# Patient Record
Sex: FEMALE | Race: OTHER
[De-identification: names, ages, dates, MRNs, and addresses within clinical notes are randomized per-mention and may not be internally consistent; named-entity substitution may affect disease eponyms.]

---

## 2018-03-19 ENCOUNTER — HOSPITAL ENCOUNTER (INPATIENT)
Dept: HOSPITAL 61 - ER | Age: 63
LOS: 12 days | Discharge: HOME | DRG: 314 | End: 2018-03-31
Attending: INTERNAL MEDICINE | Admitting: INTERNAL MEDICINE
Payer: COMMERCIAL

## 2018-03-19 DIAGNOSIS — N18.6: ICD-10-CM

## 2018-03-19 DIAGNOSIS — E78.5: ICD-10-CM

## 2018-03-19 DIAGNOSIS — E44.0: ICD-10-CM

## 2018-03-19 DIAGNOSIS — Z83.3: ICD-10-CM

## 2018-03-19 DIAGNOSIS — A04.72: ICD-10-CM

## 2018-03-19 DIAGNOSIS — T82.41XA: Primary | ICD-10-CM

## 2018-03-19 DIAGNOSIS — K76.0: ICD-10-CM

## 2018-03-19 DIAGNOSIS — I13.2: ICD-10-CM

## 2018-03-19 DIAGNOSIS — A41.9: ICD-10-CM

## 2018-03-19 DIAGNOSIS — D63.1: ICD-10-CM

## 2018-03-19 DIAGNOSIS — E11.22: ICD-10-CM

## 2018-03-19 DIAGNOSIS — T83.518A: ICD-10-CM

## 2018-03-19 DIAGNOSIS — K21.9: ICD-10-CM

## 2018-03-19 DIAGNOSIS — I77.4: ICD-10-CM

## 2018-03-19 DIAGNOSIS — E66.01: ICD-10-CM

## 2018-03-19 DIAGNOSIS — K57.30: ICD-10-CM

## 2018-03-19 DIAGNOSIS — Z95.5: ICD-10-CM

## 2018-03-19 DIAGNOSIS — Z99.2: ICD-10-CM

## 2018-03-19 DIAGNOSIS — Z91.19: ICD-10-CM

## 2018-03-19 DIAGNOSIS — I50.9: ICD-10-CM

## 2018-03-19 DIAGNOSIS — L40.9: ICD-10-CM

## 2018-03-19 DIAGNOSIS — I25.10: ICD-10-CM

## 2018-03-19 DIAGNOSIS — I42.9: ICD-10-CM

## 2018-03-19 LAB
% BANDS: 9 % (ref 0–9)
% EOS: 2 % (ref 0–5)
% LYMPHS: 5 % (ref 24–48)
% SEGS: 84 % (ref 35–66)
ADD MAN DIFF?: YES
ALBUMIN SERPL-MCNC: 2.6 G/DL (ref 3.4–5)
ALBUMIN/GLOB SERPL: 0.5 {RATIO} (ref 1–1.7)
ALP SERPL-CCNC: 86 U/L (ref 46–116)
ALT (SGPT): 9 U/L (ref 14–59)
ANION GAP SERPL CALC-SCNC: 20 MMOL/L (ref 6–14)
AST SERPL-CCNC: 13 U/L (ref 15–37)
BASO #: 0.1 X10^3/UL (ref 0–0.2)
BASO %: 1 % (ref 0–3)
BLOOD UREA NITROGEN: 84 MG/DL (ref 7–20)
BUN/CREAT SERPL: 9 (ref 6–20)
CALCIUM: 7.2 MG/DL (ref 8.5–10.1)
CHLORIDE: 98 MMOL/L (ref 98–107)
CO2 SERPL-SCNC: 21 MMOL/L (ref 21–32)
CREAT SERPL-MCNC: 9.7 MG/DL (ref 0.6–1)
EOS #: 0.2 X10^3/UL (ref 0–0.7)
EOS %: 1 % (ref 0–3)
GFR SERPLBLD BASED ON 1.73 SQ M-ARVRAT: 4.1 ML/MIN
GLOBULIN SER-MCNC: 5.1 G/DL (ref 2.2–3.8)
GLUCOSE SERPL-MCNC: 191 MG/DL (ref 70–99)
HCG SERPL-ACNC: 15.8 X10^3/UL (ref 4–11)
HEMATOCRIT: 22.7 % (ref 36–47)
HEMOGLOBIN: 7.5 G/DL (ref 12–15.5)
LIPASE: 107 U/L (ref 73–393)
LYMPH #: 0.9 X10^3/UL (ref 1–4.8)
LYMPH %: 6 % (ref 24–48)
MACROCYTOSIS: SLIGHT
MEAN CORPUSCULAR HEMOGLOBIN: 31 PG (ref 25–35)
MEAN CORPUSCULAR HGB CONC: 33 G/DL (ref 31–37)
MEAN CORPUSCULAR VOLUME: 95 FL (ref 79–100)
MONO #: 0.8 X10^3/UL (ref 0–1.1)
MONO %: 5 % (ref 0–9)
NEUT #: 13.8 X10^3UL (ref 1.8–7.7)
NEUT %: 87 % (ref 31–73)
OVALOCYTES: (no result)
PLATELET COUNT: 264 X10^3/UL (ref 140–400)
PLT ESTIMATE: ADEQUATE
POTASSIUM SERPL-SCNC: 4.5 MMOL/L (ref 3.5–5.1)
RED BLOOD COUNT: 2.39 X10^6/UL (ref 3.5–5.4)
RED CELL DISTRIBUTION WIDTH: 13 % (ref 11.5–14.5)
SODIUM: 139 MMOL/L (ref 136–145)
TOTAL BILIRUBIN: 0.2 MG/DL (ref 0.2–1)
TOTAL PROTEIN: 7.7 G/DL (ref 6.4–8.2)

## 2018-03-19 PROCEDURE — 84145 PROCALCITONIN (PCT): CPT

## 2018-03-19 PROCEDURE — 96374 THER/PROPH/DIAG INJ IV PUSH: CPT

## 2018-03-19 PROCEDURE — 80048 BASIC METABOLIC PNL TOTAL CA: CPT

## 2018-03-19 PROCEDURE — 71046 X-RAY EXAM CHEST 2 VIEWS: CPT

## 2018-03-19 PROCEDURE — 80202 ASSAY OF VANCOMYCIN: CPT

## 2018-03-19 PROCEDURE — 83540 ASSAY OF IRON: CPT

## 2018-03-19 PROCEDURE — 87086 URINE CULTURE/COLONY COUNT: CPT

## 2018-03-19 PROCEDURE — 85018 HEMOGLOBIN: CPT

## 2018-03-19 PROCEDURE — 99285 EMERGENCY DEPT VISIT HI MDM: CPT

## 2018-03-19 PROCEDURE — 80053 COMPREHEN METABOLIC PANEL: CPT

## 2018-03-19 PROCEDURE — 74022 RADEX COMPL AQT ABD SERIES: CPT

## 2018-03-19 PROCEDURE — 96375 TX/PRO/DX INJ NEW DRUG ADDON: CPT

## 2018-03-19 PROCEDURE — 85045 AUTOMATED RETICULOCYTE COUNT: CPT

## 2018-03-19 PROCEDURE — 3E03317 INTRODUCTION OF OTHER THROMBOLYTIC INTO PERIPHERAL VEIN, PERCUTANEOUS APPROACH: ICD-10-PCS

## 2018-03-19 PROCEDURE — 76770 US EXAM ABDO BACK WALL COMP: CPT

## 2018-03-19 PROCEDURE — 82728 ASSAY OF FERRITIN: CPT

## 2018-03-19 PROCEDURE — 85610 PROTHROMBIN TIME: CPT

## 2018-03-19 PROCEDURE — 85651 RBC SED RATE NONAUTOMATED: CPT

## 2018-03-19 PROCEDURE — 84100 ASSAY OF PHOSPHORUS: CPT

## 2018-03-19 PROCEDURE — 87324 CLOSTRIDIUM AG IA: CPT

## 2018-03-19 PROCEDURE — 36415 COLL VENOUS BLD VENIPUNCTURE: CPT

## 2018-03-19 PROCEDURE — 85025 COMPLETE CBC W/AUTO DIFF WBC: CPT

## 2018-03-19 PROCEDURE — 93005 ELECTROCARDIOGRAM TRACING: CPT

## 2018-03-19 PROCEDURE — 83690 ASSAY OF LIPASE: CPT

## 2018-03-19 PROCEDURE — 87045 FECES CULTURE AEROBIC BACT: CPT

## 2018-03-19 PROCEDURE — 83735 ASSAY OF MAGNESIUM: CPT

## 2018-03-19 PROCEDURE — 85007 BL SMEAR W/DIFF WBC COUNT: CPT

## 2018-03-19 PROCEDURE — 80069 RENAL FUNCTION PANEL: CPT

## 2018-03-19 PROCEDURE — 97165 OT EVAL LOW COMPLEX 30 MIN: CPT

## 2018-03-19 PROCEDURE — 83550 IRON BINDING TEST: CPT

## 2018-03-19 PROCEDURE — 74176 CT ABD & PELVIS W/O CONTRAST: CPT

## 2018-03-19 PROCEDURE — 82962 GLUCOSE BLOOD TEST: CPT

## 2018-03-19 PROCEDURE — 81001 URINALYSIS AUTO W/SCOPE: CPT

## 2018-03-19 PROCEDURE — 85730 THROMBOPLASTIN TIME PARTIAL: CPT

## 2018-03-19 PROCEDURE — 85027 COMPLETE CBC AUTOMATED: CPT

## 2018-03-19 PROCEDURE — 87328 CRYPTOSPORIDIUM AG IA: CPT

## 2018-03-19 PROCEDURE — 87040 BLOOD CULTURE FOR BACTERIA: CPT

## 2018-03-19 RX ADMIN — ALTEPLASE 1 MG: 2.2 INJECTION, POWDER, LYOPHILIZED, FOR SOLUTION INTRAVENOUS at 18:10

## 2018-03-19 RX ADMIN — INSULIN DETEMIR 1 UNITS: 100 INJECTION, SOLUTION SUBCUTANEOUS at 22:00

## 2018-03-19 RX ADMIN — ALTEPLASE 1 MG: 2.2 INJECTION, POWDER, LYOPHILIZED, FOR SOLUTION INTRAVENOUS at 18:11

## 2018-03-19 RX ADMIN — FAMOTIDINE 1 MG: 10 INJECTION, SOLUTION INTRAVENOUS at 15:42

## 2018-03-19 RX ADMIN — ATORVASTATIN CALCIUM 1 MG: 40 TABLET, FILM COATED ORAL at 22:57

## 2018-03-19 RX ADMIN — ONDANSETRON 1 MG: 2 INJECTION INTRAMUSCULAR; INTRAVENOUS at 15:42

## 2018-03-20 LAB
ADD MAN DIFF?: NO
ALBUMIN SERPL-MCNC: 2.3 G/DL (ref 3.4–5)
ALBUMIN/GLOB SERPL: 0.5 {RATIO} (ref 1–1.7)
ALP SERPL-CCNC: 70 U/L (ref 46–116)
ALT (SGPT): 6 U/L (ref 14–59)
ANION GAP SERPL CALC-SCNC: 16 MMOL/L (ref 6–14)
AST SERPL-CCNC: 10 U/L (ref 15–37)
BASO #: 0.1 X10^3/UL (ref 0–0.2)
BASO %: 0 % (ref 0–3)
BLOOD UREA NITROGEN: 87 MG/DL (ref 7–20)
BUN/CREAT SERPL: 8 (ref 6–20)
CALCIUM: 7 MG/DL (ref 8.5–10.1)
CHLORIDE: 101 MMOL/L (ref 98–107)
CO2 SERPL-SCNC: 23 MMOL/L (ref 21–32)
CREAT SERPL-MCNC: 10.3 MG/DL (ref 0.6–1)
EOS #: 0.4 X10^3/UL (ref 0–0.7)
EOS %: 3 % (ref 0–3)
GFR SERPLBLD BASED ON 1.73 SQ M-ARVRAT: 3.8 ML/MIN
GLOBULIN SER-MCNC: 4.4 G/DL (ref 2.2–3.8)
GLUCOSE SERPL-MCNC: 97 MG/DL (ref 70–99)
HCG SERPL-ACNC: 13.8 X10^3/UL (ref 4–11)
HEMATOCRIT: 20.5 % (ref 36–47)
HEMOGLOBIN: 6.7 G/DL (ref 12–15.5)
INR: 1.2 (ref 0.8–1.1)
LYMPH #: 1.4 X10^3/UL (ref 1–4.8)
LYMPH %: 10 % (ref 24–48)
MEAN CORPUSCULAR HEMOGLOBIN: 31 PG (ref 25–35)
MEAN CORPUSCULAR HGB CONC: 33 G/DL (ref 31–37)
MEAN CORPUSCULAR VOLUME: 95 FL (ref 79–100)
MONO #: 1 X10^3/UL (ref 0–1.1)
MONO %: 8 % (ref 0–9)
NEUT #: 10.9 X10^3UL (ref 1.8–7.7)
NEUT %: 80 % (ref 31–73)
PARTIAL THROMBOPLASTIN TIME: 37 SEC (ref 24–38)
PLATELET COUNT: 242 X10^3/UL (ref 140–400)
POC GLUCOSE: 105 MG/DL (ref 70–99)
POC GLUCOSE: 175 MG/DL (ref 70–99)
POC GLUCOSE: 203 MG/DL (ref 70–99)
POC GLUCOSE: 88 MG/DL (ref 70–99)
POTASSIUM SERPL-SCNC: 4.2 MMOL/L (ref 3.5–5.1)
PROTHROMBIN TIME PATIENT: 14.8 SEC (ref 11.7–14)
RED BLOOD COUNT: 2.14 X10^6/UL (ref 3.5–5.4)
RED CELL DISTRIBUTION WIDTH: 13 % (ref 11.5–14.5)
SODIUM: 140 MMOL/L (ref 136–145)
TOTAL BILIRUBIN: 0.2 MG/DL (ref 0.2–1)
TOTAL PROTEIN: 6.7 G/DL (ref 6.4–8.2)

## 2018-03-20 RX ADMIN — ISOSORBIDE MONONITRATE 1 MG: 30 TABLET, EXTENDED RELEASE ORAL at 14:07

## 2018-03-20 RX ADMIN — FUROSEMIDE 1 MG: 40 TABLET ORAL at 14:00

## 2018-03-20 RX ADMIN — INSULIN DETEMIR 1 UNITS: 100 INJECTION, SOLUTION SUBCUTANEOUS at 21:12

## 2018-03-20 RX ADMIN — LISINOPRIL 1 MG: 20 TABLET ORAL at 14:07

## 2018-03-20 RX ADMIN — FUROSEMIDE 1 MG: 40 TABLET ORAL at 14:06

## 2018-03-20 RX ADMIN — CARVEDILOL 1 MG: 12.5 TABLET, FILM COATED ORAL at 17:00

## 2018-03-20 RX ADMIN — ONDANSETRON 1 MG: 4 TABLET, ORALLY DISINTEGRATING ORAL at 17:50

## 2018-03-20 RX ADMIN — ATORVASTATIN CALCIUM 1 MG: 40 TABLET, FILM COATED ORAL at 21:04

## 2018-03-20 RX ADMIN — INSULIN ASPART 1 UNITS: 100 INJECTION, SOLUTION INTRAVENOUS; SUBCUTANEOUS at 17:00

## 2018-03-20 RX ADMIN — ACETAMINOPHEN 1 MG: 500 TABLET ORAL at 16:54

## 2018-03-20 RX ADMIN — DARBEPOETIN ALFA 1 MCG: 60 INJECTION, SOLUTION INTRAVENOUS; SUBCUTANEOUS at 21:05

## 2018-03-20 RX ADMIN — CARVEDILOL 1 MG: 12.5 TABLET, FILM COATED ORAL at 14:08

## 2018-03-20 RX ADMIN — INSULIN ASPART 1 UNITS: 100 INJECTION, SOLUTION INTRAVENOUS; SUBCUTANEOUS at 12:00

## 2018-03-20 RX ADMIN — CLOPIDOGREL BISULFATE 1 MG: 75 TABLET ORAL at 14:06

## 2018-03-21 LAB
ALBUMIN SERPL-MCNC: 2.5 G/DL (ref 3.4–5)
ANION GAP SERPL CALC-SCNC: 15 MMOL/L (ref 6–14)
BLOOD UREA NITROGEN: 35 MG/DL (ref 7–20)
CALCIUM: 7.8 MG/DL (ref 8.5–10.1)
CHLORIDE: 98 MMOL/L (ref 98–107)
CO2 SERPL-SCNC: 28 MMOL/L (ref 21–32)
CREAT SERPL-MCNC: 6.3 MG/DL (ref 0.6–1)
GFR SERPLBLD BASED ON 1.73 SQ M-ARVRAT: 6.8 ML/MIN
GLUCOSE SERPL-MCNC: 75 MG/DL (ref 70–99)
HEMOGLOBIN: 7.8 G/DL (ref 12–15.5)
MAGNESIUM: 2 MG/DL (ref 1.8–2.4)
PHOSPHORUS: 5.5 MG/DL (ref 2.6–4.7)
POC GLUCOSE: 187 MG/DL (ref 70–99)
POC GLUCOSE: 252 MG/DL (ref 70–99)
POC GLUCOSE: 267 MG/DL (ref 70–99)
POC GLUCOSE: 90 MG/DL (ref 70–99)
POTASSIUM SERPL-SCNC: 3.6 MMOL/L (ref 3.5–5.1)
SODIUM: 141 MMOL/L (ref 136–145)

## 2018-03-21 RX ADMIN — PIPERACILLIN SODIUM AND TAZOBACTAM SODIUM 1 MLS/HR: 2; .25 INJECTION, POWDER, LYOPHILIZED, FOR SOLUTION INTRAVENOUS at 21:41

## 2018-03-21 RX ADMIN — CARVEDILOL 1 MG: 12.5 TABLET, FILM COATED ORAL at 09:53

## 2018-03-21 RX ADMIN — Medication 1 CAP: at 21:41

## 2018-03-21 RX ADMIN — LISINOPRIL 1 MG: 20 TABLET ORAL at 09:52

## 2018-03-21 RX ADMIN — INSULIN ASPART 1 UNITS: 100 INJECTION, SOLUTION INTRAVENOUS; SUBCUTANEOUS at 13:33

## 2018-03-21 RX ADMIN — INSULIN ASPART 1 UNITS: 100 INJECTION, SOLUTION INTRAVENOUS; SUBCUTANEOUS at 12:00

## 2018-03-21 RX ADMIN — INSULIN ASPART 1 UNITS: 100 INJECTION, SOLUTION INTRAVENOUS; SUBCUTANEOUS at 08:00

## 2018-03-21 RX ADMIN — ISOSORBIDE MONONITRATE 1 MG: 30 TABLET, EXTENDED RELEASE ORAL at 09:54

## 2018-03-21 RX ADMIN — INSULIN DETEMIR 1 UNITS: 100 INJECTION, SOLUTION SUBCUTANEOUS at 21:45

## 2018-03-21 RX ADMIN — ATORVASTATIN CALCIUM 1 MG: 40 TABLET, FILM COATED ORAL at 21:42

## 2018-03-21 RX ADMIN — VANCOMYCIN HYDROCHLORIDE 1 EACH: 1 INJECTION, POWDER, LYOPHILIZED, FOR SOLUTION INTRAVENOUS at 15:40

## 2018-03-21 RX ADMIN — INSULIN ASPART 1 UNITS: 100 INJECTION, SOLUTION INTRAVENOUS; SUBCUTANEOUS at 17:19

## 2018-03-21 RX ADMIN — CLOPIDOGREL BISULFATE 1 MG: 75 TABLET ORAL at 09:51

## 2018-03-21 RX ADMIN — PIPERACILLIN SODIUM AND TAZOBACTAM SODIUM 1 MLS/HR: 2; .25 INJECTION, POWDER, LYOPHILIZED, FOR SOLUTION INTRAVENOUS at 13:26

## 2018-03-21 RX ADMIN — VANCOMYCIN HYDROCHLORIDE 1 MLS/HR: 1 INJECTION, POWDER, FOR SOLUTION INTRAVENOUS at 13:20

## 2018-03-21 RX ADMIN — FUROSEMIDE 1 MG: 40 TABLET ORAL at 13:30

## 2018-03-21 RX ADMIN — CARVEDILOL 1 MG: 12.5 TABLET, FILM COATED ORAL at 16:39

## 2018-03-21 RX ADMIN — FUROSEMIDE 1 MG: 40 TABLET ORAL at 09:55

## 2018-03-22 LAB
% SAT IRON: 24 % (ref 15–34)
ADD MAN DIFF?: NO
ALBUMIN SERPL-MCNC: 2.2 G/DL (ref 3.4–5)
ANION GAP SERPL CALC-SCNC: 12 MMOL/L (ref 6–14)
BASO #: 0.1 X10^3/UL (ref 0–0.2)
BASO %: 1 % (ref 0–3)
BLOOD UREA NITROGEN: 58 MG/DL (ref 7–20)
CALCIUM: 6.6 MG/DL (ref 8.5–10.1)
CHLORIDE: 95 MMOL/L (ref 98–107)
CO2 SERPL-SCNC: 27 MMOL/L (ref 21–32)
CREAT SERPL-MCNC: 8.3 MG/DL (ref 0.6–1)
EOS #: 0.4 X10^3/UL (ref 0–0.7)
EOS %: 4 % (ref 0–3)
FERRITIN: 286 NG/ML (ref 8–252)
GFR SERPLBLD BASED ON 1.73 SQ M-ARVRAT: 4.9 ML/MIN
GLUCOSE SERPL-MCNC: 230 MG/DL (ref 70–99)
HCG SERPL-ACNC: 9.6 X10^3/UL (ref 4–11)
HEMATOCRIT: 19.5 % (ref 36–47)
HEMOGLOBIN: 6.4 G/DL (ref 12–15.5)
IRON,SERUM: 43 UG/DL (ref 50–170)
LYMPH #: 1.1 X10^3/UL (ref 1–4.8)
LYMPH %: 12 % (ref 24–48)
MAGNESIUM: 1.7 MG/DL (ref 1.8–2.4)
MEAN CORPUSCULAR HEMOGLOBIN: 32 PG (ref 25–35)
MEAN CORPUSCULAR HGB CONC: 33 G/DL (ref 31–37)
MEAN CORPUSCULAR VOLUME: 95 FL (ref 79–100)
MONO #: 0.6 X10^3/UL (ref 0–1.1)
MONO %: 6 % (ref 0–9)
NEUT #: 7.4 X10^3UL (ref 1.8–7.7)
NEUT %: 77 % (ref 31–73)
PHOSPHORUS: 4.2 MG/DL (ref 2.6–4.7)
PLATELET COUNT: 228 X10^3/UL (ref 140–400)
POC GLUCOSE: 186 MG/DL (ref 70–99)
POC GLUCOSE: 298 MG/DL (ref 70–99)
POC GLUCOSE: 339 MG/DL (ref 70–99)
POC GLUCOSE: 342 MG/DL (ref 70–99)
POTASSIUM SERPL-SCNC: 4.1 MMOL/L (ref 3.5–5.1)
RED BLOOD COUNT: 2.04 X10^6/UL (ref 3.5–5.4)
RED CELL DISTRIBUTION WIDTH: 12.9 % (ref 11.5–14.5)
RETIC COUNT: 0.9 % (ref 0.5–2.5)
SEDIMENTATION RATE: 117 (ref 0–25)
SODIUM: 134 MMOL/L (ref 136–145)

## 2018-03-22 RX ADMIN — PIPERACILLIN SODIUM AND TAZOBACTAM SODIUM 1 MLS/HR: 2; .25 INJECTION, POWDER, LYOPHILIZED, FOR SOLUTION INTRAVENOUS at 06:15

## 2018-03-22 RX ADMIN — INSULIN ASPART 1 UNITS: 100 INJECTION, SOLUTION INTRAVENOUS; SUBCUTANEOUS at 10:15

## 2018-03-22 RX ADMIN — CALCIUM GLUCONATE 1 MG: 94 INJECTION, SOLUTION INTRAVENOUS at 18:18

## 2018-03-22 RX ADMIN — VANCOMYCIN HYDROCHLORIDE 1 EACH: 1 INJECTION, POWDER, LYOPHILIZED, FOR SOLUTION INTRAVENOUS at 06:00

## 2018-03-22 RX ADMIN — ISOSORBIDE MONONITRATE 1 MG: 30 TABLET, EXTENDED RELEASE ORAL at 08:55

## 2018-03-22 RX ADMIN — MAGNESIUM SULFATE IN WATER 1 MLS/HR: 40 INJECTION, SOLUTION INTRAVENOUS at 13:07

## 2018-03-22 RX ADMIN — CARVEDILOL 1 MG: 12.5 TABLET, FILM COATED ORAL at 18:19

## 2018-03-22 RX ADMIN — HYALURONIDASE (HUMAN RECOMBINANT) 1 UNIT: 150 INJECTION, SOLUTION SUBCUTANEOUS at 16:31

## 2018-03-22 RX ADMIN — PIPERACILLIN SODIUM AND TAZOBACTAM SODIUM 1 MLS/HR: 2; .25 INJECTION, POWDER, LYOPHILIZED, FOR SOLUTION INTRAVENOUS at 21:25

## 2018-03-22 RX ADMIN — LISINOPRIL 1 MG: 20 TABLET ORAL at 08:54

## 2018-03-22 RX ADMIN — CALCIUM 1 MG: 500 TABLET ORAL at 13:08

## 2018-03-22 RX ADMIN — FUROSEMIDE 1 MG: 40 TABLET ORAL at 15:40

## 2018-03-22 RX ADMIN — INSULIN ASPART 1 UNITS: 100 INJECTION, SOLUTION INTRAVENOUS; SUBCUTANEOUS at 18:33

## 2018-03-22 RX ADMIN — Medication 1 CAP: at 08:54

## 2018-03-22 RX ADMIN — CALCIUM 1 MG: 500 TABLET ORAL at 18:19

## 2018-03-22 RX ADMIN — PIPERACILLIN SODIUM AND TAZOBACTAM SODIUM 1 MLS/HR: 2; .25 INJECTION, POWDER, LYOPHILIZED, FOR SOLUTION INTRAVENOUS at 15:41

## 2018-03-22 RX ADMIN — CARVEDILOL 1 MG: 12.5 TABLET, FILM COATED ORAL at 08:53

## 2018-03-22 RX ADMIN — CLOPIDOGREL BISULFATE 1 MG: 75 TABLET ORAL at 08:55

## 2018-03-22 RX ADMIN — Medication 1 TAB: at 13:08

## 2018-03-22 RX ADMIN — FUROSEMIDE 1 MG: 40 TABLET ORAL at 08:55

## 2018-03-22 RX ADMIN — Medication 1 CAP: at 21:24

## 2018-03-22 RX ADMIN — ATORVASTATIN CALCIUM 1 MG: 40 TABLET, FILM COATED ORAL at 21:24

## 2018-03-22 RX ADMIN — INSULIN ASPART 1 UNITS: 100 INJECTION, SOLUTION INTRAVENOUS; SUBCUTANEOUS at 13:22

## 2018-03-22 RX ADMIN — CALCIUM GLUCONATE 1 MG: 94 INJECTION, SOLUTION INTRAVENOUS at 15:39

## 2018-03-22 RX ADMIN — CALCIUM GLUCONATE 1 MG: 94 INJECTION, SOLUTION INTRAVENOUS at 13:06

## 2018-03-22 RX ADMIN — INSULIN DETEMIR 1 UNITS: 100 INJECTION, SOLUTION SUBCUTANEOUS at 21:54

## 2018-03-23 LAB
ALBUMIN SERPL-MCNC: 2.2 G/DL (ref 3.4–5)
ANION GAP SERPL CALC-SCNC: 13 MMOL/L (ref 6–14)
BLOOD UREA NITROGEN: 79 MG/DL (ref 7–20)
CALCIUM: 7.7 MG/DL (ref 8.5–10.1)
CHLORIDE: 94 MMOL/L (ref 98–107)
CO2 SERPL-SCNC: 26 MMOL/L (ref 21–32)
CREAT SERPL-MCNC: 9.4 MG/DL (ref 0.6–1)
GFR SERPLBLD BASED ON 1.73 SQ M-ARVRAT: 4.3 ML/MIN
GLUCOSE SERPL-MCNC: 143 MG/DL (ref 70–99)
MAGNESIUM: 2.3 MG/DL (ref 1.8–2.4)
PHOSPHORUS: 3.6 MG/DL (ref 2.6–4.7)
POC GLUCOSE: 118 MG/DL (ref 70–99)
POC GLUCOSE: 157 MG/DL (ref 70–99)
POC GLUCOSE: 183 MG/DL (ref 70–99)
POTASSIUM SERPL-SCNC: 4.1 MMOL/L (ref 3.5–5.1)
SODIUM: 133 MMOL/L (ref 136–145)

## 2018-03-23 RX ADMIN — INSULIN ASPART 1 UNITS: 100 INJECTION, SOLUTION INTRAVENOUS; SUBCUTANEOUS at 08:00

## 2018-03-23 RX ADMIN — LISINOPRIL 1 MG: 20 TABLET ORAL at 09:52

## 2018-03-23 RX ADMIN — INSULIN ASPART 1 UNITS: 100 INJECTION, SOLUTION INTRAVENOUS; SUBCUTANEOUS at 12:38

## 2018-03-23 RX ADMIN — CARVEDILOL 1 MG: 12.5 TABLET, FILM COATED ORAL at 17:00

## 2018-03-23 RX ADMIN — VANCOMYCIN HYDROCHLORIDE 1 MG: 500 INJECTION, POWDER, LYOPHILIZED, FOR SOLUTION INTRAVENOUS at 17:00

## 2018-03-23 RX ADMIN — VANCOMYCIN HYDROCHLORIDE 1 MG: 500 INJECTION, POWDER, LYOPHILIZED, FOR SOLUTION INTRAVENOUS at 14:25

## 2018-03-23 RX ADMIN — ACETAMINOPHEN 1 MG: 500 TABLET ORAL at 20:32

## 2018-03-23 RX ADMIN — FUROSEMIDE 1 MG: 40 TABLET ORAL at 14:00

## 2018-03-23 RX ADMIN — ONDANSETRON 1 MG: 4 TABLET, ORALLY DISINTEGRATING ORAL at 12:15

## 2018-03-23 RX ADMIN — VANCOMYCIN HYDROCHLORIDE 1 MG: 500 INJECTION, POWDER, LYOPHILIZED, FOR SOLUTION INTRAVENOUS at 10:13

## 2018-03-23 RX ADMIN — FUROSEMIDE 1 MG: 40 TABLET ORAL at 09:54

## 2018-03-23 RX ADMIN — ACETAMINOPHEN 1 MG: 500 TABLET ORAL at 09:52

## 2018-03-23 RX ADMIN — CARVEDILOL 1 MG: 12.5 TABLET, FILM COATED ORAL at 09:53

## 2018-03-23 RX ADMIN — Medication 1 CAP: at 20:31

## 2018-03-23 RX ADMIN — ISOSORBIDE MONONITRATE 1 MG: 30 TABLET, EXTENDED RELEASE ORAL at 09:53

## 2018-03-23 RX ADMIN — CLOPIDOGREL BISULFATE 1 MG: 75 TABLET ORAL at 09:53

## 2018-03-23 RX ADMIN — INSULIN ASPART 1 UNITS: 100 INJECTION, SOLUTION INTRAVENOUS; SUBCUTANEOUS at 17:00

## 2018-03-23 RX ADMIN — ATORVASTATIN CALCIUM 1 MG: 40 TABLET, FILM COATED ORAL at 20:31

## 2018-03-23 RX ADMIN — Medication 1 CAP: at 09:52

## 2018-03-23 RX ADMIN — CALCIUM 1 MG: 500 TABLET ORAL at 12:15

## 2018-03-23 RX ADMIN — CALCIUM 1 MG: 500 TABLET ORAL at 09:51

## 2018-03-23 RX ADMIN — INSULIN DETEMIR 1 UNITS: 100 INJECTION, SOLUTION SUBCUTANEOUS at 21:00

## 2018-03-23 RX ADMIN — CALCIUM 1 MG: 500 TABLET ORAL at 16:30

## 2018-03-23 RX ADMIN — Medication 1 TAB: at 09:52

## 2018-03-23 RX ADMIN — PIPERACILLIN SODIUM AND TAZOBACTAM SODIUM 1 MLS/HR: 2; .25 INJECTION, POWDER, LYOPHILIZED, FOR SOLUTION INTRAVENOUS at 05:52

## 2018-03-23 RX ADMIN — VANCOMYCIN HYDROCHLORIDE 1 MG: 500 INJECTION, POWDER, LYOPHILIZED, FOR SOLUTION INTRAVENOUS at 20:32

## 2018-03-24 LAB
ADD MAN DIFF?: NO
ALBUMIN SERPL-MCNC: 2.2 G/DL (ref 3.4–5)
ANION GAP SERPL CALC-SCNC: 7 MMOL/L (ref 6–14)
BASO #: 0.1 X10^3/UL (ref 0–0.2)
BASO %: 1 % (ref 0–3)
BLOOD UREA NITROGEN: 29 MG/DL (ref 7–20)
CALCIUM: 8.1 MG/DL (ref 8.5–10.1)
CHLORIDE: 99 MMOL/L (ref 98–107)
CO2 SERPL-SCNC: 30 MMOL/L (ref 21–32)
CREAT SERPL-MCNC: 5.3 MG/DL (ref 0.6–1)
EOS #: 0.2 X10^3/UL (ref 0–0.7)
EOS %: 1 % (ref 0–3)
GFR SERPLBLD BASED ON 1.73 SQ M-ARVRAT: 8.3 ML/MIN
GLUCOSE SERPL-MCNC: 59 MG/DL (ref 70–99)
HCG SERPL-ACNC: 13.5 X10^3/UL (ref 4–11)
HEMATOCRIT: 20.2 % (ref 36–47)
HEMOGLOBIN: 6.5 G/DL (ref 12–15.5)
LYMPH #: 1.2 X10^3/UL (ref 1–4.8)
LYMPH %: 9 % (ref 24–48)
MAGNESIUM: 2 MG/DL (ref 1.8–2.4)
MEAN CORPUSCULAR HEMOGLOBIN: 31 PG (ref 25–35)
MEAN CORPUSCULAR HGB CONC: 32 G/DL (ref 31–37)
MEAN CORPUSCULAR VOLUME: 96 FL (ref 79–100)
MONO #: 0.9 X10^3/UL (ref 0–1.1)
MONO %: 7 % (ref 0–9)
NEUT #: 11.2 X10^3UL (ref 1.8–7.7)
NEUT %: 82 % (ref 31–73)
PHOSPHORUS: 2.6 MG/DL (ref 2.6–4.7)
PLATELET COUNT: 249 X10^3/UL (ref 140–400)
POC GLUCOSE: 236 MG/DL (ref 70–99)
POC GLUCOSE: 270 MG/DL (ref 70–99)
POC GLUCOSE: 281 MG/DL (ref 70–99)
POC GLUCOSE: 86 MG/DL (ref 70–99)
POC GLUCOSE: 93 MG/DL (ref 70–99)
POTASSIUM SERPL-SCNC: 3.8 MMOL/L (ref 3.5–5.1)
RED BLOOD COUNT: 2.11 X10^6/UL (ref 3.5–5.4)
RED CELL DISTRIBUTION WIDTH: 12.8 % (ref 11.5–14.5)
SODIUM: 136 MMOL/L (ref 136–145)

## 2018-03-24 RX ADMIN — Medication 1 CAP: at 21:45

## 2018-03-24 RX ADMIN — VANCOMYCIN HYDROCHLORIDE 1 MG: 500 INJECTION, POWDER, LYOPHILIZED, FOR SOLUTION INTRAVENOUS at 18:29

## 2018-03-24 RX ADMIN — LISINOPRIL 1 MG: 20 TABLET ORAL at 10:05

## 2018-03-24 RX ADMIN — INSULIN ASPART 1 UNITS: 100 INJECTION, SOLUTION INTRAVENOUS; SUBCUTANEOUS at 17:23

## 2018-03-24 RX ADMIN — INSULIN ASPART 1 UNITS: 100 INJECTION, SOLUTION INTRAVENOUS; SUBCUTANEOUS at 08:00

## 2018-03-24 RX ADMIN — INSULIN ASPART 1 UNITS: 100 INJECTION, SOLUTION INTRAVENOUS; SUBCUTANEOUS at 12:27

## 2018-03-24 RX ADMIN — ISOSORBIDE MONONITRATE 1 MG: 30 TABLET, EXTENDED RELEASE ORAL at 10:07

## 2018-03-24 RX ADMIN — CLOPIDOGREL BISULFATE 1 MG: 75 TABLET ORAL at 10:06

## 2018-03-24 RX ADMIN — CALCIUM 1 MG: 500 TABLET ORAL at 12:22

## 2018-03-24 RX ADMIN — FUROSEMIDE 1 MG: 40 TABLET ORAL at 14:35

## 2018-03-24 RX ADMIN — CALCIUM 1 MG: 500 TABLET ORAL at 10:06

## 2018-03-24 RX ADMIN — ATORVASTATIN CALCIUM 1 MG: 40 TABLET, FILM COATED ORAL at 21:45

## 2018-03-24 RX ADMIN — INSULIN DETEMIR 1 UNITS: 100 INJECTION, SOLUTION SUBCUTANEOUS at 21:49

## 2018-03-24 RX ADMIN — CARVEDILOL 1 MG: 12.5 TABLET, FILM COATED ORAL at 10:07

## 2018-03-24 RX ADMIN — Medication 1 TAB: at 10:04

## 2018-03-24 RX ADMIN — Medication 1 CAP: at 10:06

## 2018-03-24 RX ADMIN — CALCIUM 1 MG: 500 TABLET ORAL at 17:16

## 2018-03-24 RX ADMIN — VANCOMYCIN HYDROCHLORIDE 1 MG: 500 INJECTION, POWDER, LYOPHILIZED, FOR SOLUTION INTRAVENOUS at 12:22

## 2018-03-24 RX ADMIN — VANCOMYCIN HYDROCHLORIDE 1 MG: 500 INJECTION, POWDER, LYOPHILIZED, FOR SOLUTION INTRAVENOUS at 21:45

## 2018-03-24 RX ADMIN — FUROSEMIDE 1 MG: 40 TABLET ORAL at 10:06

## 2018-03-24 RX ADMIN — CARVEDILOL 1 MG: 12.5 TABLET, FILM COATED ORAL at 17:17

## 2018-03-24 RX ADMIN — VANCOMYCIN HYDROCHLORIDE 1 MG: 500 INJECTION, POWDER, LYOPHILIZED, FOR SOLUTION INTRAVENOUS at 10:10

## 2018-03-25 LAB
ALBUMIN SERPL-MCNC: 2.1 G/DL (ref 3.4–5)
ANION GAP SERPL CALC-SCNC: 12 MMOL/L (ref 6–14)
BLOOD UREA NITROGEN: 45 MG/DL (ref 7–20)
CALCIUM: 8.2 MG/DL (ref 8.5–10.1)
CHLORIDE: 94 MMOL/L (ref 98–107)
CO2 SERPL-SCNC: 28 MMOL/L (ref 21–32)
CREAT SERPL-MCNC: 7.6 MG/DL (ref 0.6–1)
GFR SERPLBLD BASED ON 1.73 SQ M-ARVRAT: 5.4 ML/MIN
GLUCOSE SERPL-MCNC: 150 MG/DL (ref 70–99)
MAGNESIUM: 2.1 MG/DL (ref 1.8–2.4)
PHOSPHORUS: 2.7 MG/DL (ref 2.6–4.7)
POC GLUCOSE: 172 MG/DL (ref 70–99)
POC GLUCOSE: 235 MG/DL (ref 70–99)
POC GLUCOSE: 237 MG/DL (ref 70–99)
POC GLUCOSE: 80 MG/DL (ref 70–99)
POTASSIUM SERPL-SCNC: 4.2 MMOL/L (ref 3.5–5.1)
SODIUM: 134 MMOL/L (ref 136–145)

## 2018-03-25 RX ADMIN — CALCIUM 1 MG: 500 TABLET ORAL at 08:51

## 2018-03-25 RX ADMIN — OXYCODONE HYDROCHLORIDE AND ACETAMINOPHEN 1 TAB: 5; 325 TABLET ORAL at 15:26

## 2018-03-25 RX ADMIN — DIPHENOXYLATE HYDROCHLORIDE AND ATROPINE SULFATE 1 TAB: 2.5; .025 TABLET ORAL at 09:08

## 2018-03-25 RX ADMIN — ACETAMINOPHEN 1 MG: 500 TABLET ORAL at 09:08

## 2018-03-25 RX ADMIN — CALCIUM 1 MG: 500 TABLET ORAL at 12:36

## 2018-03-25 RX ADMIN — VANCOMYCIN HYDROCHLORIDE 1 MG: 500 INJECTION, POWDER, LYOPHILIZED, FOR SOLUTION INTRAVENOUS at 17:24

## 2018-03-25 RX ADMIN — FUROSEMIDE 1 MG: 40 TABLET ORAL at 15:22

## 2018-03-25 RX ADMIN — CALCIUM 1 MG: 500 TABLET ORAL at 17:23

## 2018-03-25 RX ADMIN — VANCOMYCIN HYDROCHLORIDE 1 MG: 500 INJECTION, POWDER, LYOPHILIZED, FOR SOLUTION INTRAVENOUS at 21:37

## 2018-03-25 RX ADMIN — FUROSEMIDE 1 MG: 40 TABLET ORAL at 08:53

## 2018-03-25 RX ADMIN — Medication 1 MLS/HR: at 12:35

## 2018-03-25 RX ADMIN — CARVEDILOL 1 MG: 12.5 TABLET, FILM COATED ORAL at 17:24

## 2018-03-25 RX ADMIN — LISINOPRIL 1 MG: 20 TABLET ORAL at 08:53

## 2018-03-25 RX ADMIN — TOBRAMYCIN SULFATE 1 MLS/HR: 40 INJECTION, SOLUTION INTRAMUSCULAR; INTRAVENOUS at 21:38

## 2018-03-25 RX ADMIN — Medication 1 CAP: at 21:38

## 2018-03-25 RX ADMIN — INSULIN ASPART 1 UNITS: 100 INJECTION, SOLUTION INTRAVENOUS; SUBCUTANEOUS at 17:34

## 2018-03-25 RX ADMIN — ATORVASTATIN CALCIUM 1 MG: 40 TABLET, FILM COATED ORAL at 21:38

## 2018-03-25 RX ADMIN — INSULIN ASPART 1 UNITS: 100 INJECTION, SOLUTION INTRAVENOUS; SUBCUTANEOUS at 08:00

## 2018-03-25 RX ADMIN — VANCOMYCIN HYDROCHLORIDE 1 MG: 500 INJECTION, POWDER, LYOPHILIZED, FOR SOLUTION INTRAVENOUS at 12:35

## 2018-03-25 RX ADMIN — CLOPIDOGREL BISULFATE 1 MG: 75 TABLET ORAL at 08:54

## 2018-03-25 RX ADMIN — INSULIN ASPART 1 UNITS: 100 INJECTION, SOLUTION INTRAVENOUS; SUBCUTANEOUS at 12:00

## 2018-03-25 RX ADMIN — Medication 1 TAB: at 08:50

## 2018-03-25 RX ADMIN — VANCOMYCIN HYDROCHLORIDE 1 MG: 500 INJECTION, POWDER, LYOPHILIZED, FOR SOLUTION INTRAVENOUS at 08:53

## 2018-03-25 RX ADMIN — CARVEDILOL 1 MG: 12.5 TABLET, FILM COATED ORAL at 08:52

## 2018-03-25 RX ADMIN — INSULIN DETEMIR 1 UNITS: 100 INJECTION, SOLUTION SUBCUTANEOUS at 21:47

## 2018-03-25 RX ADMIN — Medication 1 CAP: at 08:53

## 2018-03-25 RX ADMIN — ISOSORBIDE MONONITRATE 1 MG: 30 TABLET, EXTENDED RELEASE ORAL at 08:53

## 2018-03-26 LAB
ADD MAN DIFF?: NO
ALBUMIN SERPL-MCNC: 2 G/DL (ref 3.4–5)
ALBUMIN/GLOB SERPL: 0.4 {RATIO} (ref 1–1.7)
ALP SERPL-CCNC: 95 U/L (ref 46–116)
ALT (SGPT): 13 U/L (ref 14–59)
ANION GAP SERPL CALC-SCNC: 13 MMOL/L (ref 6–14)
AST SERPL-CCNC: 10 U/L (ref 15–37)
BACTERIA,URINE: (no result) /HPF
BASO #: 0.1 X10^3/UL (ref 0–0.2)
BASO %: 0 % (ref 0–3)
BILIRUBIN,URINE: NEGATIVE
BLOOD UREA NITROGEN: 57 MG/DL (ref 7–20)
BUN/CREAT SERPL: 6 (ref 6–20)
CALCIUM: 7.7 MG/DL (ref 8.5–10.1)
CHLORIDE: 91 MMOL/L (ref 98–107)
CLARITY,URINE: (no result)
CO2 SERPL-SCNC: 26 MMOL/L (ref 21–32)
COLOR,URINE: YELLOW
CREAT SERPL-MCNC: 9 MG/DL (ref 0.6–1)
EOS #: 0.4 X10^3/UL (ref 0–0.7)
EOS %: 2 % (ref 0–3)
GFR SERPLBLD BASED ON 1.73 SQ M-ARVRAT: 4.5 ML/MIN
GLOBULIN SER-MCNC: 4.5 G/DL (ref 2.2–3.8)
GLUCOSE SERPL-MCNC: 228 MG/DL (ref 70–99)
GLUCOSE,URINE: 250 MG/DL
HCG SERPL-ACNC: 16.4 X10^3/UL (ref 4–11)
HEMATOCRIT: 20.2 % (ref 36–47)
HEMOGLOBIN: 6.5 G/DL (ref 12–15.5)
HYALINE CASTS, URINE: (no result) /HPF
LYMPH #: 1 X10^3/UL (ref 1–4.8)
LYMPH %: 6 % (ref 24–48)
MEAN CORPUSCULAR HEMOGLOBIN: 31 PG (ref 25–35)
MEAN CORPUSCULAR HGB CONC: 32 G/DL (ref 31–37)
MEAN CORPUSCULAR VOLUME: 95 FL (ref 79–100)
MONO #: 0.9 X10^3/UL (ref 0–1.1)
MONO %: 5 % (ref 0–9)
NEUT #: 14 X10^3UL (ref 1.8–7.7)
NEUT %: 86 % (ref 31–73)
NITRITE,URINE: NEGATIVE
PH,URINE: 7
PHOSPHORUS: 3.6 MG/DL (ref 2.6–4.7)
PLATELET COUNT: 263 X10^3/UL (ref 140–400)
POC GLUCOSE: 143 MG/DL (ref 70–99)
POC GLUCOSE: 241 MG/DL (ref 70–99)
POC GLUCOSE: 287 MG/DL (ref 70–99)
POTASSIUM SERPL-SCNC: 4.3 MMOL/L (ref 3.5–5.1)
PROTEIN,URINE: >=300 MG/DL
RBC,URINE: (no result) /HPF (ref 0–2)
RED BLOOD COUNT: 2.13 X10^6/UL (ref 3.5–5.4)
RED CELL DISTRIBUTION WIDTH: 12.7 % (ref 11.5–14.5)
SODIUM: 130 MMOL/L (ref 136–145)
SPECIFIC GRAVITY,URINE: 1.02
SQUAMOUS EPITHELIAL CELL,UR: (no result) /LPF
TOTAL BILIRUBIN: 0.2 MG/DL (ref 0.2–1)
TOTAL PROTEIN: 6.5 G/DL (ref 6.4–8.2)
UROBILINOGEN,URINE: 0.2 MG/DL
WBC,URINE: (no result) /HPF (ref 0–4)

## 2018-03-26 RX ADMIN — VANCOMYCIN HYDROCHLORIDE 1 MG: 500 INJECTION, POWDER, LYOPHILIZED, FOR SOLUTION INTRAVENOUS at 16:37

## 2018-03-26 RX ADMIN — PANTOPRAZOLE SODIUM 1 MG: 40 TABLET, DELAYED RELEASE ORAL at 12:55

## 2018-03-26 RX ADMIN — VANCOMYCIN HYDROCHLORIDE 1 MG: 500 INJECTION, POWDER, LYOPHILIZED, FOR SOLUTION INTRAVENOUS at 12:54

## 2018-03-26 RX ADMIN — CALCIUM 1 MG: 500 TABLET ORAL at 07:30

## 2018-03-26 RX ADMIN — LISINOPRIL 1 MG: 20 TABLET ORAL at 09:00

## 2018-03-26 RX ADMIN — VANCOMYCIN HYDROCHLORIDE 1 MG: 500 INJECTION, POWDER, LYOPHILIZED, FOR SOLUTION INTRAVENOUS at 09:00

## 2018-03-26 RX ADMIN — CARVEDILOL 1 MG: 12.5 TABLET, FILM COATED ORAL at 16:38

## 2018-03-26 RX ADMIN — VANCOMYCIN HYDROCHLORIDE 1 MG: 500 INJECTION, POWDER, LYOPHILIZED, FOR SOLUTION INTRAVENOUS at 22:51

## 2018-03-26 RX ADMIN — FUROSEMIDE 1 MG: 40 TABLET ORAL at 09:00

## 2018-03-26 RX ADMIN — Medication 1 TAB: at 09:00

## 2018-03-26 RX ADMIN — CLOPIDOGREL BISULFATE 1 MG: 75 TABLET ORAL at 08:00

## 2018-03-26 RX ADMIN — ISOSORBIDE MONONITRATE 1 MG: 30 TABLET, EXTENDED RELEASE ORAL at 09:00

## 2018-03-26 RX ADMIN — FLUCONAZOLE 1 MG: 100 TABLET ORAL at 12:54

## 2018-03-26 RX ADMIN — FUROSEMIDE 1 MG: 40 TABLET ORAL at 12:54

## 2018-03-26 RX ADMIN — Medication 1 CAP: at 09:00

## 2018-03-26 RX ADMIN — ATORVASTATIN CALCIUM 1 MG: 40 TABLET, FILM COATED ORAL at 22:49

## 2018-03-26 RX ADMIN — OXYCODONE HYDROCHLORIDE AND ACETAMINOPHEN 1 TAB: 5; 325 TABLET ORAL at 22:49

## 2018-03-26 RX ADMIN — CARVEDILOL 1 MG: 12.5 TABLET, FILM COATED ORAL at 08:00

## 2018-03-26 RX ADMIN — INSULIN DETEMIR 1 UNITS: 100 INJECTION, SOLUTION SUBCUTANEOUS at 22:47

## 2018-03-26 RX ADMIN — CALCIUM 1 MG: 500 TABLET ORAL at 16:37

## 2018-03-26 RX ADMIN — INSULIN ASPART 1 UNITS: 100 INJECTION, SOLUTION INTRAVENOUS; SUBCUTANEOUS at 13:01

## 2018-03-26 RX ADMIN — OXYCODONE HYDROCHLORIDE AND ACETAMINOPHEN 1 TAB: 5; 325 TABLET ORAL at 16:37

## 2018-03-26 RX ADMIN — CALCIUM 1 MG: 500 TABLET ORAL at 12:54

## 2018-03-26 RX ADMIN — Medication 1 CAP: at 22:48

## 2018-03-26 RX ADMIN — INSULIN ASPART 1 UNITS: 100 INJECTION, SOLUTION INTRAVENOUS; SUBCUTANEOUS at 18:20

## 2018-03-26 RX ADMIN — OXYCODONE HYDROCHLORIDE AND ACETAMINOPHEN 1 TAB: 5; 325 TABLET ORAL at 09:16

## 2018-03-26 RX ADMIN — INSULIN ASPART 1 UNITS: 100 INJECTION, SOLUTION INTRAVENOUS; SUBCUTANEOUS at 08:00

## 2018-03-26 RX ADMIN — ONDANSETRON 1 MG: 2 INJECTION INTRAMUSCULAR; INTRAVENOUS at 22:58

## 2018-03-27 LAB
ADD MAN DIFF?: NO
ALBUMIN SERPL-MCNC: 2 G/DL (ref 3.4–5)
ANION GAP SERPL CALC-SCNC: 8 MMOL/L (ref 6–14)
BASO #: 0 X10^3/UL (ref 0–0.2)
BASO %: 0 % (ref 0–3)
BLOOD UREA NITROGEN: 25 MG/DL (ref 7–20)
CALCIUM: 8.7 MG/DL (ref 8.5–10.1)
CHLORIDE: 97 MMOL/L (ref 98–107)
CO2 SERPL-SCNC: 29 MMOL/L (ref 21–32)
CREAT SERPL-MCNC: 5.2 MG/DL (ref 0.6–1)
EOS #: 0.3 X10^3/UL (ref 0–0.7)
EOS %: 2 % (ref 0–3)
GFR SERPLBLD BASED ON 1.73 SQ M-ARVRAT: 8.4 ML/MIN
GLUCOSE SERPL-MCNC: 183 MG/DL (ref 70–99)
HCG SERPL-ACNC: 15.7 X10^3/UL (ref 4–11)
HEMATOCRIT: 19.2 % (ref 36–47)
HEMOGLOBIN: 6.3 G/DL (ref 12–15.5)
LYMPH #: 0.7 X10^3/UL (ref 1–4.8)
LYMPH %: 4 % (ref 24–48)
MEAN CORPUSCULAR HEMOGLOBIN: 31 PG (ref 25–35)
MEAN CORPUSCULAR HGB CONC: 33 G/DL (ref 31–37)
MEAN CORPUSCULAR VOLUME: 95 FL (ref 79–100)
MONO #: 0.8 X10^3/UL (ref 0–1.1)
MONO %: 5 % (ref 0–9)
NEUT #: 13.9 X10^3UL (ref 1.8–7.7)
NEUT %: 89 % (ref 31–73)
PHOSPHORUS: 3.8 MG/DL (ref 2.6–4.7)
PLATELET COUNT: 269 X10^3/UL (ref 140–400)
POC GLUCOSE: 151 MG/DL (ref 70–99)
POC GLUCOSE: 157 MG/DL (ref 70–99)
POC GLUCOSE: 172 MG/DL (ref 70–99)
POC GLUCOSE: 183 MG/DL (ref 70–99)
POTASSIUM SERPL-SCNC: 4.4 MMOL/L (ref 3.5–5.1)
RED BLOOD COUNT: 2.03 X10^6/UL (ref 3.5–5.4)
RED CELL DISTRIBUTION WIDTH: 13.1 % (ref 11.5–14.5)
SODIUM: 134 MMOL/L (ref 136–145)

## 2018-03-27 RX ADMIN — FLUCONAZOLE 1 MG: 100 TABLET ORAL at 09:07

## 2018-03-27 RX ADMIN — LISINOPRIL 1 MG: 20 TABLET ORAL at 09:07

## 2018-03-27 RX ADMIN — INSULIN DETEMIR 1 UNITS: 100 INJECTION, SOLUTION SUBCUTANEOUS at 21:13

## 2018-03-27 RX ADMIN — DICYCLOMINE HYDROCHLORIDE 1 MG: 10 CAPSULE ORAL at 20:03

## 2018-03-27 RX ADMIN — VANCOMYCIN HYDROCHLORIDE 1 MG: 500 INJECTION, POWDER, LYOPHILIZED, FOR SOLUTION INTRAVENOUS at 09:07

## 2018-03-27 RX ADMIN — INSULIN ASPART 1 UNITS: 100 INJECTION, SOLUTION INTRAVENOUS; SUBCUTANEOUS at 17:48

## 2018-03-27 RX ADMIN — FUROSEMIDE 1 MG: 40 TABLET ORAL at 13:51

## 2018-03-27 RX ADMIN — ATORVASTATIN CALCIUM 1 MG: 40 TABLET, FILM COATED ORAL at 20:04

## 2018-03-27 RX ADMIN — VANCOMYCIN HYDROCHLORIDE 1 MG: 500 INJECTION, POWDER, LYOPHILIZED, FOR SOLUTION INTRAVENOUS at 17:42

## 2018-03-27 RX ADMIN — CALCIUM 1 MG: 500 TABLET ORAL at 17:41

## 2018-03-27 RX ADMIN — INSULIN ASPART 1 UNITS: 100 INJECTION, SOLUTION INTRAVENOUS; SUBCUTANEOUS at 09:16

## 2018-03-27 RX ADMIN — Medication 1 EACH: at 15:15

## 2018-03-27 RX ADMIN — FUROSEMIDE 1 MG: 40 TABLET ORAL at 09:06

## 2018-03-27 RX ADMIN — Medication 1 CAP: at 20:03

## 2018-03-27 RX ADMIN — DARBEPOETIN ALFA 1 MCG: 60 INJECTION, SOLUTION INTRAVENOUS; SUBCUTANEOUS at 21:08

## 2018-03-27 RX ADMIN — ACETAMINOPHEN 1 MG: 500 TABLET ORAL at 20:03

## 2018-03-27 RX ADMIN — PANTOPRAZOLE SODIUM 1 MG: 40 TABLET, DELAYED RELEASE ORAL at 09:06

## 2018-03-27 RX ADMIN — ONDANSETRON 1 MG: 2 INJECTION INTRAMUSCULAR; INTRAVENOUS at 17:53

## 2018-03-27 RX ADMIN — CLOPIDOGREL BISULFATE 1 MG: 75 TABLET ORAL at 09:18

## 2018-03-27 RX ADMIN — CALCIUM 1 MG: 500 TABLET ORAL at 12:13

## 2018-03-27 RX ADMIN — CALCIUM 1 MG: 500 TABLET ORAL at 09:05

## 2018-03-27 RX ADMIN — Medication 1 TAB: at 09:04

## 2018-03-27 RX ADMIN — ONDANSETRON 1 MG: 4 TABLET, ORALLY DISINTEGRATING ORAL at 20:04

## 2018-03-27 RX ADMIN — INSULIN ASPART 1 UNITS: 100 INJECTION, SOLUTION INTRAVENOUS; SUBCUTANEOUS at 12:17

## 2018-03-27 RX ADMIN — VANCOMYCIN HYDROCHLORIDE 1 MG: 500 INJECTION, POWDER, LYOPHILIZED, FOR SOLUTION INTRAVENOUS at 13:52

## 2018-03-27 RX ADMIN — Medication 1 CAP: at 09:07

## 2018-03-27 RX ADMIN — ISOSORBIDE MONONITRATE 1 MG: 30 TABLET, EXTENDED RELEASE ORAL at 09:05

## 2018-03-27 RX ADMIN — CARVEDILOL 1 MG: 12.5 TABLET, FILM COATED ORAL at 09:06

## 2018-03-27 RX ADMIN — VANCOMYCIN HYDROCHLORIDE 1 MG: 500 INJECTION, POWDER, LYOPHILIZED, FOR SOLUTION INTRAVENOUS at 21:09

## 2018-03-27 RX ADMIN — CARVEDILOL 1 MG: 12.5 TABLET, FILM COATED ORAL at 17:41

## 2018-03-28 LAB
ANION GAP SERPL CALC-SCNC: 12 MMOL/L (ref 6–14)
BLOOD UREA NITROGEN: 42 MG/DL (ref 7–20)
CALCIUM: 7.8 MG/DL (ref 8.5–10.1)
CHLORIDE: 95 MMOL/L (ref 98–107)
CO2 SERPL-SCNC: 26 MMOL/L (ref 21–32)
CREAT SERPL-MCNC: 7.5 MG/DL (ref 0.6–1)
GFR SERPLBLD BASED ON 1.73 SQ M-ARVRAT: 5.5 ML/MIN
GLUCOSE SERPL-MCNC: 140 MG/DL (ref 70–99)
HCG SERPL-ACNC: 11.3 X10^3/UL (ref 4–11)
HEMATOCRIT: 19 % (ref 36–47)
HEMOGLOBIN: 6 G/DL (ref 12–15.5)
MEAN CORPUSCULAR HEMOGLOBIN: 30 PG (ref 25–35)
MEAN CORPUSCULAR HGB CONC: 32 G/DL (ref 31–37)
MEAN CORPUSCULAR VOLUME: 95 FL (ref 79–100)
PLATELET COUNT: 277 X10^3/UL (ref 140–400)
POC GLUCOSE: 120 MG/DL (ref 70–99)
POC GLUCOSE: 122 MG/DL (ref 70–99)
POC GLUCOSE: 184 MG/DL (ref 70–99)
POC GLUCOSE: 74 MG/DL (ref 70–99)
POC GLUCOSE: 84 MG/DL (ref 70–99)
POTASSIUM SERPL-SCNC: 4.9 MMOL/L (ref 3.5–5.1)
PROCALCITONIN: 0.97 NG/ML (ref 0–0.1)
RED BLOOD COUNT: 2 X10^6/UL (ref 3.5–5.4)
RED CELL DISTRIBUTION WIDTH: 13 % (ref 11.5–14.5)
SODIUM: 133 MMOL/L (ref 136–145)

## 2018-03-28 RX ADMIN — FUROSEMIDE 1 MG: 40 TABLET ORAL at 14:00

## 2018-03-28 RX ADMIN — CARVEDILOL 1 MG: 12.5 TABLET, FILM COATED ORAL at 17:49

## 2018-03-28 RX ADMIN — VANCOMYCIN HYDROCHLORIDE 1 MG: 500 INJECTION, POWDER, LYOPHILIZED, FOR SOLUTION INTRAVENOUS at 17:48

## 2018-03-28 RX ADMIN — INSULIN DETEMIR 1 UNITS: 100 INJECTION, SOLUTION SUBCUTANEOUS at 21:00

## 2018-03-28 RX ADMIN — FUROSEMIDE 1 MG: 40 TABLET ORAL at 13:11

## 2018-03-28 RX ADMIN — CALCIUM 1 MG: 500 TABLET ORAL at 13:08

## 2018-03-28 RX ADMIN — CARVEDILOL 1 MG: 12.5 TABLET, FILM COATED ORAL at 13:10

## 2018-03-28 RX ADMIN — Medication 1 TAB: at 13:07

## 2018-03-28 RX ADMIN — PANTOPRAZOLE SODIUM 1 MG: 40 TABLET, DELAYED RELEASE ORAL at 13:08

## 2018-03-28 RX ADMIN — DIPHENOXYLATE HYDROCHLORIDE AND ATROPINE SULFATE 1 TAB: 2.5; .025 TABLET ORAL at 00:22

## 2018-03-28 RX ADMIN — Medication 1 CAP: at 21:33

## 2018-03-28 RX ADMIN — FLUCONAZOLE 1 MG: 100 TABLET ORAL at 13:13

## 2018-03-28 RX ADMIN — INSULIN ASPART 1 UNITS: 100 INJECTION, SOLUTION INTRAVENOUS; SUBCUTANEOUS at 08:00

## 2018-03-28 RX ADMIN — ONDANSETRON 1 MG: 2 INJECTION INTRAMUSCULAR; INTRAVENOUS at 08:10

## 2018-03-28 RX ADMIN — VANCOMYCIN HYDROCHLORIDE 1 MG: 500 INJECTION, POWDER, LYOPHILIZED, FOR SOLUTION INTRAVENOUS at 09:00

## 2018-03-28 RX ADMIN — ONDANSETRON 1 MG: 2 INJECTION INTRAMUSCULAR; INTRAVENOUS at 18:17

## 2018-03-28 RX ADMIN — Medication 1 CAP: at 13:07

## 2018-03-28 RX ADMIN — CALCIUM 1 MG: 500 TABLET ORAL at 17:48

## 2018-03-28 RX ADMIN — CALCIUM 1 MG: 500 TABLET ORAL at 07:30

## 2018-03-28 RX ADMIN — INSULIN ASPART 1 UNITS: 100 INJECTION, SOLUTION INTRAVENOUS; SUBCUTANEOUS at 17:00

## 2018-03-28 RX ADMIN — ATORVASTATIN CALCIUM 1 MG: 40 TABLET, FILM COATED ORAL at 21:33

## 2018-03-28 RX ADMIN — IOHEXOL 1 ML: 240 INJECTION, SOLUTION INTRATHECAL; INTRAVASCULAR; INTRAVENOUS; ORAL at 12:55

## 2018-03-28 RX ADMIN — VANCOMYCIN HYDROCHLORIDE 1 MG: 500 INJECTION, POWDER, LYOPHILIZED, FOR SOLUTION INTRAVENOUS at 13:06

## 2018-03-28 RX ADMIN — INSULIN ASPART 1 UNITS: 100 INJECTION, SOLUTION INTRAVENOUS; SUBCUTANEOUS at 12:00

## 2018-03-28 RX ADMIN — LISINOPRIL 1 MG: 20 TABLET ORAL at 13:09

## 2018-03-28 RX ADMIN — CLOPIDOGREL BISULFATE 1 MG: 75 TABLET ORAL at 13:11

## 2018-03-28 RX ADMIN — ISOSORBIDE MONONITRATE 1 MG: 30 TABLET, EXTENDED RELEASE ORAL at 13:11

## 2018-03-28 RX ADMIN — VANCOMYCIN HYDROCHLORIDE 1 MG: 500 INJECTION, POWDER, LYOPHILIZED, FOR SOLUTION INTRAVENOUS at 21:33

## 2018-03-29 LAB
ADD MAN DIFF?: YES
BASO #: 0 X10^3/UL (ref 0–0.2)
BASO %: 0 % (ref 0–3)
EOS #: 0.1 X10^3/UL (ref 0–0.7)
EOS %: 1 % (ref 0–3)
HCG SERPL-ACNC: 13.8 X10^3/UL (ref 4–11)
HEMATOCRIT: 21.1 % (ref 36–47)
HEMOGLOBIN: 6.8 G/DL (ref 12–15.5)
LYMPH #: 0.8 X10^3/UL (ref 1–4.8)
LYMPH %: 6 % (ref 24–48)
MEAN CORPUSCULAR HEMOGLOBIN: 31 PG (ref 25–35)
MEAN CORPUSCULAR HGB CONC: 32 G/DL (ref 31–37)
MEAN CORPUSCULAR VOLUME: 96 FL (ref 79–100)
MONO #: 0.8 X10^3/UL (ref 0–1.1)
MONO %: 6 % (ref 0–9)
NEUT #: 12.1 X10^3UL (ref 1.8–7.7)
NEUT %: 87 % (ref 31–73)
PLATELET COUNT: 289 X10^3/UL (ref 140–400)
POC GLUCOSE: 122 MG/DL (ref 70–99)
POC GLUCOSE: 198 MG/DL (ref 70–99)
POC GLUCOSE: 243 MG/DL (ref 70–99)
POC GLUCOSE: 321 MG/DL (ref 70–99)
RED BLOOD COUNT: 2.21 X10^6/UL (ref 3.5–5.4)
RED CELL DISTRIBUTION WIDTH: 13.3 % (ref 11.5–14.5)

## 2018-03-29 RX ADMIN — ATORVASTATIN CALCIUM 1 MG: 40 TABLET, FILM COATED ORAL at 21:51

## 2018-03-29 RX ADMIN — CARVEDILOL 1 MG: 12.5 TABLET, FILM COATED ORAL at 17:00

## 2018-03-29 RX ADMIN — Medication 1 CAP: at 10:10

## 2018-03-29 RX ADMIN — VANCOMYCIN HYDROCHLORIDE 1 MG: 500 INJECTION, POWDER, LYOPHILIZED, FOR SOLUTION INTRAVENOUS at 17:40

## 2018-03-29 RX ADMIN — CARVEDILOL 1 MG: 12.5 TABLET, FILM COATED ORAL at 08:00

## 2018-03-29 RX ADMIN — CALCIUM 1 MG: 500 TABLET ORAL at 17:41

## 2018-03-29 RX ADMIN — CALCIUM 1 MG: 500 TABLET ORAL at 10:17

## 2018-03-29 RX ADMIN — Medication 1 TAB: at 10:10

## 2018-03-29 RX ADMIN — Medication 1 CAP: at 21:51

## 2018-03-29 RX ADMIN — FLUCONAZOLE 1 MG: 100 TABLET ORAL at 10:16

## 2018-03-29 RX ADMIN — INSULIN DETEMIR 1 UNITS: 100 INJECTION, SOLUTION SUBCUTANEOUS at 21:56

## 2018-03-29 RX ADMIN — VANCOMYCIN HYDROCHLORIDE 1 MG: 500 INJECTION, POWDER, LYOPHILIZED, FOR SOLUTION INTRAVENOUS at 12:58

## 2018-03-29 RX ADMIN — INSULIN ASPART 1 UNITS: 100 INJECTION, SOLUTION INTRAVENOUS; SUBCUTANEOUS at 13:00

## 2018-03-29 RX ADMIN — LISINOPRIL 1 MG: 20 TABLET ORAL at 09:00

## 2018-03-29 RX ADMIN — FUROSEMIDE 1 MG: 40 TABLET ORAL at 17:41

## 2018-03-29 RX ADMIN — FUROSEMIDE 1 MG: 40 TABLET ORAL at 10:17

## 2018-03-29 RX ADMIN — MINERAL SUPPLEMENT IRON 300 MG / 5 ML STRENGTH LIQUID 100 PER BOX UNFLAVORED 1 MG: at 17:40

## 2018-03-29 RX ADMIN — INSULIN ASPART 1 UNITS: 100 INJECTION, SOLUTION INTRAVENOUS; SUBCUTANEOUS at 17:49

## 2018-03-29 RX ADMIN — VANCOMYCIN HYDROCHLORIDE 1 MG: 500 INJECTION, POWDER, LYOPHILIZED, FOR SOLUTION INTRAVENOUS at 21:51

## 2018-03-29 RX ADMIN — INSULIN ASPART 1 UNITS: 100 INJECTION, SOLUTION INTRAVENOUS; SUBCUTANEOUS at 12:00

## 2018-03-29 RX ADMIN — CLOPIDOGREL BISULFATE 1 MG: 75 TABLET ORAL at 10:16

## 2018-03-29 RX ADMIN — VANCOMYCIN HYDROCHLORIDE 1 MG: 500 INJECTION, POWDER, LYOPHILIZED, FOR SOLUTION INTRAVENOUS at 10:10

## 2018-03-29 RX ADMIN — PANTOPRAZOLE SODIUM 1 MG: 40 TABLET, DELAYED RELEASE ORAL at 10:17

## 2018-03-29 RX ADMIN — ISOSORBIDE MONONITRATE 1 MG: 30 TABLET, EXTENDED RELEASE ORAL at 09:00

## 2018-03-29 RX ADMIN — CALCIUM 1 MG: 500 TABLET ORAL at 12:58

## 2018-03-30 LAB
ANION GAP SERPL CALC-SCNC: 10 MMOL/L (ref 6–14)
BLOOD UREA NITROGEN: 38 MG/DL (ref 7–20)
CALCIUM: 7.7 MG/DL (ref 8.5–10.1)
CHLORIDE: 95 MMOL/L (ref 98–107)
CO2 SERPL-SCNC: 27 MMOL/L (ref 21–32)
CREAT SERPL-MCNC: 7 MG/DL (ref 0.6–1)
GFR SERPLBLD BASED ON 1.73 SQ M-ARVRAT: 6 ML/MIN
GLUCOSE SERPL-MCNC: 36 MG/DL (ref 70–99)
HCG SERPL-ACNC: 12.7 X10^3/UL (ref 4–11)
HEMATOCRIT: 19 % (ref 36–47)
HEMOGLOBIN: 6.2 G/DL (ref 12–15.5)
MEAN CORPUSCULAR HEMOGLOBIN: 31 PG (ref 25–35)
MEAN CORPUSCULAR HGB CONC: 33 G/DL (ref 31–37)
MEAN CORPUSCULAR VOLUME: 94 FL (ref 79–100)
PLATELET COUNT: 304 X10^3/UL (ref 140–400)
POC GLUCOSE: 120 MG/DL (ref 70–99)
POC GLUCOSE: 127 MG/DL (ref 70–99)
POC GLUCOSE: 193 MG/DL (ref 70–99)
POC GLUCOSE: 41 MG/DL (ref 70–99)
POC GLUCOSE: 83 MG/DL (ref 70–99)
POTASSIUM SERPL-SCNC: 4 MMOL/L (ref 3.5–5.1)
RED BLOOD COUNT: 2.01 X10^6/UL (ref 3.5–5.4)
RED CELL DISTRIBUTION WIDTH: 13.3 % (ref 11.5–14.5)
SODIUM: 132 MMOL/L (ref 136–145)

## 2018-03-30 RX ADMIN — CARVEDILOL 1 MG: 12.5 TABLET, FILM COATED ORAL at 12:36

## 2018-03-30 RX ADMIN — CARVEDILOL 1 MG: 12.5 TABLET, FILM COATED ORAL at 16:57

## 2018-03-30 RX ADMIN — INSULIN ASPART 1 UNITS: 100 INJECTION, SOLUTION INTRAVENOUS; SUBCUTANEOUS at 17:00

## 2018-03-30 RX ADMIN — Medication 1 CAP: at 12:38

## 2018-03-30 RX ADMIN — PANTOPRAZOLE SODIUM 1 MG: 40 TABLET, DELAYED RELEASE ORAL at 12:38

## 2018-03-30 RX ADMIN — CALCIUM 1 MG: 500 TABLET ORAL at 16:55

## 2018-03-30 RX ADMIN — FUROSEMIDE 1 MG: 40 TABLET ORAL at 16:55

## 2018-03-30 RX ADMIN — CALCIUM 1 MG: 500 TABLET ORAL at 12:36

## 2018-03-30 RX ADMIN — VANCOMYCIN HYDROCHLORIDE 1 MG: 500 INJECTION, POWDER, LYOPHILIZED, FOR SOLUTION INTRAVENOUS at 13:00

## 2018-03-30 RX ADMIN — VANCOMYCIN HYDROCHLORIDE 1 MG: 500 INJECTION, POWDER, LYOPHILIZED, FOR SOLUTION INTRAVENOUS at 21:15

## 2018-03-30 RX ADMIN — DEXTROSE MONOHYDRATE 2 GM: 25 INJECTION, SOLUTION INTRAVENOUS at 05:44

## 2018-03-30 RX ADMIN — ISOSORBIDE MONONITRATE 1 MG: 30 TABLET, EXTENDED RELEASE ORAL at 12:47

## 2018-03-30 RX ADMIN — CARVEDILOL 1 MG: 12.5 TABLET, FILM COATED ORAL at 16:58

## 2018-03-30 RX ADMIN — Medication 1 CAP: at 21:15

## 2018-03-30 RX ADMIN — LISINOPRIL 1 MG: 20 TABLET ORAL at 12:35

## 2018-03-30 RX ADMIN — FUROSEMIDE 1 MG: 40 TABLET ORAL at 12:36

## 2018-03-30 RX ADMIN — INSULIN DETEMIR 1 UNITS: 100 INJECTION, SOLUTION SUBCUTANEOUS at 21:57

## 2018-03-30 RX ADMIN — CLOPIDOGREL BISULFATE 1 MG: 75 TABLET ORAL at 12:47

## 2018-03-30 RX ADMIN — VANCOMYCIN HYDROCHLORIDE 1 MG: 500 INJECTION, POWDER, LYOPHILIZED, FOR SOLUTION INTRAVENOUS at 16:55

## 2018-03-30 RX ADMIN — Medication 1 TAB: at 12:35

## 2018-03-30 RX ADMIN — INSULIN ASPART 1 UNITS: 100 INJECTION, SOLUTION INTRAVENOUS; SUBCUTANEOUS at 08:00

## 2018-03-30 RX ADMIN — CALCIUM 1 MG: 500 TABLET ORAL at 11:30

## 2018-03-30 RX ADMIN — INSULIN ASPART 1 UNITS: 100 INJECTION, SOLUTION INTRAVENOUS; SUBCUTANEOUS at 12:00

## 2018-03-30 RX ADMIN — CALCIUM 1 MG: 500 TABLET ORAL at 07:30

## 2018-03-30 RX ADMIN — ATORVASTATIN CALCIUM 1 MG: 40 TABLET, FILM COATED ORAL at 21:15

## 2018-03-30 RX ADMIN — MINERAL SUPPLEMENT IRON 300 MG / 5 ML STRENGTH LIQUID 100 PER BOX UNFLAVORED 1 MG: at 16:55

## 2018-03-30 RX ADMIN — VANCOMYCIN HYDROCHLORIDE 1 MG: 500 INJECTION, POWDER, LYOPHILIZED, FOR SOLUTION INTRAVENOUS at 12:36

## 2018-03-30 RX ADMIN — MINERAL SUPPLEMENT IRON 300 MG / 5 ML STRENGTH LIQUID 100 PER BOX UNFLAVORED 1 MG: at 12:36

## 2018-03-31 LAB
POC GLUCOSE: 110 MG/DL (ref 70–99)
POC GLUCOSE: 119 MG/DL (ref 70–99)
POC GLUCOSE: 250 MG/DL (ref 70–99)

## 2018-03-31 RX ADMIN — CALCIUM 1 MG: 500 TABLET ORAL at 12:36

## 2018-03-31 RX ADMIN — Medication 1 TAB: at 08:53

## 2018-03-31 RX ADMIN — MINERAL SUPPLEMENT IRON 300 MG / 5 ML STRENGTH LIQUID 100 PER BOX UNFLAVORED 1 MG: at 08:50

## 2018-03-31 RX ADMIN — CALCIUM 1 MG: 500 TABLET ORAL at 08:50

## 2018-03-31 RX ADMIN — INSULIN ASPART 1 UNITS: 100 INJECTION, SOLUTION INTRAVENOUS; SUBCUTANEOUS at 08:00

## 2018-03-31 RX ADMIN — CARVEDILOL 1 MG: 12.5 TABLET, FILM COATED ORAL at 08:00

## 2018-03-31 RX ADMIN — VANCOMYCIN HYDROCHLORIDE 1 MG: 500 INJECTION, POWDER, LYOPHILIZED, FOR SOLUTION INTRAVENOUS at 12:36

## 2018-03-31 RX ADMIN — FUROSEMIDE 1 MG: 40 TABLET ORAL at 12:36

## 2018-03-31 RX ADMIN — LISINOPRIL 1 MG: 20 TABLET ORAL at 08:55

## 2018-03-31 RX ADMIN — PANTOPRAZOLE SODIUM 1 MG: 40 TABLET, DELAYED RELEASE ORAL at 08:51

## 2018-03-31 RX ADMIN — INSULIN ASPART 1 UNITS: 100 INJECTION, SOLUTION INTRAVENOUS; SUBCUTANEOUS at 12:40

## 2018-03-31 RX ADMIN — Medication 1 CAP: at 08:50

## 2018-03-31 RX ADMIN — VANCOMYCIN HYDROCHLORIDE 1 MG: 500 INJECTION, POWDER, LYOPHILIZED, FOR SOLUTION INTRAVENOUS at 08:52

## 2018-03-31 RX ADMIN — ISOSORBIDE MONONITRATE 1 MG: 30 TABLET, EXTENDED RELEASE ORAL at 08:56

## 2018-03-31 RX ADMIN — FUROSEMIDE 1 MG: 40 TABLET ORAL at 08:50

## 2018-03-31 RX ADMIN — CLOPIDOGREL BISULFATE 1 MG: 75 TABLET ORAL at 08:50

## 2018-07-23 ENCOUNTER — HOSPITAL ENCOUNTER (INPATIENT)
Dept: HOSPITAL 61 - ER | Age: 63
LOS: 2 days | Discharge: HOME | DRG: 314 | End: 2018-07-25
Attending: INTERNAL MEDICINE | Admitting: INTERNAL MEDICINE
Payer: COMMERCIAL

## 2018-07-23 ENCOUNTER — HOSPITAL ENCOUNTER (OUTPATIENT)
Dept: HOSPITAL 61 - LAB | Age: 63
Discharge: HOME | End: 2018-07-23
Attending: INTERNAL MEDICINE
Payer: COMMERCIAL

## 2018-07-23 DIAGNOSIS — I25.10: ICD-10-CM

## 2018-07-23 DIAGNOSIS — K21.9: ICD-10-CM

## 2018-07-23 DIAGNOSIS — T82.41XA: Primary | ICD-10-CM

## 2018-07-23 DIAGNOSIS — I50.9: ICD-10-CM

## 2018-07-23 DIAGNOSIS — N18.6: ICD-10-CM

## 2018-07-23 DIAGNOSIS — E78.5: ICD-10-CM

## 2018-07-23 DIAGNOSIS — N18.5: ICD-10-CM

## 2018-07-23 DIAGNOSIS — Z99.2: ICD-10-CM

## 2018-07-23 DIAGNOSIS — I13.2: ICD-10-CM

## 2018-07-23 DIAGNOSIS — Z83.3: ICD-10-CM
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
## 2018-07-23 DIAGNOSIS — E11.22: ICD-10-CM

## 2018-07-23 DIAGNOSIS — L40.9: ICD-10-CM

## 2018-07-23 DIAGNOSIS — I50.43: ICD-10-CM

## 2018-07-23 DIAGNOSIS — I25.2: ICD-10-CM

## 2018-07-23 DIAGNOSIS — E66.9: ICD-10-CM
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
## 2018-07-23 DIAGNOSIS — E87.5: Primary | ICD-10-CM

## 2018-07-23 LAB
ADD MAN DIFF?: NO
ALBUMIN SERPL-MCNC: 3.1 G/DL (ref 3.4–5)
ALBUMIN/GLOB SERPL: 0.7 {RATIO} (ref 1–1.7)
ALP SERPL-CCNC: 121 U/L (ref 46–116)
ALT (SGPT): 12 U/L (ref 14–59)
ANION GAP SERPL CALC-SCNC: 19 MMOL/L (ref 6–14)
AST SERPL-CCNC: 9 U/L (ref 15–37)
BASO #: 0 X10^3/UL (ref 0–0.2)
BASO %: 1 % (ref 0–3)
BLOOD UREA NITROGEN: 126 MG/DL (ref 7–20)
BUN/CREAT SERPL: 12 (ref 6–20)
CALCIUM: 9.2 MG/DL (ref 8.5–10.1)
CHLORIDE: 95 MMOL/L (ref 98–107)
CO2 SERPL-SCNC: 19 MMOL/L (ref 21–32)
CREAT SERPL-MCNC: 10.4 MG/DL (ref 0.6–1)
EOS #: 0.2 X10^3/UL (ref 0–0.7)
EOS %: 4 % (ref 0–3)
GFR SERPLBLD BASED ON 1.73 SQ M-ARVRAT: 3.8 ML/MIN
GLOBULIN SER-MCNC: 4.5 G/DL (ref 2.2–3.8)
GLUCOSE SERPL-MCNC: 300 MG/DL (ref 70–99)
HCG SERPL-ACNC: 6.3 X10^3/UL (ref 4–11)
HEMATOCRIT: 33.7 % (ref 36–47)
HEMOGLOBIN: 11.5 G/DL (ref 12–15.5)
LYMPH #: 1.3 X10^3/UL (ref 1–4.8)
LYMPH %: 21 % (ref 24–48)
MEAN CORPUSCULAR HEMOGLOBIN: 32 PG (ref 25–35)
MEAN CORPUSCULAR HGB CONC: 34 G/DL (ref 31–37)
MEAN CORPUSCULAR VOLUME: 94 FL (ref 79–100)
MONO #: 0.5 X10^3/UL (ref 0–1.1)
MONO %: 9 % (ref 0–9)
NEUT #: 4.1 X10^3UL (ref 1.8–7.7)
NEUT %: 66 % (ref 31–73)
NT-PRO BNP: 3155 PG/ML (ref 0–124)
PLATELET COUNT: 193 X10^3/UL (ref 140–400)
POC GLUCOSE: 167 MG/DL (ref 70–99)
POC GLUCOSE: 211 MG/DL (ref 70–99)
POTASSIUM SERPL-SCNC: 5.8 MMOL/L (ref 3.5–5.1)
POTASSIUM SERPL-SCNC: 5.9 MMOL/L (ref 3.5–5.1)
RED BLOOD COUNT: 3.57 X10^6/UL (ref 3.5–5.4)
RED CELL DISTRIBUTION WIDTH: 13.9 % (ref 11.5–14.5)
SODIUM: 133 MMOL/L (ref 136–145)
TOTAL BILIRUBIN: 0.3 MG/DL (ref 0.2–1)
TOTAL PROTEIN: 7.6 G/DL (ref 6.4–8.2)

## 2018-07-23 PROCEDURE — 5A1D70Z PERFORMANCE OF URINARY FILTRATION, INTERMITTENT, LESS THAN 6 HOURS PER DAY: ICD-10-PCS

## 2018-07-23 PROCEDURE — 80053 COMPREHEN METABOLIC PANEL: CPT

## 2018-07-23 PROCEDURE — 82962 GLUCOSE BLOOD TEST: CPT

## 2018-07-23 PROCEDURE — 84132 ASSAY OF SERUM POTASSIUM: CPT

## 2018-07-23 PROCEDURE — 80048 BASIC METABOLIC PNL TOTAL CA: CPT

## 2018-07-23 PROCEDURE — 36415 COLL VENOUS BLD VENIPUNCTURE: CPT

## 2018-07-23 PROCEDURE — 99285 EMERGENCY DEPT VISIT HI MDM: CPT

## 2018-07-23 PROCEDURE — 83880 ASSAY OF NATRIURETIC PEPTIDE: CPT

## 2018-07-23 PROCEDURE — 96375 TX/PRO/DX INJ NEW DRUG ADDON: CPT

## 2018-07-23 PROCEDURE — 85025 COMPLETE CBC W/AUTO DIFF WBC: CPT

## 2018-07-23 PROCEDURE — 96374 THER/PROPH/DIAG INJ IV PUSH: CPT

## 2018-07-23 PROCEDURE — 71045 X-RAY EXAM CHEST 1 VIEW: CPT

## 2018-07-23 PROCEDURE — 93005 ELECTROCARDIOGRAM TRACING: CPT

## 2018-07-23 RX ADMIN — CALCIUM GLUCONATE 1 MG: 94 INJECTION, SOLUTION INTRAVENOUS at 14:50

## 2018-07-23 RX ADMIN — INSULIN LISPRO 1 UNIT: 100 INJECTION, SOLUTION INTRAVENOUS; SUBCUTANEOUS at 14:52

## 2018-07-23 RX ADMIN — CARVEDILOL 1 MG: 12.5 TABLET, FILM COATED ORAL at 22:40

## 2018-07-23 RX ADMIN — INSULIN GLARGINE 1 UNITS: 100 INJECTION, SOLUTION SUBCUTANEOUS at 22:46

## 2018-07-23 RX ADMIN — DEXTROSE MONOHYDRATE 1 GM: 25 INJECTION, SOLUTION INTRAVENOUS at 14:50

## 2018-07-23 RX ADMIN — FUROSEMIDE 1 MG: 40 TABLET ORAL at 22:41

## 2018-07-23 RX ADMIN — ATORVASTATIN CALCIUM 1 MG: 40 TABLET, FILM COATED ORAL at 22:39

## 2018-07-23 RX ADMIN — ONDANSETRON 1 MG: 2 INJECTION INTRAMUSCULAR; INTRAVENOUS at 14:36

## 2018-07-24 LAB
ANION GAP SERPL CALC-SCNC: 8 MMOL/L (ref 6–14)
BLOOD UREA NITROGEN: 52 MG/DL (ref 7–20)
CALCIUM: 8.2 MG/DL (ref 8.5–10.1)
CHLORIDE: 96 MMOL/L (ref 98–107)
CO2 SERPL-SCNC: 32 MMOL/L (ref 21–32)
CREAT SERPL-MCNC: 6.2 MG/DL (ref 0.6–1)
GFR SERPLBLD BASED ON 1.73 SQ M-ARVRAT: 6.9 ML/MIN
GLUCOSE SERPL-MCNC: 190 MG/DL (ref 70–99)
POC GLUCOSE: 109 MG/DL (ref 70–99)
POC GLUCOSE: 141 MG/DL (ref 70–99)
POC GLUCOSE: 181 MG/DL (ref 70–99)
POC GLUCOSE: 190 MG/DL (ref 70–99)
POTASSIUM SERPL-SCNC: 4.5 MMOL/L (ref 3.5–5.1)
SODIUM: 136 MMOL/L (ref 136–145)

## 2018-07-24 RX ADMIN — INSULIN LISPRO 1 UNITS: 100 INJECTION, SOLUTION INTRAVENOUS; SUBCUTANEOUS at 08:00

## 2018-07-24 RX ADMIN — FUROSEMIDE 1 MG: 40 TABLET ORAL at 17:09

## 2018-07-24 RX ADMIN — CLOPIDOGREL BISULFATE 1 MG: 75 TABLET ORAL at 07:59

## 2018-07-24 RX ADMIN — INSULIN LISPRO 1 UNITS: 100 INJECTION, SOLUTION INTRAVENOUS; SUBCUTANEOUS at 17:00

## 2018-07-24 RX ADMIN — INSULIN GLARGINE 1 UNITS: 100 INJECTION, SOLUTION SUBCUTANEOUS at 21:08

## 2018-07-24 RX ADMIN — ASPIRIN 1 MG: 81 TABLET, COATED ORAL at 07:58

## 2018-07-24 RX ADMIN — CARVEDILOL 1 MG: 12.5 TABLET, FILM COATED ORAL at 07:58

## 2018-07-24 RX ADMIN — ATORVASTATIN CALCIUM 1 MG: 40 TABLET, FILM COATED ORAL at 21:03

## 2018-07-24 RX ADMIN — ISOSORBIDE MONONITRATE 1 MG: 30 TABLET, EXTENDED RELEASE ORAL at 07:59

## 2018-07-24 RX ADMIN — ONDANSETRON 1 MG: 2 INJECTION INTRAMUSCULAR; INTRAVENOUS at 17:31

## 2018-07-24 RX ADMIN — LISINOPRIL 1 MG: 20 TABLET ORAL at 07:57

## 2018-07-24 RX ADMIN — CARVEDILOL 1 MG: 12.5 TABLET, FILM COATED ORAL at 17:10

## 2018-07-24 RX ADMIN — INSULIN LISPRO 1 UNITS: 100 INJECTION, SOLUTION INTRAVENOUS; SUBCUTANEOUS at 12:00

## 2018-07-24 RX ADMIN — FUROSEMIDE 1 MG: 40 TABLET ORAL at 07:58

## 2018-07-25 LAB
ANION GAP SERPL CALC-SCNC: 9 MMOL/L (ref 6–14)
BLOOD UREA NITROGEN: 39 MG/DL (ref 7–20)
CALCIUM: 7.9 MG/DL (ref 8.5–10.1)
CHLORIDE: 98 MMOL/L (ref 98–107)
CO2 SERPL-SCNC: 28 MMOL/L (ref 21–32)
CREAT SERPL-MCNC: 4.1 MG/DL (ref 0.6–1)
GFR SERPLBLD BASED ON 1.73 SQ M-ARVRAT: 11.1 ML/MIN
GLUCOSE SERPL-MCNC: 162 MG/DL (ref 70–99)
POC GLUCOSE: 134 MG/DL (ref 70–99)
POC GLUCOSE: 98 MG/DL (ref 70–99)
POTASSIUM SERPL-SCNC: 4 MMOL/L (ref 3.5–5.1)
SODIUM: 135 MMOL/L (ref 136–145)

## 2018-07-25 PROCEDURE — 5A1D70Z PERFORMANCE OF URINARY FILTRATION, INTERMITTENT, LESS THAN 6 HOURS PER DAY: ICD-10-PCS

## 2018-07-25 RX ADMIN — CLOPIDOGREL BISULFATE 1 MG: 75 TABLET ORAL at 09:00

## 2018-07-25 RX ADMIN — CARVEDILOL 1 MG: 12.5 TABLET, FILM COATED ORAL at 08:00

## 2018-07-25 RX ADMIN — ASPIRIN 1 MG: 81 TABLET, COATED ORAL at 08:00

## 2018-07-25 RX ADMIN — LISINOPRIL 1 MG: 20 TABLET ORAL at 09:00

## 2018-07-25 RX ADMIN — INSULIN LISPRO 1 UNITS: 100 INJECTION, SOLUTION INTRAVENOUS; SUBCUTANEOUS at 12:00

## 2018-07-25 RX ADMIN — ISOSORBIDE MONONITRATE 1 MG: 30 TABLET, EXTENDED RELEASE ORAL at 09:00

## 2018-07-25 RX ADMIN — FUROSEMIDE 1 MG: 40 TABLET ORAL at 09:00

## 2018-07-25 RX ADMIN — INSULIN LISPRO 1 UNITS: 100 INJECTION, SOLUTION INTRAVENOUS; SUBCUTANEOUS at 08:00

## 2018-12-05 ENCOUNTER — HOSPITAL ENCOUNTER (INPATIENT)
Dept: HOSPITAL 61 - ER | Age: 63
LOS: 2 days | Discharge: HOME | DRG: 640 | End: 2018-12-07
Attending: INTERNAL MEDICINE | Admitting: INTERNAL MEDICINE
Payer: COMMERCIAL

## 2018-12-05 VITALS — DIASTOLIC BLOOD PRESSURE: 62 MMHG | SYSTOLIC BLOOD PRESSURE: 179 MMHG

## 2018-12-05 VITALS — SYSTOLIC BLOOD PRESSURE: 131 MMHG | DIASTOLIC BLOOD PRESSURE: 74 MMHG

## 2018-12-05 VITALS — DIASTOLIC BLOOD PRESSURE: 58 MMHG | SYSTOLIC BLOOD PRESSURE: 149 MMHG

## 2018-12-05 VITALS — HEIGHT: 64 IN | BODY MASS INDEX: 33.63 KG/M2 | WEIGHT: 197 LBS

## 2018-12-05 VITALS — DIASTOLIC BLOOD PRESSURE: 74 MMHG | SYSTOLIC BLOOD PRESSURE: 153 MMHG

## 2018-12-05 VITALS — SYSTOLIC BLOOD PRESSURE: 178 MMHG | DIASTOLIC BLOOD PRESSURE: 57 MMHG

## 2018-12-05 VITALS — DIASTOLIC BLOOD PRESSURE: 62 MMHG | SYSTOLIC BLOOD PRESSURE: 145 MMHG

## 2018-12-05 VITALS — DIASTOLIC BLOOD PRESSURE: 65 MMHG | SYSTOLIC BLOOD PRESSURE: 149 MMHG

## 2018-12-05 VITALS — SYSTOLIC BLOOD PRESSURE: 165 MMHG | DIASTOLIC BLOOD PRESSURE: 74 MMHG

## 2018-12-05 VITALS — SYSTOLIC BLOOD PRESSURE: 154 MMHG | DIASTOLIC BLOOD PRESSURE: 71 MMHG

## 2018-12-05 DIAGNOSIS — L40.9: ICD-10-CM

## 2018-12-05 DIAGNOSIS — Z95.5: ICD-10-CM

## 2018-12-05 DIAGNOSIS — Z79.4: ICD-10-CM

## 2018-12-05 DIAGNOSIS — Z83.3: ICD-10-CM

## 2018-12-05 DIAGNOSIS — E66.01: ICD-10-CM

## 2018-12-05 DIAGNOSIS — Z99.2: ICD-10-CM

## 2018-12-05 DIAGNOSIS — Z91.15: ICD-10-CM

## 2018-12-05 DIAGNOSIS — K21.9: ICD-10-CM

## 2018-12-05 DIAGNOSIS — I25.10: ICD-10-CM

## 2018-12-05 DIAGNOSIS — I25.2: ICD-10-CM

## 2018-12-05 DIAGNOSIS — E87.5: Primary | ICD-10-CM

## 2018-12-05 DIAGNOSIS — Z91.19: ICD-10-CM

## 2018-12-05 DIAGNOSIS — E78.5: ICD-10-CM

## 2018-12-05 DIAGNOSIS — I50.9: ICD-10-CM

## 2018-12-05 DIAGNOSIS — B37.0: ICD-10-CM

## 2018-12-05 DIAGNOSIS — E11.22: ICD-10-CM

## 2018-12-05 DIAGNOSIS — I13.2: ICD-10-CM

## 2018-12-05 DIAGNOSIS — E78.00: ICD-10-CM

## 2018-12-05 DIAGNOSIS — N18.6: ICD-10-CM

## 2018-12-05 LAB
ALBUMIN SERPL-MCNC: 3 G/DL (ref 3.4–5)
ALBUMIN/GLOB SERPL: 0.6 {RATIO} (ref 1–1.7)
ALP SERPL-CCNC: 75 U/L (ref 46–116)
ALT SERPL-CCNC: 9 U/L (ref 14–59)
ANION GAP SERPL CALC-SCNC: 20 MMOL/L (ref 6–14)
APTT PPP: YELLOW S
AST SERPL-CCNC: 9 U/L (ref 15–37)
BACTERIA #/AREA URNS HPF: (no result) /HPF
BASOPHILS # BLD AUTO: 0.1 X10^3/UL (ref 0–0.2)
BASOPHILS NFR BLD: 1 % (ref 0–3)
BILIRUB SERPL-MCNC: 0.3 MG/DL (ref 0.2–1)
BILIRUB UR QL STRIP: NEGATIVE
BUN SERPL-MCNC: 132 MG/DL (ref 7–20)
BUN/CREAT SERPL: 9 (ref 6–20)
CALCIUM SERPL-MCNC: 7.6 MG/DL (ref 8.5–10.1)
CHLORIDE SERPL-SCNC: 99 MMOL/L (ref 98–107)
CO2 SERPL-SCNC: 16 MMOL/L (ref 21–32)
CREAT SERPL-MCNC: 14.7 MG/DL (ref 0.6–1)
EOSINOPHIL NFR BLD: 0.2 X10^3/UL (ref 0–0.7)
EOSINOPHIL NFR BLD: 3 % (ref 0–3)
ERYTHROCYTE [DISTWIDTH] IN BLOOD BY AUTOMATED COUNT: 12.7 % (ref 11.5–14.5)
FIBRINOGEN PPP-MCNC: (no result) MG/DL
GFR SERPLBLD BASED ON 1.73 SQ M-ARVRAT: 2.5 ML/MIN
GLOBULIN SER-MCNC: 4.8 G/DL (ref 2.2–3.8)
GLUCOSE SERPL-MCNC: 170 MG/DL (ref 70–99)
HCT VFR BLD CALC: 25.6 % (ref 36–47)
HGB BLD-MCNC: 8.7 G/DL (ref 12–15.5)
LYMPHOCYTES # BLD: 1.2 X10^3/UL (ref 1–4.8)
LYMPHOCYTES NFR BLD AUTO: 18 % (ref 24–48)
MCH RBC QN AUTO: 34 PG (ref 25–35)
MCHC RBC AUTO-ENTMCNC: 34 G/DL (ref 31–37)
MCV RBC AUTO: 99 FL (ref 79–100)
MONO #: 0.5 X10^3/UL (ref 0–1.1)
MONOCYTES NFR BLD: 7 % (ref 0–9)
NEUT #: 4.5 X10^3UL (ref 1.8–7.7)
NEUTROPHILS NFR BLD AUTO: 70 % (ref 31–73)
NITRITE UR QL STRIP: NEGATIVE
PH UR STRIP: 5.5 [PH]
PLATELET # BLD AUTO: 246 X10^3/UL (ref 140–400)
POTASSIUM SERPL-SCNC: 7 MMOL/L (ref 3.5–5.1)
PROT SERPL-MCNC: 7.8 G/DL (ref 6.4–8.2)
PROT UR STRIP-MCNC: >=300 MG/DL
RBC # BLD AUTO: 2.59 X10^6/UL (ref 3.5–5.4)
SODIUM SERPL-SCNC: 135 MMOL/L (ref 136–145)
SQUAMOUS #/AREA URNS LPF: (no result) /LPF
UROBILINOGEN UR-MCNC: 0.2 MG/DL
WBC # BLD AUTO: 6.5 X10^3/UL (ref 4–11)

## 2018-12-05 PROCEDURE — 71046 X-RAY EXAM CHEST 2 VIEWS: CPT

## 2018-12-05 PROCEDURE — 83880 ASSAY OF NATRIURETIC PEPTIDE: CPT

## 2018-12-05 PROCEDURE — 80048 BASIC METABOLIC PNL TOTAL CA: CPT

## 2018-12-05 PROCEDURE — 96374 THER/PROPH/DIAG INJ IV PUSH: CPT

## 2018-12-05 PROCEDURE — 85025 COMPLETE CBC W/AUTO DIFF WBC: CPT

## 2018-12-05 PROCEDURE — 36415 COLL VENOUS BLD VENIPUNCTURE: CPT

## 2018-12-05 PROCEDURE — 84484 ASSAY OF TROPONIN QUANT: CPT

## 2018-12-05 PROCEDURE — 80053 COMPREHEN METABOLIC PANEL: CPT

## 2018-12-05 PROCEDURE — 87641 MR-STAPH DNA AMP PROBE: CPT

## 2018-12-05 PROCEDURE — 93005 ELECTROCARDIOGRAM TRACING: CPT

## 2018-12-05 PROCEDURE — 82962 GLUCOSE BLOOD TEST: CPT

## 2018-12-05 PROCEDURE — 5A1D70Z PERFORMANCE OF URINARY FILTRATION, INTERMITTENT, LESS THAN 6 HOURS PER DAY: ICD-10-PCS | Performed by: INTERNAL MEDICINE

## 2018-12-05 PROCEDURE — 87086 URINE CULTURE/COLONY COUNT: CPT

## 2018-12-05 PROCEDURE — 81001 URINALYSIS AUTO W/SCOPE: CPT

## 2018-12-05 PROCEDURE — 96375 TX/PRO/DX INJ NEW DRUG ADDON: CPT

## 2018-12-05 RX ADMIN — INSULIN GLARGINE SCH UNITS: 100 INJECTION, SOLUTION SUBCUTANEOUS at 23:00

## 2018-12-05 NOTE — EKG
[facility]

              [address] [place] [address]

Test Date:    2018               Test Time:    17:04:08

Pat Name:     GANGA Solerpartment:   

Patient ID:   [id number]           Room:          

Gender:       F                        Technician:   

:          [id number]               Requested By: MARCIO MALDONADO

Order Number: [id number]           Reading MD:     

                                 Measurements

Intervals                              Axis          

Rate:         60                       P:            

AK:                                    QRS:          -22

QRSD:         90                       T:            103

QT:           458                                    

QTc:          462                                    

                           Interpretive Statements

ATRIAL FIBRILLATION

LEFTWARD AXIS

T ABNORMALITY IN HIGH LATERAL LEADS

ABNORMAL ECG

No previous ECG available for comparison

## 2018-12-05 NOTE — HP
ADMIT DATE:  12/05/2018



CHIEF COMPLAINT:  Shortness of breath.



HISTORY OF PRESENT ILLNESS:  The patient is a pleasant 63-year-old female well

known to my service.  She has been on dialysis for quite some time.  She tends

to be noncompliant with it at times.  Once again, she has missed dialysis for

about 3 weeks because she has not been feeling well.  Her daughter explains that

she did not go to dialysis because her tongue hurt and she had an infection on

it.  She has associated weakness, describes it as agonizing, has been occurring

for over a week or two, tried to increase her home meds, but that did not work. 

I discussed the case with the ER physician.  Her potassium is 7.  Her BUN is

extremely high as well at 132 and creatinine is 14.7.  I will go and get the

patient admitted and get her dialyzed.



PAST MEDICAL HISTORY:  Noncompliance; CHF; end-stage renal disease, on dialysis;

hypertension; hyperlipidemia; psoriasis; angioplasty; thoracentesis; cardiac

stents; dialysis catheter.



ALLERGIES:  None.



FAMILY HISTORY:  End-stage renal disease.



SOCIAL HISTORY:  She does not drink, smoke or take drugs.  She lives with her

daughter.



MEDICATIONS:  Reviewed, please refer to the MRAD.



REVIEW OF SYSTEMS:

GENERAL:  No history of weight change, weakness or fevers.

SKIN:  No bruising, hair changes or rashes.

EYES:  No blurred, double or loss of vision.

NOSE AND THROAT:  No history of nosebleeds, hoarseness or sore throat.

HEART:  No history of palpitations, chest pain or shortness of breath on

exertion.

LUNGS:  She complains of shortness of breath.

GASTROINTESTINAL:  Denies changes in appetite, nausea, vomiting, diarrhea or

constipation.

GENITOURINARY:  No history of frequency, urgency, hesitancy or nocturia.

NEUROLOGIC:  Denies history of numbness, tingling, tremor or weakness.

PSYCHIATRIC:  No history of panic, anxiety or depression.

ENDOCRINE:  No history of heat or cold intolerance, polyuria or polydipsia.

EXTREMITIES:  Denies muscle weakness, joint pain, pain on walking or stiffness.



PHYSICAL EXAMINATION:

VITAL SIGNS:  Temperature afebrile, pulse 98, respirations 18, blood pressure

167/90.

GENERAL:  She is awake, appears depressed and weak.  Her daughter is present.

HEENT:  She has thrush.

HEART:  Normal S1, S2 with a soft S3.

LUNGS:  Bibasilar crackles.

ABDOMEN:  Soft, obese.

EXTREMITIES:  2+ edema.

SKIN:  No rashes.  She is pale.

ENDOCRINE:  No thyromegaly.

LYMPHATICS:  No cervical nodes.

HEMATOPOIETIC:  No bruising.



LABORATORY DATA:  Potassium 7, creatinine is 14.7, BUN is 132.



ASSESSMENT AND PLAN:  Noncompliance, hyperkalemia, azotemia and thrush.  The

patient has been admitted.  We will start nystatin 5 mL q.i.d. orally swish and

swallow.  She is going to get dialysis probably tonight or tomorrow.  Resume

home medicines, frequent labs, PT, OT.



PROGNOSIS:  Guarded.

 



______________________________

BRAXTON HADLEY DO



DR:  CJ/marcela  JOB#:  [id number] / [id number]

DD:  12/05/2018 23:22  DT:  12/05/2018 23:38

## 2018-12-05 NOTE — RAD
Chest, PA and Lateral:

 

Technique:  PA and lateral views of the chest were obtained.

 

History:  Shortness of breath.

 

Comparison: 7/23/2018.

 

Findings:

 

 Mild prominent bilateral interstitial lung markings likely mild 

congestive changes. Right-sided dialysis catheter is unchanged. Mild 

cardiomegaly.

 

 

 

IMPRESSION:

 

Mild congestive changes.

 

Electronically signed by: Bryn Ocasio MD (12/5/2018 6:16 PM) [facility]

## 2018-12-05 NOTE — PHYS DOC
Past Medical History


Past Medical History:  CHF, Diabetes-Type II, High Cholesterol, Hypertension, MI

, Renal Disease, Other


Additional Past Medical Histor:  PSORASIS


Past Surgical History:  Angioplasty, Other


Additional Past Surgical Histo:  2L fluid removed from lungs, CARDIAC CATH/

STENTS,DIALYSIS CATH R CHEST


Alcohol Use:  None


Drug Use:  None





Adult General


Chief Complaint


Chief Complaint:  SHORTNESS OF BREATH





HPI


HPI





Patient is a 63  year old female who presents with has not gone to gets 

dialysis in almost 3 weeks due to stating that her tongue and mouth is sore. 

She states that she had some chills last night but was unsure she exited a 

fever. She states that she has began having shortness of breath that started 

over the weekend. Patient states she does have some nausea and some lower mid 

abdominal pain. She states it's very small and does not actually bother her. 

She rates her pain a 4 out of 10. States her last bowel movement was today and 

normal.





Review of Systems


Review of Systems





Constitutional: Denies fever or chills []


Eyes: Denies change in visual acuity, redness, or eye pain []


HENT: Denies nasal congestion or sore throat []


Respiratory: Denies cough. Shortness of breath []


Cardiovascular: No additional information not addressed in HPI []


GI: lower mid abdominal pain, nausea, denies vomiting, bloody stools or 

diarrhea []


: Denies dysuria or hematuria []


Musculoskeletal: Denies back pain or joint pain []


Integument: Denies rash or skin lesions []


Neurologic: Denies headache, focal weakness or sensory changes []





All other systems were reviewed and found to be within normal limits, except as 

documented in this note.





Current Medications


Current Medications





Current Medications








 Medications


  (Trade)  Dose


 Ordered  Sig/[facility]  Start Time


 Stop Time Status Last Admin


Dose Admin


 


 Calcium Gluconate


  (Calcium


 Gluconate)  1,000 mg  1X  ONCE  12/5/18 18:15


 12/5/18 18:16   





 


 Dextrose


  (Dextrose


 50%-Water Syringe)  25 gm  1X  ONCE  12/5/18 18:15


 12/5/18 18:16   





 


 Insulin Human


 Regular


  (HumuLIN R VIAL)  10 unit  1X  ONCE  12/5/18 18:15


 12/5/18 18:16   





 


 Sodium Bicarbonate


  (Sodium Bicarb


 Adult 8.4% Syr)  50 meq  1X  ONCE  12/5/18 18:15


 12/5/18 18:16   














Allergies


Allergies





Allergies








Coded Allergies Type Severity Reaction Last Updated Verified


 


  No Known Drug Allergies    8/6/18 No











Physical Exam


Physical Exam





Constitutional: Well developed, well nourished, no acute distress, non-toxic 

appearance. []


HENT: Normocephalic, atraumatic, bilateral external ears normal, oropharynx 

moist, no oral exudates, nose normal. []


Eyes: PERRLA, EOMI, conjunctiva normal, no discharge. [] 


Neck: Normal range of motion, no tenderness, supple, no stridor. [] 


Cardiovascular:Heart rate regular rhythm, no murmur []


Lungs & Thorax:  Bilateral upper breath sounds clear to auscultation, bilateral 

lower breath sounds diminished []


Abdomen: Bowel sounds normal, soft, low mid tenderness, no masses, no pulsatile 

masses. [] 


Skin: Warm, dry, no erythema, no rash. [] 


Back: No tenderness, no CVA tenderness. [] 


Extremities: No tenderness, no cyanosis, no clubbing, ROM intact, no edema. [] 


Neurologic: Alert and oriented X 3, normal motor function, normal sensory 

function, no focal deficits noted. []


Psychologic: Affect normal, judgement normal, mood normal. []





Current Patient Data


Vital Signs





 Vital Signs








  Date Time  Temp Pulse Resp B/P (MAP) Pulse Ox O2 Delivery O2 Flow Rate FiO2


 


12/5/18 16:36 97.6 64 18 168/75 (106) 98 Room Air  





 97.6       








Lab Values





 Laboratory Tests








Test


 12/5/18


17:20 12/5/18


17:33


 


White Blood Count


 6.5 x10^3/uL


(4.0-11.0) 





 


Red Blood Count


 2.59 x10^6/uL


(3.50-5.40)  L 





 


Hemoglobin


 8.7 g/dL


(12.0-15.5)  L 





 


Hematocrit


 25.6 %


(36.0-47.0)  L 





 


Mean Corpuscular Volume


 99 fL ()


 





 


Mean Corpuscular Hemoglobin 34 pg (25-35)   


 


Mean Corpuscular Hemoglobin


Concent 34 g/dL


(31-37) 





 


Red Cell Distribution Width


 12.7 %


(11.5-14.5) 





 


Platelet Count


 246 x10^3/uL


(140-400) 





 


Neutrophils (%) (Auto) 70 % (31-73)   


 


Lymphocytes (%) (Auto) 18 % (24-48)  L 


 


Monocytes (%) (Auto) 7 % (0-9)   


 


Eosinophils (%) (Auto) 3 % (0-3)   


 


Basophils (%) (Auto) 1 % (0-3)   


 


Neutrophils # (Auto)


 4.5 x10^3uL


(1.8-7.7) 





 


Lymphocytes # (Auto)


 1.2 x10^3/uL


(1.0-4.8) 





 


Monocytes # (Auto)


 0.5 x10^3/uL


(0.0-1.1) 





 


Eosinophils # (Auto)


 0.2 x10^3/uL


(0.0-0.7) 





 


Basophils # (Auto)


 0.1 x10^3/uL


(0.0-0.2) 





 


Urine Collection Type Unknown   


 


Urine Color Yellow   


 


Urine Clarity Cloudy   


 


Urine pH 5.5   


 


Urine Specific Gravity 1.015   


 


Urine Protein


 >=300 mg/dL


(NEG-TRACE) 





 


Urine Glucose (UA)


 250 mg/dL


(NEG) 





 


Urine Ketones (Stick)


 Negative mg/dL


(NEG) 





 


Urine Blood


 Moderate (NEG)


 





 


Urine Nitrite


 Negative (NEG)


 





 


Urine Bilirubin


 Negative (NEG)


 





 


Urine Urobilinogen Dipstick


 0.2 mg/dL (0.2


mg/dL) 





 


Urine Leukocyte Esterase Large (NEG)   


 


Urine RBC


 11-20 /HPF


(0-2) 





 


Urine WBC


 11-20 /HPF


(0-4) 





 


Urine Squamous Epithelial


Cells Many /LPF  


 





 


Urine Bacteria


 Many /HPF


(0-FEW) 





 


Sodium Level


 135 mmol/L


(136-145)  L 





 


Potassium Level


 7.0 mmol/L


(3.5-5.1)  *H 





 


Chloride Level


 99 mmol/L


() 





 


Carbon Dioxide Level


 16 mmol/L


(21-32)  L 





 


Anion Gap 20 (6-14)  H 


 


Blood Urea Nitrogen


 132 mg/dL


(7-20)  H 





 


Creatinine


 14.7 mg/dL


(0.6-1.0)  H 





 


Estimated GFR


(Cockcroft-Gault) 2.5  


 





 


BUN/Creatinine Ratio 9 (6-20)   


 


Glucose Level


 170 mg/dL


(70-99)  H 





 


Calcium Level


 7.6 mg/dL


(8.5-10.1)  L 





 


Total Bilirubin


 0.3 mg/dL


(0.2-1.0) 





 


Aspartate Amino Transferase


(AST) 9 U/L (15-37)


L 





 


Alanine Aminotransferase (ALT)


 9 U/L (14-59)


L 





 


Alkaline Phosphatase


 75 U/L


() 





 


Troponin I Quantitative


 < 0.017 ng/mL


(0.000-0.055) 





 


Total Protein


 7.8 g/dL


(6.4-8.2) 





 


Albumin


 3.0 g/dL


(3.4-5.0)  L 





 


Albumin/Globulin Ratio


 0.6 (1.0-1.7)


L 





 


POC Troponin I


 


 0.03 ng/ml


(<0.08)





 Laboratory Tests


12/5/18 17:20








 Laboratory Tests


12/5/18 17:20











EKG


EKG


Sinus rhythm with T with abnormality no STEMI


Interpretation Time:


1704 and read by Dr Oconnor





Radiology/Procedures


Radiology/Procedures


[]





Course & Med Decision Making


Course & Med Decision Making


Patient is a 63  year old female who presents with has not gone to gets 

dialysis in almost 3 weeks due to stating that her tongue and mouth is sore. 

She states that she had some chills last night but was unsure she exited a 

fever. She states that she has began having shortness of breath that started 

over the weekend. Patient states she does have some nausea and some lower mid 

abdominal pain. She states it's very small and does not actually bother her. 

She rates her pain a 4 out of 10. States her last bowel movement was today and 

normal. Chin disposed to be getting dialysis on Monday Wednesdays and Fridays 

at [facility]. Lungs are clear in upper lobes but diminished in lower lobes. 

Abdomen is only slightly tender at lower mid abdomen but rest of abdomen is 

soft and nontender. EKG shows sinus rhythm but there is a T-wave abnormality 

seen. Patient has no extremity edema. She is in no respiratory distress and 

speaks in full clear sentences. Vital signs are within normal limits. Afebrile. 

Neurologically intact. Alert and oriented. Skin is pink warm and dry. Patient 

denies urinary symptoms and states that she does still produce some urine. 

Patient is ambulatory. Patient denies chest pain, dizziness,abdominal distention

, vomiting, diarrhea, headache, numbness or tingling. She has history of 

diabetes, kidney failure, hypertension, high cholesterol, CHF, stents in her 

heart. He shouldn't is told that she will be admitted to the hospital to her 

history, symptoms, and not having dialysis for the last 3 weeks.





Patient's potassium is 7.0. I spoken to Dr. Gardner nephrology and she states that 

the dialysis technician is actually up in ICU. She states the probably put the 

patient up on the ICU and have the patient dialysis ice. She states go ahead 

and give the patient and dextrose, 10 units insulin, 2 g of calcium gluconate 

and we should get the patient dialysis ice tonight. I will also put in a 

consult for Nephrology. I have also spoken to Dr Feldman for admission fo this 

patient.





Dragon Disclaimer


Dragon Disclaimer


This electronic medical record was generated, in whole or in part, using a 

voice recognition dictation system.





Departure


Departure


Impression:  


 Primary Impression:  


 Hyperkalemia


Admitting Physician:  Jacquie Feldman


Condition:  STABLE


Referrals:  


NO PCP (PCP)











MARCIO MALDONADO APRANNE Dec 5, 2018 17:22

## 2018-12-06 VITALS — DIASTOLIC BLOOD PRESSURE: 73 MMHG | SYSTOLIC BLOOD PRESSURE: 165 MMHG

## 2018-12-06 VITALS — DIASTOLIC BLOOD PRESSURE: 73 MMHG | SYSTOLIC BLOOD PRESSURE: 181 MMHG

## 2018-12-06 VITALS — SYSTOLIC BLOOD PRESSURE: 148 MMHG | DIASTOLIC BLOOD PRESSURE: 60 MMHG

## 2018-12-06 VITALS — SYSTOLIC BLOOD PRESSURE: 152 MMHG | DIASTOLIC BLOOD PRESSURE: 50 MMHG

## 2018-12-06 VITALS — DIASTOLIC BLOOD PRESSURE: 72 MMHG | SYSTOLIC BLOOD PRESSURE: 153 MMHG

## 2018-12-06 VITALS — SYSTOLIC BLOOD PRESSURE: 177 MMHG | DIASTOLIC BLOOD PRESSURE: 68 MMHG

## 2018-12-06 VITALS — SYSTOLIC BLOOD PRESSURE: 190 MMHG | DIASTOLIC BLOOD PRESSURE: 68 MMHG

## 2018-12-06 VITALS — SYSTOLIC BLOOD PRESSURE: 159 MMHG | DIASTOLIC BLOOD PRESSURE: 56 MMHG

## 2018-12-06 VITALS — SYSTOLIC BLOOD PRESSURE: 132 MMHG | DIASTOLIC BLOOD PRESSURE: 78 MMHG

## 2018-12-06 VITALS — DIASTOLIC BLOOD PRESSURE: 60 MMHG | SYSTOLIC BLOOD PRESSURE: 150 MMHG

## 2018-12-06 VITALS — DIASTOLIC BLOOD PRESSURE: 76 MMHG | SYSTOLIC BLOOD PRESSURE: 191 MMHG

## 2018-12-06 VITALS — SYSTOLIC BLOOD PRESSURE: 171 MMHG | DIASTOLIC BLOOD PRESSURE: 63 MMHG

## 2018-12-06 VITALS — SYSTOLIC BLOOD PRESSURE: 186 MMHG | DIASTOLIC BLOOD PRESSURE: 68 MMHG

## 2018-12-06 VITALS — SYSTOLIC BLOOD PRESSURE: 165 MMHG | DIASTOLIC BLOOD PRESSURE: 85 MMHG

## 2018-12-06 LAB
ANION GAP SERPL CALC-SCNC: 15 MMOL/L (ref 6–14)
BUN SERPL-MCNC: 63 MG/DL (ref 7–20)
CALCIUM SERPL-MCNC: 8.1 MG/DL (ref 8.5–10.1)
CHLORIDE SERPL-SCNC: 96 MMOL/L (ref 98–107)
CO2 SERPL-SCNC: 27 MMOL/L (ref 21–32)
CREAT SERPL-MCNC: 8.3 MG/DL (ref 0.6–1)
GFR SERPLBLD BASED ON 1.73 SQ M-ARVRAT: 4.9 ML/MIN
GLUCOSE SERPL-MCNC: 245 MG/DL (ref 70–99)
POTASSIUM SERPL-SCNC: 4.3 MMOL/L (ref 3.5–5.1)
SODIUM SERPL-SCNC: 138 MMOL/L (ref 136–145)

## 2018-12-06 RX ADMIN — NYSTATIN SCH ML: 100000 SUSPENSION ORAL at 09:08

## 2018-12-06 RX ADMIN — NYSTATIN SCH ML: 100000 SUSPENSION ORAL at 20:38

## 2018-12-06 RX ADMIN — INSULIN GLARGINE SCH UNITS: 100 INJECTION, SOLUTION SUBCUTANEOUS at 20:42

## 2018-12-06 RX ADMIN — NYSTATIN SCH ML: 100000 SUSPENSION ORAL at 12:05

## 2018-12-06 RX ADMIN — INSULIN LISPRO SCH UNITS: 100 INJECTION, SOLUTION INTRAVENOUS; SUBCUTANEOUS at 08:00

## 2018-12-06 RX ADMIN — LISINOPRIL SCH MG: 20 TABLET ORAL at 01:13

## 2018-12-06 RX ADMIN — NYSTATIN SCH ML: 100000 SUSPENSION ORAL at 17:25

## 2018-12-06 RX ADMIN — NYSTATIN SCH ML: 100000 SUSPENSION ORAL at 01:15

## 2018-12-06 RX ADMIN — INSULIN LISPRO SCH UNITS: 100 INJECTION, SOLUTION INTRAVENOUS; SUBCUTANEOUS at 17:00

## 2018-12-06 RX ADMIN — INSULIN LISPRO SCH UNITS: 100 INJECTION, SOLUTION INTRAVENOUS; SUBCUTANEOUS at 12:09

## 2018-12-06 RX ADMIN — LISINOPRIL SCH MG: 20 TABLET ORAL at 09:07

## 2018-12-06 NOTE — PDOC2
CONSULT


Date of Consult


Date of Consult


DATE: 12/6/18 


TIME: 11:57





Reason for Consult


Reason for Consult:


ESRD- last HD 3 weeks back





Identification/Chief Complaint


Chief Complaint


"was feeling weak and mouth felt sore"





Source


Source:  Caregiver, Chart review





History of Present Illness


Reason for Visit:


 Hx Obtained from daughter as pt doesn't speak english  


She is  63-year-old female  ESRD on HD x 2 years . She has missed dialysis for 

about 3 weeks because she has not been feeling well. She tends to be 

noncompliant with it at times.


 Her daughter explains that she did not go to dialysis because her tongue hurt, 

she was feeling weak  for over a week or two, tried to increase her home meds, 

but that did not work. 


 In ER she was found to have K of  7 and as per ER  APN- mild peaked T waves 

and also reported oral thrush  Her BUN was 132 and creatinine is 14.7. 


She was dialyzed emergently last night  





Currently she is comfortable. Daughter doesn't have any concerns . No RRF. No CP

, SOB. No N/V/Abdominal pain





Past Medical History


Cardiovascular:  CAD, CHF, HTN, Hyperlipidemia


Pulmonary:  No pertinent hx, Other


CENTRAL NERVOUS SYSTEM:  Other


GI:  GERD


Heme/Onc:  No pertinent hx


Hepatobiliary:  No pertinent hx


Psych:  No pertinent hx


Rheumatologic:  No pertinent hx


Infectious disease:  No pertinent hx


Renal/:  Chronic renal failure


Endocrine:  Diabetes





Past Surgical History


Past Surgical History:  Other





Family History


Family History:  Diabetes, Heart Disease





Social History


ALCOHOL:  none


Drugs:  None


Lives:  with Family





Current Medications


Current Medications





Current Medications


Dextrose (Dextrose 50%-Water Syringe) 25 gm 1X  ONCE IV  Last administered on 12 /5/18at 18:15;  Start 12/5/18 at 18:15;  Stop 12/5/18 at 18:16;  Status DC


Insulin Human Regular (HumuLIN R VIAL) 10 unit 1X  ONCE IV  Last administered 

on 12/5/18at 18:14;  Start 12/5/18 at 18:15;  Stop 12/5/18 at 18:16;  Status DC


Calcium Gluconate (Calcium Gluconate) 1,000 mg 1X  ONCE IVP  Last administered 

on 12/5/18at 18:15;  Start 12/5/18 at 18:15;  Stop 12/5/18 at 18:16;  Status DC


Sodium Bicarbonate (Sodium Bicarb Adult 8.4% Syr) 50 meq 1X  ONCE IV ;  Start 12 /5/18 at 18:15;  Stop 12/5/18 at 18:15;  Status DC


Sodium Bicarbonate (Sodium Bicarb Adult 8.4% Syr) 50 meq 1X  ONCE IV ;  Start 12 /5/18 at 18:15;  Stop 12/5/18 at 18:15;  Status DC


Ondansetron HCl (Zofran) 4 mg PRN Q8HRS  PRN IV NAUSEA/VOMITING;  Start 12/5/18 

at 18:15;  Stop 12/6/18 at 18:14


Acetaminophen (Tylenol) 650 mg PRN Q4HRS  PRN PO FEVER;  Start 12/5/18 at 18:15

;  Stop 12/6/18 at 18:14


Sodium Chloride 1,000 ml @  1,000 mls/hr Q1H PRN IV hypotension;  Start 12/5/18 

at 19:35;  Stop 12/5/18 at 19:38;  Status DC


Sodium Chloride 1,000 ml @  400 mls/hr Q2H30M PRN IV PATENCY;  Start 12/5/18 at 

19:35;  Stop 12/5/18 at 19:38;  Status DC


Info (PHARMACY MONITORING -- do not chart) 1 each PRN DAILY  PRN MC SEE COMMENTS

;  Start 12/5/18 at 19:45;  Stop 12/5/18 at 19:45;  Status DC


Info (PHARMACY MONITORING -- do not chart) 1 each PRN DAILY  PRN MC SEE COMMENTS

;  Start 12/5/18 at 20:00


Sodium Chloride 1,000 ml @  1,000 mls/hr Q1H PRN IV hypotension;  Start 12/5/18 

at 19:45;  Stop 12/6/18 at 01:34;  Status DC


Sodium Chloride 1,000 ml @  400 mls/hr Q2H30M PRN IV PATENCY;  Start 12/5/18 at 

19:45;  Stop 12/6/18 at 07:34;  Status DC


Nystatin (Nystatin Oral Susp) 5 ml [id number] [id number]  Last administered on 12/6/18at 

09:08;  Start 12/5/18 at 23:00


Insulin Glargine (Lantus) 20 units QHS SQ ;  Start 12/5/18 at 23:00


Dextrose (Dextrose 50%-Water Syringe) 12.5 gm PRN Q15MIN  PRN IV SEE COMMENTS;  

Start 12/5/18 at 22:15


Insulin Human Lispro (HumaLOG) 10 units TIDWMEALS SQ ;  Start 12/6/18 at 08:00


Amlodipine Besylate (Norvasc) 5 mg DAILY PO  Last administered on 12/6/18at 09:

07;  Start 12/6/18 at 01:00


Lisinopril (Prinivil) 20 mg DAILY PO  Last administered on 12/6/18at 09:07;  

Start 12/6/18 at 01:00


Clonidine HCl (Catapres Tts-1) 1 patch WEEKLY TD  Last administered on 12/6/ 18at 01:15;  Start 12/6/18 at 01:00





Active Scripts


Active


Clonazepam 0.5 Mg Tablet 1 Tab PO PRN TID PRN


Levemir Flextouch (Insulin Detemir) 100 Unit/1 Ml Insuln.pen 10 Unit SQ QHS 30 

Days


Furosemide 40 Mg Tablet 40 Mg PO BID


Isosorbide Mononitrate Er (Isosorbide Mononitrate) 30 Mg Tab.er.24h 30 Mg PO 

DAILY


Atorvastatin Calcium 40 Mg Tablet 80 Mg PO QHS


Aspirin Ec (Aspirin) 81 Mg Tablet.dr 81 Mg PO DAILYWBKFT


Amlodipine Besylate 5 Mg Tablet 5 Mg PO DAILYWLUN


Clopidogrel (Clopidogrel Bisulfate) 75 Mg Tablet 75 Mg PO DAILY


Glyburide 5 Mg Tablet 1 Tab PO BIDWMEALS


Lisinopril 20 Mg Tablet 1 Tab PO DAILY


Reported


Carvedilol 25 Mg Tablet 25 Mg PO BIDWMEALS


Humalog (Insulin Lispro) 100 Unit/1 Ml Cartridge 0 SQ TIDWMEALS


     PT. ONLY TAKES SLIDING SCALE





Allergies


Allergies:  


Coded Allergies:  


     No Known Drug Allergies (Unverified , 8/6/18)





ROS


Review of System


   As per HPI, Unable to Obtain from Pt





Physical Exam


Physical Exam


GENERAL:   NAD  


HEENT:  She has thrush.


HEART:  Normal S1, S2 with a soft S3.


LUNGS:  CTA bilat  , non labored  


ABDOMEN:  Soft, obese.


EXTREMITIES:  1+ edema.


SKIN:  No rashes. 


- No cath


Neuro- AxO


Vital Signs





Vital Signs








  Date Time  Temp Pulse Resp B/P (MAP) Pulse Ox O2 Delivery O2 Flow Rate FiO2


 


12/6/18 11:49 98.2 78 20 150/60 (90) 93 Room Air  





 98.2       








Assessment & Plan


ESRD - On HD MWF [facility] 


Last OP HD was 3 weeks back, has Hx of chronic non compliance  


Emergent HD last night, Labs stable ,  HD tomorrow  as per her schedule  





Hyperkalemia- due to multiple missed treatments  


Emergent HD yesterday 





DM- as per primary  





HTN -Continue home meds 





Thrush- primary managing   





Discussed with Pt's daughter and dialysis RN





Labs


Labs





Laboratory Tests








Test


 12/5/18


17:20 12/5/18


17:33 12/6/18


00:25 12/6/18


01:34


 


White Blood Count


 6.5 x10^3/uL


(4.0-11.0) 


 


 





 


Red Blood Count


 2.59 x10^6/uL


(3.50-5.40) 


 


 





 


Hemoglobin


 8.7 g/dL


(12.0-15.5) 


 


 





 


Hematocrit


 25.6 %


(36.0-47.0) 


 


 





 


Mean Corpuscular Volume 99 fL ()    


 


Mean Corpuscular Hemoglobin 34 pg (25-35)    


 


Mean Corpuscular Hemoglobin


Concent 34 g/dL


(31-37) 


 


 





 


Red Cell Distribution Width


 12.7 %


(11.5-14.5) 


 


 





 


Platelet Count


 246 x10^3/uL


(140-400) 


 


 





 


Neutrophils (%) (Auto) 70 % (31-73)    


 


Lymphocytes (%) (Auto) 18 % (24-48)    


 


Monocytes (%) (Auto) 7 % (0-9)    


 


Eosinophils (%) (Auto) 3 % (0-3)    


 


Basophils (%) (Auto) 1 % (0-3)    


 


Neutrophils # (Auto)


 4.5 x10^3uL


(1.8-7.7) 


 


 





 


Lymphocytes # (Auto)


 1.2 x10^3/uL


(1.0-4.8) 


 


 





 


Monocytes # (Auto)


 0.5 x10^3/uL


(0.0-1.1) 


 


 





 


Eosinophils # (Auto)


 0.2 x10^3/uL


(0.0-0.7) 


 


 





 


Basophils # (Auto)


 0.1 x10^3/uL


(0.0-0.2) 


 


 





 


Urine Collection Type Unknown    


 


Urine Color Yellow    


 


Urine Clarity Cloudy    


 


Urine pH 5.5    


 


Urine Specific Gravity 1.015    


 


Urine Protein


 >=300 mg/dL


(NEG-TRACE) 


 


 





 


Urine Glucose (UA)


 250 mg/dL


(NEG) 


 


 





 


Urine Ketones (Stick)


 Negative mg/dL


(NEG) 


 


 





 


Urine Blood Moderate (NEG)    


 


Urine Nitrite Negative (NEG)    


 


Urine Bilirubin Negative (NEG)    


 


Urine Urobilinogen Dipstick


 0.2 mg/dL (0.2


mg/dL) 


 


 





 


Urine Leukocyte Esterase Large (NEG)    


 


Urine RBC


 11-20 /HPF


(0-2) 


 


 





 


Urine WBC


 11-20 /HPF


(0-4) 


 


 





 


Urine Squamous Epithelial


Cells Many /LPF 


 


 


 





 


Urine Bacteria


 Many /HPF


(0-FEW) 


 


 





 


Sodium Level


 135 mmol/L


(136-145) 


 


 





 


Potassium Level


 7.0 mmol/L


(3.5-5.1) 


 


 





 


Chloride Level


 99 mmol/L


() 


 


 





 


Carbon Dioxide Level


 16 mmol/L


(21-32) 


 


 





 


Anion Gap 20 (6-14)    


 


Blood Urea Nitrogen


 132 mg/dL


(7-20) 


 


 





 


Creatinine


 14.7 mg/dL


(0.6-1.0) 


 


 





 


Estimated GFR


(Cockcroft-Gault) 2.5 


 


 


 





 


BUN/Creatinine Ratio 9 (6-20)    


 


Glucose Level


 170 mg/dL


(70-99) 


 


 





 


Calcium Level


 7.6 mg/dL


(8.5-10.1) 


 


 





 


Total Bilirubin


 0.3 mg/dL


(0.2-1.0) 


 


 





 


Aspartate Amino Transf


(AST/SGOT) 9 U/L (15-37) 


 


 


 





 


Alanine Aminotransferase


(ALT/SGPT) 9 U/L (14-59) 


 


 


 





 


Alkaline Phosphatase


 75 U/L


() 


 


 





 


Troponin I Quantitative


 < 0.017 ng/mL


(0.000-0.055) 


 


 





 


NT-Pro-B-Type Natriuretic


Peptide 5199 pg/mL


(0-124) 


 


 





 


Total Protein


 7.8 g/dL


(6.4-8.2) 


 


 





 


Albumin


 3.0 g/dL


(3.4-5.0) 


 


 





 


Albumin/Globulin Ratio 0.6 (1.0-1.7)    


 


Bedside Troponin I


 


 0.03 ng/ml


(<0.08) 


 





 


Glucose (Fingerstick)


 


 


 69 mg/dL


(70-99) 143 mg/dL


(70-99)


 


Test


 12/6/18


03:15 12/6/18


09:05 12/6/18


11:44 





 


Sodium Level


 138 mmol/L


(136-145) 


 


 





 


Potassium Level


 4.3 mmol/L


(3.5-5.1) 


 


 





 


Chloride Level


 96 mmol/L


() 


 


 





 


Carbon Dioxide Level


 27 mmol/L


(21-32) 


 


 





 


Anion Gap 15 (6-14)    


 


Blood Urea Nitrogen


 63 mg/dL


(7-20) 


 


 





 


Creatinine


 8.3 mg/dL


(0.6-1.0) 


 


 





 


Estimated GFR


(Cockcroft-Gault) 4.9 


 


 


 





 


Glucose Level


 245 mg/dL


(70-99) 


 


 





 


Calcium Level


 8.1 mg/dL


(8.5-10.1) 


 


 





 


Glucose (Fingerstick)


 


 158 mg/dL


(70-99) 219 mg/dL


(70-99) 











Laboratory Tests








Test


 12/5/18


17:20 12/5/18


17:33 12/6/18


00:25 12/6/18


01:34


 


White Blood Count


 6.5 x10^3/uL


(4.0-11.0) 


 


 





 


Red Blood Count


 2.59 x10^6/uL


(3.50-5.40) 


 


 





 


Hemoglobin


 8.7 g/dL


(12.0-15.5) 


 


 





 


Hematocrit


 25.6 %


(36.0-47.0) 


 


 





 


Mean Corpuscular Volume 99 fL ()    


 


Mean Corpuscular Hemoglobin 34 pg (25-35)    


 


Mean Corpuscular Hemoglobin


Concent 34 g/dL


(31-37) 


 


 





 


Red Cell Distribution Width


 12.7 %


(11.5-14.5) 


 


 





 


Platelet Count


 246 x10^3/uL


(140-400) 


 


 





 


Neutrophils (%) (Auto) 70 % (31-73)    


 


Lymphocytes (%) (Auto) 18 % (24-48)    


 


Monocytes (%) (Auto) 7 % (0-9)    


 


Eosinophils (%) (Auto) 3 % (0-3)    


 


Basophils (%) (Auto) 1 % (0-3)    


 


Neutrophils # (Auto)


 4.5 x10^3uL


(1.8-7.7) 


 


 





 


Lymphocytes # (Auto)


 1.2 x10^3/uL


(1.0-4.8) 


 


 





 


Monocytes # (Auto)


 0.5 x10^3/uL


(0.0-1.1) 


 


 





 


Eosinophils # (Auto)


 0.2 x10^3/uL


(0.0-0.7) 


 


 





 


Basophils # (Auto)


 0.1 x10^3/uL


(0.0-0.2) 


 


 





 


Urine Collection Type Unknown    


 


Urine Color Yellow    


 


Urine Clarity Cloudy    


 


Urine pH 5.5    


 


Urine Specific Gravity 1.015    


 


Urine Protein


 >=300 mg/dL


(NEG-TRACE) 


 


 





 


Urine Glucose (UA)


 250 mg/dL


(NEG) 


 


 





 


Urine Ketones (Stick)


 Negative mg/dL


(NEG) 


 


 





 


Urine Blood Moderate (NEG)    


 


Urine Nitrite Negative (NEG)    


 


Urine Bilirubin Negative (NEG)    


 


Urine Urobilinogen Dipstick


 0.2 mg/dL (0.2


mg/dL) 


 


 





 


Urine Leukocyte Esterase Large (NEG)    


 


Urine RBC


 11-20 /HPF


(0-2) 


 


 





 


Urine WBC


 11-20 /HPF


(0-4) 


 


 





 


Urine Squamous Epithelial


Cells Many /LPF 


 


 


 





 


Urine Bacteria


 Many /HPF


(0-FEW) 


 


 





 


Sodium Level


 135 mmol/L


(136-145) 


 


 





 


Potassium Level


 7.0 mmol/L


(3.5-5.1) 


 


 





 


Chloride Level


 99 mmol/L


() 


 


 





 


Carbon Dioxide Level


 16 mmol/L


(21-32) 


 


 





 


Anion Gap 20 (6-14)    


 


Blood Urea Nitrogen


 132 mg/dL


(7-20) 


 


 





 


Creatinine


 14.7 mg/dL


(0.6-1.0) 


 


 





 


Estimated GFR


(Cockcroft-Gault) 2.5 


 


 


 





 


BUN/Creatinine Ratio 9 (6-20)    


 


Glucose Level


 170 mg/dL


(70-99) 


 


 





 


Calcium Level


 7.6 mg/dL


(8.5-10.1) 


 


 





 


Total Bilirubin


 0.3 mg/dL


(0.2-1.0) 


 


 





 


Aspartate Amino Transf


(AST/SGOT) 9 U/L (15-37) 


 


 


 





 


Alanine Aminotransferase


(ALT/SGPT) 9 U/L (14-59) 


 


 


 





 


Alkaline Phosphatase


 75 U/L


() 


 


 





 


Troponin I Quantitative


 < 0.017 ng/mL


(0.000-0.055) 


 


 





 


NT-Pro-B-Type Natriuretic


Peptide 5199 pg/mL


(0-124) 


 


 





 


Total Protein


 7.8 g/dL


(6.4-8.2) 


 


 





 


Albumin


 3.0 g/dL


(3.4-5.0) 


 


 





 


Albumin/Globulin Ratio 0.6 (1.0-1.7)    


 


Bedside Troponin I


 


 0.03 ng/ml


(<0.08) 


 





 


Glucose (Fingerstick)


 


 


 69 mg/dL


(70-99) 143 mg/dL


(70-99)


 


Test


 12/6/18


03:15 12/6/18


09:05 12/6/18


11:44 





 


Sodium Level


 138 mmol/L


(136-145) 


 


 





 


Potassium Level


 4.3 mmol/L


(3.5-5.1) 


 


 





 


Chloride Level


 96 mmol/L


() 


 


 





 


Carbon Dioxide Level


 27 mmol/L


(21-32) 


 


 





 


Anion Gap 15 (6-14)    


 


Blood Urea Nitrogen


 63 mg/dL


(7-20) 


 


 





 


Creatinine


 8.3 mg/dL


(0.6-1.0) 


 


 





 


Estimated GFR


(Cockcroft-Gault) 4.9 


 


 


 





 


Glucose Level


 245 mg/dL


(70-99) 


 


 





 


Calcium Level


 8.1 mg/dL


(8.5-10.1) 


 


 





 


Glucose (Fingerstick)


 


 158 mg/dL


(70-99) 219 mg/dL


(70-99) 











Review


All relevant outside records, renal labs, imaging studies, telemetry/EKG's were 

reviewed.











MABLE DIAS MD Dec 6, 2018 12:08

## 2018-12-06 NOTE — PDOC
PROGRESS NOTES


Chief Complaint


Chief Complaint


Hyperkalemia


Dialysis





History of Present Illness


History of Present Illness


Pt was seen and examined in ICU today with her daughter Radha in the room. She 

has a long history of kidney disease and has been non-compliant with her 

dialysis. Pt also has thrush on her tongue still, nystatin was prescribed. 

Renal and ID following. She had no new complaints or overnight events.





Vitals


Vitals





Vital Signs








  Date Time  Temp Pulse Resp B/P (MAP) Pulse Ox O2 Delivery O2 Flow Rate FiO2


 


12/6/18 10:00  76 13 165/85 (111) 96 Room Air  


 


12/6/18 08:00 99.2       





 99.2       











Physical Exam


General:  Alert, Oriented X3, Cooperative, No acute distress


Heart:  Regular rate, Normal S1, Normal S2


Lungs:  Clear, Other (no crackles, wheezes)


Abdomen:  Normal bowel sounds, Soft


Extremities:  No clubbing, No cyanosis


Skin:  No breakdown, No significant lesion





Labs


LABS





Laboratory Tests








Test


 12/5/18


17:20 12/5/18


17:33 12/6/18


00:25 12/6/18


01:34


 


White Blood Count


 6.5 x10^3/uL


(4.0-11.0) 


 


 





 


Red Blood Count


 2.59 x10^6/uL


(3.50-5.40) 


 


 





 


Hemoglobin


 8.7 g/dL


(12.0-15.5) 


 


 





 


Hematocrit


 25.6 %


(36.0-47.0) 


 


 





 


Mean Corpuscular Volume 99 fL ()    


 


Mean Corpuscular Hemoglobin 34 pg (25-35)    


 


Mean Corpuscular Hemoglobin


Concent 34 g/dL


(31-37) 


 


 





 


Red Cell Distribution Width


 12.7 %


(11.5-14.5) 


 


 





 


Platelet Count


 246 x10^3/uL


(140-400) 


 


 





 


Neutrophils (%) (Auto) 70 % (31-73)    


 


Lymphocytes (%) (Auto) 18 % (24-48)    


 


Monocytes (%) (Auto) 7 % (0-9)    


 


Eosinophils (%) (Auto) 3 % (0-3)    


 


Basophils (%) (Auto) 1 % (0-3)    


 


Neutrophils # (Auto)


 4.5 x10^3uL


(1.8-7.7) 


 


 





 


Lymphocytes # (Auto)


 1.2 x10^3/uL


(1.0-4.8) 


 


 





 


Monocytes # (Auto)


 0.5 x10^3/uL


(0.0-1.1) 


 


 





 


Eosinophils # (Auto)


 0.2 x10^3/uL


(0.0-0.7) 


 


 





 


Basophils # (Auto)


 0.1 x10^3/uL


(0.0-0.2) 


 


 





 


Urine Collection Type Unknown    


 


Urine Color Yellow    


 


Urine Clarity Cloudy    


 


Urine pH 5.5    


 


Urine Specific Gravity 1.015    


 


Urine Protein


 >=300 mg/dL


(NEG-TRACE) 


 


 





 


Urine Glucose (UA)


 250 mg/dL


(NEG) 


 


 





 


Urine Ketones (Stick)


 Negative mg/dL


(NEG) 


 


 





 


Urine Blood Moderate (NEG)    


 


Urine Nitrite Negative (NEG)    


 


Urine Bilirubin Negative (NEG)    


 


Urine Urobilinogen Dipstick


 0.2 mg/dL (0.2


mg/dL) 


 


 





 


Urine Leukocyte Esterase Large (NEG)    


 


Urine RBC


 11-20 /HPF


(0-2) 


 


 





 


Urine WBC


 11-20 /HPF


(0-4) 


 


 





 


Urine Squamous Epithelial


Cells Many /LPF 


 


 


 





 


Urine Bacteria


 Many /HPF


(0-FEW) 


 


 





 


Sodium Level


 135 mmol/L


(136-145) 


 


 





 


Potassium Level


 7.0 mmol/L


(3.5-5.1) 


 


 





 


Chloride Level


 99 mmol/L


() 


 


 





 


Carbon Dioxide Level


 16 mmol/L


(21-32) 


 


 





 


Anion Gap 20 (6-14)    


 


Blood Urea Nitrogen


 132 mg/dL


(7-20) 


 


 





 


Creatinine


 14.7 mg/dL


(0.6-1.0) 


 


 





 


Estimated GFR


(Cockcroft-Gault) 2.5 


 


 


 





 


BUN/Creatinine Ratio 9 (6-20)    


 


Glucose Level


 170 mg/dL


(70-99) 


 


 





 


Calcium Level


 7.6 mg/dL


(8.5-10.1) 


 


 





 


Total Bilirubin


 0.3 mg/dL


(0.2-1.0) 


 


 





 


Aspartate Amino Transf


(AST/SGOT) 9 U/L (15-37) 


 


 


 





 


Alanine Aminotransferase


(ALT/SGPT) 9 U/L (14-59) 


 


 


 





 


Alkaline Phosphatase


 75 U/L


() 


 


 





 


Troponin I Quantitative


 < 0.017 ng/mL


(0.000-0.055) 


 


 





 


NT-Pro-B-Type Natriuretic


Peptide 5199 pg/mL


(0-124) 


 


 





 


Total Protein


 7.8 g/dL


(6.4-8.2) 


 


 





 


Albumin


 3.0 g/dL


(3.4-5.0) 


 


 





 


Albumin/Globulin Ratio 0.6 (1.0-1.7)    


 


Bedside Troponin I


 


 0.03 ng/ml


(<0.08) 


 





 


Glucose (Fingerstick)


 


 


 69 mg/dL


(70-99) 143 mg/dL


(70-99)


 


Test


 12/6/18


03:15 12/6/18


09:05 


 





 


Sodium Level


 138 mmol/L


(136-145) 


 


 





 


Potassium Level


 4.3 mmol/L


(3.5-5.1) 


 


 





 


Chloride Level


 96 mmol/L


() 


 


 





 


Carbon Dioxide Level


 27 mmol/L


(21-32) 


 


 





 


Anion Gap 15 (6-14)    


 


Blood Urea Nitrogen


 63 mg/dL


(7-20) 


 


 





 


Creatinine


 8.3 mg/dL


(0.6-1.0) 


 


 





 


Estimated GFR


(Cockcroft-Gault) 4.9 


 


 


 





 


Glucose Level


 245 mg/dL


(70-99) 


 


 





 


Calcium Level


 8.1 mg/dL


(8.5-10.1) 


 


 





 


Glucose (Fingerstick)


 


 158 mg/dL


(70-99) 


 














Review of Systems


Review of Systems


GENERAL: No unexpected wt loss, fever/chills


CV: No chest pain, palpitations





Assessment and Plan


Assessmemt and Plan


ASSESSMENT:


Hyperkalemia


Dialysis





PLAN:


ICU Monitoring


Recheck labs in AM


Continue nystatin for oral thrush


Home meds


Dialyze pt per nephrology


Subspecialty input appreciated


Consult PT/OT


DVT Prophylaxis





Comment


Review of Relevant


I have reviewed the following items lore (where applicable) has been applied.


Labs





Laboratory Tests








Test


 12/5/18


17:20 12/5/18


17:33 12/6/18


00:25 12/6/18


01:34


 


White Blood Count


 6.5 x10^3/uL


(4.0-11.0) 


 


 





 


Red Blood Count


 2.59 x10^6/uL


(3.50-5.40) 


 


 





 


Hemoglobin


 8.7 g/dL


(12.0-15.5) 


 


 





 


Hematocrit


 25.6 %


(36.0-47.0) 


 


 





 


Mean Corpuscular Volume 99 fL ()    


 


Mean Corpuscular Hemoglobin 34 pg (25-35)    


 


Mean Corpuscular Hemoglobin


Concent 34 g/dL


(31-37) 


 


 





 


Red Cell Distribution Width


 12.7 %


(11.5-14.5) 


 


 





 


Platelet Count


 246 x10^3/uL


(140-400) 


 


 





 


Neutrophils (%) (Auto) 70 % (31-73)    


 


Lymphocytes (%) (Auto) 18 % (24-48)    


 


Monocytes (%) (Auto) 7 % (0-9)    


 


Eosinophils (%) (Auto) 3 % (0-3)    


 


Basophils (%) (Auto) 1 % (0-3)    


 


Neutrophils # (Auto)


 4.5 x10^3uL


(1.8-7.7) 


 


 





 


Lymphocytes # (Auto)


 1.2 x10^3/uL


(1.0-4.8) 


 


 





 


Monocytes # (Auto)


 0.5 x10^3/uL


(0.0-1.1) 


 


 





 


Eosinophils # (Auto)


 0.2 x10^3/uL


(0.0-0.7) 


 


 





 


Basophils # (Auto)


 0.1 x10^3/uL


(0.0-0.2) 


 


 





 


Urine Collection Type Unknown    


 


Urine Color Yellow    


 


Urine Clarity Cloudy    


 


Urine pH 5.5    


 


Urine Specific Gravity 1.015    


 


Urine Protein


 >=300 mg/dL


(NEG-TRACE) 


 


 





 


Urine Glucose (UA)


 250 mg/dL


(NEG) 


 


 





 


Urine Ketones (Stick)


 Negative mg/dL


(NEG) 


 


 





 


Urine Blood Moderate (NEG)    


 


Urine Nitrite Negative (NEG)    


 


Urine Bilirubin Negative (NEG)    


 


Urine Urobilinogen Dipstick


 0.2 mg/dL (0.2


mg/dL) 


 


 





 


Urine Leukocyte Esterase Large (NEG)    


 


Urine RBC


 11-20 /HPF


(0-2) 


 


 





 


Urine WBC


 11-20 /HPF


(0-4) 


 


 





 


Urine Squamous Epithelial


Cells Many /LPF 


 


 


 





 


Urine Bacteria


 Many /HPF


(0-FEW) 


 


 





 


Sodium Level


 135 mmol/L


(136-145) 


 


 





 


Potassium Level


 7.0 mmol/L


(3.5-5.1) 


 


 





 


Chloride Level


 99 mmol/L


() 


 


 





 


Carbon Dioxide Level


 16 mmol/L


(21-32) 


 


 





 


Anion Gap 20 (6-14)    


 


Blood Urea Nitrogen


 132 mg/dL


(7-20) 


 


 





 


Creatinine


 14.7 mg/dL


(0.6-1.0) 


 


 





 


Estimated GFR


(Cockcroft-Gault) 2.5 


 


 


 





 


BUN/Creatinine Ratio 9 (6-20)    


 


Glucose Level


 170 mg/dL


(70-99) 


 


 





 


Calcium Level


 7.6 mg/dL


(8.5-10.1) 


 


 





 


Total Bilirubin


 0.3 mg/dL


(0.2-1.0) 


 


 





 


Aspartate Amino Transf


(AST/SGOT) 9 U/L (15-37) 


 


 


 





 


Alanine Aminotransferase


(ALT/SGPT) 9 U/L (14-59) 


 


 


 





 


Alkaline Phosphatase


 75 U/L


() 


 


 





 


Troponin I Quantitative


 < 0.017 ng/mL


(0.000-0.055) 


 


 





 


NT-Pro-B-Type Natriuretic


Peptide 5199 pg/mL


(0-124) 


 


 





 


Total Protein


 7.8 g/dL


(6.4-8.2) 


 


 





 


Albumin


 3.0 g/dL


(3.4-5.0) 


 


 





 


Albumin/Globulin Ratio 0.6 (1.0-1.7)    


 


Bedside Troponin I


 


 0.03 ng/ml


(<0.08) 


 





 


Glucose (Fingerstick)


 


 


 69 mg/dL


(70-99) 143 mg/dL


(70-99)


 


Test


 12/6/18


03:15 12/6/18


09:05 


 





 


Sodium Level


 138 mmol/L


(136-145) 


 


 





 


Potassium Level


 4.3 mmol/L


(3.5-5.1) 


 


 





 


Chloride Level


 96 mmol/L


() 


 


 





 


Carbon Dioxide Level


 27 mmol/L


(21-32) 


 


 





 


Anion Gap 15 (6-14)    


 


Blood Urea Nitrogen


 63 mg/dL


(7-20) 


 


 





 


Creatinine


 8.3 mg/dL


(0.6-1.0) 


 


 





 


Estimated GFR


(Cockcroft-Gault) 4.9 


 


 


 





 


Glucose Level


 245 mg/dL


(70-99) 


 


 





 


Calcium Level


 8.1 mg/dL


(8.5-10.1) 


 


 





 


Glucose (Fingerstick)


 


 158 mg/dL


(70-99) 


 











Laboratory Tests








Test


 12/5/18


17:20 12/5/18


17:33 12/6/18


00:25 12/6/18


01:34


 


White Blood Count


 6.5 x10^3/uL


(4.0-11.0) 


 


 





 


Red Blood Count


 2.59 x10^6/uL


(3.50-5.40) 


 


 





 


Hemoglobin


 8.7 g/dL


(12.0-15.5) 


 


 





 


Hematocrit


 25.6 %


(36.0-47.0) 


 


 





 


Mean Corpuscular Volume 99 fL ()    


 


Mean Corpuscular Hemoglobin 34 pg (25-35)    


 


Mean Corpuscular Hemoglobin


Concent 34 g/dL


(31-37) 


 


 





 


Red Cell Distribution Width


 12.7 %


(11.5-14.5) 


 


 





 


Platelet Count


 246 x10^3/uL


(140-400) 


 


 





 


Neutrophils (%) (Auto) 70 % (31-73)    


 


Lymphocytes (%) (Auto) 18 % (24-48)    


 


Monocytes (%) (Auto) 7 % (0-9)    


 


Eosinophils (%) (Auto) 3 % (0-3)    


 


Basophils (%) (Auto) 1 % (0-3)    


 


Neutrophils # (Auto)


 4.5 x10^3uL


(1.8-7.7) 


 


 





 


Lymphocytes # (Auto)


 1.2 x10^3/uL


(1.0-4.8) 


 


 





 


Monocytes # (Auto)


 0.5 x10^3/uL


(0.0-1.1) 


 


 





 


Eosinophils # (Auto)


 0.2 x10^3/uL


(0.0-0.7) 


 


 





 


Basophils # (Auto)


 0.1 x10^3/uL


(0.0-0.2) 


 


 





 


Urine Collection Type Unknown    


 


Urine Color Yellow    


 


Urine Clarity Cloudy    


 


Urine pH 5.5    


 


Urine Specific Gravity 1.015    


 


Urine Protein


 >=300 mg/dL


(NEG-TRACE) 


 


 





 


Urine Glucose (UA)


 250 mg/dL


(NEG) 


 


 





 


Urine Ketones (Stick)


 Negative mg/dL


(NEG) 


 


 





 


Urine Blood Moderate (NEG)    


 


Urine Nitrite Negative (NEG)    


 


Urine Bilirubin Negative (NEG)    


 


Urine Urobilinogen Dipstick


 0.2 mg/dL (0.2


mg/dL) 


 


 





 


Urine Leukocyte Esterase Large (NEG)    


 


Urine RBC


 11-20 /HPF


(0-2) 


 


 





 


Urine WBC


 11-20 /HPF


(0-4) 


 


 





 


Urine Squamous Epithelial


Cells Many /LPF 


 


 


 





 


Urine Bacteria


 Many /HPF


(0-FEW) 


 


 





 


Sodium Level


 135 mmol/L


(136-145) 


 


 





 


Potassium Level


 7.0 mmol/L


(3.5-5.1) 


 


 





 


Chloride Level


 99 mmol/L


() 


 


 





 


Carbon Dioxide Level


 16 mmol/L


(21-32) 


 


 





 


Anion Gap 20 (6-14)    


 


Blood Urea Nitrogen


 132 mg/dL


(7-20) 


 


 





 


Creatinine


 14.7 mg/dL


(0.6-1.0) 


 


 





 


Estimated GFR


(Cockcroft-Gault) 2.5 


 


 


 





 


BUN/Creatinine Ratio 9 (6-20)    


 


Glucose Level


 170 mg/dL


(70-99) 


 


 





 


Calcium Level


 7.6 mg/dL


(8.5-10.1) 


 


 





 


Total Bilirubin


 0.3 mg/dL


(0.2-1.0) 


 


 





 


Aspartate Amino Transf


(AST/SGOT) 9 U/L (15-37) 


 


 


 





 


Alanine Aminotransferase


(ALT/SGPT) 9 U/L (14-59) 


 


 


 





 


Alkaline Phosphatase


 75 U/L


() 


 


 





 


Troponin I Quantitative


 < 0.017 ng/mL


(0.000-0.055) 


 


 





 


NT-Pro-B-Type Natriuretic


Peptide 5199 pg/mL


(0-124) 


 


 





 


Total Protein


 7.8 g/dL


(6.4-8.2) 


 


 





 


Albumin


 3.0 g/dL


(3.4-5.0) 


 


 





 


Albumin/Globulin Ratio 0.6 (1.0-1.7)    


 


Bedside Troponin I


 


 0.03 ng/ml


(<0.08) 


 





 


Glucose (Fingerstick)


 


 


 69 mg/dL


(70-99) 143 mg/dL


(70-99)


 


Test


 12/6/18


03:15 12/6/18


09:05 


 





 


Sodium Level


 138 mmol/L


(136-145) 


 


 





 


Potassium Level


 4.3 mmol/L


(3.5-5.1) 


 


 





 


Chloride Level


 96 mmol/L


() 


 


 





 


Carbon Dioxide Level


 27 mmol/L


(21-32) 


 


 





 


Anion Gap 15 (6-14)    


 


Blood Urea Nitrogen


 63 mg/dL


(7-20) 


 


 





 


Creatinine


 8.3 mg/dL


(0.6-1.0) 


 


 





 


Estimated GFR


(Cockcroft-Gault) 4.9 


 


 


 





 


Glucose Level


 245 mg/dL


(70-99) 


 


 





 


Calcium Level


 8.1 mg/dL


(8.5-10.1) 


 


 





 


Glucose (Fingerstick)


 


 158 mg/dL


(70-99) 


 











Medications





Current Medications


Dextrose (Dextrose 50%-Water Syringe) 25 gm 1X  ONCE IV  Last administered on 12 /5/18at 18:15;  Start 12/5/18 at 18:15;  Stop 12/5/18 at 18:16;  Status DC


Insulin Human Regular (HumuLIN R VIAL) 10 unit 1X  ONCE IV  Last administered 

on 12/5/18at 18:14;  Start 12/5/18 at 18:15;  Stop 12/5/18 at 18:16;  Status DC


Calcium Gluconate (Calcium Gluconate) 1,000 mg 1X  ONCE IVP  Last administered 

on 12/5/18at 18:15;  Start 12/5/18 at 18:15;  Stop 12/5/18 at 18:16;  Status DC


Sodium Bicarbonate (Sodium Bicarb Adult 8.4% Syr) 50 meq 1X  ONCE IV ;  Start 12 /5/18 at 18:15;  Stop 12/5/18 at 18:15;  Status DC


Sodium Bicarbonate (Sodium Bicarb Adult 8.4% Syr) 50 meq 1X  ONCE IV ;  Start 12 /5/18 at 18:15;  Stop 12/5/18 at 18:15;  Status DC


Ondansetron HCl (Zofran) 4 mg PRN Q8HRS  PRN IV NAUSEA/VOMITING;  Start 12/5/18 

at 18:15;  Stop 12/6/18 at 18:14


Acetaminophen (Tylenol) 650 mg PRN Q4HRS  PRN PO FEVER;  Start 12/5/18 at 18:15

;  Stop 12/6/18 at 18:14


Sodium Chloride 1,000 ml @  1,000 mls/hr Q1H PRN IV hypotension;  Start 12/5/18 

at 19:35;  Stop 12/5/18 at 19:38;  Status DC


Sodium Chloride 1,000 ml @  400 mls/hr Q2H30M PRN IV PATENCY;  Start 12/5/18 at 

19:35;  Stop 12/5/18 at 19:38;  Status DC


Info (PHARMACY MONITORING -- do not chart) 1 each PRN DAILY  PRN MC SEE COMMENTS

;  Start 12/5/18 at 19:45;  Stop 12/5/18 at 19:45;  Status DC


Info (PHARMACY MONITORING -- do not chart) 1 each PRN DAILY  PRN MC SEE COMMENTS

;  Start 12/5/18 at 20:00


Sodium Chloride 1,000 ml @  1,000 mls/hr Q1H PRN IV hypotension;  Start 12/5/18 

at 19:45;  Stop 12/6/18 at 01:34;  Status DC


Sodium Chloride 1,000 ml @  400 mls/hr Q2H30M PRN IV PATENCY;  Start 12/5/18 at 

19:45;  Stop 12/6/18 at 07:34;  Status DC


Nystatin (Nystatin Oral Susp) 5 ml [id number]  Last administered on 12/6/18at 

09:08;  Start 12/5/18 at 23:00


Insulin Glargine (Lantus) 20 units QHS SQ ;  Start 12/5/18 at 23:00


Dextrose (Dextrose 50%-Water Syringe) 12.5 gm PRN Q15MIN  PRN IV SEE COMMENTS;  

Start 12/5/18 at 22:15


Insulin Human Lispro (HumaLOG) 10 units TIDWMEALS SQ ;  Start 12/6/18 at 08:00


Amlodipine Besylate (Norvasc) 5 mg DAILY PO  Last administered on 12/6/18at 09:

07;  Start 12/6/18 at 01:00


Lisinopril (Prinivil) 20 mg DAILY PO  Last administered on 12/6/18at 09:07;  

Start 12/6/18 at 01:00


Clonidine HCl (Catapres Tts-1) 1 patch WEEKLY TD  Last administered on 12/6/ 18at 01:15;  Start 12/6/18 at 01:00





Active Scripts


Active


Clonazepam 0.5 Mg Tablet 1 Tab PO PRN TID PRN


Levemir Flextouch (Insulin Detemir) 100 Unit/1 Ml Insuln.pen 10 Unit SQ QHS 30 

Days


Furosemide 40 Mg Tablet 40 Mg PO BID


Isosorbide Mononitrate Er (Isosorbide Mononitrate) 30 Mg Tab.er.24h 30 Mg PO 

DAILY


Atorvastatin Calcium 40 Mg Tablet 80 Mg PO QHS


Aspirin Ec (Aspirin) 81 Mg Tablet.dr 81 Mg PO DAILYWBKFT


Amlodipine Besylate 5 Mg Tablet 5 Mg PO DAILYWLUN


Clopidogrel (Clopidogrel Bisulfate) 75 Mg Tablet 75 Mg PO DAILY


Glyburide 5 Mg Tablet 1 Tab PO BIDWMEALS


Lisinopril 20 Mg Tablet 1 Tab PO DAILY


Reported


Carvedilol 25 Mg Tablet 25 Mg PO BIDWMEALS


Humalog (Insulin Lispro) 100 Unit/1 Ml Cartridge 0 SQ TIDWMEALS


     PT. ONLY TAKES SLIDING SCALE


Vitals/I & O





Vital Sign - Last 24 Hours








 12/5/18 12/5/18 12/5/18 12/5/18





 16:36 17:50 18:12 18:42


 


Temp 97.6   





 97.6   


 


Pulse 64 64 62 62


 


Resp 18 14 16 16


 


B/P (MAP) 168/75 (106) 146/64 (91) 158/71 (100) 160/70 (100)


 


Pulse Ox 98 98 97 96


 


O2 Delivery Room Air Room Air Room Air Room Air


 


    





    





 12/5/18 12/5/18 12/5/18 12/5/18





 19:50 20:00 20:00 20:15


 


Temp 97.7   





 97.7   


 


Pulse 62 64  62


 


Resp 16 17  13


 


B/P (MAP) 178/57 (97) 154/71 (98)  153/74 (100)


 


Pulse Ox 99 99  100


 


O2 Delivery Room Air Room Air Room Air Room Air


 


    





    





 12/5/18 12/5/18 12/5/18 12/5/18





 20:30 20:45 21:00 21:30


 


Pulse 62 64 62 64


 


Resp 12 12 14 14


 


B/P (MAP) 149/58 (88) 145/62 (89) 131/74 (93) 165/74 (104)


 


Pulse Ox 98 99 100 98


 


O2 Delivery Room Air Room Air Room Air Room Air





 12/5/18 12/5/18 12/5/18 12/6/18





 22:00 23:02 23:59 00:00


 


Temp    98.4





    98.4


 


Pulse 66 70  74


 


Resp 11 16  15


 


B/P (MAP) 149/65 (93) 179/62 (101)  191/76 (114)


 


Pulse Ox 98 99  99


 


O2 Delivery Room Air Room Air Room Air Room Air


 


    





    





 12/6/18 12/6/18 12/6/18 12/6/18





 01:00 01:13 01:13 02:00


 


Pulse 78 81 81 84


 


Resp 22   30


 


B/P (MAP) 181/73 (109) 176/65 176/65 132/78 (96)


 


Pulse Ox 100   98


 


O2 Delivery Room Air   Room Air





 12/6/18 12/6/18 12/6/18 12/6/18





 03:00 04:00 04:00 05:00


 


Temp  98.1  





  98.1  


 


Pulse 70 68  74


 


Resp 17 14  10


 


B/P (MAP) 186/68 (107) 165/73 (103)  153/72 (99)


 


Pulse Ox 94 96  97


 


O2 Delivery Room Air Room Air Room Air Room Air


 


    





    





 12/6/18 12/6/18 12/6/18 12/6/18





 06:00 08:00 08:00 09:00


 


Temp   99.2 





   99.2 


 


Pulse 75  74 81


 


Resp 11  12 12


 


B/P (MAP) 177/68 (104)  171/63 (99) 190/68 (108)


 


Pulse Ox 96  97 99


 


O2 Delivery Room Air Room Air Room Air Room Air


 


    





    





 12/6/18 12/6/18 12/6/18 





 09:07 09:07 10:00 


 


Pulse 80 78 76 


 


Resp   13 


 


B/P (MAP) 190/68 190/68 165/85 (111) 


 


Pulse Ox   96 


 


O2 Delivery   Room Air 














Intake and Output   


 


 12/5/18 12/5/18 12/6/18





 15:00 23:00 07:00


 


Intake Total  0 ml 420 ml


 


Output Total  0 ml 130 ml


 


Balance  0 ml 290 ml

















BRAXTON HADLEY III DO Dec 6, 2018 10:50

## 2018-12-07 VITALS — DIASTOLIC BLOOD PRESSURE: 46 MMHG | SYSTOLIC BLOOD PRESSURE: 146 MMHG

## 2018-12-07 VITALS — DIASTOLIC BLOOD PRESSURE: 62 MMHG | SYSTOLIC BLOOD PRESSURE: 175 MMHG

## 2018-12-07 VITALS — SYSTOLIC BLOOD PRESSURE: 173 MMHG | DIASTOLIC BLOOD PRESSURE: 58 MMHG

## 2018-12-07 PROCEDURE — 5A1D70Z PERFORMANCE OF URINARY FILTRATION, INTERMITTENT, LESS THAN 6 HOURS PER DAY: ICD-10-PCS | Performed by: INTERNAL MEDICINE

## 2018-12-07 RX ADMIN — INSULIN LISPRO SCH UNITS: 100 INJECTION, SOLUTION INTRAVENOUS; SUBCUTANEOUS at 08:22

## 2018-12-07 RX ADMIN — NYSTATIN SCH ML: 100000 SUSPENSION ORAL at 13:00

## 2018-12-07 RX ADMIN — NYSTATIN SCH ML: 100000 SUSPENSION ORAL at 08:24

## 2018-12-07 RX ADMIN — LISINOPRIL SCH MG: 20 TABLET ORAL at 08:24

## 2018-12-07 RX ADMIN — INSULIN LISPRO SCH UNITS: 100 INJECTION, SOLUTION INTRAVENOUS; SUBCUTANEOUS at 12:00

## 2018-12-07 NOTE — PDOC
SUBJECTIVE


ROS


No new concerns





OBJECTIVE


Vital Signs





Vital Signs








  Date Time  Temp Pulse Resp B/P (MAP) Pulse Ox O2 Delivery O2 Flow Rate FiO2


 


12/7/18 15:00 98.8 72 20 173/58 (96) 97 Room Air  





 98.8       








I & 0











Intake and Output 


 


 12/7/18





 07:00


 


Intake Total 100 ml


 


Output Total 75 ml


 


Balance 25 ml


 


 


 


Intake Oral 100 ml


 


Output Urine Total 75 ml











PHYSICAL EXAM


Physical Exam


GENERAL:   NAD  


HEENT:  She has thrush.


HEART:  Normal S1, S2 with a soft S3.


LUNGS:  CTA bilat  , non labored  


ABDOMEN:  Soft, obese.


EXTREMITIES:  1+ edema.


SKIN:  No rashes. 


- No cath


Neuro- AxO


Vital Signs





Vital Signs








  Date Time  Temp Pulse Resp B/P (MAP) Pulse Ox O2 Delivery O2 Flow Rate FiO2


 


12/6/18 11:49 98.2 78 20 150/60 (90) 93 Room Air  





 98.2       











DIAGNOSIS/ASSESSMENT


Assessment & Plan


ESRD - On HD MWF [facility] 


Last OP HD was 3 weeks back, has Hx of chronic non compliance  


Emergent HD on 12/5


Seen on Hd today, tolerating well,continue as Ordered  





DM- as per primary  





HTN -Continue home meds 





Thrush- primary managing   





Discussed with  dialysis RN





COMMENT/RELEVANT DATA


Meds





Current Medications








 Medications


  (Trade)  Dose


 Ordered  Sig/Gume  Start Time


 Stop Time Status Last Admin


Dose Admin


 


 Acetaminophen


  (Tylenol)  650 mg  PRN Q4HRS  PRN  12/5/18 18:15


 12/6/18 18:14 DC  





 


 Amlodipine


 Besylate


  (Norvasc)  5 mg  DAILY  12/6/18 01:00


    12/7/18 08:23


5 MG


 


 Calcium Gluconate


  (Calcium


 Gluconate)  1,000 mg  1X  ONCE  12/5/18 18:15


 12/5/18 18:16 DC 12/5/18 18:15


1,000 MG


 


 Clonidine HCl


  (Catapres Tts-1)  1 patch  WEEKLY  12/6/18 01:00


    12/6/18 01:15


1 PATCH


 


 Dextrose


  (Dextrose


 50%-Water Syringe)  12.5 gm  PRN Q15MIN  PRN  12/5/18 22:15


     





 


 Info


  (PHARMACY


 MONITORING -- do


 not chart)  1 each  PRN DAILY  PRN  12/7/18 10:30


     





 


 Insulin Glargine


  (Lantus)  7 units  QHS  12/7/18 21:00


     





 


 Insulin Human


 Lispro


  (HumaLOG)  10 units  TIDWMEALS  12/6/18 08:00


    12/6/18 12:09


5 UNITS


 


 Insulin Human


 Regular


  (HumuLIN R VIAL)  10 unit  1X  ONCE  12/5/18 18:15


 12/5/18 18:16 DC 12/5/18 18:14


10 UNIT


 


 Lisinopril


  (Prinivil)  20 mg  DAILY  12/6/18 01:00


    12/7/18 08:24


20 MG


 


 Nystatin


  (Nystatin Oral


 Susp)  5 ml  [id number]  12/5/18 23:00


    12/7/18 08:24


5 ML


 


 Ondansetron HCl


  (Zofran)  4 mg  PRN Q8HRS  PRN  12/5/18 18:15


 12/6/18 18:14 DC 12/6/18 12:47


4 MG


 


 Sodium Bicarbonate


  (Sodium Bicarb


 Adult 8.4% Syr)  50 meq  1X  ONCE  12/5/18 18:15


 12/5/18 18:15 DC  





 


 Sodium Chloride  1,000 ml @ 


 400 mls/hr  Q2H30M PRN  12/7/18 10:19


 12/7/18 22:18   











Lab





Laboratory Tests








Test


 12/6/18


16:37 12/6/18


20:31 12/7/18


07:19 12/7/18


12:14


 


Glucose (Fingerstick)


 109 mg/dL


(70-99) 206 mg/dL


(70-99) 90 mg/dL


(70-99) 174 mg/dL


(70-99)








Results


All relevant outside records, renal labs, imaging studies, telemetry/EKG's were 

reviewed.











MABLE DIAS MD Dec 7, 2018 16:27

## 2018-12-07 NOTE — PDOC3
Discharge Summary [facility]


Date of Admission:  Dec 5, 2018


Discharge Date:  Dec 7, 2018


Admitting Diagnosis





ESRD on HD, missed HD for 10ds


oral thrush


dm2 on insulin


hyperkalemia


morbid obesity


non compliance, not fu with PCP always


CONSULTS


renal


Brief Hospital Course


Ms. Ibrahim  is a 63 old F, dm2, ESRD on HD, came for sob, chills.


She speaks Turkish, often hospital visitor, not fu with PCP, always required 

her home meds refilled every time when she is in hosp.


daughter speaking English well said she missed HD for 10ds, since she was 

feeling chills, with oral thrush , not eating.


in ER, pt was fouND K 7, admitted to ICU and got HD, bmp much better. nystatin 

mouth wash started.


pt feels good now, on levemir 7 u qhs, humalog 10u tid.


dc home after HD if ok with renal. resumed all home meds for 1 month.


dc time 35min. cont nystatin mouth wash for 10ds. 





 General:  Alert, Oriented X3, Cooperative, No acute distress


Heart:  Regular rate, Normal S1, Normal S2. tongue has mild white thrush 


Lungs:  Clear, Other (no crackles, wheezes)


Abdomen:  Normal bowel sounds, Soft


Extremities:  No clubbing, No cyanosis


Skin:  No breakdown, No significant lesion


Patient History:  


FH: heart disease


  32 MOTHER


FHx: diabetes mellitus


  33 FATHER


Unknown


Disposition


home


CONDITION AT DISCHARGE:  Improved, Stable


Scheduled


Amlodipine Besylate (Amlodipine Besylate), 5 MG PO DAILYWLUN


Aspirin (Aspirin Ec), 81 MG PO DAILYWBKFT


Atorvastatin Calcium (Atorvastatin Calcium), 80 MG PO QHS


Clonidine (Clonidine Tts-1), 1 PATCH TD WEEKLY


Clopidogrel Bisulfate (Clopidogrel), 75 MG PO DAILY


Insulin Glargine,Hum.rec.anlog (Lantus Solostar), 7 UNITS SQ QHS


Insulin Lispro (Humalog), 10 UNITS SQ TIDWMEALS


Lisinopril (Lisinopril), 1 TAB PO DAILY


Nystatin (Nystatin), 5 ML SWSW [id number]





Discontinued Medications


Carvedilol (Carvedilol), 25 MG PO BIDWMEALS, (Reported)


Clonazepam (Clonazepam), 1 TAB PO PRN TID PRN for TREMORS


Furosemide (Furosemide), 40 MG PO BID


Glyburide (Glyburide), 1 TAB PO BIDWMEALS


Insulin Detemir (Levemir Flextouch), 10 UNIT SQ QHS


Insulin Lispro (Humalog), 0 SQ TIDWMEALS, (Reported)


Isosorbide Mononitrate (Isosorbide Mononitrate Er), 30 MG PO DAILY











ARTEMIO NUNES MD Dec 7, 2018 12:38

## 2019-01-27 ENCOUNTER — HOSPITAL ENCOUNTER (INPATIENT)
Dept: HOSPITAL 61 - ER | Age: 64
LOS: 1 days | Discharge: HOME | DRG: 291 | End: 2019-01-28
Attending: INTERNAL MEDICINE | Admitting: INTERNAL MEDICINE
Payer: COMMERCIAL

## 2019-01-27 VITALS — WEIGHT: 182 LBS | BODY MASS INDEX: 31.07 KG/M2 | HEIGHT: 64 IN

## 2019-01-27 VITALS — DIASTOLIC BLOOD PRESSURE: 53 MMHG | SYSTOLIC BLOOD PRESSURE: 164 MMHG

## 2019-01-27 DIAGNOSIS — D63.1: ICD-10-CM

## 2019-01-27 DIAGNOSIS — E11.22: ICD-10-CM

## 2019-01-27 DIAGNOSIS — I25.10: ICD-10-CM

## 2019-01-27 DIAGNOSIS — Z91.15: ICD-10-CM

## 2019-01-27 DIAGNOSIS — I50.31: ICD-10-CM

## 2019-01-27 DIAGNOSIS — E78.5: ICD-10-CM

## 2019-01-27 DIAGNOSIS — Z83.3: ICD-10-CM

## 2019-01-27 DIAGNOSIS — N18.6: ICD-10-CM

## 2019-01-27 DIAGNOSIS — E78.00: ICD-10-CM

## 2019-01-27 DIAGNOSIS — I13.2: Primary | ICD-10-CM

## 2019-01-27 DIAGNOSIS — E87.5: ICD-10-CM

## 2019-01-27 DIAGNOSIS — E66.9: ICD-10-CM

## 2019-01-27 DIAGNOSIS — K21.9: ICD-10-CM

## 2019-01-27 DIAGNOSIS — Z79.899: ICD-10-CM

## 2019-01-27 LAB
ALBUMIN SERPL-MCNC: 3.3 G/DL (ref 3.4–5)
ALBUMIN/GLOB SERPL: 0.9 {RATIO} (ref 1–1.7)
ALP SERPL-CCNC: 93 U/L (ref 46–116)
ALT SERPL-CCNC: 14 U/L (ref 14–59)
ANION GAP SERPL CALC-SCNC: 22 MMOL/L (ref 6–14)
APTT PPP: YELLOW S
AST SERPL-CCNC: 10 U/L (ref 15–37)
BACTERIA #/AREA URNS HPF: 0 /HPF
BASE EXCESS ABG: -13 MMOL/L (ref -3–3)
BASOPHILS # BLD AUTO: 0.1 X10^3/UL (ref 0–0.2)
BASOPHILS NFR BLD: 1 % (ref 0–3)
BILIRUB SERPL-MCNC: 0.3 MG/DL (ref 0.2–1)
BILIRUB UR QL STRIP: NEGATIVE
BUN SERPL-MCNC: 149 MG/DL (ref 7–20)
BUN/CREAT SERPL: 9 (ref 6–20)
CALCIUM SERPL-MCNC: 7.3 MG/DL (ref 8.5–10.1)
CHLORIDE SERPL-SCNC: 101 MMOL/L (ref 98–107)
CO2 SERPL-SCNC: 16 MMOL/L (ref 21–32)
CREAT SERPL-MCNC: 16.5 MG/DL (ref 0.6–1)
EOSINOPHIL NFR BLD: 0.4 X10^3/UL (ref 0–0.7)
EOSINOPHIL NFR BLD: 6 % (ref 0–3)
ERYTHROCYTE [DISTWIDTH] IN BLOOD BY AUTOMATED COUNT: 13.2 % (ref 11.5–14.5)
FIBRINOGEN PPP-MCNC: (no result) MG/DL
GFR SERPLBLD BASED ON 1.73 SQ M-ARVRAT: 2.2 ML/MIN
GLOBULIN SER-MCNC: 3.8 G/DL (ref 2.2–3.8)
GLUCOSE SERPL-MCNC: 142 MG/DL (ref 70–99)
HCO3 BLDA-SCNC: 12 MMOL/L (ref 21–28)
HCT VFR BLD CALC: 22.7 % (ref 36–47)
HGB BLD-MCNC: 7.5 G/DL (ref 12–15.5)
INSPIRATION SETTING TIME VENT: 21
LYMPHOCYTES # BLD: 1.2 X10^3/UL (ref 1–4.8)
LYMPHOCYTES NFR BLD AUTO: 19 % (ref 24–48)
MCH RBC QN AUTO: 32 PG (ref 25–35)
MCHC RBC AUTO-ENTMCNC: 33 G/DL (ref 31–37)
MCV RBC AUTO: 97 FL (ref 79–100)
MONO #: 0.6 X10^3/UL (ref 0–1.1)
MONOCYTES NFR BLD: 9 % (ref 0–9)
NEUT #: 4 X10^3UL (ref 1.8–7.7)
NEUTROPHILS NFR BLD AUTO: 65 % (ref 31–73)
NITRITE UR QL STRIP: NEGATIVE
PCO2 BLDA: 29 MMHG (ref 35–46)
PH UR STRIP: 5.5 [PH]
PLATELET # BLD AUTO: 175 X10^3/UL (ref 140–400)
PO2 BLDA: 101 MMHG (ref 65–108)
POTASSIUM SERPL-SCNC: 6.8 MMOL/L (ref 3.5–5.1)
PROT SERPL-MCNC: 7.1 G/DL (ref 6.4–8.2)
PROT UR STRIP-MCNC: >=300 MG/DL
PROTHROMBIN TIME: 13.7 SEC (ref 11.7–14)
RBC # BLD AUTO: 2.33 X10^6/UL (ref 3.5–5.4)
RBC #/AREA URNS HPF: (no result) /HPF (ref 0–2)
SAO2 % BLDA: 96 % (ref 92–99)
SODIUM SERPL-SCNC: 139 MMOL/L (ref 136–145)
SQUAMOUS #/AREA URNS LPF: (no result) /LPF
UROBILINOGEN UR-MCNC: 0.2 MG/DL
WBC # BLD AUTO: 6.2 X10^3/UL (ref 4–11)
WBC #/AREA URNS HPF: (no result) /HPF (ref 0–4)

## 2019-01-27 PROCEDURE — 87086 URINE CULTURE/COLONY COUNT: CPT

## 2019-01-27 PROCEDURE — 96374 THER/PROPH/DIAG INJ IV PUSH: CPT

## 2019-01-27 PROCEDURE — 80053 COMPREHEN METABOLIC PANEL: CPT

## 2019-01-27 PROCEDURE — 94644 CONT INHLJ TX 1ST HOUR: CPT

## 2019-01-27 PROCEDURE — G0378 HOSPITAL OBSERVATION PER HR: HCPCS

## 2019-01-27 PROCEDURE — 36415 COLL VENOUS BLD VENIPUNCTURE: CPT

## 2019-01-27 PROCEDURE — 86706 HEP B SURFACE ANTIBODY: CPT

## 2019-01-27 PROCEDURE — 96375 TX/PRO/DX INJ NEW DRUG ADDON: CPT

## 2019-01-27 PROCEDURE — 81001 URINALYSIS AUTO W/SCOPE: CPT

## 2019-01-27 PROCEDURE — 84100 ASSAY OF PHOSPHORUS: CPT

## 2019-01-27 PROCEDURE — 82805 BLOOD GASES W/O2 SATURATION: CPT

## 2019-01-27 PROCEDURE — 83735 ASSAY OF MAGNESIUM: CPT

## 2019-01-27 PROCEDURE — 36600 WITHDRAWAL OF ARTERIAL BLOOD: CPT

## 2019-01-27 PROCEDURE — 71046 X-RAY EXAM CHEST 2 VIEWS: CPT

## 2019-01-27 PROCEDURE — 93005 ELECTROCARDIOGRAM TRACING: CPT

## 2019-01-27 PROCEDURE — 87340 HEPATITIS B SURFACE AG IA: CPT

## 2019-01-27 PROCEDURE — 83605 ASSAY OF LACTIC ACID: CPT

## 2019-01-27 PROCEDURE — 83036 HEMOGLOBIN GLYCOSYLATED A1C: CPT

## 2019-01-27 PROCEDURE — 82962 GLUCOSE BLOOD TEST: CPT

## 2019-01-27 PROCEDURE — 85610 PROTHROMBIN TIME: CPT

## 2019-01-27 PROCEDURE — 84484 ASSAY OF TROPONIN QUANT: CPT

## 2019-01-27 PROCEDURE — 85025 COMPLETE CBC W/AUTO DIFF WBC: CPT

## 2019-01-27 RX ADMIN — SEVELAMER CARBONATE SCH MG: 800 TABLET, FILM COATED ORAL at 22:05

## 2019-01-27 NOTE — PDOC1
History and Physical


Date of Admission


Date of Admission


DATE: 1/27/19 


TIME: 21:51





Identification/Chief Complaint


Chief Complaint


short of breath,  nausea





Source


Source:  Caregiver, Chart review, Patient





History of Present Illness


History of Present Illness


Bree is a 63 year old female admit from the ER for worsening dyspnea. 


She is ESRD patient, and has missed HD for 3 weeks,  her daughter assists with 

history and explains that the HD cath doesnt work and no body wants to help fix 

it, but  am not sure what she meant.


Some nausea,  feeling improved now


dyspnea had worsened last 24 hours, but she appears comfortable





Past Medical History


Cardiovascular:  CAD, CHF, HTN, Hyperlipidemia


Pulmonary:  No pertinent hx, Other


CENTRAL NERVOUS SYSTEM:  Other


GI:  GERD


Heme/Onc:  No pertinent hx


Hepatobiliary:  No pertinent hx


Psych:  No pertinent hx


Rheumatologic:  No pertinent hx


Infectious disease:  No pertinent hx


Renal/:  Chronic renal failure


Endocrine:  Diabetes





Past Surgical History


Past Surgical History:  Other





Family History


Family History:  Diabetes, Heart Disease





Social History


Smoke:  No


ALCOHOL:  none


Drugs:  None





Current Medications


Current Medications





Current Medications


Calcium Gluconate (Calcium Gluconate) 1,000 mg 1X  ONCE IVP  Last administered 

on 1/27/19at 18:35;  Start 1/27/19 at 18:15;  Stop 1/27/19 at 18:18;  Status DC


Dextrose (Dextrose 50%-Water Syringe) 25 gm 1X  ONCE IV  Last administered on 1/ 27/19at 18:45;  Start 1/27/19 at 18:15;  Stop 1/27/19 at 18:18;  Status DC


Insulin Human Regular (HumuLIN R VIAL) 10 unit 1X  ONCE IV  Last administered 

on 1/27/19at 18:50;  Start 1/27/19 at 18:15;  Stop 1/27/19 at 18:18;  Status DC


Albuterol Sulfate (Ventolin Neb Soln) 10 mg 1X  ONCE CONT NEB  Last 

administered on 1/27/19at 18:29;  Start 1/27/19 at 18:15;  Stop 1/27/19 at 18:18

;  Status DC


Sodium Polystyrene Sulfonate (Kayexalate) 30 gm 1X  ONCE PO  Last administered 

on 1/27/19at 18:51;  Start 1/27/19 at 18:30;  Stop 1/27/19 at 18:31;  Status DC


Sodium Polystyrene Sulfonate (Kayexalate) 30 gm 1X  ONCE PO ;  Start 1/28/19 at 

00:30;  Stop 1/28/19 at 00:31


Amlodipine Besylate (Norvasc) 5 mg DAILYWLUN PO ;  Start 1/28/19 at 12:00


Aspirin (Ecotrin) 81 mg DAILYWBKFT PO ;  Start 1/28/19 at 08:00


Atorvastatin Calcium (Lipitor) 80 mg QHS PO ;  Start 1/27/19 at 21:15


Clonidine HCl (Catapres Tts-1) 1 patch WEEKLY TD ;  Start 2/3/19 at 09:00


Clopidogrel Bisulfate (Plavix) 75 mg DAILY PO ;  Start 1/28/19 at 09:00


Insulin Glargine (Lantus) 7 units QHS SQ ;  Start 1/27/19 at 21:15


Insulin Human Lispro (HumaLOG) 10 units TIDWMEALS SQ ;  Start 1/28/19 at 08:00


Lisinopril (Prinivil) 20 mg DAILY PO ;  Start 1/28/19 at 09:00


Sevelamer Carbonate (Renvela) 800 mg TIDWMEALS PO ;  Start 1/27/19 at 21:00





Active Scripts


Active


Humalog (Insulin Lispro) 100 Unit/1 Ml Insuln.pen 10 Units SQ TIDWMEALS 30 Days


Lantus Solostar (Insulin Glargine,Hum.rec.anlog) 100 Unit/1 Ml Insuln.pen 7 

Units SQ QHS 30 Days


Clonidine Tts-1 (Clonidine) 1 Each Patch.tdwk 1 Patch TD WEEKLY 30 Days


Atorvastatin Calcium 40 Mg Tablet 80 Mg PO QHS


Amlodipine Besylate 5 Mg Tablet 5 Mg PO DAILYWLUN


Clopidogrel (Clopidogrel Bisulfate) 75 Mg Tablet 75 Mg PO DAILY


Lisinopril 20 Mg Tablet 1 Tab PO DAILY


Aspirin Ec (Aspirin) 81 Mg Tablet. 81 Mg PO DAILYWBKFT


Reported


Renvela (Sevelamer Carbonate) 800 Mg Tablet 1 Tab PO TID





Allergies


Allergies:  


Coded Allergies:  


     No Known Drug Allergies (Unverified , 8/6/18)





ROS


General:  YES: Fatigue, Malaise; 


   No: Chills, Night Sweats, Appetite, Other


PSYCHOLOGICAL ROS:  No: Anxiety, Behavioral Disorder, Concentration difficultie

, Decreased libido, Depression, Disorientation, Hallucinations, Hostility, 

Irritablity, Memory difficulties, Mood Swings, Obsessive thoughts, Physical 

abuse, Sexual abuse, Sleep disturbances, Suicidal ideation, Other


Eyes:  No Blurry vision, No Decreased vision, No Double vision, No Dry eyes, No 

Excessive tearing, No Eye Pain, No Itchy Eyes, No Loss of vision, No Photophobia

, No Scotomata, No Uses contacts, No Uses glasses, No Other


Respiratory:  YES: Cough, SOB with excertion; 


   No: Hemoptysis, Orthopnea, Pleuritic Pain, Shortness of breath, Sputum 

Changes, Stridor, Tachypnea, Wheezing, Other


Cardiovascular:  No Chest Pain, No Palpitations, No Orthopnea, No Paroxysmal 

Noc. Dyspnea, No Edema, No Lt Headedness, No Other


Gastrointestinal:  No Nausea, No Vomiting, No Abdominal Pain, No Diarrhea, No 

Constipation, No Melena, No Hematochezia, No Other


Genitourinary:  No Dysuria, No Frequency, No Incontinence, No Hematuria, No 

Retention, No Discharge, No Urgency, No Pain, No Flank Pain, No Other, No , No 

, No , No , No , No , No 


Musculoskeletal:  Yes Joint Pain


Neurological:  No Behavorial Changes, No Bowel/Bladder ControlChng, No Confusion

, No Dizziness, No Gait Disturbance, No Headaches, No Impaired Coord/balance, 

No Memory Loss, No Numbness/Tingling, No Seizures, No Speech Problems, No 

Tremors, No Visual Changes, No Weakness, No Other


Skin:  Yes Dry Skin; 


   No Eczema, No Hair Changes, No Lumps, No Mole Changes, No Mottling, No Nail 

Changes, No Pruritus, No Rash, No Skin Lesion Changes, No Other, No Acne





Physical Exam


General:  Alert, Cooperative, No acute distress


HEENT:  EOMI


Lungs:  Clear to auscultation


Heart:  S1S2


Abdomen:  Normal bowel sounds, Soft


Extremities:  No cyanosis, Other (+ le edema)


Skin:  No rashes


Neuro:  Normal speech


Psych/Mental Status:  Mental status NL, Mood NL





Vitals


Vitals





Vital Signs








  Date Time  Temp Pulse Resp B/P (MAP) Pulse Ox O2 Delivery O2 Flow Rate FiO2


 


1/27/19 18:46     94 Room Air  


 


1/27/19 18:33  68 20     


 


1/27/19 17:25 97.6       





 97.6       











Labs


Labs





Laboratory Tests








Test


 1/27/19


17:30 1/27/19


17:40 1/27/19


18:10


 


White Blood Count


 6.2 x10^3/uL


(4.0-11.0) 


 





 


Red Blood Count


 2.33 x10^6/uL


(3.50-5.40) 


 





 


Hemoglobin


 7.5 g/dL


(12.0-15.5) 


 





 


Hematocrit


 22.7 %


(36.0-47.0) 


 





 


Mean Corpuscular Volume 97 fL ()   


 


Mean Corpuscular Hemoglobin 32 pg (25-35)   


 


Mean Corpuscular Hemoglobin


Concent 33 g/dL


(31-37) 


 





 


Red Cell Distribution Width


 13.2 %


(11.5-14.5) 


 





 


Platelet Count


 175 x10^3/uL


(140-400) 


 





 


Neutrophils (%) (Auto) 65 % (31-73)   


 


Lymphocytes (%) (Auto) 19 % (24-48)   


 


Monocytes (%) (Auto) 9 % (0-9)   


 


Eosinophils (%) (Auto) 6 % (0-3)   


 


Basophils (%) (Auto) 1 % (0-3)   


 


Neutrophils # (Auto)


 4.0 x10^3uL


(1.8-7.7) 


 





 


Lymphocytes # (Auto)


 1.2 x10^3/uL


(1.0-4.8) 


 





 


Monocytes # (Auto)


 0.6 x10^3/uL


(0.0-1.1) 


 





 


Eosinophils # (Auto)


 0.4 x10^3/uL


(0.0-0.7) 


 





 


Basophils # (Auto)


 0.1 x10^3/uL


(0.0-0.2) 


 





 


Prothrombin Time


 13.7 SEC


(11.7-14.0) 


 





 


Prothromb Time International


Ratio 1.1 (0.8-1.1) 


 


 





 


Sodium Level


 139 mmol/L


(136-145) 


 





 


Potassium Level


 6.8 mmol/L


(3.5-5.1) 


 





 


Chloride Level


 101 mmol/L


() 


 





 


Carbon Dioxide Level


 16 mmol/L


(21-32) 


 





 


Anion Gap 22 (6-14)   


 


Blood Urea Nitrogen


 149 mg/dL


(7-20) 


 





 


Creatinine


 16.5 mg/dL


(0.6-1.0) 


 





 


Estimated GFR


(Cockcroft-Gault) 2.2 


 


 





 


BUN/Creatinine Ratio 9 (6-20)   


 


Glucose Level


 142 mg/dL


(70-99) 


 





 


Lactic Acid Level


 1.5 mmol/L


(0.4-2.0) 


 





 


Calcium Level


 7.3 mg/dL


(8.5-10.1) 


 





 


Total Bilirubin


 0.3 mg/dL


(0.2-1.0) 


 





 


Aspartate Amino Transf


(AST/SGOT) 10 U/L (15-37) 


 


 





 


Alanine Aminotransferase


(ALT/SGPT) 14 U/L (14-59) 


 


 





 


Alkaline Phosphatase


 93 U/L


() 


 





 


Troponin I Quantitative


 < 0.017 ng/mL


(0.000-0.055) 


 





 


Total Protein


 7.1 g/dL


(6.4-8.2) 


 





 


Albumin


 3.3 g/dL


(3.4-5.0) 


 





 


Albumin/Globulin Ratio 0.9 (1.0-1.7)   


 


Urine Collection Type  Unknown  


 


Urine Color  Yellow  


 


Urine Clarity  Cloudy  


 


Urine pH  5.5  


 


Urine Specific Gravity  1.015  


 


Urine Protein


 


 >=300 mg/dL


(NEG-TRACE) 





 


Urine Glucose (UA)


 


 100 mg/dL


(NEG) 





 


Urine Ketones (Stick)


 


 Negative mg/dL


(NEG) 





 


Urine Blood  Moderate (NEG)  


 


Urine Nitrite  Negative (NEG)  


 


Urine Bilirubin  Negative (NEG)  


 


Urine Urobilinogen Dipstick


 


 0.2 mg/dL (0.2


mg/dL) 





 


Urine Leukocyte Esterase  Small (NEG)  


 


Urine RBC


 


 6-10 /HPF


(0-2) 





 


Urine WBC  1-4 /HPF (0-4)  


 


Urine Squamous Epithelial


Cells 


 Few /LPF 


 





 


Urine Bacteria  0 /HPF (0-FEW)  


 


O2 Saturation   96 % (92-99) 


 


Arterial Blood pH


 


 


 7.26


(7.35-7.45)


 


Arterial Blood pCO2 at


Patient Temp 


 


 29 mmHg


(35-46)


 


Arterial Blood pO2 at Patient


Temp 


 


 101 mmHg


()


 


Arterial Blood HCO3


 


 


 12 mmol/L


(21-28)


 


Arterial Blood Base Excess


 


 


 -13 mmol/L


(-3-3)


 


FiO2   21 








Laboratory Tests








Test


 1/27/19


17:30 1/27/19


17:40 1/27/19


18:10


 


White Blood Count


 6.2 x10^3/uL


(4.0-11.0) 


 





 


Red Blood Count


 2.33 x10^6/uL


(3.50-5.40) 


 





 


Hemoglobin


 7.5 g/dL


(12.0-15.5) 


 





 


Hematocrit


 22.7 %


(36.0-47.0) 


 





 


Mean Corpuscular Volume 97 fL ()   


 


Mean Corpuscular Hemoglobin 32 pg (25-35)   


 


Mean Corpuscular Hemoglobin


Concent 33 g/dL


(31-37) 


 





 


Red Cell Distribution Width


 13.2 %


(11.5-14.5) 


 





 


Platelet Count


 175 x10^3/uL


(140-400) 


 





 


Neutrophils (%) (Auto) 65 % (31-73)   


 


Lymphocytes (%) (Auto) 19 % (24-48)   


 


Monocytes (%) (Auto) 9 % (0-9)   


 


Eosinophils (%) (Auto) 6 % (0-3)   


 


Basophils (%) (Auto) 1 % (0-3)   


 


Neutrophils # (Auto)


 4.0 x10^3uL


(1.8-7.7) 


 





 


Lymphocytes # (Auto)


 1.2 x10^3/uL


(1.0-4.8) 


 





 


Monocytes # (Auto)


 0.6 x10^3/uL


(0.0-1.1) 


 





 


Eosinophils # (Auto)


 0.4 x10^3/uL


(0.0-0.7) 


 





 


Basophils # (Auto)


 0.1 x10^3/uL


(0.0-0.2) 


 





 


Prothrombin Time


 13.7 SEC


(11.7-14.0) 


 





 


Prothromb Time International


Ratio 1.1 (0.8-1.1) 


 


 





 


Sodium Level


 139 mmol/L


(136-145) 


 





 


Potassium Level


 6.8 mmol/L


(3.5-5.1) 


 





 


Chloride Level


 101 mmol/L


() 


 





 


Carbon Dioxide Level


 16 mmol/L


(21-32) 


 





 


Anion Gap 22 (6-14)   


 


Blood Urea Nitrogen


 149 mg/dL


(7-20) 


 





 


Creatinine


 16.5 mg/dL


(0.6-1.0) 


 





 


Estimated GFR


(Cockcroft-Gault) 2.2 


 


 





 


BUN/Creatinine Ratio 9 (6-20)   


 


Glucose Level


 142 mg/dL


(70-99) 


 





 


Lactic Acid Level


 1.5 mmol/L


(0.4-2.0) 


 





 


Calcium Level


 7.3 mg/dL


(8.5-10.1) 


 





 


Total Bilirubin


 0.3 mg/dL


(0.2-1.0) 


 





 


Aspartate Amino Transf


(AST/SGOT) 10 U/L (15-37) 


 


 





 


Alanine Aminotransferase


(ALT/SGPT) 14 U/L (14-59) 


 


 





 


Alkaline Phosphatase


 93 U/L


() 


 





 


Troponin I Quantitative


 < 0.017 ng/mL


(0.000-0.055) 


 





 


Total Protein


 7.1 g/dL


(6.4-8.2) 


 





 


Albumin


 3.3 g/dL


(3.4-5.0) 


 





 


Albumin/Globulin Ratio 0.9 (1.0-1.7)   


 


Urine Collection Type  Unknown  


 


Urine Color  Yellow  


 


Urine Clarity  Cloudy  


 


Urine pH  5.5  


 


Urine Specific Gravity  1.015  


 


Urine Protein


 


 >=300 mg/dL


(NEG-TRACE) 





 


Urine Glucose (UA)


 


 100 mg/dL


(NEG) 





 


Urine Ketones (Stick)


 


 Negative mg/dL


(NEG) 





 


Urine Blood  Moderate (NEG)  


 


Urine Nitrite  Negative (NEG)  


 


Urine Bilirubin  Negative (NEG)  


 


Urine Urobilinogen Dipstick


 


 0.2 mg/dL (0.2


mg/dL) 





 


Urine Leukocyte Esterase  Small (NEG)  


 


Urine RBC


 


 6-10 /HPF


(0-2) 





 


Urine WBC  1-4 /HPF (0-4)  


 


Urine Squamous Epithelial


Cells 


 Few /LPF 


 





 


Urine Bacteria  0 /HPF (0-FEW)  


 


O2 Saturation   96 % (92-99) 


 


Arterial Blood pH


 


 


 7.26


(7.35-7.45)


 


Arterial Blood pCO2 at


Patient Temp 


 


 29 mmHg


(35-46)


 


Arterial Blood pO2 at Patient


Temp 


 


 101 mmHg


()


 


Arterial Blood HCO3


 


 


 12 mmol/L


(21-28)


 


Arterial Blood Base Excess


 


 


 -13 mmol/L


(-3-3)


 


FiO2   21 











VTE Prophylaxis Ordered


VTE Prophylaxis Devices:  No


VTE Pharmacological Prophylaxi:  Yes





Assessment/Plan


Assessment/Plan


acute diastolic CHF, fluid overload from missing dialysis





ESRD, poor compliance


she reports that her HD cath doesnt work,  


serious  hyperkalemia   meds given in ER, should correct,  HD in AM





obesity


Dm2


htn


anemia of CKD





admit











ROMANA VALDOVINOS MD Jan 27, 2019 21:53

## 2019-01-28 VITALS — SYSTOLIC BLOOD PRESSURE: 221 MMHG | DIASTOLIC BLOOD PRESSURE: 75 MMHG

## 2019-01-28 VITALS — SYSTOLIC BLOOD PRESSURE: 155 MMHG | DIASTOLIC BLOOD PRESSURE: 57 MMHG

## 2019-01-28 VITALS — SYSTOLIC BLOOD PRESSURE: 158 MMHG | DIASTOLIC BLOOD PRESSURE: 58 MMHG

## 2019-01-28 LAB
ALBUMIN SERPL-MCNC: 3.4 G/DL (ref 3.4–5)
ALBUMIN/GLOB SERPL: 0.8 {RATIO} (ref 1–1.7)
ALP SERPL-CCNC: 93 U/L (ref 46–116)
ALT SERPL-CCNC: 9 U/L (ref 14–59)
ANION GAP SERPL CALC-SCNC: 20 MMOL/L (ref 6–14)
AST SERPL-CCNC: 12 U/L (ref 15–37)
BASOPHILS # BLD AUTO: 0.1 X10^3/UL (ref 0–0.2)
BASOPHILS NFR BLD: 1 % (ref 0–3)
BILIRUB SERPL-MCNC: 0.3 MG/DL (ref 0.2–1)
BUN SERPL-MCNC: 156 MG/DL (ref 7–20)
BUN/CREAT SERPL: 9 (ref 6–20)
CALCIUM SERPL-MCNC: 7.4 MG/DL (ref 8.5–10.1)
CHLORIDE SERPL-SCNC: 104 MMOL/L (ref 98–107)
CO2 SERPL-SCNC: 17 MMOL/L (ref 21–32)
CREAT SERPL-MCNC: 16.7 MG/DL (ref 0.6–1)
EOSINOPHIL NFR BLD: 0.3 X10^3/UL (ref 0–0.7)
EOSINOPHIL NFR BLD: 3 % (ref 0–3)
ERYTHROCYTE [DISTWIDTH] IN BLOOD BY AUTOMATED COUNT: 13.1 % (ref 11.5–14.5)
GFR SERPLBLD BASED ON 1.73 SQ M-ARVRAT: 2.2 ML/MIN
GLOBULIN SER-MCNC: 4.4 G/DL (ref 2.2–3.8)
GLUCOSE SERPL-MCNC: 123 MG/DL (ref 70–99)
HCT VFR BLD CALC: 23 % (ref 36–47)
HGB BLD-MCNC: 7.4 G/DL (ref 12–15.5)
LYMPHOCYTES # BLD: 0.8 X10^3/UL (ref 1–4.8)
LYMPHOCYTES NFR BLD AUTO: 10 % (ref 24–48)
MAGNESIUM SERPL-MCNC: 2.5 MG/DL (ref 1.8–2.4)
MCH RBC QN AUTO: 31 PG (ref 25–35)
MCHC RBC AUTO-ENTMCNC: 32 G/DL (ref 31–37)
MCV RBC AUTO: 97 FL (ref 79–100)
MONO #: 0.6 X10^3/UL (ref 0–1.1)
MONOCYTES NFR BLD: 8 % (ref 0–9)
NEUT #: 6.5 X10^3UL (ref 1.8–7.7)
NEUTROPHILS NFR BLD AUTO: 79 % (ref 31–73)
PHOSPHATE SERPL-MCNC: 9 MG/DL (ref 2.6–4.7)
PLATELET # BLD AUTO: 177 X10^3/UL (ref 140–400)
POTASSIUM SERPL-SCNC: 5.7 MMOL/L (ref 3.5–5.1)
PROT SERPL-MCNC: 7.8 G/DL (ref 6.4–8.2)
RBC # BLD AUTO: 2.38 X10^6/UL (ref 3.5–5.4)
SODIUM SERPL-SCNC: 141 MMOL/L (ref 136–145)
WBC # BLD AUTO: 8.2 X10^3/UL (ref 4–11)

## 2019-01-28 PROCEDURE — 5A1D70Z PERFORMANCE OF URINARY FILTRATION, INTERMITTENT, LESS THAN 6 HOURS PER DAY: ICD-10-PCS | Performed by: INTERNAL MEDICINE

## 2019-01-28 RX ADMIN — INSULIN LISPRO SCH UNITS: 100 INJECTION, SOLUTION INTRAVENOUS; SUBCUTANEOUS at 08:00

## 2019-01-28 RX ADMIN — SEVELAMER CARBONATE SCH MG: 800 TABLET, FILM COATED ORAL at 08:00

## 2019-01-28 RX ADMIN — SEVELAMER CARBONATE SCH MG: 800 TABLET, FILM COATED ORAL at 14:08

## 2019-01-28 RX ADMIN — INSULIN LISPRO SCH UNITS: 100 INJECTION, SOLUTION INTRAVENOUS; SUBCUTANEOUS at 12:00

## 2019-01-28 NOTE — PDOC
PROGRESS NOTES


Chief Complaint


Chief Complaint


Nausea, vomiting, hyperkalemia





History of Present Illness


History of Present Illness


Patient was seen and examined this morning in the dialysis room. She is doing 

well, and we hope to possibly discharge her today following dialysis. She had 

reporting that her cath was not working properly, but it was working properly 

today with no indications that is has any issues.





Vitals


Vitals





Vital Signs








  Date Time  Temp Pulse Resp B/P (MAP) Pulse Ox O2 Delivery O2 Flow Rate FiO2


 


1/28/19 08:00      Room Air  


 


1/28/19 07:00 97.8 75 18 155/57 (89) 94   





 97.8       











Physical Exam


General:  Alert, Cooperative, No acute distress


Heart:  Regular rate


Lungs:  Clear, Other


Abdomen:  Normal bowel sounds, Soft


Extremities:  No cyanosis, Other (+ le edema)


Skin:  No rashes





Labs


LABS





Laboratory Tests








Test


 1/27/19


17:30 1/27/19


17:40 1/27/19


18:10 1/28/19


00:47


 


White Blood Count


 6.2 x10^3/uL


(4.0-11.0) 


 


 





 


Red Blood Count


 2.33 x10^6/uL


(3.50-5.40) 


 


 





 


Hemoglobin


 7.5 g/dL


(12.0-15.5) 


 


 





 


Hematocrit


 22.7 %


(36.0-47.0) 


 


 





 


Mean Corpuscular Volume 97 fL ()    


 


Mean Corpuscular Hemoglobin 32 pg (25-35)    


 


Mean Corpuscular Hemoglobin


Concent 33 g/dL


(31-37) 


 


 





 


Red Cell Distribution Width


 13.2 %


(11.5-14.5) 


 


 





 


Platelet Count


 175 x10^3/uL


(140-400) 


 


 





 


Neutrophils (%) (Auto) 65 % (31-73)    


 


Lymphocytes (%) (Auto) 19 % (24-48)    


 


Monocytes (%) (Auto) 9 % (0-9)    


 


Eosinophils (%) (Auto) 6 % (0-3)    


 


Basophils (%) (Auto) 1 % (0-3)    


 


Neutrophils # (Auto)


 4.0 x10^3uL


(1.8-7.7) 


 


 





 


Lymphocytes # (Auto)


 1.2 x10^3/uL


(1.0-4.8) 


 


 





 


Monocytes # (Auto)


 0.6 x10^3/uL


(0.0-1.1) 


 


 





 


Eosinophils # (Auto)


 0.4 x10^3/uL


(0.0-0.7) 


 


 





 


Basophils # (Auto)


 0.1 x10^3/uL


(0.0-0.2) 


 


 





 


Prothrombin Time


 13.7 SEC


(11.7-14.0) 


 


 





 


Prothromb Time International


Ratio 1.1 (0.8-1.1) 


 


 


 





 


Sodium Level


 139 mmol/L


(136-145) 


 


 





 


Potassium Level


 6.8 mmol/L


(3.5-5.1) 


 


 





 


Chloride Level


 101 mmol/L


() 


 


 





 


Carbon Dioxide Level


 16 mmol/L


(21-32) 


 


 





 


Anion Gap 22 (6-14)    


 


Blood Urea Nitrogen


 149 mg/dL


(7-20) 


 


 





 


Creatinine


 16.5 mg/dL


(0.6-1.0) 


 


 





 


Estimated GFR


(Cockcroft-Gault) 2.2 


 


 


 





 


BUN/Creatinine Ratio 9 (6-20)    


 


Glucose Level


 142 mg/dL


(70-99) 


 


 





 


Lactic Acid Level


 1.5 mmol/L


(0.4-2.0) 


 


 





 


Calcium Level


 7.3 mg/dL


(8.5-10.1) 


 


 





 


Total Bilirubin


 0.3 mg/dL


(0.2-1.0) 


 


 





 


Aspartate Amino Transf


(AST/SGOT) 10 U/L (15-37) 


 


 


 





 


Alanine Aminotransferase


(ALT/SGPT) 14 U/L (14-59) 


 


 


 





 


Alkaline Phosphatase


 93 U/L


() 


 


 





 


Troponin I Quantitative


 < 0.017 ng/mL


(0.000-0.055) 


 


 





 


Total Protein


 7.1 g/dL


(6.4-8.2) 


 


 





 


Albumin


 3.3 g/dL


(3.4-5.0) 


 


 





 


Albumin/Globulin Ratio 0.9 (1.0-1.7)    


 


Urine Collection Type  Unknown   


 


Urine Color  Yellow   


 


Urine Clarity  Cloudy   


 


Urine pH  5.5   


 


Urine Specific Gravity  1.015   


 


Urine Protein


 


 >=300 mg/dL


(NEG-TRACE) 


 





 


Urine Glucose (UA)


 


 100 mg/dL


(NEG) 


 





 


Urine Ketones (Stick)


 


 Negative mg/dL


(NEG) 


 





 


Urine Blood  Moderate (NEG)   


 


Urine Nitrite  Negative (NEG)   


 


Urine Bilirubin  Negative (NEG)   


 


Urine Urobilinogen Dipstick


 


 0.2 mg/dL (0.2


mg/dL) 


 





 


Urine Leukocyte Esterase  Small (NEG)   


 


Urine RBC


 


 6-10 /HPF


(0-2) 


 





 


Urine WBC  1-4 /HPF (0-4)   


 


Urine Squamous Epithelial


Cells 


 Few /LPF 


 


 





 


Urine Bacteria  0 /HPF (0-FEW)   


 


O2 Saturation   96 % (92-99)  


 


Arterial Blood pH


 


 


 7.26


(7.35-7.45) 





 


Arterial Blood pCO2 at


Patient Temp 


 


 29 mmHg


(35-46) 





 


Arterial Blood pO2 at Patient


Temp 


 


 101 mmHg


() 





 


Arterial Blood HCO3


 


 


 12 mmol/L


(21-28) 





 


Arterial Blood Base Excess


 


 


 -13 mmol/L


(-3-3) 





 


FiO2   21  


 


Glucose (Fingerstick)


 


 


 


 143 mg/dL


(70-99)


 


Test


 1/28/19


06:35 1/28/19


09:11 


 





 


White Blood Count


 8.2 x10^3/uL


(4.0-11.0) 


 


 





 


Red Blood Count


 2.38 x10^6/uL


(3.50-5.40) 


 


 





 


Hemoglobin


 7.4 g/dL


(12.0-15.5) 


 


 





 


Hematocrit


 23.0 %


(36.0-47.0) 


 


 





 


Mean Corpuscular Volume 97 fL ()    


 


Mean Corpuscular Hemoglobin 31 pg (25-35)    


 


Mean Corpuscular Hemoglobin


Concent 32 g/dL


(31-37) 


 


 





 


Red Cell Distribution Width


 13.1 %


(11.5-14.5) 


 


 





 


Platelet Count


 177 x10^3/uL


(140-400) 


 


 





 


Neutrophils (%) (Auto) 79 % (31-73)    


 


Lymphocytes (%) (Auto) 10 % (24-48)    


 


Monocytes (%) (Auto) 8 % (0-9)    


 


Eosinophils (%) (Auto) 3 % (0-3)    


 


Basophils (%) (Auto) 1 % (0-3)    


 


Neutrophils # (Auto)


 6.5 x10^3uL


(1.8-7.7) 


 


 





 


Lymphocytes # (Auto)


 0.8 x10^3/uL


(1.0-4.8) 


 


 





 


Monocytes # (Auto)


 0.6 x10^3/uL


(0.0-1.1) 


 


 





 


Eosinophils # (Auto)


 0.3 x10^3/uL


(0.0-0.7) 


 


 





 


Basophils # (Auto)


 0.1 x10^3/uL


(0.0-0.2) 


 


 





 


Sodium Level


 141 mmol/L


(136-145) 


 


 





 


Potassium Level


 5.7 mmol/L


(3.5-5.1) 


 


 





 


Chloride Level


 104 mmol/L


() 


 


 





 


Carbon Dioxide Level


 17 mmol/L


(21-32) 


 


 





 


Anion Gap 20 (6-14)    


 


Blood Urea Nitrogen


 156 mg/dL


(7-20) 


 


 





 


Creatinine


 16.7 mg/dL


(0.6-1.0) 


 


 





 


Estimated GFR


(Cockcroft-Gault) 2.2 


 


 


 





 


BUN/Creatinine Ratio 9 (6-20)    


 


Glucose Level


 123 mg/dL


(70-99) 


 


 





 


Calcium Level


 7.4 mg/dL


(8.5-10.1) 


 


 





 


Phosphorus Level


 9.0 mg/dL


(2.6-4.7) 


 


 





 


Magnesium Level


 2.5 mg/dL


(1.8-2.4) 


 


 





 


Total Bilirubin


 0.3 mg/dL


(0.2-1.0) 


 


 





 


Aspartate Amino Transf


(AST/SGOT) 12 U/L (15-37) 


 


 


 





 


Alanine Aminotransferase


(ALT/SGPT) 9 U/L (14-59) 


 


 


 





 


Alkaline Phosphatase


 93 U/L


() 


 


 





 


Total Protein


 7.8 g/dL


(6.4-8.2) 


 


 





 


Albumin


 3.4 g/dL


(3.4-5.0) 


 


 





 


Albumin/Globulin Ratio 0.8 (1.0-1.7)    


 


Glucose (Fingerstick)


 


 110 mg/dL


(70-99) 


 














Review of Systems


Review of Systems


Heart: denies chest pain 


Lung: denies soa


Integument: denies rash





Assessment and Plan


Assessmemt and Plan


Assessment:


1. Hyperkalemia, now resolved


2. Acute diastolic heart failure d/t noncompliance with hemodialysis


3. Fluid overload


4. ESRD


5. DM2


6. HTN


7. Anemia of CKD





Plan:


1. Labs


2. Home meds


3. Dialysis


4. Possible discharge following dialysis





Comment


Review of Relevant


I have reviewed the following items lore (where applicable) has been applied.


Labs





Laboratory Tests








Test


 1/27/19


17:30 1/27/19


17:40 1/27/19


18:10 1/28/19


00:47


 


White Blood Count


 6.2 x10^3/uL


(4.0-11.0) 


 


 





 


Red Blood Count


 2.33 x10^6/uL


(3.50-5.40) 


 


 





 


Hemoglobin


 7.5 g/dL


(12.0-15.5) 


 


 





 


Hematocrit


 22.7 %


(36.0-47.0) 


 


 





 


Mean Corpuscular Volume 97 fL ()    


 


Mean Corpuscular Hemoglobin 32 pg (25-35)    


 


Mean Corpuscular Hemoglobin


Concent 33 g/dL


(31-37) 


 


 





 


Red Cell Distribution Width


 13.2 %


(11.5-14.5) 


 


 





 


Platelet Count


 175 x10^3/uL


(140-400) 


 


 





 


Neutrophils (%) (Auto) 65 % (31-73)    


 


Lymphocytes (%) (Auto) 19 % (24-48)    


 


Monocytes (%) (Auto) 9 % (0-9)    


 


Eosinophils (%) (Auto) 6 % (0-3)    


 


Basophils (%) (Auto) 1 % (0-3)    


 


Neutrophils # (Auto)


 4.0 x10^3uL


(1.8-7.7) 


 


 





 


Lymphocytes # (Auto)


 1.2 x10^3/uL


(1.0-4.8) 


 


 





 


Monocytes # (Auto)


 0.6 x10^3/uL


(0.0-1.1) 


 


 





 


Eosinophils # (Auto)


 0.4 x10^3/uL


(0.0-0.7) 


 


 





 


Basophils # (Auto)


 0.1 x10^3/uL


(0.0-0.2) 


 


 





 


Prothrombin Time


 13.7 SEC


(11.7-14.0) 


 


 





 


Prothromb Time International


Ratio 1.1 (0.8-1.1) 


 


 


 





 


Sodium Level


 139 mmol/L


(136-145) 


 


 





 


Potassium Level


 6.8 mmol/L


(3.5-5.1) 


 


 





 


Chloride Level


 101 mmol/L


() 


 


 





 


Carbon Dioxide Level


 16 mmol/L


(21-32) 


 


 





 


Anion Gap 22 (6-14)    


 


Blood Urea Nitrogen


 149 mg/dL


(7-20) 


 


 





 


Creatinine


 16.5 mg/dL


(0.6-1.0) 


 


 





 


Estimated GFR


(Cockcroft-Gault) 2.2 


 


 


 





 


BUN/Creatinine Ratio 9 (6-20)    


 


Glucose Level


 142 mg/dL


(70-99) 


 


 





 


Lactic Acid Level


 1.5 mmol/L


(0.4-2.0) 


 


 





 


Calcium Level


 7.3 mg/dL


(8.5-10.1) 


 


 





 


Total Bilirubin


 0.3 mg/dL


(0.2-1.0) 


 


 





 


Aspartate Amino Transf


(AST/SGOT) 10 U/L (15-37) 


 


 


 





 


Alanine Aminotransferase


(ALT/SGPT) 14 U/L (14-59) 


 


 


 





 


Alkaline Phosphatase


 93 U/L


() 


 


 





 


Troponin I Quantitative


 < 0.017 ng/mL


(0.000-0.055) 


 


 





 


Total Protein


 7.1 g/dL


(6.4-8.2) 


 


 





 


Albumin


 3.3 g/dL


(3.4-5.0) 


 


 





 


Albumin/Globulin Ratio 0.9 (1.0-1.7)    


 


Urine Collection Type  Unknown   


 


Urine Color  Yellow   


 


Urine Clarity  Cloudy   


 


Urine pH  5.5   


 


Urine Specific Gravity  1.015   


 


Urine Protein


 


 >=300 mg/dL


(NEG-TRACE) 


 





 


Urine Glucose (UA)


 


 100 mg/dL


(NEG) 


 





 


Urine Ketones (Stick)


 


 Negative mg/dL


(NEG) 


 





 


Urine Blood  Moderate (NEG)   


 


Urine Nitrite  Negative (NEG)   


 


Urine Bilirubin  Negative (NEG)   


 


Urine Urobilinogen Dipstick


 


 0.2 mg/dL (0.2


mg/dL) 


 





 


Urine Leukocyte Esterase  Small (NEG)   


 


Urine RBC


 


 6-10 /HPF


(0-2) 


 





 


Urine WBC  1-4 /HPF (0-4)   


 


Urine Squamous Epithelial


Cells 


 Few /LPF 


 


 





 


Urine Bacteria  0 /HPF (0-FEW)   


 


O2 Saturation   96 % (92-99)  


 


Arterial Blood pH


 


 


 7.26


(7.35-7.45) 





 


Arterial Blood pCO2 at


Patient Temp 


 


 29 mmHg


(35-46) 





 


Arterial Blood pO2 at Patient


Temp 


 


 101 mmHg


() 





 


Arterial Blood HCO3


 


 


 12 mmol/L


(21-28) 





 


Arterial Blood Base Excess


 


 


 -13 mmol/L


(-3-3) 





 


FiO2   21  


 


Glucose (Fingerstick)


 


 


 


 143 mg/dL


(70-99)


 


Test


 1/28/19


06:35 1/28/19


09:11 


 





 


White Blood Count


 8.2 x10^3/uL


(4.0-11.0) 


 


 





 


Red Blood Count


 2.38 x10^6/uL


(3.50-5.40) 


 


 





 


Hemoglobin


 7.4 g/dL


(12.0-15.5) 


 


 





 


Hematocrit


 23.0 %


(36.0-47.0) 


 


 





 


Mean Corpuscular Volume 97 fL ()    


 


Mean Corpuscular Hemoglobin 31 pg (25-35)    


 


Mean Corpuscular Hemoglobin


Concent 32 g/dL


(31-37) 


 


 





 


Red Cell Distribution Width


 13.1 %


(11.5-14.5) 


 


 





 


Platelet Count


 177 x10^3/uL


(140-400) 


 


 





 


Neutrophils (%) (Auto) 79 % (31-73)    


 


Lymphocytes (%) (Auto) 10 % (24-48)    


 


Monocytes (%) (Auto) 8 % (0-9)    


 


Eosinophils (%) (Auto) 3 % (0-3)    


 


Basophils (%) (Auto) 1 % (0-3)    


 


Neutrophils # (Auto)


 6.5 x10^3uL


(1.8-7.7) 


 


 





 


Lymphocytes # (Auto)


 0.8 x10^3/uL


(1.0-4.8) 


 


 





 


Monocytes # (Auto)


 0.6 x10^3/uL


(0.0-1.1) 


 


 





 


Eosinophils # (Auto)


 0.3 x10^3/uL


(0.0-0.7) 


 


 





 


Basophils # (Auto)


 0.1 x10^3/uL


(0.0-0.2) 


 


 





 


Sodium Level


 141 mmol/L


(136-145) 


 


 





 


Potassium Level


 5.7 mmol/L


(3.5-5.1) 


 


 





 


Chloride Level


 104 mmol/L


() 


 


 





 


Carbon Dioxide Level


 17 mmol/L


(21-32) 


 


 





 


Anion Gap 20 (6-14)    


 


Blood Urea Nitrogen


 156 mg/dL


(7-20) 


 


 





 


Creatinine


 16.7 mg/dL


(0.6-1.0) 


 


 





 


Estimated GFR


(Cockcroft-Gault) 2.2 


 


 


 





 


BUN/Creatinine Ratio 9 (6-20)    


 


Glucose Level


 123 mg/dL


(70-99) 


 


 





 


Calcium Level


 7.4 mg/dL


(8.5-10.1) 


 


 





 


Phosphorus Level


 9.0 mg/dL


(2.6-4.7) 


 


 





 


Magnesium Level


 2.5 mg/dL


(1.8-2.4) 


 


 





 


Total Bilirubin


 0.3 mg/dL


(0.2-1.0) 


 


 





 


Aspartate Amino Transf


(AST/SGOT) 12 U/L (15-37) 


 


 


 





 


Alanine Aminotransferase


(ALT/SGPT) 9 U/L (14-59) 


 


 


 





 


Alkaline Phosphatase


 93 U/L


() 


 


 





 


Total Protein


 7.8 g/dL


(6.4-8.2) 


 


 





 


Albumin


 3.4 g/dL


(3.4-5.0) 


 


 





 


Albumin/Globulin Ratio 0.8 (1.0-1.7)    


 


Glucose (Fingerstick)


 


 110 mg/dL


(70-99) 


 











Laboratory Tests








Test


 1/27/19


17:30 1/27/19


17:40 1/27/19


18:10 1/28/19


00:47


 


White Blood Count


 6.2 x10^3/uL


(4.0-11.0) 


 


 





 


Red Blood Count


 2.33 x10^6/uL


(3.50-5.40) 


 


 





 


Hemoglobin


 7.5 g/dL


(12.0-15.5) 


 


 





 


Hematocrit


 22.7 %


(36.0-47.0) 


 


 





 


Mean Corpuscular Volume 97 fL ()    


 


Mean Corpuscular Hemoglobin 32 pg (25-35)    


 


Mean Corpuscular Hemoglobin


Concent 33 g/dL


(31-37) 


 


 





 


Red Cell Distribution Width


 13.2 %


(11.5-14.5) 


 


 





 


Platelet Count


 175 x10^3/uL


(140-400) 


 


 





 


Neutrophils (%) (Auto) 65 % (31-73)    


 


Lymphocytes (%) (Auto) 19 % (24-48)    


 


Monocytes (%) (Auto) 9 % (0-9)    


 


Eosinophils (%) (Auto) 6 % (0-3)    


 


Basophils (%) (Auto) 1 % (0-3)    


 


Neutrophils # (Auto)


 4.0 x10^3uL


(1.8-7.7) 


 


 





 


Lymphocytes # (Auto)


 1.2 x10^3/uL


(1.0-4.8) 


 


 





 


Monocytes # (Auto)


 0.6 x10^3/uL


(0.0-1.1) 


 


 





 


Eosinophils # (Auto)


 0.4 x10^3/uL


(0.0-0.7) 


 


 





 


Basophils # (Auto)


 0.1 x10^3/uL


(0.0-0.2) 


 


 





 


Prothrombin Time


 13.7 SEC


(11.7-14.0) 


 


 





 


Prothromb Time International


Ratio 1.1 (0.8-1.1) 


 


 


 





 


Sodium Level


 139 mmol/L


(136-145) 


 


 





 


Potassium Level


 6.8 mmol/L


(3.5-5.1) 


 


 





 


Chloride Level


 101 mmol/L


() 


 


 





 


Carbon Dioxide Level


 16 mmol/L


(21-32) 


 


 





 


Anion Gap 22 (6-14)    


 


Blood Urea Nitrogen


 149 mg/dL


(7-20) 


 


 





 


Creatinine


 16.5 mg/dL


(0.6-1.0) 


 


 





 


Estimated GFR


(Cockcroft-Gault) 2.2 


 


 


 





 


BUN/Creatinine Ratio 9 (6-20)    


 


Glucose Level


 142 mg/dL


(70-99) 


 


 





 


Lactic Acid Level


 1.5 mmol/L


(0.4-2.0) 


 


 





 


Calcium Level


 7.3 mg/dL


(8.5-10.1) 


 


 





 


Total Bilirubin


 0.3 mg/dL


(0.2-1.0) 


 


 





 


Aspartate Amino Transf


(AST/SGOT) 10 U/L (15-37) 


 


 


 





 


Alanine Aminotransferase


(ALT/SGPT) 14 U/L (14-59) 


 


 


 





 


Alkaline Phosphatase


 93 U/L


() 


 


 





 


Troponin I Quantitative


 < 0.017 ng/mL


(0.000-0.055) 


 


 





 


Total Protein


 7.1 g/dL


(6.4-8.2) 


 


 





 


Albumin


 3.3 g/dL


(3.4-5.0) 


 


 





 


Albumin/Globulin Ratio 0.9 (1.0-1.7)    


 


Urine Collection Type  Unknown   


 


Urine Color  Yellow   


 


Urine Clarity  Cloudy   


 


Urine pH  5.5   


 


Urine Specific Gravity  1.015   


 


Urine Protein


 


 >=300 mg/dL


(NEG-TRACE) 


 





 


Urine Glucose (UA)


 


 100 mg/dL


(NEG) 


 





 


Urine Ketones (Stick)


 


 Negative mg/dL


(NEG) 


 





 


Urine Blood  Moderate (NEG)   


 


Urine Nitrite  Negative (NEG)   


 


Urine Bilirubin  Negative (NEG)   


 


Urine Urobilinogen Dipstick


 


 0.2 mg/dL (0.2


mg/dL) 


 





 


Urine Leukocyte Esterase  Small (NEG)   


 


Urine RBC


 


 6-10 /HPF


(0-2) 


 





 


Urine WBC  1-4 /HPF (0-4)   


 


Urine Squamous Epithelial


Cells 


 Few /LPF 


 


 





 


Urine Bacteria  0 /HPF (0-FEW)   


 


O2 Saturation   96 % (92-99)  


 


Arterial Blood pH


 


 


 7.26


(7.35-7.45) 





 


Arterial Blood pCO2 at


Patient Temp 


 


 29 mmHg


(35-46) 





 


Arterial Blood pO2 at Patient


Temp 


 


 101 mmHg


() 





 


Arterial Blood HCO3


 


 


 12 mmol/L


(21-28) 





 


Arterial Blood Base Excess


 


 


 -13 mmol/L


(-3-3) 





 


FiO2   21  


 


Glucose (Fingerstick)


 


 


 


 143 mg/dL


(70-99)


 


Test


 1/28/19


06:35 1/28/19


09:11 


 





 


White Blood Count


 8.2 x10^3/uL


(4.0-11.0) 


 


 





 


Red Blood Count


 2.38 x10^6/uL


(3.50-5.40) 


 


 





 


Hemoglobin


 7.4 g/dL


(12.0-15.5) 


 


 





 


Hematocrit


 23.0 %


(36.0-47.0) 


 


 





 


Mean Corpuscular Volume 97 fL ()    


 


Mean Corpuscular Hemoglobin 31 pg (25-35)    


 


Mean Corpuscular Hemoglobin


Concent 32 g/dL


(31-37) 


 


 





 


Red Cell Distribution Width


 13.1 %


(11.5-14.5) 


 


 





 


Platelet Count


 177 x10^3/uL


(140-400) 


 


 





 


Neutrophils (%) (Auto) 79 % (31-73)    


 


Lymphocytes (%) (Auto) 10 % (24-48)    


 


Monocytes (%) (Auto) 8 % (0-9)    


 


Eosinophils (%) (Auto) 3 % (0-3)    


 


Basophils (%) (Auto) 1 % (0-3)    


 


Neutrophils # (Auto)


 6.5 x10^3uL


(1.8-7.7) 


 


 





 


Lymphocytes # (Auto)


 0.8 x10^3/uL


(1.0-4.8) 


 


 





 


Monocytes # (Auto)


 0.6 x10^3/uL


(0.0-1.1) 


 


 





 


Eosinophils # (Auto)


 0.3 x10^3/uL


(0.0-0.7) 


 


 





 


Basophils # (Auto)


 0.1 x10^3/uL


(0.0-0.2) 


 


 





 


Sodium Level


 141 mmol/L


(136-145) 


 


 





 


Potassium Level


 5.7 mmol/L


(3.5-5.1) 


 


 





 


Chloride Level


 104 mmol/L


() 


 


 





 


Carbon Dioxide Level


 17 mmol/L


(21-32) 


 


 





 


Anion Gap 20 (6-14)    


 


Blood Urea Nitrogen


 156 mg/dL


(7-20) 


 


 





 


Creatinine


 16.7 mg/dL


(0.6-1.0) 


 


 





 


Estimated GFR


(Cockcroft-Gault) 2.2 


 


 


 





 


BUN/Creatinine Ratio 9 (6-20)    


 


Glucose Level


 123 mg/dL


(70-99) 


 


 





 


Calcium Level


 7.4 mg/dL


(8.5-10.1) 


 


 





 


Phosphorus Level


 9.0 mg/dL


(2.6-4.7) 


 


 





 


Magnesium Level


 2.5 mg/dL


(1.8-2.4) 


 


 





 


Total Bilirubin


 0.3 mg/dL


(0.2-1.0) 


 


 





 


Aspartate Amino Transf


(AST/SGOT) 12 U/L (15-37) 


 


 


 





 


Alanine Aminotransferase


(ALT/SGPT) 9 U/L (14-59) 


 


 


 





 


Alkaline Phosphatase


 93 U/L


() 


 


 





 


Total Protein


 7.8 g/dL


(6.4-8.2) 


 


 





 


Albumin


 3.4 g/dL


(3.4-5.0) 


 


 





 


Albumin/Globulin Ratio 0.8 (1.0-1.7)    


 


Glucose (Fingerstick)


 


 110 mg/dL


(70-99) 


 











Medications





Current Medications


Calcium Gluconate (Calcium Gluconate) 1,000 mg 1X  ONCE IVP  Last administered 

on 1/27/19at 18:35;  Start 1/27/19 at 18:15;  Stop 1/27/19 at 18:18;  Status DC


Dextrose (Dextrose 50%-Water Syringe) 25 gm 1X  ONCE IV  Last administered on 1/ 27/19at 18:45;  Start 1/27/19 at 18:15;  Stop 1/27/19 at 18:18;  Status DC


Insulin Human Regular (HumuLIN R VIAL) 10 unit 1X  ONCE IV  Last administered 

on 1/27/19at 18:50;  Start 1/27/19 at 18:15;  Stop 1/27/19 at 18:18;  Status DC


Albuterol Sulfate (Ventolin Neb Soln) 10 mg 1X  ONCE CONT NEB  Last 

administered on 1/27/19at 18:29;  Start 1/27/19 at 18:15;  Stop 1/27/19 at 18:18

;  Status DC


Sodium Polystyrene Sulfonate (Kayexalate) 30 gm 1X  ONCE PO  Last administered 

on 1/27/19at 18:51;  Start 1/27/19 at 18:30;  Stop 1/27/19 at 18:31;  Status DC


Sodium Polystyrene Sulfonate (Kayexalate) 30 gm 1X  ONCE PO  Last administered 

on 1/28/19at 00:46;  Start 1/28/19 at 00:30;  Stop 1/28/19 at 00:31;  Status DC


Amlodipine Besylate (Norvasc) 5 mg DAILYWLUN PO ;  Start 1/28/19 at 12:00


Aspirin (Ecotrin) 81 mg DAILYWBKFT PO ;  Start 1/28/19 at 08:00


Atorvastatin Calcium (Lipitor) 80 mg QHS PO  Last administered on 1/27/19at 22:

06;  Start 1/27/19 at 21:15


Clonidine HCl (Catapres Tts-1) 1 patch WEEKLY TD ;  Start 2/3/19 at 09:00;  

Stop 2/3/19 at 09:00;  Status DC


Clopidogrel Bisulfate (Plavix) 75 mg DAILY PO ;  Start 1/28/19 at 09:00


Insulin Glargine (Lantus) 7 units QHS SQ ;  Start 1/27/19 at 21:15


Insulin Human Lispro (HumaLOG) 10 units TIDWMEALS SQ ;  Start 1/28/19 at 08:00


Lisinopril (Prinivil) 20 mg DAILY PO ;  Start 1/28/19 at 09:00


Sevelamer Carbonate (Renvela) 800 mg TIDWMEALS PO ;  Start 1/27/19 at 21:00


Ondansetron HCl (Zofran Odt) 4 mg PRN Q6HRS  PRN PO NAUSEA/VOMITING;  Start 1/27 /19 at 22:00


Labetalol HCl (Normodyne Iv Push) 20 mg PRN Q2HR  PRN IVP HYPERTENSION, SEE 

COMMENTS Last administered on 1/27/19at 22:42;  Start 1/27/19 at 22:30


Clonidine HCl (Catapres Tts-1) 1 patch WEEKLY TD ;  Start 1/28/19 at 09:00


Sodium Chloride 1,000 ml @  1,000 mls/hr Q1H PRN IV hypotension;  Start 1/28/19 

at 09:30;  Stop 1/28/19 at 15:30


Sodium Chloride 1,000 ml @  400 mls/hr Q2H30M PRN IV PATENCY;  Start 1/28/19 at 

09:30;  Stop 1/28/19 at 15:30


Info (PHARMACY MONITORING -- do not chart) 1 each PRN DAILY  PRN MC SEE COMMENTS

;  Start 1/28/19 at 09:15;  Status UNV


Info (PHARMACY MONITORING -- do not chart) 1 each PRN DAILY  PRN MC SEE COMMENTS

;  Start 1/28/19 at 09:15


Alteplase, Recombinant (Cathflo For Central Catheter Clearance) 1 mg 1X  ONCE 

INT CAT ;  Start 1/28/19 at 11:00;  Stop 1/28/19 at 11:04;  Status DC


Alteplase, Recombinant (Cathflo For Central Catheter Clearance) 1 mg 1X  ONCE 

INT CAT ;  Start 1/28/19 at 11:15;  Stop 1/28/19 at 11:16;  Status DC





Active Scripts


Active


Humalog (Insulin Lispro) 100 Unit/1 Ml Insuln.pen 10 Units SQ TIDWMEALS 30 Days


Lantus Solostar (Insulin Glargine,Hum.rec.anlog) 100 Unit/1 Ml Insuln.pen 7 

Units SQ QHS 30 Days


Clonidine Tts-1 (Clonidine) 1 Each Patch.tdwk 1 Patch TD WEEKLY 30 Days


Atorvastatin Calcium 40 Mg Tablet 80 Mg PO QHS


Amlodipine Besylate 5 Mg Tablet 5 Mg PO DAILYWLUN


Clopidogrel (Clopidogrel Bisulfate) 75 Mg Tablet 75 Mg PO DAILY


Lisinopril 20 Mg Tablet 1 Tab PO DAILY


Aspirin Ec (Aspirin) 81 Mg Tablet.dr 81 Mg PO DAILYWBKFT


Reported


Renvela (Sevelamer Carbonate) 800 Mg Tablet 1 Tab PO TID


Vitals/I & O





Vital Sign - Last 24 Hours








 1/27/19 1/27/19 1/27/19 1/27/19





 17:25 18:33 18:46 20:30


 


Temp 97.6   





 97.6   


 


Pulse 71 68  


 


Resp 20 20  


 


B/P (MAP) 191/78 (115)   


 


Pulse Ox 97 95 94 


 


O2 Delivery Room Air Aerosol Mask Room Air Room Air


 


    





    





 1/27/19 1/27/19 1/28/19 1/28/19





 22:41 22:42 03:11 07:00


 


Temp 97.4  97.7 97.8





 97.4  97.7 97.8


 


Pulse 69 67 71 75


 


Resp 20  20 18


 


B/P (MAP) 164/53 (90) 164/53 158/58 (91) 155/57 (89)


 


Pulse Ox 96  96 94


 


O2 Delivery Room Air  Room Air Room Air


 


    





    





 1/28/19   





 08:00   


 


O2 Delivery Room Air   

















BRAXTON HADLEY III DO Jan 28, 2019 11:58

## 2019-01-28 NOTE — RAD
PA and lateral chest x-ray

 

COMPARISON: Chest x-ray December 5, 2018.

 

HISTORY: Delayed hemodialysis.

 

FINDINGS: Right jugular dual-lumen dialysis catheter tip projects region 

of the distal SVC. There is mild buckling of the tip of the catheter 

although this is similar the prior exam. Cardiomegaly stable. Aortic arch 

calcified plaque. No pneumothorax, pulmonary opacities or pleural 

effusions. There are mild coarse interstitial markings at the lower lobes 

likely mild edema. Bones are unremarkable.

 

IMPRESSION: Cardiomegaly stable. Mild lower lobe pulmonary interstitial 

edema is present.

 

Electronically signed by: Primitivo Meehan MD (1/28/2019 2:31 AM) 

[id number]

## 2019-01-28 NOTE — NUR
Patient discharged to home. Discharge instructions, medications and follow up appointments 
discussed with patients daughter. Daughter verbalized understanding. Discharge papers given 
to patient. Prescriptions called in by Dr. Feldman. IV discontinued. Patient assisted out in 
wheelchair by staff at this time.

## 2019-01-28 NOTE — EKG
[facility]

              [address] [place] [address]

Test Date:    2019               Test Time:    17:28:25

Pat Name:     GANGA Solerpartment:   

Patient ID:   [id number]           Room:         [facility]

Gender:       F                        Technician:   

:          [id number]               Requested By: MARCIO MALDONADO

Order Number: [id number]           Reading MD:   Marvin Sanders

                                 Measurements

Intervals                              Axis          

Rate:         68                       P:            29

WI:           190                      QRS:          -6

QRSD:         96                       T:            71

QT:           438                                    

QTc:          471                                    

                           Interpretive Statements

SINUS RHYTHM

LEFTWARD AXIS

T ABNORMALITY IN HIGH LATERAL LEADS

ABNORMAL ECG



Electronically Signed On 2019 10:02:11 CST by Marvin Sanders

## 2019-01-29 LAB — HBA1C MFR BLD: 9 % (ref 4.8–5.6)

## 2019-02-20 ENCOUNTER — HOSPITAL ENCOUNTER (INPATIENT)
Dept: HOSPITAL 61 - ER | Age: 64
LOS: 3 days | Discharge: HOME | DRG: 291 | End: 2019-02-23
Attending: INTERNAL MEDICINE | Admitting: INTERNAL MEDICINE
Payer: COMMERCIAL

## 2019-02-20 VITALS — SYSTOLIC BLOOD PRESSURE: 117 MMHG | DIASTOLIC BLOOD PRESSURE: 52 MMHG

## 2019-02-20 VITALS — SYSTOLIC BLOOD PRESSURE: 198 MMHG | DIASTOLIC BLOOD PRESSURE: 87 MMHG

## 2019-02-20 VITALS — DIASTOLIC BLOOD PRESSURE: 64 MMHG | SYSTOLIC BLOOD PRESSURE: 139 MMHG

## 2019-02-20 VITALS — HEIGHT: 60 IN | BODY MASS INDEX: 32.79 KG/M2 | WEIGHT: 167 LBS

## 2019-02-20 DIAGNOSIS — J96.01: ICD-10-CM

## 2019-02-20 DIAGNOSIS — I50.43: ICD-10-CM

## 2019-02-20 DIAGNOSIS — I13.2: Primary | ICD-10-CM

## 2019-02-20 DIAGNOSIS — E78.00: ICD-10-CM

## 2019-02-20 DIAGNOSIS — L40.9: ICD-10-CM

## 2019-02-20 DIAGNOSIS — E87.2: ICD-10-CM

## 2019-02-20 DIAGNOSIS — E11.22: ICD-10-CM

## 2019-02-20 DIAGNOSIS — Z95.1: ICD-10-CM

## 2019-02-20 DIAGNOSIS — E11.649: ICD-10-CM

## 2019-02-20 DIAGNOSIS — I25.10: ICD-10-CM

## 2019-02-20 DIAGNOSIS — E66.9: ICD-10-CM

## 2019-02-20 DIAGNOSIS — E78.5: ICD-10-CM

## 2019-02-20 DIAGNOSIS — K21.9: ICD-10-CM

## 2019-02-20 DIAGNOSIS — I25.5: ICD-10-CM

## 2019-02-20 DIAGNOSIS — Z83.3: ICD-10-CM

## 2019-02-20 DIAGNOSIS — Z91.15: ICD-10-CM

## 2019-02-20 DIAGNOSIS — Z91.19: ICD-10-CM

## 2019-02-20 DIAGNOSIS — G47.33: ICD-10-CM

## 2019-02-20 DIAGNOSIS — M19.90: ICD-10-CM

## 2019-02-20 DIAGNOSIS — Z95.5: ICD-10-CM

## 2019-02-20 DIAGNOSIS — E87.5: ICD-10-CM

## 2019-02-20 DIAGNOSIS — Z99.2: ICD-10-CM

## 2019-02-20 DIAGNOSIS — Z82.49: ICD-10-CM

## 2019-02-20 DIAGNOSIS — D63.1: ICD-10-CM

## 2019-02-20 DIAGNOSIS — N18.6: ICD-10-CM

## 2019-02-20 LAB
% LYMPHS: 12 % (ref 24–48)
% MONOS: 11 % (ref 0–10)
% SEGS: 71 % (ref 35–66)
ALBUMIN SERPL-MCNC: 3.3 G/DL (ref 3.4–5)
ALBUMIN/GLOB SERPL: 0.7 {RATIO} (ref 1–1.7)
ALP SERPL-CCNC: 81 U/L (ref 46–116)
ALT SERPL-CCNC: 11 U/L (ref 14–59)
ANION GAP SERPL CALC-SCNC: 26 MMOL/L (ref 6–14)
AST SERPL-CCNC: 13 U/L (ref 15–37)
BASE EXCESS ABG: -12 MMOL/L (ref -3–3)
BASE EXCESS ABG: -5 MMOL/L (ref -3–3)
BASOPHILS # BLD AUTO: 0.1 X10^3/UL (ref 0–0.2)
BASOPHILS NFR BLD AUTO: 1 % (ref 0–3)
BASOPHILS NFR BLD: 1 % (ref 0–3)
BILIRUB SERPL-MCNC: 0.4 MG/DL (ref 0.2–1)
BUN SERPL-MCNC: 78 MG/DL (ref 7–20)
BUN/CREAT SERPL: 7 (ref 6–20)
CALCIUM SERPL-MCNC: 7.4 MG/DL (ref 8.5–10.1)
CHLORIDE SERPL-SCNC: 93 MMOL/L (ref 98–107)
CO2 SERPL-SCNC: 15 MMOL/L (ref 21–32)
CREAT SERPL-MCNC: 10.7 MG/DL (ref 0.6–1)
EOSINOPHIL NFR BLD AUTO: 5 % (ref 0–5)
EOSINOPHIL NFR BLD: 0.4 X10^3/UL (ref 0–0.7)
EOSINOPHIL NFR BLD: 3 % (ref 0–3)
ERYTHROCYTE [DISTWIDTH] IN BLOOD BY AUTOMATED COUNT: 14 % (ref 11.5–14.5)
GFR SERPLBLD BASED ON 1.73 SQ M-ARVRAT: 3.7 ML/MIN
GLOBULIN SER-MCNC: 4.5 G/DL (ref 2.2–3.8)
GLUCOSE SERPL-MCNC: 344 MG/DL (ref 70–99)
HCO3 BLDA-SCNC: 14 MMOL/L (ref 21–28)
HCO3 BLDA-SCNC: 20 MMOL/L (ref 21–28)
HCT VFR BLD CALC: 21.7 % (ref 36–47)
HGB BLD-MCNC: 7 G/DL (ref 12–15.5)
INFLUENZA A PATIENT: NEGATIVE
INFLUENZA B PATIENT: NEGATIVE
INSPIRATION SETTING TIME VENT: 32
INSPIRATION SETTING TIME VENT: 40
LYMPHOCYTES # BLD: 1.8 X10^3/UL (ref 1–4.8)
LYMPHOCYTES NFR BLD AUTO: 17 % (ref 24–48)
MCH RBC QN AUTO: 32 PG (ref 25–35)
MCHC RBC AUTO-ENTMCNC: 32 G/DL (ref 31–37)
MCV RBC AUTO: 99 FL (ref 79–100)
MONO #: 0.9 X10^3/UL (ref 0–1.1)
MONOCYTES NFR BLD: 8 % (ref 0–9)
NEUT #: 7.8 X10^3UL (ref 1.8–7.7)
NEUTROPHILS NFR BLD AUTO: 71 % (ref 31–73)
PCO2 BLDA: 33 MMHG (ref 35–46)
PCO2 BLDA: 36 MMHG (ref 35–46)
PLATELET # BLD AUTO: 230 X10^3/UL (ref 140–400)
PLATELET # BLD EST: ADEQUATE 10*3/UL
PO2 BLDA: 115 MMHG (ref 65–108)
PO2 BLDA: 96 MMHG (ref 65–108)
POTASSIUM SERPL-SCNC: 5.2 MMOL/L (ref 3.5–5.1)
PROT SERPL-MCNC: 7.8 G/DL (ref 6.4–8.2)
RBC # BLD AUTO: 2.2 X10^6/UL (ref 3.5–5.4)
SAO2 % BLDA: 95 % (ref 92–99)
SAO2 % BLDA: 97 % (ref 92–99)
SODIUM SERPL-SCNC: 134 MMOL/L (ref 136–145)
WBC # BLD AUTO: 10.9 X10^3/UL (ref 4–11)

## 2019-02-20 PROCEDURE — 80048 BASIC METABOLIC PNL TOTAL CA: CPT

## 2019-02-20 PROCEDURE — 80053 COMPREHEN METABOLIC PANEL: CPT

## 2019-02-20 PROCEDURE — 87340 HEPATITIS B SURFACE AG IA: CPT

## 2019-02-20 PROCEDURE — 83605 ASSAY OF LACTIC ACID: CPT

## 2019-02-20 PROCEDURE — 85025 COMPLETE CBC W/AUTO DIFF WBC: CPT

## 2019-02-20 PROCEDURE — 82805 BLOOD GASES W/O2 SATURATION: CPT

## 2019-02-20 PROCEDURE — 93005 ELECTROCARDIOGRAM TRACING: CPT

## 2019-02-20 PROCEDURE — 87804 INFLUENZA ASSAY W/OPTIC: CPT

## 2019-02-20 PROCEDURE — 93306 TTE W/DOPPLER COMPLETE: CPT

## 2019-02-20 PROCEDURE — 80061 LIPID PANEL: CPT

## 2019-02-20 PROCEDURE — 94640 AIRWAY INHALATION TREATMENT: CPT

## 2019-02-20 PROCEDURE — 84484 ASSAY OF TROPONIN QUANT: CPT

## 2019-02-20 PROCEDURE — 94660 CPAP INITIATION&MGMT: CPT

## 2019-02-20 PROCEDURE — 36415 COLL VENOUS BLD VENIPUNCTURE: CPT

## 2019-02-20 PROCEDURE — 94760 N-INVAS EAR/PLS OXIMETRY 1: CPT

## 2019-02-20 PROCEDURE — 96374 THER/PROPH/DIAG INJ IV PUSH: CPT

## 2019-02-20 PROCEDURE — 5A09357 ASSISTANCE WITH RESPIRATORY VENTILATION, LESS THAN 24 CONSECUTIVE HOURS, CONTINUOUS POSITIVE AIRWAY PRESSURE: ICD-10-PCS | Performed by: INTERNAL MEDICINE

## 2019-02-20 PROCEDURE — 82962 GLUCOSE BLOOD TEST: CPT

## 2019-02-20 PROCEDURE — 85379 FIBRIN DEGRADATION QUANT: CPT

## 2019-02-20 PROCEDURE — 83880 ASSAY OF NATRIURETIC PEPTIDE: CPT

## 2019-02-20 PROCEDURE — 96375 TX/PRO/DX INJ NEW DRUG ADDON: CPT

## 2019-02-20 PROCEDURE — 36600 WITHDRAWAL OF ARTERIAL BLOOD: CPT

## 2019-02-20 PROCEDURE — 87040 BLOOD CULTURE FOR BACTERIA: CPT

## 2019-02-20 PROCEDURE — 71045 X-RAY EXAM CHEST 1 VIEW: CPT

## 2019-02-20 PROCEDURE — 85007 BL SMEAR W/DIFF WBC COUNT: CPT

## 2019-02-20 RX ADMIN — INSULIN GLARGINE SCH UNITS: 100 INJECTION, SOLUTION SUBCUTANEOUS at 22:46

## 2019-02-20 RX ADMIN — IPRATROPIUM BROMIDE AND ALBUTEROL SULFATE SCH ML: .5; 3 SOLUTION RESPIRATORY (INHALATION) at 20:57

## 2019-02-20 NOTE — PHYS DOC
Past Medical History


Past Medical History:  CHF, Diabetes-Type II, High Cholesterol, Hypertension, MI

, Renal Disease, Renal Failure, Other


Additional Past Medical Histor:  PSORASIS


Past Surgical History:  Angioplasty, Other


Additional Past Surgical Histo:  2L fluid removed from lungs, CARDIAC CATH/

STENTS,DIALYSIS CATH R CHEST


Alcohol Use:  None


Drug Use:  None





Adult General


Chief Complaint


Chief Complaint:  SHORTNESS OF BREATH





HPI


HPI





Patient is a 63-year-old female who presents via EMS with report of shortness 

of breath/respiratory distress. Medics indicate that upon their arrival patient'

s oxygen saturation was 86% on room air. Patient had labored breathing at that 

time. Upon arrival, patient had improved saturation on room air but clearly 

fatigued. Additional history is limited due to severity of patient condition.





Review of Systems


Review of Systems





Constitutional: Denies fever []


Respiratory: Complains of cough and shortness of breath []


Cardiovascular: No additional information not addressed in HPI []


Integument: Denies rash or skin lesions []





A full review of systems is difficult to obtain due to severity of patient's 

symptoms.





Current Medications


Current Medications





Current Medications








 Medications


  (Trade)  Dose


 Ordered  Sig/Gume  Start Time


 Stop Time Status Last Admin


Dose Admin


 


 Albuterol/


 Ipratropium


  (Duoneb)  3 ml  1X  ONCE  2/20/19 14:00


 2/20/19 14:02 DC 2/20/19 14:10


3 ML


 


 Furosemide


  (Lasix)  60 mg  1X  ONCE  2/20/19 15:00


 2/20/19 15:01 DC 2/20/19 15:14


60 MG


 


 Insulin Human


 Regular


  (HumuLIN R VIAL)  10 unit  1X  ONCE  2/20/19 16:00


 2/20/19 16:04 DC  














Allergies


Allergies





Allergies








Coded Allergies Type Severity Reaction Last Updated Verified


 


  No Known Drug Allergies    8/6/18 No











Physical Exam


Physical Exam





Constitutional: Well developed, well nourished, in obvious respiratory 

distress. []


HENT: Normocephalic, atraumatic, bilateral external ears normal, oropharynx 

moist, no oral exudates, nose normal. []


Eyes: PERRLA, EOMI, conjunctiva normal, no discharge. [] 


Neck: Normal range of motion, no tenderness, supple. [] 


Cardiovascular: Regular rate and rhythm[]


Lungs & Thorax: Fairly good air movement is noted throughout with rales in the 

lung bases to auscultation []


Abdomen: Bowel sounds normal, soft, no tenderness. [] 


Skin: Warm, dry, no erythema, no rash. [] 


Extremities: No tenderness, no cyanosis, no clubbing, ROM intact, no edema. [] 


Neurologic: Awake and alert, no focal deficits noted. []





Current Patient Data


Vital Signs





 Vital Signs








  Date Time  Temp Pulse Resp B/P (MAP) Pulse Ox O2 Delivery O2 Flow Rate FiO2


 


2/20/19 14:06     95 BiPAP/CPAP  


 


2/20/19 14:00 97.9 101 30 143/63 (89)    





 97.9       








Lab Values





 Laboratory Tests








Test


 2/20/19


13:56 2/20/19


14:00 2/20/19


14:05 2/20/19


15:50


 


Glucose (Fingerstick)


 295 mg/dL


(70-99)  H 


 


 





 


White Blood Count


 


 10.9 x10^3/uL


(4.0-11.0) 


 





 


Red Blood Count


 


 2.20 x10^6/uL


(3.50-5.40)  L 


 





 


Hemoglobin


 


 7.0 g/dL


(12.0-15.5)  *L 


 





 


Hematocrit


 


 21.7 %


(36.0-47.0)  L 


 





 


Mean Corpuscular Volume


 


 99 fL ()


 


 





 


Mean Corpuscular Hemoglobin  32 pg (25-35)    


 


Mean Corpuscular Hemoglobin


Concent 


 32 g/dL


(31-37) 


 





 


Red Cell Distribution Width


 


 14.0 %


(11.5-14.5) 


 





 


Platelet Count


 


 230 x10^3/uL


(140-400) 


 





 


Neutrophils (%) (Auto)  71 % (31-73)    


 


Lymphocytes (%) (Auto)  17 % (24-48)  L  


 


Monocytes (%) (Auto)  8 % (0-9)    


 


Eosinophils (%) (Auto)  3 % (0-3)    


 


Basophils (%) (Auto)  1 % (0-3)    


 


Neutrophils # (Auto)


 


 7.8 x10^3uL


(1.8-7.7)  H 


 





 


Lymphocytes # (Auto)


 


 1.8 x10^3/uL


(1.0-4.8) 


 





 


Monocytes # (Auto)


 


 0.9 x10^3/uL


(0.0-1.1) 


 





 


Eosinophils # (Auto)


 


 0.4 x10^3/uL


(0.0-0.7) 


 





 


Basophils # (Auto)


 


 0.1 x10^3/uL


(0.0-0.2) 


 





 


Segmented Neutrophils %  71 % (35-66)  H  


 


Lymphocytes %  12 % (24-48)  L  


 


Monocytes %  11 % (0-10)  H  


 


Eosinophils %  5 % (0-5)    


 


Basophils %  1 % (0-3)    


 


Myelocytes %   % (0-0)    


 


Platelet Estimate


 


 Adequate


(ADEQUATE) 


 





 


D-Dimer (Shira)


 


 4.95 ug/mlFEU


(0.00-0.50)  H 


 





 


Sodium Level


 


 134 mmol/L


(136-145)  L 


 





 


Potassium Level


 


 5.2 mmol/L


(3.5-5.1)  H 


 





 


Chloride Level


 


 93 mmol/L


()  L 


 





 


Carbon Dioxide Level


 


 15 mmol/L


(21-32)  L 


 





 


Anion Gap  26 (6-14)  H  


 


Blood Urea Nitrogen


 


 78 mg/dL


(7-20)  H 


 





 


Creatinine


 


 10.7 mg/dL


(0.6-1.0)  H 


 





 


Estimated GFR


(Cockcroft-Gault) 


 3.7  


 


 





 


BUN/Creatinine Ratio  7 (6-20)    


 


Glucose Level


 


 344 mg/dL


(70-99)  H 


 





 


Lactic Acid Level


 


 7.2 mmol/L


(0.4-2.0)  *H 


 





 


Calcium Level


 


 7.4 mg/dL


(8.5-10.1)  L 


 





 


Total Bilirubin


 


 0.4 mg/dL


(0.2-1.0) 


 





 


Aspartate Amino Transferase


(AST) 


 13 U/L (15-37)


L 


 





 


Alanine Aminotransferase (ALT)


 


 11 U/L (14-59)


L 


 





 


Alkaline Phosphatase


 


 81 U/L


() 


 





 


Troponin I Quantitative


 


 < 0.017 ng/mL


(0.000-0.055) 


 





 


NT-Pro-B-Type Natriuretic


Peptide 


 > 02288 pg/mL


(0-124)  H 


 





 


Total Protein


 


 7.8 g/dL


(6.4-8.2) 


 





 


Albumin


 


 3.3 g/dL


(3.4-5.0)  L 


 





 


Albumin/Globulin Ratio


 


 0.7 (1.0-1.7)


L 


 





 


O2 Saturation   95 % (92-99)   


 


Arterial Blood pH


 


 


 7.25


(7.35-7.45)  L 





 


Arterial Blood pCO2 at


Patient Temp 


 


 33 mmHg


(35-46)  L 





 


Arterial Blood pO2 at Patient


Temp 


 


 96 mmHg


() 





 


Arterial Blood HCO3


 


 


 14 mmol/L


(21-28)  L 





 


Arterial Blood Base Excess


 


 


 -12 mmol/L


(-3-3)  L 





 


FiO2   32   


 


Influenza Type A Antigen


 


 


 


 Negative


(NEGATIVE)


 


Influenza Type B Antigen


 


 


 


 Negative


(NEGATIVE)





 Laboratory Tests


2/20/19 14:00








 Laboratory Tests


2/20/19 14:00














EKG


EKG


[]


Interpretation Time:


EKG demonstrates normal sinus rhythm with rate of 99.





Radiology/Procedures


Radiology/Procedures


[]


Impressions:


PROCEDURE: PORTABLE CHEST 1V





Portable chest, 2/20/2019:


 


HISTORY: Dyspnea


 


Comparison is made to a study from 2/13/2019. A right jugular dialysis 


type catheter remains in place extending to the level the atriocaval 


junction. The heart is enlarged. Mild perihilar infiltrates have developed


with loss of vascular margination. The findings suggest pulmonary edema. 


Blunting of the left lateral costophrenic angle suggests a small amount of


left-sided pleural fluid.


 


IMPRESSION: Mild parahilar-perivascular pulmonary edema


 


Electronically signed by: Rick Moritz, MD (2/20/2019 2:26 PM) [facility]





Course & Med Decision Making


Course & Med Decision Making


Pertinent Labs and Imaging studies reviewed. (See chart for details)





[]





Dragon Disclaimer


Dragon Disclaimer


This electronic medical record was generated, in whole or in part, using a 

voice recognition dictation system.





Departure


Departure


Impression:  


 Primary Impression:  


 CHF (congestive heart failure)


 Additional Impressions:  


 Hyperkalemia


 ESRD (end stage renal disease) on dialysis


Disposition:  09 ADMITTED AS INPATIENT


Admitting Physician:  Other (Dr. Rocha)


Condition:  IMPROVED


Referrals:  


NO PCP (PCP)





Problem Qualifiers








 Primary Impression:  


 CHF (congestive heart failure)


 Heart failure type:  unspecified  Heart failure chronicity:  unspecified  

Qualified Codes:  I50.9 - Heart failure, unspecified








GHADA BYRNES Jr. DO Feb 20, 2019 16:48

## 2019-02-20 NOTE — PDOC1
History and Physical


Date of Admission


Date of Admission


DATE: 2/20/19 


TIME: 16:36





Identification/Chief Complaint


Chief Complaint


Shortness of breath





Source


Source:  Caregiver, Chart review, Patient





History of Present Illness


History of Present Illness


Ms. Ibrahim is a 63 old Armenian-speaking female known to us w/ PMHx 

ESRD Monday Wednesday Friday and missed one and half weeks worth of dialysis, 

last dialysis was here in the hospital. She has been short of breath, O2 

saturations in the 70s per EMS





Came in hypoxic and we have placed her on BIPAP 12/5 40% FiO2. Lactate 7.4. Hb 

7. pH 7.25 on ABG with PCO2 33.





Prognosis is poor because of noncompliance. Daughter always at bedside and have 

explained multiple times regarding her situation, they do not wish to use the 

 phone and have requested [place]ian speaking staff.





Admitted to [facility] on BIPAP





Past Medical History


Cardiovascular:  CAD, CHF, HTN, Hyperlipidemia


Pulmonary:  No pertinent hx, Other


CENTRAL NERVOUS SYSTEM:  Other


GI:  GERD


Heme/Onc:  No pertinent hx


Hepatobiliary:  No pertinent hx


Psych:  No pertinent hx


Rheumatologic:  No pertinent hx


Infectious disease:  No pertinent hx


Renal/:  Chronic renal failure


Endocrine:  Diabetes





Past Surgical History


Past Surgical History:  Other





Family History


Family History:  Diabetes, Heart Disease





Social History


ALCOHOL:  none


Drugs:  None





Current Medications


Current Medications





Current Medications


Albuterol/ Ipratropium (Duoneb) 3 ml 1X  ONCE NEB  Last administered on 2/20/ 19at 14:10;  Start 2/20/19 at 14:00;  Stop 2/20/19 at 14:02;  Status DC


Furosemide (Lasix) 60 mg 1X  ONCE IVP  Last administered on 2/20/19at 15:14;  

Start 2/20/19 at 15:00;  Stop 2/20/19 at 15:01;  Status DC


Insulin Human Regular (HumuLIN R VIAL) 10 unit 1X  ONCE IV ;  Start 2/20/19 at 

16:00;  Stop 2/20/19 at 16:04;  Status DC





Active Scripts


Active


Levaquin (Levofloxacin) 500 Mg Tablet 500 Mg PO Q48H MDD 1 7 Days


Humalog (Insulin Lispro) 100 Unit/1 Ml Insuln.pen 10 Units SQ TIDWMEALS 30 Days


Lantus Solostar (Insulin Glargine,Hum.rec.anlog) 100 Unit/1 Ml Insuln.pen 7 

Units SQ QHS 30 Days


Clonidine Tts-1 (Clonidine) 1 Each Patch.tdwk 1 Patch TD WEEKLY 30 Days


Atorvastatin Calcium 40 Mg Tablet 80 Mg PO QHS


Amlodipine Besylate 5 Mg Tablet 5 Mg PO DAILYWLUN


Clopidogrel (Clopidogrel Bisulfate) 75 Mg Tablet 75 Mg PO DAILY


Lisinopril 20 Mg Tablet 1 Tab PO DAILY


Aspirin Ec (Aspirin) 81 Mg Tablet.dr 81 Mg PO DAILYWBKFT


Reported


Renvela (Sevelamer Carbonate) 800 Mg Tablet 1 Tab PO TID





Allergies


Allergies:  


Coded Allergies:  


     No Known Drug Allergies (Unverified , 8/6/18)





ROS


General:  YES: Fatigue, Malaise; 


   No: Chills, Night Sweats, Appetite, Other


PSYCHOLOGICAL ROS:  YES: Anxiety, Depression; 


   No: Behavioral Disorder, Concentration difficultie, Decreased libido, 

Disorientation, Hallucinations, Hostility, Irritablity, Memory difficulties, 

Mood Swings, Obsessive thoughts, Physical abuse, Sexual abuse, Sleep 

disturbances, Suicidal ideation, Other


Eyes:  No Blurry vision, No Decreased vision, No Double vision, No Dry eyes, No 

Excessive tearing, No Eye Pain, No Itchy Eyes, No Loss of vision, No Photophobia

, No Scotomata, No Uses contacts, No Uses glasses, No Other


HEENT:  No: Heacaches, Visual Changes, Hearing change, Nasal congestion, Nasal 

discharge, Oral lesions, Sinus pain, Sore Throat, Epistaxis, Sneezing, Snoring, 

Tinnitus, Vertigo, Vocal changes, Other


ALLERGY AND IMMUNOLOGY:  No: Hives, Insect Bite Sensitivity, Itchy/Watery Eyes, 

Nasal Congestion, Post Nasal Drip, Seasonal Allergies, Other


Hematological and Lymphatic:  No: Bleeding Problems, Blood Clots, Blood 

Transfusions, Brusing, Night Sweats, Pallor, Swollen Lymph Nodes, Other


ENDOCRINE:  No: Breast Changes, Galactorrhea, Hair Pattern Changes, Hot Flashes

, Malaise/lethargy, Mood Swings, Palpitations, Polydipsia/polyuria, Skin Changes

, Temperature Intolerance, Unexpected Weight Changes, Other


Breast:  No New/Changing Breast Lumps, No Nipple changes, No Nipple discharge, 

No Other


Respiratory:  YES: Cough, Orthopnea, Shortness of breath; 


   No: Hemoptysis, Pleuritic Pain, SOB with excertion, Sputum Changes, Stridor, 

Tachypnea, Wheezing, Other


Cardiovascular:  yes Orthopnea, yes Edema; 


   No Chest Pain, No Palpitations, No Paroxysmal Noc. Dyspnea, No Lt Headedness

, No Other


Gastrointestinal:  Yes Nausea; 


   No Vomiting, No Abdominal Pain, No Diarrhea, No Constipation, No Melena, No 

Hematochezia, No Other


Genitourinary:  No Dysuria, No Frequency, No Incontinence, No Hematuria, No 

Retention, No Discharge, No Urgency, No Pain, No Flank Pain, No Other, No , No 

, No , No , No , No , No 


Musculoskeletal:  No Gait Disturbance, No Joint Pain, No Joint Stiffness, No 

Joint Swelling, No Muscle Pain, No Muscular Weakness, No Pain In:, No Swelling 

In:, No Other


Neurological:  No Behavorial Changes, No Bowel/Bladder ControlChng, No Confusion

, No Dizziness, No Gait Disturbance, No Headaches, No Impaired Coord/balance, 

No Memory Loss, No Numbness/Tingling, No Seizures, No Speech Problems, No 

Tremors, No Visual Changes, No Weakness, No Other


Skin:  No Dry Skin, No Eczema, No Hair Changes, No Lumps, No Mole Changes, No 

Mottling, No Nail Changes, No Pruritus, No Rash, No Skin Lesion Changes, No 

Other, No Acne





Physical Exam


General:  Alert, Cooperative, moderate distress


HEENT:  Atraumatic, PERRLA, EOMI, Mucous membr. moist/pink


Lungs:  Other (Bilateral wheezes)


Heart:  S1S2, RRR


Abdomen:  Normal bowel sounds, Soft, No tenderness, No hepatosplenomegaly, No 

masses


Extremities:  No clubbing, No cyanosis, Other (1+ edema, abdominal swelling)


Neuro:  Normal speech, Strength at 5/5 X4 ext, Normal tone, Sensation intact, 

Cranial nerves 3-12 NL, Reflexes 2+


Psych/Mental Status:  Other (Drowsy)





Vitals


Vitals





Vital Signs








  Date Time  Temp Pulse Resp B/P (MAP) Pulse Ox O2 Delivery O2 Flow Rate FiO2


 


2/20/19 14:06     95 BiPAP/CPAP  


 


2/20/19 14:00 97.9 101 30 143/63 (89)    





 97.9       











Labs


Labs





Laboratory Tests








Test


 2/20/19


13:56 2/20/19


14:00 2/20/19


14:05 2/20/19


15:50


 


Glucose (Fingerstick)


 295 mg/dL


(70-99) 


 


 





 


White Blood Count


 


 10.9 x10^3/uL


(4.0-11.0) 


 





 


Red Blood Count


 


 2.20 x10^6/uL


(3.50-5.40) 


 





 


Hemoglobin


 


 7.0 g/dL


(12.0-15.5) 


 





 


Hematocrit


 


 21.7 %


(36.0-47.0) 


 





 


Mean Corpuscular Volume  99 fL ()   


 


Mean Corpuscular Hemoglobin  32 pg (25-35)   


 


Mean Corpuscular Hemoglobin


Concent 


 32 g/dL


(31-37) 


 





 


Red Cell Distribution Width


 


 14.0 %


(11.5-14.5) 


 





 


Platelet Count


 


 230 x10^3/uL


(140-400) 


 





 


Neutrophils (%) (Auto)  71 % (31-73)   


 


Lymphocytes (%) (Auto)  17 % (24-48)   


 


Monocytes (%) (Auto)  8 % (0-9)   


 


Eosinophils (%) (Auto)  3 % (0-3)   


 


Basophils (%) (Auto)  1 % (0-3)   


 


Neutrophils # (Auto)


 


 7.8 x10^3uL


(1.8-7.7) 


 





 


Lymphocytes # (Auto)


 


 1.8 x10^3/uL


(1.0-4.8) 


 





 


Monocytes # (Auto)


 


 0.9 x10^3/uL


(0.0-1.1) 


 





 


Eosinophils # (Auto)


 


 0.4 x10^3/uL


(0.0-0.7) 


 





 


Basophils # (Auto)


 


 0.1 x10^3/uL


(0.0-0.2) 


 





 


Segmented Neutrophils %  71 % (35-66)   


 


Lymphocytes %  12 % (24-48)   


 


Monocytes %  11 % (0-10)   


 


Eosinophils %  5 % (0-5)   


 


Basophils %  1 % (0-3)   


 


Myelocytes %   % (0-0)   


 


Platelet Estimate


 


 Adequate


(ADEQUATE) 


 





 


D-Dimer (Shira)


 


 4.95 ug/mlFEU


(0.00-0.50) 


 





 


Sodium Level


 


 134 mmol/L


(136-145) 


 





 


Potassium Level


 


 5.2 mmol/L


(3.5-5.1) 


 





 


Chloride Level


 


 93 mmol/L


() 


 





 


Carbon Dioxide Level


 


 15 mmol/L


(21-32) 


 





 


Anion Gap  26 (6-14)   


 


Blood Urea Nitrogen


 


 78 mg/dL


(7-20) 


 





 


Creatinine


 


 10.7 mg/dL


(0.6-1.0) 


 





 


Estimated GFR


(Cockcroft-Gault) 


 3.7 


 


 





 


BUN/Creatinine Ratio  7 (6-20)   


 


Glucose Level


 


 344 mg/dL


(70-99) 


 





 


Lactic Acid Level


 


 7.2 mmol/L


(0.4-2.0) 


 





 


Calcium Level


 


 7.4 mg/dL


(8.5-10.1) 


 





 


Total Bilirubin


 


 0.4 mg/dL


(0.2-1.0) 


 





 


Aspartate Amino Transf


(AST/SGOT) 


 13 U/L (15-37) 


 


 





 


Alanine Aminotransferase


(ALT/SGPT) 


 11 U/L (14-59) 


 


 





 


Alkaline Phosphatase


 


 81 U/L


() 


 





 


Troponin I Quantitative


 


 < 0.017 ng/mL


(0.000-0.055) 


 





 


NT-Pro-B-Type Natriuretic


Peptide 


 > 72013 pg/mL


(0-124) 


 





 


Total Protein


 


 7.8 g/dL


(6.4-8.2) 


 





 


Albumin


 


 3.3 g/dL


(3.4-5.0) 


 





 


Albumin/Globulin Ratio  0.7 (1.0-1.7)   


 


O2 Saturation   95 % (92-99)  


 


Arterial Blood pH


 


 


 7.25


(7.35-7.45) 





 


Arterial Blood pCO2 at


Patient Temp 


 


 33 mmHg


(35-46) 





 


Arterial Blood pO2 at Patient


Temp 


 


 96 mmHg


() 





 


Arterial Blood HCO3


 


 


 14 mmol/L


(21-28) 





 


Arterial Blood Base Excess


 


 


 -12 mmol/L


(-3-3) 





 


FiO2   32  


 


Influenza Type A Antigen


 


 


 


 Negative


(NEGATIVE)


 


Influenza Type B Antigen


 


 


 


 Negative


(NEGATIVE)








Laboratory Tests








Test


 2/20/19


13:56 2/20/19


14:00 2/20/19


14:05 2/20/19


15:50


 


Glucose (Fingerstick)


 295 mg/dL


(70-99) 


 


 





 


White Blood Count


 


 10.9 x10^3/uL


(4.0-11.0) 


 





 


Red Blood Count


 


 2.20 x10^6/uL


(3.50-5.40) 


 





 


Hemoglobin


 


 7.0 g/dL


(12.0-15.5) 


 





 


Hematocrit


 


 21.7 %


(36.0-47.0) 


 





 


Mean Corpuscular Volume  99 fL ()   


 


Mean Corpuscular Hemoglobin  32 pg (25-35)   


 


Mean Corpuscular Hemoglobin


Concent 


 32 g/dL


(31-37) 


 





 


Red Cell Distribution Width


 


 14.0 %


(11.5-14.5) 


 





 


Platelet Count


 


 230 x10^3/uL


(140-400) 


 





 


Neutrophils (%) (Auto)  71 % (31-73)   


 


Lymphocytes (%) (Auto)  17 % (24-48)   


 


Monocytes (%) (Auto)  8 % (0-9)   


 


Eosinophils (%) (Auto)  3 % (0-3)   


 


Basophils (%) (Auto)  1 % (0-3)   


 


Neutrophils # (Auto)


 


 7.8 x10^3uL


(1.8-7.7) 


 





 


Lymphocytes # (Auto)


 


 1.8 x10^3/uL


(1.0-4.8) 


 





 


Monocytes # (Auto)


 


 0.9 x10^3/uL


(0.0-1.1) 


 





 


Eosinophils # (Auto)


 


 0.4 x10^3/uL


(0.0-0.7) 


 





 


Basophils # (Auto)


 


 0.1 x10^3/uL


(0.0-0.2) 


 





 


Segmented Neutrophils %  71 % (35-66)   


 


Lymphocytes %  12 % (24-48)   


 


Monocytes %  11 % (0-10)   


 


Eosinophils %  5 % (0-5)   


 


Basophils %  1 % (0-3)   


 


Myelocytes %   % (0-0)   


 


Platelet Estimate


 


 Adequate


(ADEQUATE) 


 





 


D-Dimer (Shira)


 


 4.95 ug/mlFEU


(0.00-0.50) 


 





 


Sodium Level


 


 134 mmol/L


(136-145) 


 





 


Potassium Level


 


 5.2 mmol/L


(3.5-5.1) 


 





 


Chloride Level


 


 93 mmol/L


() 


 





 


Carbon Dioxide Level


 


 15 mmol/L


(21-32) 


 





 


Anion Gap  26 (6-14)   


 


Blood Urea Nitrogen


 


 78 mg/dL


(7-20) 


 





 


Creatinine


 


 10.7 mg/dL


(0.6-1.0) 


 





 


Estimated GFR


(Cockcroft-Gault) 


 3.7 


 


 





 


BUN/Creatinine Ratio  7 (6-20)   


 


Glucose Level


 


 344 mg/dL


(70-99) 


 





 


Lactic Acid Level


 


 7.2 mmol/L


(0.4-2.0) 


 





 


Calcium Level


 


 7.4 mg/dL


(8.5-10.1) 


 





 


Total Bilirubin


 


 0.4 mg/dL


(0.2-1.0) 


 





 


Aspartate Amino Transf


(AST/SGOT) 


 13 U/L (15-37) 


 


 





 


Alanine Aminotransferase


(ALT/SGPT) 


 11 U/L (14-59) 


 


 





 


Alkaline Phosphatase


 


 81 U/L


() 


 





 


Troponin I Quantitative


 


 < 0.017 ng/mL


(0.000-0.055) 


 





 


NT-Pro-B-Type Natriuretic


Peptide 


 > 04865 pg/mL


(0-124) 


 





 


Total Protein


 


 7.8 g/dL


(6.4-8.2) 


 





 


Albumin


 


 3.3 g/dL


(3.4-5.0) 


 





 


Albumin/Globulin Ratio  0.7 (1.0-1.7)   


 


O2 Saturation   95 % (92-99)  


 


Arterial Blood pH


 


 


 7.25


(7.35-7.45) 





 


Arterial Blood pCO2 at


Patient Temp 


 


 33 mmHg


(35-46) 





 


Arterial Blood pO2 at Patient


Temp 


 


 96 mmHg


() 





 


Arterial Blood HCO3


 


 


 14 mmol/L


(21-28) 





 


Arterial Blood Base Excess


 


 


 -12 mmol/L


(-3-3) 





 


FiO2   32  


 


Influenza Type A Antigen


 


 


 


 Negative


(NEGATIVE)


 


Influenza Type B Antigen


 


 


 


 Negative


(NEGATIVE)











VTE Prophylaxis Ordered


VTE Prophylaxis Devices:  Contraindicated


VTE Pharmacological Prophylaxi:  Yes





Assessment/Plan


Assessment/Plan


A/P:


Acute hypoxic respiratory failure - on BIPAP with improvement. Will consult pulm

, though this seems like CHF exacerbation from lack of ultrafiltration with no 

dialysis for a week


ESRD on dialysis, missed one week - Consult nephrology. Counseled on compliance 

with her and her daughter. I am not certain this has sunk in


History of hyperkalemia - K 5.2, will monitor


Anemia of ESRD - Hb of 7, she refuses transfusion as a jevohahs witness. Monitor


dCHF - in acute exacerbation, cont BB, statin, ASA. Needs ultrafiltration.


CAD history of CABG - stable, consult cardiology


Hypertension - will monitor on meds


Diabetes type 2 with hypoglycemic episodes- history of - reduce her home lantus 

and scheduled insulin


Obesity, BMI 32 - counseled





FEN - ADA renal diet


PPX - Heparin


FULL CODE


ICU step-down for respiratory failure on BIPAP, 37 minutes of critical care 

time spent with patient and family. Will be inpatient at least 2 midnights.











GALI COSTA MD Feb 20, 2019 16:37

## 2019-02-20 NOTE — EKG
[facility]

              [address] [place] [address]

Test Date:    2019               Test Time:    14:04:39

Pat Name:     GANGA Solerpartment:   

Patient ID:   [id number]           Room:          

Gender:                                Technician:   

:          1955               Requested By: GHADA BYRNES

Order Number: [id number]           Reading MD:   Won Brumfield MD

                                 Measurements

Intervals                              Axis          

Rate:                                  P:            

SC:                                    QRS:          

QRSD:                                  T:            

QT:                                                  

QTc:                                                 

                           Interpretive Statements

PROBABLE SR

NON-SPECIFIC ST/T CHANGES



Electronically Signed On 3-4-2019 11:55:27 CST by Won Brumfield MD

## 2019-02-20 NOTE — RAD
Portable chest, 2/20/2019:

 

HISTORY: Dyspnea

 

Comparison is made to a study from 2/13/2019. A right jugular dialysis 

type catheter remains in place extending to the level the atriocaval 

junction. The heart is enlarged. Mild perihilar infiltrates have developed

with loss of vascular margination. The findings suggest pulmonary edema. 

Blunting of the left lateral costophrenic angle suggests a small amount of

left-sided pleural fluid.

 

IMPRESSION: Mild parahilar-perivascular pulmonary edema

 

Electronically signed by: Rick Moritz, MD (2/20/2019 2:26 PM) [facility]

## 2019-02-20 NOTE — NUR
RN NOTE

daughter renato wants to wait to do the admitting questions and medications stating she 
wanted to get some clothes and items for her mother. this rn will give report to hellen connolly rn

## 2019-02-21 VITALS — DIASTOLIC BLOOD PRESSURE: 66 MMHG | SYSTOLIC BLOOD PRESSURE: 146 MMHG

## 2019-02-21 VITALS — DIASTOLIC BLOOD PRESSURE: 72 MMHG | SYSTOLIC BLOOD PRESSURE: 151 MMHG

## 2019-02-21 VITALS — DIASTOLIC BLOOD PRESSURE: 86 MMHG | SYSTOLIC BLOOD PRESSURE: 203 MMHG

## 2019-02-21 VITALS — SYSTOLIC BLOOD PRESSURE: 176 MMHG | DIASTOLIC BLOOD PRESSURE: 75 MMHG

## 2019-02-21 VITALS — SYSTOLIC BLOOD PRESSURE: 167 MMHG | DIASTOLIC BLOOD PRESSURE: 74 MMHG

## 2019-02-21 VITALS — DIASTOLIC BLOOD PRESSURE: 78 MMHG | SYSTOLIC BLOOD PRESSURE: 181 MMHG

## 2019-02-21 VITALS — SYSTOLIC BLOOD PRESSURE: 196 MMHG | DIASTOLIC BLOOD PRESSURE: 82 MMHG

## 2019-02-21 VITALS — DIASTOLIC BLOOD PRESSURE: 77 MMHG | SYSTOLIC BLOOD PRESSURE: 188 MMHG

## 2019-02-21 VITALS — SYSTOLIC BLOOD PRESSURE: 183 MMHG | DIASTOLIC BLOOD PRESSURE: 76 MMHG

## 2019-02-21 VITALS — DIASTOLIC BLOOD PRESSURE: 70 MMHG | SYSTOLIC BLOOD PRESSURE: 153 MMHG

## 2019-02-21 VITALS — SYSTOLIC BLOOD PRESSURE: 156 MMHG | DIASTOLIC BLOOD PRESSURE: 87 MMHG

## 2019-02-21 LAB
ANION GAP SERPL CALC-SCNC: 17 MMOL/L (ref 6–14)
BASOPHILS # BLD AUTO: 0 X10^3/UL (ref 0–0.2)
BASOPHILS NFR BLD: 1 % (ref 0–3)
BUN SERPL-MCNC: 84 MG/DL (ref 7–20)
CALCIUM SERPL-MCNC: 7.3 MG/DL (ref 8.5–10.1)
CHLORIDE SERPL-SCNC: 97 MMOL/L (ref 98–107)
CHOLEST SERPL-MCNC: 144 MG/DL (ref 0–200)
CHOLEST/HDLC SERPL: 3.6 {RATIO}
CO2 SERPL-SCNC: 22 MMOL/L (ref 21–32)
CREAT SERPL-MCNC: 11.1 MG/DL (ref 0.6–1)
EOSINOPHIL NFR BLD: 0.1 X10^3/UL (ref 0–0.7)
EOSINOPHIL NFR BLD: 1 % (ref 0–3)
ERYTHROCYTE [DISTWIDTH] IN BLOOD BY AUTOMATED COUNT: 13.9 % (ref 11.5–14.5)
GFR SERPLBLD BASED ON 1.73 SQ M-ARVRAT: 3.5 ML/MIN
GLUCOSE SERPL-MCNC: 185 MG/DL (ref 70–99)
HCT VFR BLD CALC: 18.3 % (ref 36–47)
HDLC SERPL-MCNC: 40 MG/DL (ref 40–60)
HGB BLD-MCNC: 6 G/DL (ref 12–15.5)
LDLC: 77 MG/DL (ref 0–100)
LYMPHOCYTES # BLD: 0.9 X10^3/UL (ref 1–4.8)
LYMPHOCYTES NFR BLD AUTO: 13 % (ref 24–48)
MCH RBC QN AUTO: 32 PG (ref 25–35)
MCHC RBC AUTO-ENTMCNC: 33 G/DL (ref 31–37)
MCV RBC AUTO: 96 FL (ref 79–100)
MONO #: 0.5 X10^3/UL (ref 0–1.1)
MONOCYTES NFR BLD: 7 % (ref 0–9)
NEUT #: 5.9 X10^3UL (ref 1.8–7.7)
NEUTROPHILS NFR BLD AUTO: 79 % (ref 31–73)
PLATELET # BLD AUTO: 166 X10^3/UL (ref 140–400)
POTASSIUM SERPL-SCNC: 5.2 MMOL/L (ref 3.5–5.1)
RBC # BLD AUTO: 1.91 X10^6/UL (ref 3.5–5.4)
SODIUM SERPL-SCNC: 136 MMOL/L (ref 136–145)
TRIGL SERPL-MCNC: 133 MG/DL (ref 0–150)
VLDLC: 27 MG/DL (ref 0–40)
WBC # BLD AUTO: 7.5 X10^3/UL (ref 4–11)

## 2019-02-21 PROCEDURE — 5A09357 ASSISTANCE WITH RESPIRATORY VENTILATION, LESS THAN 24 CONSECUTIVE HOURS, CONTINUOUS POSITIVE AIRWAY PRESSURE: ICD-10-PCS | Performed by: INTERNAL MEDICINE

## 2019-02-21 PROCEDURE — 5A1D70Z PERFORMANCE OF URINARY FILTRATION, INTERMITTENT, LESS THAN 6 HOURS PER DAY: ICD-10-PCS | Performed by: INTERNAL MEDICINE

## 2019-02-21 RX ADMIN — INSULIN GLARGINE SCH UNITS: 100 INJECTION, SOLUTION SUBCUTANEOUS at 21:03

## 2019-02-21 RX ADMIN — IPRATROPIUM BROMIDE AND ALBUTEROL SULFATE SCH ML: .5; 3 SOLUTION RESPIRATORY (INHALATION) at 20:16

## 2019-02-21 RX ADMIN — IPRATROPIUM BROMIDE AND ALBUTEROL SULFATE SCH ML: .5; 3 SOLUTION RESPIRATORY (INHALATION) at 20:00

## 2019-02-21 RX ADMIN — INSULIN LISPRO SCH UNITS: 100 INJECTION, SOLUTION INTRAVENOUS; SUBCUTANEOUS at 12:00

## 2019-02-21 RX ADMIN — IPRATROPIUM BROMIDE AND ALBUTEROL SULFATE SCH ML: .5; 3 SOLUTION RESPIRATORY (INHALATION) at 08:10

## 2019-02-21 RX ADMIN — IPRATROPIUM BROMIDE AND ALBUTEROL SULFATE SCH ML: .5; 3 SOLUTION RESPIRATORY (INHALATION) at 11:31

## 2019-02-21 RX ADMIN — ISOSORBIDE MONONITRATE SCH MG: 30 TABLET, EXTENDED RELEASE ORAL at 08:47

## 2019-02-21 RX ADMIN — INSULIN LISPRO SCH UNITS: 100 INJECTION, SOLUTION INTRAVENOUS; SUBCUTANEOUS at 08:00

## 2019-02-21 RX ADMIN — IPRATROPIUM BROMIDE AND ALBUTEROL SULFATE SCH ML: .5; 3 SOLUTION RESPIRATORY (INHALATION) at 16:00

## 2019-02-21 RX ADMIN — ATORVASTATIN CALCIUM SCH MG: 40 TABLET, FILM COATED ORAL at 20:58

## 2019-02-21 RX ADMIN — LISINOPRIL SCH MG: 20 TABLET ORAL at 08:46

## 2019-02-21 RX ADMIN — ASPIRIN SCH MG: 81 TABLET, COATED ORAL at 08:47

## 2019-02-21 RX ADMIN — CARVEDILOL SCH MG: 12.5 TABLET, FILM COATED ORAL at 08:46

## 2019-02-21 RX ADMIN — INSULIN LISPRO SCH UNITS: 100 INJECTION, SOLUTION INTRAVENOUS; SUBCUTANEOUS at 17:00

## 2019-02-21 RX ADMIN — CARVEDILOL SCH MG: 12.5 TABLET, FILM COATED ORAL at 17:45

## 2019-02-21 NOTE — PDOC2
CARDIAC CONSULT


DATE OF CONSULT


Date of Consult


DATE: 2/21/19 


TIME: 07:49





REASON FOR CONSULT


Reason for Consult:


CHF Exacerbation





REFERRING PHYSICIAN


Referring Physician:


Dr. Rocha





SOURCE


Source:  Chart review, Patient





HISTORY OF PRESENT ILLNESS


HISTORY OF PRESENT ILLNESS


This is 64 yo female, with a history of ESRD, ICM, and CAD s/p PCI/stenting, 

who presented secondary to shortness of breath. Patient has been non-compliant 

with HD treatments. Is M,W,F dialysis. Has not dialyzed since last Friday. 

Daughter reports this is due to increasing back and chest pain. Daughter 

reports she has been short of breath for about a week. Progressively worsening. 

Was so short of breath prior to arrival she was not able to walk. EMS was 

called. Daughter reports her chest pain is located in her central chest. Has 

been constant and aching. No specific or worsening factors. Of note, patient 

has had multiple recent admissions due to hyperkalemia and missed HD. Patient 

does not accept blood products.





PAST MEDICAL HISTORY


Past Medical History


Cardiovascular:  HTN, CAD s/p PCI/stenting to RCA, Hyperlipidemia, ICM, CHF


Pulmonary:  No pertinent hx, Other (pleural effusion with thoracentesis)


CENTRAL NERVOUS SYSTEM:  Other (No pertinent history)


GI:  GERD


Heme/Onc:  No pertinent hx


Hepatobiliary:  No pertinent hx


Psych:  No pertinent hx


Musculoskeletal:  Osteoarthritis


Rheumatologic:  No pertinent hx


Infectious disease:  No pertinent hx


ENT:  No pertinent hx


Renal/:  CKD, renal cyst, ESRD


Endocrine:  Diabetes (2)


Dermatology:  Psoriasis





PAST SURGICAL HISTORY


Past Surgical History


ureteral stent placement, PCI/stent to RCA 2/2016





FAMILY HISTORY


Family History:  Heart Disease





SOCIAL HISTORY


Social History


Smoke:  No


ALCOHOL:  none


Drugs:  None


Lives:  with Family





CURRENT MEDICATIONS


CURRENT MEDICATIONS





Current Medications








 Medications


  (Trade)  Dose


 Ordered  Sig/Gume


 Route


 PRN Reason  Start Time


 Stop Time Status Last Admin


Dose Admin


 


 Albuterol/


 Ipratropium


  (Duoneb)  3 ml  1X  ONCE


 NEB


   2/20/19 14:00


 2/20/19 14:02 DC 2/20/19 14:10





 


 Furosemide


  (Lasix)  60 mg  1X  ONCE


 IVP


   2/20/19 15:00


 2/20/19 15:01 DC 2/20/19 15:14





 


 Insulin Human


 Regular


  (HumuLIN R VIAL)  10 unit  1X  ONCE


 IV


   2/20/19 16:00


 2/20/19 16:04 DC 2/20/19 17:04





 


 Albuterol/


 Ipratropium


  (Duoneb)  3 ml  RTQID


 NEB


   2/20/19 20:00


 2/21/19 19:59  2/20/19 20:57





 


 Insulin Glargine


  (Lantus)  7 units  QHS


 SQ


   2/20/19 22:30


    2/20/19 22:46





 


 Iron Sucrose 200


 mg/Sodium Chloride  110 ml @ 


 55 mls/hr  1X  ONCE


 IV


   2/21/19 05:00


 2/21/19 06:59 DC 2/21/19 04:53














ALLERGIES


ALLERGIES:  


Coded Allergies:  


     No Known Drug Allergies (Unverified , 8/6/18)





ROS


Review of System


14 point ROS conducted with pertinent positives noted above in HPI.





PHYSICAL EXAM


PHYSICAL EXAM


General:  Alert, Oriented X3, Cooperative, No acute distress


HEENT:  Atraumatic, Mucous membr. moist/pink


Lungs:  Other (bibasilar crackles)


Heart:  Regular rate (SR), Normal S1, Normal S2, Other (3/6 systolic murmur)


Abdomen:  Soft, No tenderness


Extremities:  No cyanosis, no edema 


Skin:  No breakdown, No significant lesion


Neuro:  Normal speech, Sensation intact


Psych/Mental Status:  Mental status NL, drowsy


MUSCULOSKELETAL:  Osteoarthritic changes both hands





VITALS


VITALS





Vital Signs








  Date Time  Temp Pulse Resp B/P (MAP) Pulse Ox O2 Delivery O2 Flow Rate FiO2


 


2/21/19 06:09 98.0 67 20 153/70 (97) 100 BiPAP/CPAP  





 98.0       


 


2/20/19 20:40       3.0 











LABS


Lab:





Laboratory Tests








Test


 2/20/19


13:56 2/20/19


14:00 2/20/19


14:05 2/20/19


15:50


 


Glucose (Fingerstick)


 295 mg/dL


(70-99) 


 


 





 


White Blood Count


 


 10.9 x10^3/uL


(4.0-11.0) 


 





 


Red Blood Count


 


 2.20 x10^6/uL


(3.50-5.40) 


 





 


Hemoglobin


 


 7.0 g/dL


(12.0-15.5) 


 





 


Hematocrit


 


 21.7 %


(36.0-47.0) 


 





 


Mean Corpuscular Volume  99 fL ()   


 


Mean Corpuscular Hemoglobin  32 pg (25-35)   


 


Mean Corpuscular Hemoglobin


Concent 


 32 g/dL


(31-37) 


 





 


Red Cell Distribution Width


 


 14.0 %


(11.5-14.5) 


 





 


Platelet Count


 


 230 x10^3/uL


(140-400) 


 





 


Neutrophils (%) (Auto)  71 % (31-73)   


 


Lymphocytes (%) (Auto)  17 % (24-48)   


 


Monocytes (%) (Auto)  8 % (0-9)   


 


Eosinophils (%) (Auto)  3 % (0-3)   


 


Basophils (%) (Auto)  1 % (0-3)   


 


Neutrophils # (Auto)


 


 7.8 x10^3uL


(1.8-7.7) 


 





 


Lymphocytes # (Auto)


 


 1.8 x10^3/uL


(1.0-4.8) 


 





 


Monocytes # (Auto)


 


 0.9 x10^3/uL


(0.0-1.1) 


 





 


Eosinophils # (Auto)


 


 0.4 x10^3/uL


(0.0-0.7) 


 





 


Basophils # (Auto)


 


 0.1 x10^3/uL


(0.0-0.2) 


 





 


Segmented Neutrophils %  71 % (35-66)   


 


Lymphocytes %  12 % (24-48)   


 


Monocytes %  11 % (0-10)   


 


Eosinophils %  5 % (0-5)   


 


Basophils %  1 % (0-3)   


 


Myelocytes %   % (0-0)   


 


Platelet Estimate


 


 Adequate


(ADEQUATE) 


 





 


D-Dimer (Shira)


 


 4.95 ug/mlFEU


(0.00-0.50) 


 





 


Sodium Level


 


 134 mmol/L


(136-145) 


 





 


Potassium Level


 


 5.2 mmol/L


(3.5-5.1) 


 





 


Chloride Level


 


 93 mmol/L


() 


 





 


Carbon Dioxide Level


 


 15 mmol/L


(21-32) 


 





 


Anion Gap  26 (6-14)   


 


Blood Urea Nitrogen


 


 78 mg/dL


(7-20) 


 





 


Creatinine


 


 10.7 mg/dL


(0.6-1.0) 


 





 


Estimated GFR


(Cockcroft-Gault) 


 3.7 


 


 





 


BUN/Creatinine Ratio  7 (6-20)   


 


Glucose Level


 


 344 mg/dL


(70-99) 


 





 


Lactic Acid Level


 


 7.2 mmol/L


(0.4-2.0) 


 





 


Calcium Level


 


 7.4 mg/dL


(8.5-10.1) 


 





 


Total Bilirubin


 


 0.4 mg/dL


(0.2-1.0) 


 





 


Aspartate Amino Transf


(AST/SGOT) 


 13 U/L (15-37) 


 


 





 


Alanine Aminotransferase


(ALT/SGPT) 


 11 U/L (14-59) 


 


 





 


Alkaline Phosphatase


 


 81 U/L


() 


 





 


Troponin I Quantitative


 


 < 0.017 ng/mL


(0.000-0.055) 


 





 


NT-Pro-B-Type Natriuretic


Peptide 


 > 29327 pg/mL


(0-124) 


 





 


Total Protein


 


 7.8 g/dL


(6.4-8.2) 


 





 


Albumin


 


 3.3 g/dL


(3.4-5.0) 


 





 


Albumin/Globulin Ratio  0.7 (1.0-1.7)   


 


O2 Saturation   95 % (92-99)  


 


Arterial Blood pH


 


 


 7.25


(7.35-7.45) 





 


Arterial Blood pCO2 at


Patient Temp 


 


 33 mmHg


(35-46) 





 


Arterial Blood pO2 at Patient


Temp 


 


 96 mmHg


() 





 


Arterial Blood HCO3


 


 


 14 mmol/L


(21-28) 





 


Arterial Blood Base Excess


 


 


 -12 mmol/L


(-3-3) 





 


FiO2   32  


 


Influenza Type A Antigen


 


 


 


 Negative


(NEGATIVE)


 


Influenza Type B Antigen


 


 


 


 Negative


(NEGATIVE)


 


Test


 2/20/19


17:10 2/20/19


20:42 2/21/19


03:30 





 


O2 Saturation 97 % (92-99)    


 


Arterial Blood pH


 7.36


(7.35-7.45) 


 


 





 


Arterial Blood pCO2 at


Patient Temp 36 mmHg


(35-46) 


 


 





 


Arterial Blood pO2 at Patient


Temp 115 mmHg


() 


 


 





 


Arterial Blood HCO3


 20 mmol/L


(21-28) 


 


 





 


Arterial Blood Base Excess


 -5 mmol/L


(-3-3) 


 


 





 


FiO2 40    


 


Glucose (Fingerstick)


 


 159 mg/dL


(70-99) 


 





 


White Blood Count


 


 


 7.5 x10^3/uL


(4.0-11.0) 





 


Red Blood Count


 


 


 1.91 x10^6/uL


(3.50-5.40) 





 


Hemoglobin


 


 


 6.0 g/dL


(12.0-15.5) 





 


Hematocrit


 


 


 18.3 %


(36.0-47.0) 





 


Mean Corpuscular Volume   96 fL ()  


 


Mean Corpuscular Hemoglobin   32 pg (25-35)  


 


Mean Corpuscular Hemoglobin


Concent 


 


 33 g/dL


(31-37) 





 


Red Cell Distribution Width


 


 


 13.9 %


(11.5-14.5) 





 


Platelet Count


 


 


 166 x10^3/uL


(140-400) 





 


Neutrophils (%) (Auto)   79 % (31-73)  


 


Lymphocytes (%) (Auto)   13 % (24-48)  


 


Monocytes (%) (Auto)   7 % (0-9)  


 


Eosinophils (%) (Auto)   1 % (0-3)  


 


Basophils (%) (Auto)   1 % (0-3)  


 


Neutrophils # (Auto)


 


 


 5.9 x10^3uL


(1.8-7.7) 





 


Lymphocytes # (Auto)


 


 


 0.9 x10^3/uL


(1.0-4.8) 





 


Monocytes # (Auto)


 


 


 0.5 x10^3/uL


(0.0-1.1) 





 


Eosinophils # (Auto)


 


 


 0.1 x10^3/uL


(0.0-0.7) 





 


Basophils # (Auto)


 


 


 0.0 x10^3/uL


(0.0-0.2) 





 


Sodium Level


 


 


 136 mmol/L


(136-145) 





 


Potassium Level


 


 


 5.2 mmol/L


(3.5-5.1) 





 


Chloride Level


 


 


 97 mmol/L


() 





 


Carbon Dioxide Level


 


 


 22 mmol/L


(21-32) 





 


Anion Gap   17 (6-14)  


 


Blood Urea Nitrogen


 


 


 84 mg/dL


(7-20) 





 


Creatinine


 


 


 11.1 mg/dL


(0.6-1.0) 





 


Estimated GFR


(Cockcroft-Gault) 


 


 3.5 


 





 


Glucose Level


 


 


 185 mg/dL


(70-99) 





 


Calcium Level


 


 


 7.3 mg/dL


(8.5-10.1) 














ECHOCARDIOGRAM


ECHOCARDIOGRAM


<Conclusion>


Left ventricle systolic function is severely impaired. The Ejection Fraction is 

25%.


There is severe global hypokinesis with predominance noted in the inferior, 

apical walls.


Doppler and Color Flow revealed severe mitral regurgitation.


Doppler and Color Flow revealed mild to moderate tricuspid regurgitation.  

There is moderate pulmonary hypertension. The PA pressure was estimated at 54 

mmHg.





DATE:     02/07/17 1434








<Conclusion>


The left ventricle is normal size.


The left ventricular systolic function is normal and the ejection fraction is 

within normal range.


LV ejection fraction id 55-60%.


There is mild concentric left ventricular hypertrophy.


There is no significant aortic valvular stenosis.


Doppler and Color Flow revealed no significant aortic regurgitation.


Doppler and Color-flow revealed mild  mitral regurgitation.


Doppler and Color Flow revealed trace tricuspid regurgitation.





DATE:     03/02/18 1811





HEART CATH


HEART CATH


Conclusion


1.  80% stenosis involving the proximal to midsegment of the right coronary 

artery.  Also seen was diffuse disease involving the distal segments of the 

left anterior descending and left circumflex artery that are not amenable for 

percutaneous intervention.


2.  Successful PCI/stents placement to the right coronary artery.





Recommendations


1.  Aspirin 325 mg daily


2.  Plavix 75 mg daily for preferably one year


3.  Cardiovascular risk factor modification








DATE:     03/04/16 0951





ASSESSMENT/PLAN


ASSESSMENT/PLAN


1.  Acute respiratory failure in the setting of a/c HF and anemia


2.  Acute on chronic combined systolic/diastolic heart failure; secondary to 

noncompliance with HD


3.  ESRD on HD; uremic, hyperkalemia. Missed HD Monday and Wednesday


4.  Anemia; hgb 6.0. Does not accept blood products.


5.  Chest pain, atypical. Troponin negative


6.  Accelerated HTN


7.  Lactic acidosis


8.  Persistent noncompliance secondary to financial constraints. 


9.  CAD: PCI/stent to RCA, diffuse disease to LCx and LAD 3/2016   


10.  Hx of ICM; LVEF previously 25%. Most recent echo showed LV recovery with 

an EF of 55-60% 


11.  Hyperlipidemia; statin











Recommendations





Check echo to assess LV systolic function


Secondary prevention measures


SS consult


Fluid off loading via HD as per nephrology


Monitor Hgb. D/w primary cardiology, will stop Plavix for now and continue ASA.


Resume home antiHTN therapy and assess need for therapy titration


Discussed significantly with daughter in regards to compliance and adherence to 

treatment.











WENCESLAO FRASER Feb 21, 2019 08:19

## 2019-02-21 NOTE — PDOC
PROGRESS NOTES


Chief Complaint


Chief Complaint


Acute hypoxic respiratory failure - on BIPAP with improvement. Follow 

recommendations from consultant.  though this seems like CHF exacerbation from 

lack of ultrafiltration with no dialysis for a week


ESRD on dialysis, missed one week - dialysis as per consultant. Counseled on 

compliance with her and her daughter. 


History of hyperkalemia - K 5.2, will monitor


Anemia of ESRD - Hb of 7, she refuses transfusion as a Jehovah witness. Monitor


dCHF - in acute exacerbation, cont BB, statin, ASA. Needs ultrafiltration.


CAD history of CABG - stable, consult cardiology


Hypertension - will monitor on meds


Diabetes type 2 with hypoglycemic episodes- history of - reduce her home lantus 

and scheduled insulin


Obesity, BMI 32 - counseled





FEN - ADA renal diet


PPX - Heparin


FULL CODE


ICU step-down for respiratory failure on BIPAP, 37 minutes of critical care 

time spent with patient and family. Will be inpatient at least 2 midnights.





History of Present Illness


History of Present Illness


Patient with no acute events reported overnight, she is much better compared to 

admission. No complaints during my visit, says the bipap helped her with the 

respiratory distress, no increased work of breathing noted.





Vitals


Vitals





Vital Signs








  Date Time  Temp Pulse Resp B/P (MAP) Pulse Ox O2 Delivery O2 Flow Rate FiO2


 


2/21/19 16:02      Nasal Cannula 3.0 


 


2/21/19 13:24  75  144/65    


 


2/21/19 11:31     96   


 


2/21/19 11:00 97.7  20     





 97.7       











Physical Exam


General:  Alert, Cooperative, moderate distress


Lungs:  Clear, Other


Abdomen:  Normal bowel sounds, Soft, No tenderness, No hepatosplenomegaly, No 

masses


Extremities:  No clubbing, No cyanosis, Other (1+ edema, abdominal swelling)





Labs


LABS





Laboratory Tests








Test


 2/20/19


17:10 2/20/19


20:42 2/21/19


03:30 2/21/19


08:37


 


O2 Saturation 97 % (92-99)    


 


Arterial Blood pH


 7.36


(7.35-7.45) 


 


 





 


Arterial Blood pCO2 at


Patient Temp 36 mmHg


(35-46) 


 


 





 


Arterial Blood pO2 at Patient


Temp 115 mmHg


() 


 


 





 


Arterial Blood HCO3


 20 mmol/L


(21-28) 


 


 





 


Arterial Blood Base Excess


 -5 mmol/L


(-3-3) 


 


 





 


FiO2 40    


 


Glucose (Fingerstick)


 


 159 mg/dL


(70-99) 


 135 mg/dL


(70-99)


 


White Blood Count


 


 


 7.5 x10^3/uL


(4.0-11.0) 





 


Red Blood Count


 


 


 1.91 x10^6/uL


(3.50-5.40) 





 


Hemoglobin


 


 


 6.0 g/dL


(12.0-15.5) 





 


Hematocrit


 


 


 18.3 %


(36.0-47.0) 





 


Mean Corpuscular Volume   96 fL ()  


 


Mean Corpuscular Hemoglobin   32 pg (25-35)  


 


Mean Corpuscular Hemoglobin


Concent 


 


 33 g/dL


(31-37) 





 


Red Cell Distribution Width


 


 


 13.9 %


(11.5-14.5) 





 


Platelet Count


 


 


 166 x10^3/uL


(140-400) 





 


Neutrophils (%) (Auto)   79 % (31-73)  


 


Lymphocytes (%) (Auto)   13 % (24-48)  


 


Monocytes (%) (Auto)   7 % (0-9)  


 


Eosinophils (%) (Auto)   1 % (0-3)  


 


Basophils (%) (Auto)   1 % (0-3)  


 


Neutrophils # (Auto)


 


 


 5.9 x10^3uL


(1.8-7.7) 





 


Lymphocytes # (Auto)


 


 


 0.9 x10^3/uL


(1.0-4.8) 





 


Monocytes # (Auto)


 


 


 0.5 x10^3/uL


(0.0-1.1) 





 


Eosinophils # (Auto)


 


 


 0.1 x10^3/uL


(0.0-0.7) 





 


Basophils # (Auto)


 


 


 0.0 x10^3/uL


(0.0-0.2) 





 


Sodium Level


 


 


 136 mmol/L


(136-145) 





 


Potassium Level


 


 


 5.2 mmol/L


(3.5-5.1) 





 


Chloride Level


 


 


 97 mmol/L


() 





 


Carbon Dioxide Level


 


 


 22 mmol/L


(21-32) 





 


Anion Gap   17 (6-14)  


 


Blood Urea Nitrogen


 


 


 84 mg/dL


(7-20) 





 


Creatinine


 


 


 11.1 mg/dL


(0.6-1.0) 





 


Estimated GFR


(Cockcroft-Gault) 


 


 3.5 


 





 


Glucose Level


 


 


 185 mg/dL


(70-99) 





 


Calcium Level


 


 


 7.3 mg/dL


(8.5-10.1) 





 


Triglycerides Level


 


 


 133 mg/dL


(0-150) 





 


Cholesterol Level


 


 


 144 mg/dL


(0-200) 





 


LDL Cholesterol, Calculated


 


 


 77 mg/dL


(0-100) 





 


VLDL Cholesterol, Calculated


 


 


 27 mg/dL


(0-40) 





 


Non-HDL Cholesterol Calculated


 


 


 104 mg/dL


(0-129) 





 


HDL Cholesterol


 


 


 40 mg/dL


(40-60) 





 


Cholesterol/HDL Ratio   3.6  


 


Test


 2/21/19


11:36 


 


 





 


Glucose (Fingerstick)


 159 mg/dL


(70-99) 


 


 














Assessment and Plan


Assessmemt and Plan


Problems


Medical Problems:


(1) CHF (congestive heart failure)


Status: Acute  





(2) ESRD (end stage renal disease) on dialysis


Status: Acute  





(3) Hyperkalemia


Status: Acute  











Comment


Review of Relevant


I have reviewed the following items lore (where applicable) has been applied.


Labs





Laboratory Tests








Test


 2/20/19


13:56 2/20/19


14:00 2/20/19


14:05 2/20/19


15:50


 


Glucose (Fingerstick)


 295 mg/dL


(70-99) 


 


 





 


White Blood Count


 


 10.9 x10^3/uL


(4.0-11.0) 


 





 


Red Blood Count


 


 2.20 x10^6/uL


(3.50-5.40) 


 





 


Hemoglobin


 


 7.0 g/dL


(12.0-15.5) 


 





 


Hematocrit


 


 21.7 %


(36.0-47.0) 


 





 


Mean Corpuscular Volume  99 fL ()   


 


Mean Corpuscular Hemoglobin  32 pg (25-35)   


 


Mean Corpuscular Hemoglobin


Concent 


 32 g/dL


(31-37) 


 





 


Red Cell Distribution Width


 


 14.0 %


(11.5-14.5) 


 





 


Platelet Count


 


 230 x10^3/uL


(140-400) 


 





 


Neutrophils (%) (Auto)  71 % (31-73)   


 


Lymphocytes (%) (Auto)  17 % (24-48)   


 


Monocytes (%) (Auto)  8 % (0-9)   


 


Eosinophils (%) (Auto)  3 % (0-3)   


 


Basophils (%) (Auto)  1 % (0-3)   


 


Neutrophils # (Auto)


 


 7.8 x10^3uL


(1.8-7.7) 


 





 


Lymphocytes # (Auto)


 


 1.8 x10^3/uL


(1.0-4.8) 


 





 


Monocytes # (Auto)


 


 0.9 x10^3/uL


(0.0-1.1) 


 





 


Eosinophils # (Auto)


 


 0.4 x10^3/uL


(0.0-0.7) 


 





 


Basophils # (Auto)


 


 0.1 x10^3/uL


(0.0-0.2) 


 





 


Segmented Neutrophils %  71 % (35-66)   


 


Lymphocytes %  12 % (24-48)   


 


Monocytes %  11 % (0-10)   


 


Eosinophils %  5 % (0-5)   


 


Basophils %  1 % (0-3)   


 


Myelocytes %   % (0-0)   


 


Platelet Estimate


 


 Adequate


(ADEQUATE) 


 





 


D-Dimer (Shira)


 


 4.95 ug/mlFEU


(0.00-0.50) 


 





 


Sodium Level


 


 134 mmol/L


(136-145) 


 





 


Potassium Level


 


 5.2 mmol/L


(3.5-5.1) 


 





 


Chloride Level


 


 93 mmol/L


() 


 





 


Carbon Dioxide Level


 


 15 mmol/L


(21-32) 


 





 


Anion Gap  26 (6-14)   


 


Blood Urea Nitrogen


 


 78 mg/dL


(7-20) 


 





 


Creatinine


 


 10.7 mg/dL


(0.6-1.0) 


 





 


Estimated GFR


(Cockcroft-Gault) 


 3.7 


 


 





 


BUN/Creatinine Ratio  7 (6-20)   


 


Glucose Level


 


 344 mg/dL


(70-99) 


 





 


Lactic Acid Level


 


 7.2 mmol/L


(0.4-2.0) 


 





 


Calcium Level


 


 7.4 mg/dL


(8.5-10.1) 


 





 


Total Bilirubin


 


 0.4 mg/dL


(0.2-1.0) 


 





 


Aspartate Amino Transf


(AST/SGOT) 


 13 U/L (15-37) 


 


 





 


Alanine Aminotransferase


(ALT/SGPT) 


 11 U/L (14-59) 


 


 





 


Alkaline Phosphatase


 


 81 U/L


() 


 





 


Troponin I Quantitative


 


 < 0.017 ng/mL


(0.000-0.055) 


 





 


NT-Pro-B-Type Natriuretic


Peptide 


 > 94638 pg/mL


(0-124) 


 





 


Total Protein


 


 7.8 g/dL


(6.4-8.2) 


 





 


Albumin


 


 3.3 g/dL


(3.4-5.0) 


 





 


Albumin/Globulin Ratio  0.7 (1.0-1.7)   


 


O2 Saturation   95 % (92-99)  


 


Arterial Blood pH


 


 


 7.25


(7.35-7.45) 





 


Arterial Blood pCO2 at


Patient Temp 


 


 33 mmHg


(35-46) 





 


Arterial Blood pO2 at Patient


Temp 


 


 96 mmHg


() 





 


Arterial Blood HCO3


 


 


 14 mmol/L


(21-28) 





 


Arterial Blood Base Excess


 


 


 -12 mmol/L


(-3-3) 





 


FiO2   32  


 


Influenza Type A Antigen


 


 


 


 Negative


(NEGATIVE)


 


Influenza Type B Antigen


 


 


 


 Negative


(NEGATIVE)


 


Test


 2/20/19


17:10 2/20/19


20:42 2/21/19


03:30 2/21/19


08:37


 


O2 Saturation 97 % (92-99)    


 


Arterial Blood pH


 7.36


(7.35-7.45) 


 


 





 


Arterial Blood pCO2 at


Patient Temp 36 mmHg


(35-46) 


 


 





 


Arterial Blood pO2 at Patient


Temp 115 mmHg


() 


 


 





 


Arterial Blood HCO3


 20 mmol/L


(21-28) 


 


 





 


Arterial Blood Base Excess


 -5 mmol/L


(-3-3) 


 


 





 


FiO2 40    


 


Glucose (Fingerstick)


 


 159 mg/dL


(70-99) 


 135 mg/dL


(70-99)


 


White Blood Count


 


 


 7.5 x10^3/uL


(4.0-11.0) 





 


Red Blood Count


 


 


 1.91 x10^6/uL


(3.50-5.40) 





 


Hemoglobin


 


 


 6.0 g/dL


(12.0-15.5) 





 


Hematocrit


 


 


 18.3 %


(36.0-47.0) 





 


Mean Corpuscular Volume   96 fL ()  


 


Mean Corpuscular Hemoglobin   32 pg (25-35)  


 


Mean Corpuscular Hemoglobin


Concent 


 


 33 g/dL


(31-37) 





 


Red Cell Distribution Width


 


 


 13.9 %


(11.5-14.5) 





 


Platelet Count


 


 


 166 x10^3/uL


(140-400) 





 


Neutrophils (%) (Auto)   79 % (31-73)  


 


Lymphocytes (%) (Auto)   13 % (24-48)  


 


Monocytes (%) (Auto)   7 % (0-9)  


 


Eosinophils (%) (Auto)   1 % (0-3)  


 


Basophils (%) (Auto)   1 % (0-3)  


 


Neutrophils # (Auto)


 


 


 5.9 x10^3uL


(1.8-7.7) 





 


Lymphocytes # (Auto)


 


 


 0.9 x10^3/uL


(1.0-4.8) 





 


Monocytes # (Auto)


 


 


 0.5 x10^3/uL


(0.0-1.1) 





 


Eosinophils # (Auto)


 


 


 0.1 x10^3/uL


(0.0-0.7) 





 


Basophils # (Auto)


 


 


 0.0 x10^3/uL


(0.0-0.2) 





 


Sodium Level


 


 


 136 mmol/L


(136-145) 





 


Potassium Level


 


 


 5.2 mmol/L


(3.5-5.1) 





 


Chloride Level


 


 


 97 mmol/L


() 





 


Carbon Dioxide Level


 


 


 22 mmol/L


(21-32) 





 


Anion Gap   17 (6-14)  


 


Blood Urea Nitrogen


 


 


 84 mg/dL


(7-20) 





 


Creatinine


 


 


 11.1 mg/dL


(0.6-1.0) 





 


Estimated GFR


(Cockcroft-Gault) 


 


 3.5 


 





 


Glucose Level


 


 


 185 mg/dL


(70-99) 





 


Calcium Level


 


 


 7.3 mg/dL


(8.5-10.1) 





 


Triglycerides Level


 


 


 133 mg/dL


(0-150) 





 


Cholesterol Level


 


 


 144 mg/dL


(0-200) 





 


LDL Cholesterol, Calculated


 


 


 77 mg/dL


(0-100) 





 


VLDL Cholesterol, Calculated


 


 


 27 mg/dL


(0-40) 





 


Non-HDL Cholesterol Calculated


 


 


 104 mg/dL


(0-129) 





 


HDL Cholesterol


 


 


 40 mg/dL


(40-60) 





 


Cholesterol/HDL Ratio   3.6  


 


Test


 2/21/19


11:36 


 


 





 


Glucose (Fingerstick)


 159 mg/dL


(70-99) 


 


 











Laboratory Tests








Test


 2/20/19


17:10 2/20/19


20:42 2/21/19


03:30 2/21/19


08:37


 


O2 Saturation 97 % (92-99)    


 


Arterial Blood pH


 7.36


(7.35-7.45) 


 


 





 


Arterial Blood pCO2 at


Patient Temp 36 mmHg


(35-46) 


 


 





 


Arterial Blood pO2 at Patient


Temp 115 mmHg


() 


 


 





 


Arterial Blood HCO3


 20 mmol/L


(21-28) 


 


 





 


Arterial Blood Base Excess


 -5 mmol/L


(-3-3) 


 


 





 


FiO2 40    


 


Glucose (Fingerstick)


 


 159 mg/dL


(70-99) 


 135 mg/dL


(70-99)


 


White Blood Count


 


 


 7.5 x10^3/uL


(4.0-11.0) 





 


Red Blood Count


 


 


 1.91 x10^6/uL


(3.50-5.40) 





 


Hemoglobin


 


 


 6.0 g/dL


(12.0-15.5) 





 


Hematocrit


 


 


 18.3 %


(36.0-47.0) 





 


Mean Corpuscular Volume   96 fL ()  


 


Mean Corpuscular Hemoglobin   32 pg (25-35)  


 


Mean Corpuscular Hemoglobin


Concent 


 


 33 g/dL


(31-37) 





 


Red Cell Distribution Width


 


 


 13.9 %


(11.5-14.5) 





 


Platelet Count


 


 


 166 x10^3/uL


(140-400) 





 


Neutrophils (%) (Auto)   79 % (31-73)  


 


Lymphocytes (%) (Auto)   13 % (24-48)  


 


Monocytes (%) (Auto)   7 % (0-9)  


 


Eosinophils (%) (Auto)   1 % (0-3)  


 


Basophils (%) (Auto)   1 % (0-3)  


 


Neutrophils # (Auto)


 


 


 5.9 x10^3uL


(1.8-7.7) 





 


Lymphocytes # (Auto)


 


 


 0.9 x10^3/uL


(1.0-4.8) 





 


Monocytes # (Auto)


 


 


 0.5 x10^3/uL


(0.0-1.1) 





 


Eosinophils # (Auto)


 


 


 0.1 x10^3/uL


(0.0-0.7) 





 


Basophils # (Auto)


 


 


 0.0 x10^3/uL


(0.0-0.2) 





 


Sodium Level


 


 


 136 mmol/L


(136-145) 





 


Potassium Level


 


 


 5.2 mmol/L


(3.5-5.1) 





 


Chloride Level


 


 


 97 mmol/L


() 





 


Carbon Dioxide Level


 


 


 22 mmol/L


(21-32) 





 


Anion Gap   17 (6-14)  


 


Blood Urea Nitrogen


 


 


 84 mg/dL


(7-20) 





 


Creatinine


 


 


 11.1 mg/dL


(0.6-1.0) 





 


Estimated GFR


(Cockcroft-Gault) 


 


 3.5 


 





 


Glucose Level


 


 


 185 mg/dL


(70-99) 





 


Calcium Level


 


 


 7.3 mg/dL


(8.5-10.1) 





 


Triglycerides Level


 


 


 133 mg/dL


(0-150) 





 


Cholesterol Level


 


 


 144 mg/dL


(0-200) 





 


LDL Cholesterol, Calculated


 


 


 77 mg/dL


(0-100) 





 


VLDL Cholesterol, Calculated


 


 


 27 mg/dL


(0-40) 





 


Non-HDL Cholesterol Calculated


 


 


 104 mg/dL


(0-129) 





 


HDL Cholesterol


 


 


 40 mg/dL


(40-60) 





 


Cholesterol/HDL Ratio   3.6  


 


Test


 2/21/19


11:36 


 


 





 


Glucose (Fingerstick)


 159 mg/dL


(70-99) 


 


 











Microbiology


2/20/19 Blood Culture - Preliminary, Resulted


          NO GROWTH AFTER 1 DAY


Medications





Current Medications


Albuterol/ Ipratropium (Duoneb) 3 ml 1X  ONCE NEB  Last administered on 2/20/ 19at 14:10;  Start 2/20/19 at 14:00;  Stop 2/20/19 at 14:02;  Status DC


Furosemide (Lasix) 60 mg 1X  ONCE IVP  Last administered on 2/20/19at 15:14;  

Start 2/20/19 at 15:00;  Stop 2/20/19 at 15:01;  Status DC


Insulin Human Regular (HumuLIN R VIAL) 10 unit 1X  ONCE IV  Last administered 

on 2/20/19at 17:04;  Start 2/20/19 at 16:00;  Stop 2/20/19 at 16:04;  Status DC


Acetaminophen (Tylenol) 650 mg PRN Q4HRS  PRN PO FEVER;  Start 2/20/19 at 17:00

;  Stop 2/21/19 at 16:59


Albuterol/ Ipratropium (Duoneb) 3 ml RTQID NEB  Last administered on 2/20/19at 

20:57;  Start 2/20/19 at 20:00;  Stop 2/21/19 at 19:59


Albuterol/ Ipratropium (Duoneb) 3 ml RTQID NEB  Last administered on 2/21/19at 

16:00;  Start 2/21/19 at 08:00


Amlodipine Besylate (Norvasc) 5 mg DAILYWLUN PO  Last administered on 2/21/19at 

13:24;  Start 2/21/19 at 12:00


Aspirin (Ecotrin) 81 mg DAILYWBKFT PO  Last administered on 2/21/19at 08:47;  

Start 2/21/19 at 08:00


Atorvastatin Calcium (Lipitor) 80 mg QHS PO ;  Start 2/21/19 at 21:00


Clonazepam (KlonoPIN) 0.5 mg PRN TID  PRN PO ANXIETY / AGITATION;  Start 2/20/ 19 at 22:00


Clopidogrel Bisulfate (Plavix) 75 mg DAILY PO  Last administered on 2/21/19at 08

:47;  Start 2/21/19 at 09:00;  Stop 2/21/19 at 15:34;  Status DC


Insulin Glargine (Lantus) 7 units QHS SQ  Last administered on 2/20/19at 22:46;

  Start 2/20/19 at 22:30


Insulin Human Lispro (HumaLOG) 10 units TIDWMEALS SQ ;  Start 2/21/19 at 08:00


Isosorbide Mononitrate (Imdur) 30 mg DAILY PO  Last administered on 2/21/19at 08

:47;  Start 2/21/19 at 09:00


Lisinopril (Prinivil) 20 mg DAILY PO  Last administered on 2/21/19at 08:46;  

Start 2/21/19 at 09:00


Carvedilol (Coreg) 25 mg BIDWMEALS PO  Last administered on 2/21/19at 08:46;  

Start 2/21/19 at 08:00


Iron Sucrose 200 mg/Sodium Chloride 110 ml @  55 mls/hr 1X  ONCE IV  Last 

administered on 2/21/19at 04:53;  Start 2/21/19 at 05:00;  Stop 2/21/19 at 06:59

;  Status DC


Sodium Chloride 1,000 ml @  1,000 mls/hr Q1H PRN IV hypotension;  Start 2/21/19 

at 13:46;  Stop 2/21/19 at 19:45


Albumin Human 200 ml @  200 mls/hr 1X PRN  PRN IV Hypotension;  Start 2/21/19 

at 14:00;  Stop 2/21/19 at 19:59


Acetaminophen (Tylenol) 500 mg 1X PRN  PRN PO MILD PAIN / TEMP;  Start 2/21/19 

at 14:00;  Stop 2/22/19 at 13:59


Diphenhydramine HCl (Benadryl) 25 mg 1X PRN  PRN IV ITCHING;  Start 2/21/19 at 

14:00;  Stop 2/22/19 at 13:59


Diphenhydramine HCl (Benadryl) 25 mg 1X PRN  PRN IV ITCHING;  Start 2/21/19 at 

14:00;  Stop 2/22/19 at 13:59


Sodium Chloride 1,000 ml @  400 mls/hr Q2H30M PRN IV PATENCY;  Start 2/21/19 at 

13:46;  Stop 2/22/19 at 01:45


Info (PHARMACY MONITORING -- do not chart) 1 each PRN DAILY  PRN MC SEE COMMENTS

;  Start 2/21/19 at 14:00





Active Scripts


Active


Humalog (Insulin Lispro) 100 Unit/1 Ml Insuln.pen 10 Units SQ TIDWMEALS 30 Days


Lantus Solostar (Insulin Glargine,Hum.rec.anlog) 100 Unit/1 Ml Insuln.pen 7 

Units SQ QHS 30 Days


Atorvastatin Calcium 40 Mg Tablet 80 Mg PO QHS


Amlodipine Besylate 5 Mg Tablet 5 Mg PO DAILYWLUN


Clopidogrel (Clopidogrel Bisulfate) 75 Mg Tablet 75 Mg PO DAILY


Lisinopril 20 Mg Tablet 1 Tab PO DAILY


Aspirin Ec (Aspirin) 81 Mg Tablet.dr 81 Mg PO DAILYWBKFT


Reported


Isosorbide Mononitrate Er (Isosorbide Mononitrate) 30 Mg Tab.er.24h 30 Mg PO 

DAILY


Furosemide 40 Mg Tablet 40 Mg PO DAILY


Carvedilol 25 Mg Tablet 25 Mg PO BIDWMEALS


Clonazepam 0.5 Mg Tablet 0.5 Mg PO TID PRN


Vitals/I & O





Vital Sign - Last 24 Hours








 2/20/19 2/20/19 2/20/19 2/20/19





 16:47 17:00 18:09 19:41


 


Temp    98.0





    98.0


 


Pulse  92 89 89


 


Resp    18


 


B/P (MAP)  150/66 (94) 117/52 (73) 139/64 (89)


 


Pulse Ox 100 100 100 98


 


O2 Delivery BiPAP/CPAP BiPAP/CPAP Nasal Cannula Nasal Cannula


 


O2 Flow Rate   3.0 3.0


 


    





    





 2/20/19 2/20/19 2/20/19 2/20/19





 20:00 20:40 20:50 23:00


 


Pulse Ox  91 98 98


 


O2 Delivery Nasal Cannula Nasal Cannula BiPAP/CPAP BiPAP/CPAP


 


O2 Flow Rate 3.0 3.0  





 2/20/19 2/21/19 2/21/19 2/21/19





 23:33 03:00 03:32 05:00


 


Temp 98.7  97.7 98.1





 98.7  97.7 98.1


 


Pulse 92  81 73


 


Resp 30  22 20


 


B/P (MAP) 198/87 (124)  183/76 (111) 151/72 (98)


 


Pulse Ox 100 97 100 100


 


O2 Delivery BiPAP/CPAP BiPAP/CPAP BiPAP/CPAP BiPAP/CPAP


 


    





    





 2/21/19 2/21/19 2/21/19 2/21/19





 05:15 05:35 05:48 06:09


 


Temp 98.1  98.0 98.0





 98.1  98.0 98.0


 


Pulse 72  72 67


 


Resp 20  20 20


 


B/P (MAP) 146/66 (92)  167/74 (105) 153/70 (97)


 


Pulse Ox 100  98 100


 


O2 Delivery BiPAP/CPAP BiPAP/CPAP BiPAP/CPAP BiPAP/CPAP


 


    





    





 2/21/19 2/21/19 2/21/19 2/21/19





 07:00 08:00 08:10 08:46


 


Temp 97.1   





 97.1   


 


Pulse 84   96


 


Resp 24   


 


B/P (MAP) 188/77 (114)   188/77


 


Pulse Ox 100  95 


 


O2 Delivery BiPAP/CPAP Bi-pap BiPAP/CPAP 


 


    





    





 2/21/19 2/21/19 2/21/19 2/21/19





 08:46 08:47 11:00 11:31


 


Temp   97.7 





   97.7 


 


Pulse 96 96 74 


 


Resp   20 


 


B/P (MAP) 188/77 188/77 156/87 (110) 


 


Pulse Ox   96 96


 


O2 Delivery   Nasal Cannula Nasal Cannula


 


O2 Flow Rate    3.0


 


    





    





 2/21/19 2/21/19  





 13:24 16:02  


 


Pulse 75   


 


B/P (MAP) 144/65   


 


O2 Delivery  Nasal Cannula  


 


O2 Flow Rate  3.0  














Intake and Output   


 


 2/20/19 2/20/19 2/21/19





 15:00 23:00 07:00


 


Intake Total   100 ml


 


Output Total   150 ml


 


Balance   -50 ml

















TAMMI MENDES MD Feb 21, 2019 16:50

## 2019-02-21 NOTE — CARD
MR#: [id number]

Account#: [id number]

Accession#: [id number]

Date of Study: 02/21/2019

Ordering Physician: WENCESLAO FRASER, 

Referring Physician: GALI COSTA, 

Tech: Porsche Gonzalez





--------------- APPROVED REPORT --------------





EXAM: Two-dimensional and M-mode echocardiogram with Doppler and color Doppler.



Other Information 

Quality : AverageHR: 89bpm



INDICATION

Congestive Heart Failure 



2D DIMENSIONS 

Left Atrium(2D)4.0 (1.6-4.0cm)IVSd1.2 (0.7-1.1cm)

Aortic Root(2D)2.7 (2.0-3.7cm)LVDd5.7 (3.9-5.9cm)

LVOT Diameter2.0 (1.8-2.4cm)PWd1.4 (0.7-1.1cm)

LVDs3.8 (2.5-4.0cm)FS (%) 32.9 %

SV98.3 mlLVEF(%)60.6 (>50%)



Aortic Valve

AoV Peak Anish.159.8cm/sAoV VTI42.9cm

AO Peak GR.10.2mmHgLVOT  VTI 18.16cm

AO Mean GR.9mmHg



Mitral Valve

MV E Qjzejxpz986.6cm/sMV E Peak Gr.107mmHg

MV DECEL [id number] A Iuozamjv86.4cm/s

E/A  Ratio1.4



TDI

Lateral E' P. V8.62cm/sMedial E' P. V4.83cm/s

E/Lateral E'12.9E/Medial E'23.1



Tricuspid Valve

TR P. Aecdxlrc500ae/sRAP [id number]

TR Peak Gr.22cvAlRPNW46dvCc



Pulmonary Vein

S1 Ecppvagd99.0cm/sS2 Zhofnvyh95.30cm/s

D2 Cqokiexc87.3cm/s



 LEFT VENTRICLE 

The left ventricle is normal size. There is mild concentric left ventricular hypertrophy. The left ve
ntricular systolic function is normal and the ejection fraction is within normal range. The Ejection 
Fraction is 50-55%. There is normal LV segmental wall motion. Transmitral Doppler flow pattern is Gra
de II-pseudonormal filling dynamics.



 RIGHT VENTRICLE 

The right ventricle is mildly dilated. There is normal right ventricular wall thickness. The right ve
ntricular systolic function is normal.



 ATRIA 

The left atrium size is normal. The right atrium size is normal. The interatrial septum is intact wit
h no evidence for an atrial septal defect or patent foramen ovale as noted on 2-D or Doppler imaging.




 AORTIC VALVE 

The aortic valve is calcified but opens well. Doppler and Color Flow revealed no significant aortic r
egurgitation. There is no significant aortic valvular stenosis.



 MITRAL VALVE 

The mitral valve is normal in structure and function. There is no evidence of mitral valve prolapse. 
There is no mitral valve stenosis. Doppler and Color-flow revealed trace to mild mitral regurgitation
.



 TRICUSPID VALVE 

The tricuspid valve is normal in structure and function. Doppler and Color Flow revealed trace tricus
pid regurgitation. There is no tricuspid valve stenosis.



 PULMONIC VALVE 

The pulmonic valve is not well visualized. Doppler and Color Flow revealed trace pulmonic valvular re
gurgitation.



 GREAT VESSELS 

The aortic root is normal in size. The IVC is normal in size and collapses >50% with inspiration.



 PERICARDIAL EFFUSION 

There is no evidence of significant pericardial effusion.



Critical Notification

Critical Value: No



<Conclusion>

The left ventricle is normal size.

The left ventricular systolic function is normal and the ejection fraction is within normal range.

The Ejection Fraction is 50-55%.

There is mild concentric left ventricular hypertrophy.

There is no significant aortic valvular stenosis.

Doppler and Color Flow revealed no significant aortic regurgitation.

Doppler and Color-flow revealed trace to mild mitral regurgitation.

Doppler and Color Flow revealed trace tricuspid regurgitation.



Signed by : Rafael Quezada MD

Electronically Approved : 02/21/2019 16:18:49

## 2019-02-21 NOTE — CONS
DATE OF CONSULTATION:  



ATTENDING PHYSICIAN:  Dr. Rocha.



REASON FOR CONSULTATION:  Dyspnea, abnormal chest x-ray.



HISTORY OF PRESENT ILLNESS:  The patient is a 63-year-old  female who

does not speak English, but her daughter was at the bedside.  The patient has

history of end-stage renal disease.  She gets dialyzed on Monday, Wednesday and

Friday.  However, she missed Monday and Wednesday as she was having chest pain. 

The patient had shortness of breath for the last 4-5 days.  No leg edema.  No

cough, no fever, no chills.  The pain was mostly in the back.  She was seen in

the Emergency Room and a chest x-ray was consistent with congestive heart

failure and I have reviewed the chest x-ray as well, as a result she is

hospitalized.  Normally, she does not take oxygen.  She has no headache, no

nausea or vomiting, no diarrhea.  No dysuria.  No focal weakness and no leg

edema.  Her hemoglobin was noted to be 6.  Her ABGs showed a pH of 7.25, pCO2 of

33 and a pO2 of 96 with bicarbonate of 14 and a followup ABG after bicarbonate

with a pH of 7.36, pCO2 of 36 and a pO2 of 115, bicarbonate of 20.  Her BUN was

84 and creatinine of 11.  Lactic acid was 7.2.  I have been asked to see her for

further evaluation.



PAST MEDICAL HISTORY:  Significant for history of end-stage renal disease, on

hemodialysis; history of CHF; history of CAD.  No history of tobacco use. 

Diabetes.



PAST SURGICAL HISTORY:  No recent surgeries.



FAMILY HISTORY:  Noncontributory to lungs.



ALLERGIES:  None.



CURRENT MEDICATIONS:  Reviewed, as listed in the MRAD.



REVIEW OF SYSTEMS:  Twelve-point system obtained.  Pertinent positives discussed

in my history of present illness, otherwise noncontributory.  All systems that

were negative were reviewed as well.



PHYSICAL EXAMINATION:

VITAL SIGNS:  Reviewed, afebrile, blood pressure on the high side at 188

systolic.  Pulse ox 95% initially on BiPAP, now on cannula.

HEENT:  Sclerae nonicteric.

NECK:  Supple.

LUNGS:  With crackles at the bases.

CARDIOVASCULAR:  Regular rate.

ABDOMEN:  Soft, nontender.

EXTREMITIES:  With no pitting edema.



LABORATORY DATA:  Reviewed.  BUN and creatinine 84 and 11.1.  Lactic acid 7.2. 

Her proBNP more than 35,000.  Her white cell count 7.5, hemoglobin 6.0.



IMPRESSION:

1.  Acute hypoxic respiratory failure secondary to multifactorial etiologies

including combination of acute congestive heart failure and acute-on-chronic

anemia.

2.  End-stage renal disease, missed dialysis on Monday and Wednesday, as result

now in congestive heart failure.

3.  Dyspnea secondary to congestive heart failure.

4.  No clinical evidence of pneumonia.



RECOMMENDATIONS:

1.  Needs to be dialyzed with ultrafiltration.

2.  Consider transfusion of packed RBCs.

3.  Follow up lactic acid level.  The initial level was high due to hypoxia and

increased work of breathing.  Unlikely sepsis.

4.  P.r.n. bronchodilators.

5.  D-dimer was high, but is a nonspecific finding.

6.  Discussed with RN and discussed with Nephrology.

 



______________________________

ONEL VASQUEZ MD



DR:  MARIE/marcela  JOB#:  [id number] / [id number]

DD:  02/21/2019 10:15  DT:  02/21/2019 10:31

## 2019-02-21 NOTE — PDOC2
CONSULT


Date of Consult


Date of Consult


DATE: 2/21/19 


TIME: 12:30





Source


Source:  Caregiver, Chart review





History of Present Illness


Reason for Visit:


Pt is a  63 old Czech-speaking female with multiple Hospitalizations, ESRD 

Monday Wednesday Friday - multiple missed treatments , this visit missed one 

and half weeks of dialysis, last dialysis was here in the hospital. She has 

been short of breath, O2 saturations in the 70s per EMS


Came in hypoxic and we have placed her on BIPAP 12/5 40% FiO2.


The patient had shortness of breath for the last 4-5 days.  No leg edema.  No 

cough, no fever, no chills.  


Denies nausea or vomiting, no diarrhea.  No dysuria.  





DW daughter at every hospitalization  about risks due to non  compliance 


Currently she is stable, On o2 by NC, Not in distress





Chest x-ray was consistent with congestive heart failure





Past Medical History


Cardiovascular:  CAD, CHF, HTN, Hyperlipidemia


Pulmonary:  No pertinent hx, Other


CENTRAL NERVOUS SYSTEM:  Other


GI:  GERD


Heme/Onc:  No pertinent hx


Hepatobiliary:  No pertinent hx


Psych:  No pertinent hx


Rheumatologic:  No pertinent hx


Infectious disease:  No pertinent hx


Renal/:  Chronic renal failure


Endocrine:  Diabetes





Past Surgical History


Past Surgical History:  Other





Family History


Family History:  Heart Disease





Social History


ALCOHOL:  none


Drugs:  None


Lives:  with Family





Current Problem List


Problem List


Problems


Medical Problems:


(1) CHF (congestive heart failure)


Status: Acute  





(2) ESRD (end stage renal disease) on dialysis


Status: Acute  





(3) Hyperkalemia


Status: Acute  











Current Medications


Current Medications





Current Medications


Albuterol/ Ipratropium (Duoneb) 3 ml 1X  ONCE NEB  Last administered on 2/20/ 19at 14:10;  Start 2/20/19 at 14:00;  Stop 2/20/19 at 14:02;  Status DC


Furosemide (Lasix) 60 mg 1X  ONCE IVP  Last administered on 2/20/19at 15:14;  

Start 2/20/19 at 15:00;  Stop 2/20/19 at 15:01;  Status DC


Insulin Human Regular (HumuLIN R VIAL) 10 unit 1X  ONCE IV  Last administered 

on 2/20/19at 17:04;  Start 2/20/19 at 16:00;  Stop 2/20/19 at 16:04;  Status DC


Acetaminophen (Tylenol) 650 mg PRN Q4HRS  PRN PO FEVER;  Start 2/20/19 at 17:00

;  Stop 2/21/19 at 16:59


Albuterol/ Ipratropium (Duoneb) 3 ml RTQID NEB  Last administered on 2/20/19at 

20:57;  Start 2/20/19 at 20:00;  Stop 2/21/19 at 19:59


Albuterol/ Ipratropium (Duoneb) 3 ml RTQID NEB  Last administered on 2/21/19at 

11:31;  Start 2/21/19 at 08:00


Amlodipine Besylate (Norvasc) 5 mg DAILYWLUN PO ;  Start 2/21/19 at 12:00


Aspirin (Ecotrin) 81 mg DAILYWBKFT PO  Last administered on 2/21/19at 08:47;  

Start 2/21/19 at 08:00


Atorvastatin Calcium (Lipitor) 80 mg QHS PO ;  Start 2/21/19 at 21:00


Clonazepam (KlonoPIN) 0.5 mg PRN TID  PRN PO ANXIETY / AGITATION;  Start 2/20/ 19 at 22:00


Clopidogrel Bisulfate (Plavix) 75 mg DAILY PO  Last administered on 2/21/19at 08

:47;  Start 2/21/19 at 09:00


Insulin Glargine (Lantus) 7 units QHS SQ  Last administered on 2/20/19at 22:46;

  Start 2/20/19 at 22:30


Insulin Human Lispro (HumaLOG) 10 units TIDWMEALS SQ ;  Start 2/21/19 at 08:00


Isosorbide Mononitrate (Imdur) 30 mg DAILY PO  Last administered on 2/21/19at 08

:47;  Start 2/21/19 at 09:00


Lisinopril (Prinivil) 20 mg DAILY PO  Last administered on 2/21/19at 08:46;  

Start 2/21/19 at 09:00


Carvedilol (Coreg) 25 mg BIDWMEALS PO  Last administered on 2/21/19at 08:46;  

Start 2/21/19 at 08:00


Iron Sucrose 200 mg/Sodium Chloride 110 ml @  55 mls/hr 1X  ONCE IV  Last 

administered on 2/21/19at 04:53;  Start 2/21/19 at 05:00;  Stop 2/21/19 at 06:59

;  Status DC





Active Scripts


Active


Humalog (Insulin Lispro) 100 Unit/1 Ml Insuln.pen 10 Units SQ TIDWMEALS 30 Days


Lantus Solostar (Insulin Glargine,Hum.rec.anlog) 100 Unit/1 Ml Insuln.pen 7 

Units SQ QHS 30 Days


Atorvastatin Calcium 40 Mg Tablet 80 Mg PO QHS


Amlodipine Besylate 5 Mg Tablet 5 Mg PO DAILYWLUN


Clopidogrel (Clopidogrel Bisulfate) 75 Mg Tablet 75 Mg PO DAILY


Lisinopril 20 Mg Tablet 1 Tab PO DAILY


Aspirin Ec (Aspirin) 81 Mg Tablet.dr 81 Mg PO DAILYWBKFT


Reported


Isosorbide Mononitrate Er (Isosorbide Mononitrate) 30 Mg Tab.er.24h 30 Mg PO 

DAILY


Furosemide 40 Mg Tablet 40 Mg PO DAILY


Carvedilol 25 Mg Tablet 25 Mg PO BIDWMEALS


Clonazepam 0.5 Mg Tablet 0.5 Mg PO TID PRN





Allergies


Allergies:  


Coded Allergies:  


     No Known Drug Allergies (Unverified , 8/6/18)





ROS


Review of System


   As per HPI





Physical Exam


Physical Exam


GEN- NAD 


HEENT:  OM moist  


NECK:  Supple.


LUNGS:   crackles at the bases, Non labored 


CARDIOVASCULAR:  RRR


ABDOMEN:  Soft, nontender, obese  


EXTREMITIES:   no pitting edema


Skin No rash


Gu No henley


Neuro- AxOx3


Vital Signs





Vital Signs








  Date Time  Temp Pulse Resp B/P (MAP) Pulse Ox O2 Delivery O2 Flow Rate FiO2


 


2/21/19 11:31     96 Nasal Cannula 3.0 


 


2/21/19 11:00 97.7 74 20 156/87 (110)    





 97.7       








Assessment & Plan


ESRD- On HD MWF


Chronic Non complaince- Misses HD for weeks at a time


Dw Daughter and Pt multiple times 


HD today with UF , discussed ordered with Dialysis RN  





Anemia- Hgb < 7


PRBC as per primary


Can transfuse on HD 





Hyperkalemia- mild


HD today 





Acute hypoxic respiratory failure 





Acute on Chr CHF  





HTN- Antihypertensives


Card managing


HD today with UF





Labs


Labs





Laboratory Tests








Test


 2/20/19


13:56 2/20/19


14:00 2/20/19


14:05 2/20/19


15:50


 


Glucose (Fingerstick)


 295 mg/dL


(70-99) 


 


 





 


White Blood Count


 


 10.9 x10^3/uL


(4.0-11.0) 


 





 


Red Blood Count


 


 2.20 x10^6/uL


(3.50-5.40) 


 





 


Hemoglobin


 


 7.0 g/dL


(12.0-15.5) 


 





 


Hematocrit


 


 21.7 %


(36.0-47.0) 


 





 


Mean Corpuscular Volume  99 fL ()   


 


Mean Corpuscular Hemoglobin  32 pg (25-35)   


 


Mean Corpuscular Hemoglobin


Concent 


 32 g/dL


(31-37) 


 





 


Red Cell Distribution Width


 


 14.0 %


(11.5-14.5) 


 





 


Platelet Count


 


 230 x10^3/uL


(140-400) 


 





 


Neutrophils (%) (Auto)  71 % (31-73)   


 


Lymphocytes (%) (Auto)  17 % (24-48)   


 


Monocytes (%) (Auto)  8 % (0-9)   


 


Eosinophils (%) (Auto)  3 % (0-3)   


 


Basophils (%) (Auto)  1 % (0-3)   


 


Neutrophils # (Auto)


 


 7.8 x10^3uL


(1.8-7.7) 


 





 


Lymphocytes # (Auto)


 


 1.8 x10^3/uL


(1.0-4.8) 


 





 


Monocytes # (Auto)


 


 0.9 x10^3/uL


(0.0-1.1) 


 





 


Eosinophils # (Auto)


 


 0.4 x10^3/uL


(0.0-0.7) 


 





 


Basophils # (Auto)


 


 0.1 x10^3/uL


(0.0-0.2) 


 





 


Segmented Neutrophils %  71 % (35-66)   


 


Lymphocytes %  12 % (24-48)   


 


Monocytes %  11 % (0-10)   


 


Eosinophils %  5 % (0-5)   


 


Basophils %  1 % (0-3)   


 


Myelocytes %   % (0-0)   


 


Platelet Estimate


 


 Adequate


(ADEQUATE) 


 





 


D-Dimer (Shira)


 


 4.95 ug/mlFEU


(0.00-0.50) 


 





 


Sodium Level


 


 134 mmol/L


(136-145) 


 





 


Potassium Level


 


 5.2 mmol/L


(3.5-5.1) 


 





 


Chloride Level


 


 93 mmol/L


() 


 





 


Carbon Dioxide Level


 


 15 mmol/L


(21-32) 


 





 


Anion Gap  26 (6-14)   


 


Blood Urea Nitrogen


 


 78 mg/dL


(7-20) 


 





 


Creatinine


 


 10.7 mg/dL


(0.6-1.0) 


 





 


Estimated GFR


(Cockcroft-Gault) 


 3.7 


 


 





 


BUN/Creatinine Ratio  7 (6-20)   


 


Glucose Level


 


 344 mg/dL


(70-99) 


 





 


Lactic Acid Level


 


 7.2 mmol/L


(0.4-2.0) 


 





 


Calcium Level


 


 7.4 mg/dL


(8.5-10.1) 


 





 


Total Bilirubin


 


 0.4 mg/dL


(0.2-1.0) 


 





 


Aspartate Amino Transf


(AST/SGOT) 


 13 U/L (15-37) 


 


 





 


Alanine Aminotransferase


(ALT/SGPT) 


 11 U/L (14-59) 


 


 





 


Alkaline Phosphatase


 


 81 U/L


() 


 





 


Troponin I Quantitative


 


 < 0.017 ng/mL


(0.000-0.055) 


 





 


NT-Pro-B-Type Natriuretic


Peptide 


 > 74372 pg/mL


(0-124) 


 





 


Total Protein


 


 7.8 g/dL


(6.4-8.2) 


 





 


Albumin


 


 3.3 g/dL


(3.4-5.0) 


 





 


Albumin/Globulin Ratio  0.7 (1.0-1.7)   


 


O2 Saturation   95 % (92-99)  


 


Arterial Blood pH


 


 


 7.25


(7.35-7.45) 





 


Arterial Blood pCO2 at


Patient Temp 


 


 33 mmHg


(35-46) 





 


Arterial Blood pO2 at Patient


Temp 


 


 96 mmHg


() 





 


Arterial Blood HCO3


 


 


 14 mmol/L


(21-28) 





 


Arterial Blood Base Excess


 


 


 -12 mmol/L


(-3-3) 





 


FiO2   32  


 


Influenza Type A Antigen


 


 


 


 Negative


(NEGATIVE)


 


Influenza Type B Antigen


 


 


 


 Negative


(NEGATIVE)


 


Test


 2/20/19


17:10 2/20/19


20:42 2/21/19


03:30 2/21/19


08:37


 


O2 Saturation 97 % (92-99)    


 


Arterial Blood pH


 7.36


(7.35-7.45) 


 


 





 


Arterial Blood pCO2 at


Patient Temp 36 mmHg


(35-46) 


 


 





 


Arterial Blood pO2 at Patient


Temp 115 mmHg


() 


 


 





 


Arterial Blood HCO3


 20 mmol/L


(21-28) 


 


 





 


Arterial Blood Base Excess


 -5 mmol/L


(-3-3) 


 


 





 


FiO2 40    


 


Glucose (Fingerstick)


 


 159 mg/dL


(70-99) 


 135 mg/dL


(70-99)


 


White Blood Count


 


 


 7.5 x10^3/uL


(4.0-11.0) 





 


Red Blood Count


 


 


 1.91 x10^6/uL


(3.50-5.40) 





 


Hemoglobin


 


 


 6.0 g/dL


(12.0-15.5) 





 


Hematocrit


 


 


 18.3 %


(36.0-47.0) 





 


Mean Corpuscular Volume   96 fL ()  


 


Mean Corpuscular Hemoglobin   32 pg (25-35)  


 


Mean Corpuscular Hemoglobin


Concent 


 


 33 g/dL


(31-37) 





 


Red Cell Distribution Width


 


 


 13.9 %


(11.5-14.5) 





 


Platelet Count


 


 


 166 x10^3/uL


(140-400) 





 


Neutrophils (%) (Auto)   79 % (31-73)  


 


Lymphocytes (%) (Auto)   13 % (24-48)  


 


Monocytes (%) (Auto)   7 % (0-9)  


 


Eosinophils (%) (Auto)   1 % (0-3)  


 


Basophils (%) (Auto)   1 % (0-3)  


 


Neutrophils # (Auto)


 


 


 5.9 x10^3uL


(1.8-7.7) 





 


Lymphocytes # (Auto)


 


 


 0.9 x10^3/uL


(1.0-4.8) 





 


Monocytes # (Auto)


 


 


 0.5 x10^3/uL


(0.0-1.1) 





 


Eosinophils # (Auto)


 


 


 0.1 x10^3/uL


(0.0-0.7) 





 


Basophils # (Auto)


 


 


 0.0 x10^3/uL


(0.0-0.2) 





 


Sodium Level


 


 


 136 mmol/L


(136-145) 





 


Potassium Level


 


 


 5.2 mmol/L


(3.5-5.1) 





 


Chloride Level


 


 


 97 mmol/L


() 





 


Carbon Dioxide Level


 


 


 22 mmol/L


(21-32) 





 


Anion Gap   17 (6-14)  


 


Blood Urea Nitrogen


 


 


 84 mg/dL


(7-20) 





 


Creatinine


 


 


 11.1 mg/dL


(0.6-1.0) 





 


Estimated GFR


(Cockcroft-Gault) 


 


 3.5 


 





 


Glucose Level


 


 


 185 mg/dL


(70-99) 





 


Calcium Level


 


 


 7.3 mg/dL


(8.5-10.1) 





 


Test


 2/21/19


11:36 


 


 





 


Glucose (Fingerstick)


 159 mg/dL


(70-99) 


 


 











Laboratory Tests








Test


 2/20/19


13:56 2/20/19


14:00 2/20/19


14:05 2/20/19


15:50


 


Glucose (Fingerstick)


 295 mg/dL


(70-99) 


 


 





 


White Blood Count


 


 10.9 x10^3/uL


(4.0-11.0) 


 





 


Red Blood Count


 


 2.20 x10^6/uL


(3.50-5.40) 


 





 


Hemoglobin


 


 7.0 g/dL


(12.0-15.5) 


 





 


Hematocrit


 


 21.7 %


(36.0-47.0) 


 





 


Mean Corpuscular Volume  99 fL ()   


 


Mean Corpuscular Hemoglobin  32 pg (25-35)   


 


Mean Corpuscular Hemoglobin


Concent 


 32 g/dL


(31-37) 


 





 


Red Cell Distribution Width


 


 14.0 %


(11.5-14.5) 


 





 


Platelet Count


 


 230 x10^3/uL


(140-400) 


 





 


Neutrophils (%) (Auto)  71 % (31-73)   


 


Lymphocytes (%) (Auto)  17 % (24-48)   


 


Monocytes (%) (Auto)  8 % (0-9)   


 


Eosinophils (%) (Auto)  3 % (0-3)   


 


Basophils (%) (Auto)  1 % (0-3)   


 


Neutrophils # (Auto)


 


 7.8 x10^3uL


(1.8-7.7) 


 





 


Lymphocytes # (Auto)


 


 1.8 x10^3/uL


(1.0-4.8) 


 





 


Monocytes # (Auto)


 


 0.9 x10^3/uL


(0.0-1.1) 


 





 


Eosinophils # (Auto)


 


 0.4 x10^3/uL


(0.0-0.7) 


 





 


Basophils # (Auto)


 


 0.1 x10^3/uL


(0.0-0.2) 


 





 


Segmented Neutrophils %  71 % (35-66)   


 


Lymphocytes %  12 % (24-48)   


 


Monocytes %  11 % (0-10)   


 


Eosinophils %  5 % (0-5)   


 


Basophils %  1 % (0-3)   


 


Myelocytes %   % (0-0)   


 


Platelet Estimate


 


 Adequate


(ADEQUATE) 


 





 


D-Dimer (Shira)


 


 4.95 ug/mlFEU


(0.00-0.50) 


 





 


Sodium Level


 


 134 mmol/L


(136-145) 


 





 


Potassium Level


 


 5.2 mmol/L


(3.5-5.1) 


 





 


Chloride Level


 


 93 mmol/L


() 


 





 


Carbon Dioxide Level


 


 15 mmol/L


(21-32) 


 





 


Anion Gap  26 (6-14)   


 


Blood Urea Nitrogen


 


 78 mg/dL


(7-20) 


 





 


Creatinine


 


 10.7 mg/dL


(0.6-1.0) 


 





 


Estimated GFR


(Cockcroft-Gault) 


 3.7 


 


 





 


BUN/Creatinine Ratio  7 (6-20)   


 


Glucose Level


 


 344 mg/dL


(70-99) 


 





 


Lactic Acid Level


 


 7.2 mmol/L


(0.4-2.0) 


 





 


Calcium Level


 


 7.4 mg/dL


(8.5-10.1) 


 





 


Total Bilirubin


 


 0.4 mg/dL


(0.2-1.0) 


 





 


Aspartate Amino Transf


(AST/SGOT) 


 13 U/L (15-37) 


 


 





 


Alanine Aminotransferase


(ALT/SGPT) 


 11 U/L (14-59) 


 


 





 


Alkaline Phosphatase


 


 81 U/L


() 


 





 


Troponin I Quantitative


 


 < 0.017 ng/mL


(0.000-0.055) 


 





 


NT-Pro-B-Type Natriuretic


Peptide 


 > 15239 pg/mL


(0-124) 


 





 


Total Protein


 


 7.8 g/dL


(6.4-8.2) 


 





 


Albumin


 


 3.3 g/dL


(3.4-5.0) 


 





 


Albumin/Globulin Ratio  0.7 (1.0-1.7)   


 


O2 Saturation   95 % (92-99)  


 


Arterial Blood pH


 


 


 7.25


(7.35-7.45) 





 


Arterial Blood pCO2 at


Patient Temp 


 


 33 mmHg


(35-46) 





 


Arterial Blood pO2 at Patient


Temp 


 


 96 mmHg


() 





 


Arterial Blood HCO3


 


 


 14 mmol/L


(21-28) 





 


Arterial Blood Base Excess


 


 


 -12 mmol/L


(-3-3) 





 


FiO2   32  


 


Influenza Type A Antigen


 


 


 


 Negative


(NEGATIVE)


 


Influenza Type B Antigen


 


 


 


 Negative


(NEGATIVE)


 


Test


 2/20/19


17:10 2/20/19


20:42 2/21/19


03:30 2/21/19


08:37


 


O2 Saturation 97 % (92-99)    


 


Arterial Blood pH


 7.36


(7.35-7.45) 


 


 





 


Arterial Blood pCO2 at


Patient Temp 36 mmHg


(35-46) 


 


 





 


Arterial Blood pO2 at Patient


Temp 115 mmHg


() 


 


 





 


Arterial Blood HCO3


 20 mmol/L


(21-28) 


 


 





 


Arterial Blood Base Excess


 -5 mmol/L


(-3-3) 


 


 





 


FiO2 40    


 


Glucose (Fingerstick)


 


 159 mg/dL


(70-99) 


 135 mg/dL


(70-99)


 


White Blood Count


 


 


 7.5 x10^3/uL


(4.0-11.0) 





 


Red Blood Count


 


 


 1.91 x10^6/uL


(3.50-5.40) 





 


Hemoglobin


 


 


 6.0 g/dL


(12.0-15.5) 





 


Hematocrit


 


 


 18.3 %


(36.0-47.0) 





 


Mean Corpuscular Volume   96 fL ()  


 


Mean Corpuscular Hemoglobin   32 pg (25-35)  


 


Mean Corpuscular Hemoglobin


Concent 


 


 33 g/dL


(31-37) 





 


Red Cell Distribution Width


 


 


 13.9 %


(11.5-14.5) 





 


Platelet Count


 


 


 166 x10^3/uL


(140-400) 





 


Neutrophils (%) (Auto)   79 % (31-73)  


 


Lymphocytes (%) (Auto)   13 % (24-48)  


 


Monocytes (%) (Auto)   7 % (0-9)  


 


Eosinophils (%) (Auto)   1 % (0-3)  


 


Basophils (%) (Auto)   1 % (0-3)  


 


Neutrophils # (Auto)


 


 


 5.9 x10^3uL


(1.8-7.7) 





 


Lymphocytes # (Auto)


 


 


 0.9 x10^3/uL


(1.0-4.8) 





 


Monocytes # (Auto)


 


 


 0.5 x10^3/uL


(0.0-1.1) 





 


Eosinophils # (Auto)


 


 


 0.1 x10^3/uL


(0.0-0.7) 





 


Basophils # (Auto)


 


 


 0.0 x10^3/uL


(0.0-0.2) 





 


Sodium Level


 


 


 136 mmol/L


(136-145) 





 


Potassium Level


 


 


 5.2 mmol/L


(3.5-5.1) 





 


Chloride Level


 


 


 97 mmol/L


() 





 


Carbon Dioxide Level


 


 


 22 mmol/L


(21-32) 





 


Anion Gap   17 (6-14)  


 


Blood Urea Nitrogen


 


 


 84 mg/dL


(7-20) 





 


Creatinine


 


 


 11.1 mg/dL


(0.6-1.0) 





 


Estimated GFR


(Cockcroft-Gault) 


 


 3.5 


 





 


Glucose Level


 


 


 185 mg/dL


(70-99) 





 


Calcium Level


 


 


 7.3 mg/dL


(8.5-10.1) 





 


Test


 2/21/19


11:36 


 


 





 


Glucose (Fingerstick)


 159 mg/dL


(70-99) 


 


 











Review


All relevant outside records, renal labs, imaging studies, telemetry/EKG's were 

reviewed.











MABLE DIAS MD Feb 21, 2019 12:43

## 2019-02-21 NOTE — NUR
SS following for discharge planning. SS reviewed pt chart. Pt is from home with spouse and a 
Yarsanism. Pt currently requires oxygen and declined PT. Pt has a Monday, Wednesday, 
Friday dialysis chair time in the community and per notes has missed scheduled chair times. 
No discharge needs noted at this time. SS will continue to follow for pending discharge 
needs.

## 2019-02-22 VITALS — DIASTOLIC BLOOD PRESSURE: 79 MMHG | SYSTOLIC BLOOD PRESSURE: 188 MMHG

## 2019-02-22 VITALS — SYSTOLIC BLOOD PRESSURE: 137 MMHG | DIASTOLIC BLOOD PRESSURE: 49 MMHG

## 2019-02-22 VITALS — SYSTOLIC BLOOD PRESSURE: 167 MMHG | DIASTOLIC BLOOD PRESSURE: 56 MMHG

## 2019-02-22 VITALS — SYSTOLIC BLOOD PRESSURE: 130 MMHG | DIASTOLIC BLOOD PRESSURE: 60 MMHG

## 2019-02-22 VITALS — SYSTOLIC BLOOD PRESSURE: 147 MMHG | DIASTOLIC BLOOD PRESSURE: 89 MMHG

## 2019-02-22 PROCEDURE — 5A09357 ASSISTANCE WITH RESPIRATORY VENTILATION, LESS THAN 24 CONSECUTIVE HOURS, CONTINUOUS POSITIVE AIRWAY PRESSURE: ICD-10-PCS | Performed by: INTERNAL MEDICINE

## 2019-02-22 PROCEDURE — 5A1D70Z PERFORMANCE OF URINARY FILTRATION, INTERMITTENT, LESS THAN 6 HOURS PER DAY: ICD-10-PCS | Performed by: INTERNAL MEDICINE

## 2019-02-22 RX ADMIN — CARVEDILOL SCH MG: 12.5 TABLET, FILM COATED ORAL at 12:53

## 2019-02-22 RX ADMIN — CARVEDILOL SCH MG: 12.5 TABLET, FILM COATED ORAL at 16:55

## 2019-02-22 RX ADMIN — ATORVASTATIN CALCIUM SCH MG: 40 TABLET, FILM COATED ORAL at 20:12

## 2019-02-22 RX ADMIN — IPRATROPIUM BROMIDE AND ALBUTEROL SULFATE SCH ML: .5; 3 SOLUTION RESPIRATORY (INHALATION) at 11:28

## 2019-02-22 RX ADMIN — IPRATROPIUM BROMIDE AND ALBUTEROL SULFATE SCH ML: .5; 3 SOLUTION RESPIRATORY (INHALATION) at 19:53

## 2019-02-22 RX ADMIN — INSULIN GLARGINE SCH UNITS: 100 INJECTION, SOLUTION SUBCUTANEOUS at 21:39

## 2019-02-22 RX ADMIN — IPRATROPIUM BROMIDE AND ALBUTEROL SULFATE SCH ML: .5; 3 SOLUTION RESPIRATORY (INHALATION) at 15:29

## 2019-02-22 RX ADMIN — IPRATROPIUM BROMIDE AND ALBUTEROL SULFATE SCH ML: .5; 3 SOLUTION RESPIRATORY (INHALATION) at 15:30

## 2019-02-22 RX ADMIN — IPRATROPIUM BROMIDE AND ALBUTEROL SULFATE SCH ML: .5; 3 SOLUTION RESPIRATORY (INHALATION) at 07:20

## 2019-02-22 RX ADMIN — INSULIN LISPRO SCH UNITS: 100 INJECTION, SOLUTION INTRAVENOUS; SUBCUTANEOUS at 12:00

## 2019-02-22 RX ADMIN — ASPIRIN SCH MG: 81 TABLET, COATED ORAL at 12:51

## 2019-02-22 RX ADMIN — LISINOPRIL SCH MG: 20 TABLET ORAL at 12:54

## 2019-02-22 RX ADMIN — ISOSORBIDE MONONITRATE SCH MG: 30 TABLET, EXTENDED RELEASE ORAL at 12:54

## 2019-02-22 RX ADMIN — INSULIN LISPRO SCH UNITS: 100 INJECTION, SOLUTION INTRAVENOUS; SUBCUTANEOUS at 17:00

## 2019-02-22 RX ADMIN — IPRATROPIUM BROMIDE AND ALBUTEROL SULFATE SCH ML: .5; 3 SOLUTION RESPIRATORY (INHALATION) at 12:00

## 2019-02-22 RX ADMIN — INSULIN LISPRO SCH UNITS: 100 INJECTION, SOLUTION INTRAVENOUS; SUBCUTANEOUS at 08:00

## 2019-02-22 NOTE — PDOC
SUBJECTIVE


ROS


Seen on HD, no complaints, concerns





OBJECTIVE


Vital Signs





Vital Signs








  Date Time  Temp Pulse Resp B/P (MAP) Pulse Ox O2 Delivery O2 Flow Rate FiO2


 


2/22/19 11:28     99 Nasal Cannula 2.0 


 


2/22/19 07:00 98.2 78 18 167/56 (93)    





 98.2       








I & 0











Intake and Output 


 


 2/22/19





 06:59


 


Intake Total 200 ml


 


Output Total 750 ml


 


Balance -550 ml


 


 


 


Intake Oral 200 ml


 


Output Urine Total 750 ml


 


# Bowel Movements 1











PHYSICAL EXAM


Physical Exam


HEENT:  OM moist  


NECK:  Supple.


LUNGS:   crackles at the bases, Non labored 


CARDIOVASCULAR:  RRR


ABDOMEN:  Soft, nontender, obese  


EXTREMITIES:   no pitting edema


Skin No rash


Gu No henley


Neuro- AxOx3





DIAGNOSIS/ASSESSMENT


Assessment & Plan


ESRD- On HD MWF


Chronic Non compliance- Misses HD for weeks at a time


Dw Daughter and Pt multiple times 


Dialyzed yesterday,


Continue as Ordered, dw Dialysis RN  





Anemia- Hgb < 7


she refused transfusion as a Jehov[employer] witness. 





Hyperkalemia- non complaince 


No labs today  





Acute hypoxic respiratory failure 


 On RA 





Acute on Chr CHF  





HTN- Antihypertensives


Card managing





COMMENT/RELEVANT DATA


Meds





Current Medications








 Medications


  (Trade)  Dose


 Ordered  Sig/Gume  Start Time


 Stop Time Status Last Admin


Dose Admin


 


 Acetaminophen


  (Tylenol)  500 mg  1X PRN  PRN  2/22/19 08:15


 2/23/19 08:14   





 


 Albumin Human  200 ml @ 


 200 mls/hr  1X PRN  PRN  2/22/19 08:15


 2/22/19 14:14   





 


 Albuterol/


 Ipratropium


  (Duoneb)  3 ml  RTQID  2/21/19 08:00


    2/22/19 07:20


3 ML


 


 Amlodipine


 Besylate


  (Norvasc)  5 mg  DAILYWLUN  2/21/19 12:00


    2/21/19 13:24


5 MG


 


 Aspirin


  (Ecotrin)  81 mg  DAILYWBKFT  2/21/19 08:00


    2/21/19 08:47


81 MG


 


 Atorvastatin


 Calcium


  (Lipitor)  80 mg  QHS  2/21/19 21:00


    2/21/19 20:58


80 MG


 


 Carvedilol


  (Coreg)  25 mg  BIDWMEALS  2/21/19 08:00


    2/21/19 17:45


25 MG


 


 Clonazepam


  (KlonoPIN)  0.5 mg  PRN TID  PRN  2/20/19 22:00


     





 


 Clopidogrel


 Bisulfate


  (Plavix)  75 mg  DAILY  2/21/19 09:00


 2/21/19 15:34 DC 2/21/19 08:47


75 MG


 


 Diphenhydramine


 HCl


  (Benadryl)  25 mg  1X PRN  PRN  2/22/19 08:15


 2/23/19 08:14   





 


 Furosemide


  (Lasix)  60 mg  1X  ONCE  2/20/19 15:00


 2/20/19 15:01 DC 2/20/19 15:14


60 MG


 


 Info


  (PHARMACY


 MONITORING -- do


 not chart)  1 each  PRN DAILY  PRN  2/22/19 08:15


 2/22/19 08:25 DC  





 


 Insulin Glargine


  (Lantus)  7 units  QHS  2/20/19 22:30


    2/21/19 21:03


7 UNITS


 


 Insulin Human


 Lispro


  (HumaLOG)  10 units  TIDWMEALS  2/21/19 08:00


     





 


 Insulin Human


 Regular


  (HumuLIN R VIAL)  10 unit  1X  ONCE  2/20/19 16:00


 2/20/19 16:04 DC 2/20/19 17:04


10 UNIT


 


 Iron Sucrose 200


 mg/Sodium Chloride  110 ml @ 


 55 mls/hr  1X  ONCE  2/21/19 05:00


 2/21/19 06:59 DC 2/21/19 04:53


55 MLS/HR


 


 Isosorbide


 Mononitrate


  (Imdur)  30 mg  DAILY  2/21/19 09:00


    2/21/19 08:47


30 MG


 


 Lisinopril


  (Prinivil)  20 mg  DAILY  2/21/19 09:00


    2/21/19 08:46


20 MG


 


 Metoprolol


 Tartrate


  (Lopressor Vial)  5 mg  PRN Q6HRS  PRN  2/21/19 20:30


     





 


 Sodium Chloride  1,000 ml @ 


 400 mls/hr  Q2H30M PRN  2/22/19 08:15


 2/22/19 20:14   











Lab





Laboratory Tests








Test


 2/21/19


16:20 2/21/19


17:39 2/21/19


20:35 2/22/19


07:35


 


Hepatitis B Surface Antigen


 Nonreactive


(Nonreactive) 


 


 





 


Glucose (Fingerstick)


 


 117 mg/dL


(70-99) 252 mg/dL


(70-99) 133 mg/dL


(70-99)








Results


All relevant outside records, renal labs, imaging studies, telemetry/EKG's were 

reviewed.











MABLE DIAS MD Feb 22, 2019 12:11

## 2019-02-22 NOTE — PDOC
CARDIO Progress Notes


Date and Time


Date of Service


2/22/2019


Time of Evaluation


1450





Subjective


Subjective:  No Chest Pain, No shortness of breath, No Palpitations





Vitals


Vitals





Vital Signs








  Date Time  Temp Pulse Resp B/P (MAP) Pulse Ox O2 Delivery O2 Flow Rate FiO2


 


2/22/19 14:47 99.1 83 18 137/49 (78) 98 Nasal Cannula 3.0 





 99.1       








Weight


Weight [ ]





Input and Output


Intake and Output











Intake and Output 


 


 2/22/19





 07:00


 


Intake Total 200 ml


 


Output Total 750 ml


 


Balance -550 ml


 


 


 


Intake Oral 200 ml


 


Output Urine Total 750 ml


 


# Bowel Movements 1











Laboratory


Labs





Laboratory Tests








Test


 2/21/19


16:20 2/21/19


17:39 2/21/19


20:35 2/22/19


07:35


 


Hepatitis B Surface Antigen


 Nonreactive


(Nonreactive) 


 


 





 


Glucose (Fingerstick)


 


 117 mg/dL


(70-99) 252 mg/dL


(70-99) 133 mg/dL


(70-99)











Microbiology


Micro





Microbiology


2/20/19 Blood Culture - Preliminary, Resulted


          NO GROWTH AFTER 2 DAYS





Physical Exam


HEENT:  Neck Supple W Full Motion


Chest:  Symmetric


LUNGS:  Clear to Auscultation


Heart:  S1S2, RRR (SR)


Abdomen:  Soft N/T


Extremities:  No Calf Tenderness


Neurology:  alert, oriented, follow commands





Assessment


Assessment


1.  Acute respiratory failure in the setting of a/c HF and anemia.  SOA better. 


2.  Acute on chronic combined systolic/diastolic heart failure; secondary to 

noncompliance with HD. EF and WM


3.  ESRD on HD; uremic, hyperkalemia. Missed HD Monday and Wednesday


4.  Anemia; hgb 6.0. Does not accept blood products. Jehovah witness.  Post 

iron transfusion


5.  Atypical CP: anemia, CHF contributing


6.  Accelerated HTN: improving


7.  Lactic acidosis


8.  Persistent noncompliance secondary to financial constraints. 


9.  CAD: PCI/stent to RCA, diffuse disease to LCx and LAD 3/2016   


10.  Hx of ICM; LVEF previously 25%. Most recent echo showed LV recovery with 

an EF of 55-60% 


11.  Hyperlipidemia; statin





Recommendations


1. Limited options as her Protestant belief impeding transfusion and persistent 

noncompliance with HD prompts frequent hospital admission.  Continue with 

secondary prevention if pt complies


2. If no obvious bleed and Hgb is better then may resume 81 mg ECASA. No plavix 

at this time


3. Will follow along peripherally











ADA HILL Feb 22, 2019 15:06

## 2019-02-22 NOTE — PDOC
PROGRESS NOTES


Chief Complaint


Chief Complaint


Acute hypoxic respiratory failure  resolved 


ESRD on dialysis, missed one week - dialysis as per consultant. Counseled on 

compliance with her and her daughter. 


History of hyperkalemia - K 5.2, will monitor


Anemia of ESRD - Hb of 7, she refuses transfusion as a Jehovah witness. Monitor


dCHF - in acute exacerbation, cont BB, statin, ASA. Needs ultrafiltration.


CAD history of CABG - stable, consult cardiology


Hypertension - will monitor on meds


Diabetes type 2 with hypoglycemic episodes- history of - reduce her home lantus 

and scheduled insulin


Obesity, BMI 32 - counseled





ADA renal diet


PPX - Heparin


FULL CODE





History of Present Illness


History of Present Illness


Discussed again her wishes of no transfusions patient denies chest pain no 

palpitations no shortness of breath and overall feels better. Her shortness of 

breath is definitely improved. She is being seen on dialysis





Vitals


Vitals





Vital Signs








  Date Time  Temp Pulse Resp B/P (MAP) Pulse Ox O2 Delivery O2 Flow Rate FiO2


 


2/22/19 11:28     99 Nasal Cannula 2.0 


 


2/22/19 07:00 98.2 78 18 167/56 (93)    





 98.2       











Physical Exam


General:  Alert, Cooperative, moderate distress


Lungs:  Clear, Other


Abdomen:  Normal bowel sounds, Soft, No tenderness, No hepatosplenomegaly, No 

masses


Extremities:  No clubbing, No cyanosis, Other (1+ edema, abdominal swelling)





Labs


LABS





Laboratory Tests








Test


 2/21/19


16:20 2/21/19


17:39 2/21/19


20:35 2/22/19


07:35


 


Hepatitis B Surface Antigen


 Nonreactive


(Nonreactive) 


 


 





 


Glucose (Fingerstick)


 


 117 mg/dL


(70-99) 252 mg/dL


(70-99) 133 mg/dL


(70-99)











Assessment and Plan


Assessmemt and Plan


Problems


Medical Problems:


(1) CHF (congestive heart failure)


Status: Acute  





(2) ESRD (end stage renal disease) on dialysis


Status: Acute  





(3) Hyperkalemia


Status: Acute  











Comment


Review of Relevant


I have reviewed the following items lore (where applicable) has been applied.


Labs





Laboratory Tests








Test


 2/20/19


13:56 2/20/19


14:00 2/20/19


14:05 2/20/19


15:50


 


Glucose (Fingerstick)


 295 mg/dL


(70-99) 


 


 





 


White Blood Count


 


 10.9 x10^3/uL


(4.0-11.0) 


 





 


Red Blood Count


 


 2.20 x10^6/uL


(3.50-5.40) 


 





 


Hemoglobin


 


 7.0 g/dL


(12.0-15.5) 


 





 


Hematocrit


 


 21.7 %


(36.0-47.0) 


 





 


Mean Corpuscular Volume  99 fL ()   


 


Mean Corpuscular Hemoglobin  32 pg (25-35)   


 


Mean Corpuscular Hemoglobin


Concent 


 32 g/dL


(31-37) 


 





 


Red Cell Distribution Width


 


 14.0 %


(11.5-14.5) 


 





 


Platelet Count


 


 230 x10^3/uL


(140-400) 


 





 


Neutrophils (%) (Auto)  71 % (31-73)   


 


Lymphocytes (%) (Auto)  17 % (24-48)   


 


Monocytes (%) (Auto)  8 % (0-9)   


 


Eosinophils (%) (Auto)  3 % (0-3)   


 


Basophils (%) (Auto)  1 % (0-3)   


 


Neutrophils # (Auto)


 


 7.8 x10^3uL


(1.8-7.7) 


 





 


Lymphocytes # (Auto)


 


 1.8 x10^3/uL


(1.0-4.8) 


 





 


Monocytes # (Auto)


 


 0.9 x10^3/uL


(0.0-1.1) 


 





 


Eosinophils # (Auto)


 


 0.4 x10^3/uL


(0.0-0.7) 


 





 


Basophils # (Auto)


 


 0.1 x10^3/uL


(0.0-0.2) 


 





 


Segmented Neutrophils %  71 % (35-66)   


 


Lymphocytes %  12 % (24-48)   


 


Monocytes %  11 % (0-10)   


 


Eosinophils %  5 % (0-5)   


 


Basophils %  1 % (0-3)   


 


Myelocytes %   % (0-0)   


 


Platelet Estimate


 


 Adequate


(ADEQUATE) 


 





 


D-Dimer (Shira)


 


 4.95 ug/mlFEU


(0.00-0.50) 


 





 


Sodium Level


 


 134 mmol/L


(136-145) 


 





 


Potassium Level


 


 5.2 mmol/L


(3.5-5.1) 


 





 


Chloride Level


 


 93 mmol/L


() 


 





 


Carbon Dioxide Level


 


 15 mmol/L


(21-32) 


 





 


Anion Gap  26 (6-14)   


 


Blood Urea Nitrogen


 


 78 mg/dL


(7-20) 


 





 


Creatinine


 


 10.7 mg/dL


(0.6-1.0) 


 





 


Estimated GFR


(Cockcroft-Gault) 


 3.7 


 


 





 


BUN/Creatinine Ratio  7 (6-20)   


 


Glucose Level


 


 344 mg/dL


(70-99) 


 





 


Lactic Acid Level


 


 7.2 mmol/L


(0.4-2.0) 


 





 


Calcium Level


 


 7.4 mg/dL


(8.5-10.1) 


 





 


Total Bilirubin


 


 0.4 mg/dL


(0.2-1.0) 


 





 


Aspartate Amino Transf


(AST/SGOT) 


 13 U/L (15-37) 


 


 





 


Alanine Aminotransferase


(ALT/SGPT) 


 11 U/L (14-59) 


 


 





 


Alkaline Phosphatase


 


 81 U/L


() 


 





 


Troponin I Quantitative


 


 < 0.017 ng/mL


(0.000-0.055) 


 





 


NT-Pro-B-Type Natriuretic


Peptide 


 > 99789 pg/mL


(0-124) 


 





 


Total Protein


 


 7.8 g/dL


(6.4-8.2) 


 





 


Albumin


 


 3.3 g/dL


(3.4-5.0) 


 





 


Albumin/Globulin Ratio  0.7 (1.0-1.7)   


 


O2 Saturation   95 % (92-99)  


 


Arterial Blood pH


 


 


 7.25


(7.35-7.45) 





 


Arterial Blood pCO2 at


Patient Temp 


 


 33 mmHg


(35-46) 





 


Arterial Blood pO2 at Patient


Temp 


 


 96 mmHg


() 





 


Arterial Blood HCO3


 


 


 14 mmol/L


(21-28) 





 


Arterial Blood Base Excess


 


 


 -12 mmol/L


(-3-3) 





 


FiO2   32  


 


Influenza Type A Antigen


 


 


 


 Negative


(NEGATIVE)


 


Influenza Type B Antigen


 


 


 


 Negative


(NEGATIVE)


 


Test


 2/20/19


17:10 2/20/19


20:42 2/21/19


03:30 2/21/19


08:37


 


O2 Saturation 97 % (92-99)    


 


Arterial Blood pH


 7.36


(7.35-7.45) 


 


 





 


Arterial Blood pCO2 at


Patient Temp 36 mmHg


(35-46) 


 


 





 


Arterial Blood pO2 at Patient


Temp 115 mmHg


() 


 


 





 


Arterial Blood HCO3


 20 mmol/L


(21-28) 


 


 





 


Arterial Blood Base Excess


 -5 mmol/L


(-3-3) 


 


 





 


FiO2 40    


 


Glucose (Fingerstick)


 


 159 mg/dL


(70-99) 


 135 mg/dL


(70-99)


 


White Blood Count


 


 


 7.5 x10^3/uL


(4.0-11.0) 





 


Red Blood Count


 


 


 1.91 x10^6/uL


(3.50-5.40) 





 


Hemoglobin


 


 


 6.0 g/dL


(12.0-15.5) 





 


Hematocrit


 


 


 18.3 %


(36.0-47.0) 





 


Mean Corpuscular Volume   96 fL ()  


 


Mean Corpuscular Hemoglobin   32 pg (25-35)  


 


Mean Corpuscular Hemoglobin


Concent 


 


 33 g/dL


(31-37) 





 


Red Cell Distribution Width


 


 


 13.9 %


(11.5-14.5) 





 


Platelet Count


 


 


 166 x10^3/uL


(140-400) 





 


Neutrophils (%) (Auto)   79 % (31-73)  


 


Lymphocytes (%) (Auto)   13 % (24-48)  


 


Monocytes (%) (Auto)   7 % (0-9)  


 


Eosinophils (%) (Auto)   1 % (0-3)  


 


Basophils (%) (Auto)   1 % (0-3)  


 


Neutrophils # (Auto)


 


 


 5.9 x10^3uL


(1.8-7.7) 





 


Lymphocytes # (Auto)


 


 


 0.9 x10^3/uL


(1.0-4.8) 





 


Monocytes # (Auto)


 


 


 0.5 x10^3/uL


(0.0-1.1) 





 


Eosinophils # (Auto)


 


 


 0.1 x10^3/uL


(0.0-0.7) 





 


Basophils # (Auto)


 


 


 0.0 x10^3/uL


(0.0-0.2) 





 


Sodium Level


 


 


 136 mmol/L


(136-145) 





 


Potassium Level


 


 


 5.2 mmol/L


(3.5-5.1) 





 


Chloride Level


 


 


 97 mmol/L


() 





 


Carbon Dioxide Level


 


 


 22 mmol/L


(21-32) 





 


Anion Gap   17 (6-14)  


 


Blood Urea Nitrogen


 


 


 84 mg/dL


(7-20) 





 


Creatinine


 


 


 11.1 mg/dL


(0.6-1.0) 





 


Estimated GFR


(Cockcroft-Gault) 


 


 3.5 


 





 


Glucose Level


 


 


 185 mg/dL


(70-99) 





 


Calcium Level


 


 


 7.3 mg/dL


(8.5-10.1) 





 


Triglycerides Level


 


 


 133 mg/dL


(0-150) 





 


Cholesterol Level


 


 


 144 mg/dL


(0-200) 





 


LDL Cholesterol, Calculated


 


 


 77 mg/dL


(0-100) 





 


VLDL Cholesterol, Calculated


 


 


 27 mg/dL


(0-40) 





 


Non-HDL Cholesterol Calculated


 


 


 104 mg/dL


(0-129) 





 


HDL Cholesterol


 


 


 40 mg/dL


(40-60) 





 


Cholesterol/HDL Ratio   3.6  


 


Test


 2/21/19


11:36 2/21/19


16:20 2/21/19


17:39 2/21/19


20:35


 


Glucose (Fingerstick)


 159 mg/dL


(70-99) 


 117 mg/dL


(70-99) 252 mg/dL


(70-99)


 


Hepatitis B Surface Antigen


 


 Nonreactive


(Nonreactive) 


 





 


Test


 2/22/19


07:35 


 


 





 


Glucose (Fingerstick)


 133 mg/dL


(70-99) 


 


 











Laboratory Tests








Test


 2/21/19


16:20 2/21/19


17:39 2/21/19


20:35 2/22/19


07:35


 


Hepatitis B Surface Antigen


 Nonreactive


(Nonreactive) 


 


 





 


Glucose (Fingerstick)


 


 117 mg/dL


(70-99) 252 mg/dL


(70-99) 133 mg/dL


(70-99)








Microbiology


2/20/19 Blood Culture - Preliminary, Resulted


          NO GROWTH AFTER 1 DAY


Medications





Current Medications


Albuterol/ Ipratropium (Duoneb) 3 ml 1X  ONCE NEB  Last administered on 2/20/ 19at 14:10;  Start 2/20/19 at 14:00;  Stop 2/20/19 at 14:02;  Status DC


Furosemide (Lasix) 60 mg 1X  ONCE IVP  Last administered on 2/20/19at 15:14;  

Start 2/20/19 at 15:00;  Stop 2/20/19 at 15:01;  Status DC


Insulin Human Regular (HumuLIN R VIAL) 10 unit 1X  ONCE IV  Last administered 

on 2/20/19at 17:04;  Start 2/20/19 at 16:00;  Stop 2/20/19 at 16:04;  Status DC


Acetaminophen (Tylenol) 650 mg PRN Q4HRS  PRN PO FEVER;  Start 2/20/19 at 17:00

;  Stop 2/21/19 at 16:59;  Status DC


Albuterol/ Ipratropium (Duoneb) 3 ml RTQID NEB  Last administered on 2/22/19at 

11:28;  Start 2/20/19 at 20:00;  Stop 2/21/19 at 19:59;  Status DC


Albuterol/ Ipratropium (Duoneb) 3 ml RTQID NEB  Last administered on 2/22/19at 

07:20;  Start 2/21/19 at 08:00


Amlodipine Besylate (Norvasc) 5 mg DAILYWLUN PO  Last administered on 2/21/19at 

13:24;  Start 2/21/19 at 12:00


Aspirin (Ecotrin) 81 mg DAILYWBKFT PO  Last administered on 2/21/19at 08:47;  

Start 2/21/19 at 08:00


Atorvastatin Calcium (Lipitor) 80 mg QHS PO  Last administered on 2/21/19at 20:

58;  Start 2/21/19 at 21:00


Clonazepam (KlonoPIN) 0.5 mg PRN TID  PRN PO ANXIETY / AGITATION;  Start 2/20/ 19 at 22:00


Clopidogrel Bisulfate (Plavix) 75 mg DAILY PO  Last administered on 2/21/19at 08

:47;  Start 2/21/19 at 09:00;  Stop 2/21/19 at 15:34;  Status DC


Insulin Glargine (Lantus) 7 units QHS SQ  Last administered on 2/21/19at 21:03;

  Start 2/20/19 at 22:30


Insulin Human Lispro (HumaLOG) 10 units TIDWMEALS SQ ;  Start 2/21/19 at 08:00


Isosorbide Mononitrate (Imdur) 30 mg DAILY PO  Last administered on 2/21/19at 08

:47;  Start 2/21/19 at 09:00


Lisinopril (Prinivil) 20 mg DAILY PO  Last administered on 2/21/19at 08:46;  

Start 2/21/19 at 09:00


Carvedilol (Coreg) 25 mg BIDWMEALS PO  Last administered on 2/21/19at 17:45;  

Start 2/21/19 at 08:00


Iron Sucrose 200 mg/Sodium Chloride 110 ml @  55 mls/hr 1X  ONCE IV  Last 

administered on 2/21/19at 04:53;  Start 2/21/19 at 05:00;  Stop 2/21/19 at 06:59

;  Status DC


Sodium Chloride 1,000 ml @  1,000 mls/hr Q1H PRN IV hypotension;  Start 2/21/19 

at 13:46;  Stop 2/21/19 at 19:45;  Status DC


Albumin Human 200 ml @  200 mls/hr 1X PRN  PRN IV Hypotension;  Start 2/21/19 

at 14:00;  Stop 2/21/19 at 19:59;  Status DC


Acetaminophen (Tylenol) 500 mg 1X PRN  PRN PO MILD PAIN / TEMP;  Start 2/21/19 

at 14:00;  Stop 2/22/19 at 13:59


Diphenhydramine HCl (Benadryl) 25 mg 1X PRN  PRN IV ITCHING;  Start 2/21/19 at 

14:00;  Stop 2/22/19 at 13:59


Diphenhydramine HCl (Benadryl) 25 mg 1X PRN  PRN IV ITCHING;  Start 2/21/19 at 

14:00;  Stop 2/22/19 at 13:59


Sodium Chloride 1,000 ml @  400 mls/hr Q2H30M PRN IV PATENCY;  Start 2/21/19 at 

13:46;  Stop 2/22/19 at 01:45;  Status DC


Info (PHARMACY MONITORING -- do not chart) 1 each PRN DAILY  PRN MC SEE COMMENTS

;  Start 2/21/19 at 14:00


Metoprolol Tartrate (Lopressor Vial) 5 mg PRN Q6HRS  PRN IVP HYPERTENSION, SEE 

COMMENTS;  Start 2/21/19 at 20:30


Sodium Chloride 1,000 ml @  1,000 mls/hr Q1H PRN IV hypotension;  Start 2/22/19 

at 08:15;  Stop 2/22/19 at 14:14


Albumin Human 200 ml @  200 mls/hr 1X PRN  PRN IV Hypotension;  Start 2/22/19 

at 08:15;  Stop 2/22/19 at 14:14


Acetaminophen (Tylenol) 500 mg 1X PRN  PRN PO MILD PAIN / TEMP;  Start 2/22/19 

at 08:15;  Stop 2/23/19 at 08:14


Diphenhydramine HCl (Benadryl) 25 mg 1X PRN  PRN IV ITCHING;  Start 2/22/19 at 

08:15;  Stop 2/23/19 at 08:14


Diphenhydramine HCl (Benadryl) 25 mg 1X PRN  PRN IV ITCHING;  Start 2/22/19 at 

08:15;  Stop 2/23/19 at 08:14


Sodium Chloride 1,000 ml @  400 mls/hr Q2H30M PRN IV PATENCY;  Start 2/22/19 at 

08:15;  Stop 2/22/19 at 20:14


Info (PHARMACY MONITORING -- do not chart) 1 each PRN DAILY  PRN MC SEE COMMENTS

;  Start 2/22/19 at 08:15;  Stop 2/22/19 at 08:25;  Status DC


Info (PHARMACY MONITORING -- do not chart) 1 each PRN DAILY  PRN MC SEE COMMENTS

;  Start 2/22/19 at 08:15;  Stop 2/22/19 at 08:25;  Status DC





Active Scripts


Active


Humalog (Insulin Lispro) 100 Unit/1 Ml Insuln.pen 10 Units SQ TIDWMEALS 30 Days


Lantus Solostar (Insulin Glargine,Hum.rec.anlog) 100 Unit/1 Ml Insuln.pen 7 

Units SQ QHS 30 Days


Atorvastatin Calcium 40 Mg Tablet 80 Mg PO QHS


Amlodipine Besylate 5 Mg Tablet 5 Mg PO DAILYWLUN


Clopidogrel (Clopidogrel Bisulfate) 75 Mg Tablet 75 Mg PO DAILY


Lisinopril 20 Mg Tablet 1 Tab PO DAILY


Aspirin Ec (Aspirin) 81 Mg Tablet. 81 Mg PO DAILYWBKFT


Reported


Isosorbide Mononitrate Er (Isosorbide Mononitrate) 30 Mg Tab.er.24h 30 Mg PO 

DAILY


Furosemide 40 Mg Tablet 40 Mg PO DAILY


Carvedilol 25 Mg Tablet 25 Mg PO BIDWMEALS


Clonazepam 0.5 Mg Tablet 0.5 Mg PO TID PRN


Vitals/I & O





Vital Sign - Last 24 Hours








 2/21/19 2/21/19 2/21/19 2/21/19





 13:24 16:02 17:45 19:32


 


Temp    98.5





    98.5


 


Pulse 75  98 85


 


Resp    17


 


B/P (MAP) 144/65  184/77 203/86 (125)


 


Pulse Ox    100


 


O2 Delivery  Nasal Cannula  Nasal Cannula


 


O2 Flow Rate  3.0  3.0


 


    





    





 2/21/19 2/21/19 2/21/19 2/21/19





 20:00 20:08 20:17 20:57


 


Pulse  90  83


 


B/P (MAP)  196/82 (120)  176/75 (108)


 


Pulse Ox   98 


 


O2 Delivery Nasal Cannula  Nasal Cannula 


 


O2 Flow Rate 3.0  3.0 





 2/21/19 2/22/19 2/22/19 2/22/19





 23:20 03:00 07:00 07:20


 


Temp 98.3 99.1 98.2 





 98.3 99.1 98.2 


 


Pulse 86 79 78 


 


Resp 19 19 18 


 


B/P (MAP) 181/78 (112) 188/79 (115) 167/56 (93) 


 


Pulse Ox 100 100 100 98


 


O2 Delivery Nasal Cannula Nasal Cannula Nasal Cannula Nasal Cannula


 


O2 Flow Rate 3.0 3.0 3.0 3.0


 


    





    





 2/22/19 2/22/19  





 08:00 11:28  


 


Pulse Ox  99  


 


O2 Delivery Nasal Cannula Nasal Cannula  


 


O2 Flow Rate 2.0 2.0  














Intake and Output   


 


 2/21/19 2/21/19 2/22/19





 15:00 23:00 07:00


 


Intake Total   200 ml


 


Output Total  200 ml 550 ml


 


Balance  -200 ml -350 ml

















TAMMI MENDES MD Feb 22, 2019 11:39

## 2019-02-22 NOTE — PDOC
Provider Note


Provider Note


Pt not seen. In dialysis











ONEL VASQUEZ MD Feb 22, 2019 12:31

## 2019-02-23 VITALS — SYSTOLIC BLOOD PRESSURE: 127 MMHG | DIASTOLIC BLOOD PRESSURE: 40 MMHG

## 2019-02-23 VITALS — SYSTOLIC BLOOD PRESSURE: 127 MMHG | DIASTOLIC BLOOD PRESSURE: 41 MMHG

## 2019-02-23 VITALS — DIASTOLIC BLOOD PRESSURE: 44 MMHG | SYSTOLIC BLOOD PRESSURE: 144 MMHG

## 2019-02-23 RX ADMIN — INSULIN LISPRO SCH UNITS: 100 INJECTION, SOLUTION INTRAVENOUS; SUBCUTANEOUS at 08:35

## 2019-02-23 RX ADMIN — IPRATROPIUM BROMIDE AND ALBUTEROL SULFATE SCH ML: .5; 3 SOLUTION RESPIRATORY (INHALATION) at 07:54

## 2019-02-23 RX ADMIN — CARVEDILOL SCH MG: 12.5 TABLET, FILM COATED ORAL at 08:41

## 2019-02-23 RX ADMIN — ASPIRIN SCH MG: 81 TABLET, COATED ORAL at 08:41

## 2019-02-23 RX ADMIN — LISINOPRIL SCH MG: 20 TABLET ORAL at 08:41

## 2019-02-23 RX ADMIN — ISOSORBIDE MONONITRATE SCH MG: 30 TABLET, EXTENDED RELEASE ORAL at 08:41

## 2019-02-23 NOTE — NUR
Patient discharged home. Discharge instructions given. PIV and heart monitor removed. 
Escorted patient in a wheelchair to the east entrance into a private vehicle.

## 2019-02-23 NOTE — PDOC
PULMONARY PROGRESS NOTES


Subjective


doesnt speak english, her daughter at bedside, sob better, no cough or pain, 

not on 02 or cpap at home. on 02 now, didnt use bipap


Vitals





Vital Signs








  Date Time  Temp Pulse Resp B/P (MAP) Pulse Ox O2 Delivery O2 Flow Rate FiO2


 


2/23/19 03:45 98.1 72 20 144/44 (77) 96 Nasal Cannula 2.0 





 98.1       








ROS:  No Nausea


General:  Alert, No acute distress


HEENT:  Other (nc at perrl)


Lungs:  Crackles, Other


Cardiovascular:  S1, S2


Abdomen:  Soft, Non-tender


Neuro Exam:  Alert


Extremities:  Other


Skin:  Warm


Labs





Laboratory Tests








Test


 2/21/19


08:37 2/21/19


11:36 2/21/19


16:20 2/21/19


17:39


 


Glucose (Fingerstick)


 135 mg/dL


(70-99) 159 mg/dL


(70-99) 


 117 mg/dL


(70-99)


 


Hepatitis B Surface Antigen


 


 


 Nonreactive


(Nonreactive) 





 


Test


 2/21/19


20:35 2/22/19


07:35 2/22/19


16:47 2/22/19


20:41


 


Glucose (Fingerstick)


 252 mg/dL


(70-99) 133 mg/dL


(70-99) 319 mg/dL


(70-99) 154 mg/dL


(70-99)








Laboratory Tests








Test


 2/22/19


07:35 2/22/19


16:47 2/22/19


20:41


 


Glucose (Fingerstick)


 133 mg/dL


(70-99) 319 mg/dL


(70-99) 154 mg/dL


(70-99)








Medications





Active Scripts








 Medications  Dose


 Route/Sig


 Max Daily Dose Days Date Category


 


 Isosorbide


 Mononitrate Er


  (Isosorbide


 Mononitrate) 30


 Mg Tab.er.24h  30 Mg


 PO DAILY


   2/20/19 Reported


 


 Furosemide 40 Mg


 Tablet  40 Mg


 PO DAILY


   2/20/19 Reported


 


 Carvedilol 25 Mg


 Tablet  25 Mg


 PO BIDWMEALS


   2/20/19 Reported


 


 Clonazepam 0.5 Mg


 Tablet  0.5 Mg


 PO TID PRN


   2/20/19 Reported


 


 Humalog (Insulin


 Lispro) 100


 Unit/1 Ml


 Insuln.pen  10 Units


 SQ TIDWMEALS


  30 12/7/18 Rx


 


 Lantus Solostar


  (Insulin


 Glargine,Hum.rec.anlog)


 100 Unit/1 Ml


 Insuln.pen  7 Units


 SQ QHS


  30 12/7/18 Rx


 


 Atorvastatin


 Calcium 40 Mg


 Tablet  80 Mg


 PO QHS


   12/7/18 Rx


 


 Amlodipine


 Besylate 5 Mg


 Tablet  5 Mg


 PO DAILYWLUN


   12/7/18 Rx


 


 Clopidogrel


  (Clopidogrel


 Bisulfate) 75 Mg


 Tablet  75 Mg


 PO DAILY


   12/7/18 Rx


 


 Lisinopril 20 Mg


 Tablet  1 Tab


 PO DAILY


   12/7/18 Rx


 


 Aspirin Ec


  (Aspirin) 81 Mg


 Tablet.  81 Mg


 PO DAILYWBKFT


   3/31/18 Rx











Impression


.





IMPRESSION:


1.  Acute hypoxic respiratory failure secondary to multifactorial etiologies


including combination of acute congestive heart failure and acute-on-chronic


anemia.


2.  End-stage renal disease, missed dialysis on Monday and Wednesday, as result


now in congestive heart failure.


3.  Dyspnea secondary to congestive heart failure.


4.  No clinical evidence of pneumonia.


5. obesity, snoring, prob LANIE





Plan


.


RECOMMENDATIONS:


1. HD per nephro


2.  02 titration


3.  Jehovah witness   declined transfusion


4.   bronchodilators.


5.  D-dimer was high, but is a nonspecific finding.


6.  PSG as out pt


Discussed with RN, pt's daughter, MARK Zuniga MD Feb 23, 2019 07:01

## 2019-02-23 NOTE — PDOC3
Discharge Summary


Visit Information


Date of Admission:  Feb 20, 2019


Date of Discharge:  Feb 23, 2019


Admitting Diagnosis:  Hyperkalemia/ ESRD on HD with non adherence


Final Diagnosis


Problems


Medical Problems:


(1) CHF (congestive heart failure)


Status: Acute  





(2) ESRD (end stage renal disease) on dialysis


Status: Acute  





(3) Hyperkalemia


Status: Acute  











Brief Hospital Course


Allergies





 Allergies








Coded Allergies Type Severity Reaction Last Updated Verified


 


  No Known Drug Allergies    8/6/18 No








Vital Signs





Vital Signs








  Date Time  Temp Pulse Resp B/P (MAP) Pulse Ox O2 Delivery O2 Flow Rate FiO2


 


2/23/19 08:41  68  127/40    


 


2/23/19 07:55     100 Nasal Cannula 2.0 


 


2/23/19 07:00 98.6  18     





 98.6       








Lab Results





Laboratory Tests








Test


 2/21/19


11:36 2/21/19


16:20 2/21/19


17:39 2/21/19


20:35


 


Glucose (Fingerstick)


 159 mg/dL


(70-99) 


 117 mg/dL


(70-99) 252 mg/dL


(70-99)


 


Hepatitis B Surface Antigen


 


 Nonreactive


(Nonreactive) 


 





 


Test


 2/22/19


07:35 2/22/19


16:47 2/22/19


20:41 2/23/19


07:24


 


Glucose (Fingerstick)


 133 mg/dL


(70-99) 319 mg/dL


(70-99) 154 mg/dL


(70-99) 166 mg/dL


(70-99)








Laboratory Tests








Test


 2/22/19


16:47 2/22/19


20:41 2/23/19


07:24


 


Glucose (Fingerstick)


 319 mg/dL


(70-99) 154 mg/dL


(70-99) 166 mg/dL


(70-99)








Brief Hospital Course


Ms. Ibrahim is a 63 old [place]-speaking female known to us w/ PMHx 

ESRD Monday Wednesday Friday and missed one and half weeks worth of dialysis, 

last dialysis was here in the hospital. She has been short of breath, O2 

saturations in the 70s per EMS





Came in hypoxic and we have placed her on BIPAP 12/5 40% FiO2. Lactate 7.4. Hb 

7. pH 7.25 on ABG with PCO2 33.





Prognosis is poor because of noncompliance. Daughter always at bedside and have 

explained multiple times regarding her situation, they do not wish to use the 

 phone and have requested Sammarinese speaking staff.





Admitted to [facility] on BIPAP





Patient responded well to the Bipap and also from her dialysis treatment, She 

is a Jehova's witness and she refuses blood transfusion. Patient received iron 

infusion, her wishes not to received blood transfusion were honored, no further 

cbc check were done since she already has a depleted HB of 6 on our last check. 

No evidence of bleeding. REassurance provided. Patient recovered from her acute 

event and is in good spirits to be dismissed from the hospital sings and 

symtpoms of alarm were explained to the patient and her daughter. All concerns 

addressed to the best o fmy abilities,.





Discharge Information


Condition at Discharge:  Improved


Follow Up:  Weeks (primary care phyisican in one week)


Disposition/Orders:  D/C to Home


Scheduled


Amlodipine Besylate (Amlodipine Besylate) 5 Mg Tablet, 5 MG PO DAILYWLUN for 

htn for 30 Days, #30 Ref 0


   Prescribed by: TAMMI MENDES MD on 2/23/19 0952


Aspirin (Aspirin Ec) 81 Mg Tablet.dr, 81 MG PO DAILYWBKFT for antiplatelet for 

30 Days, #30 Ref 0


   Prescribed by: TAMMI MENDES MD on 2/23/19 0952


Atorvastatin Calcium (Atorvastatin Calcium) 40 Mg Tablet, 80 MG PO QHS for hld 

for 30 Days, #60 Ref 0


   Prescribed by: TAMMI MENDES MD on 2/23/19 0952


Carvedilol (Carvedilol) 25 Mg Tablet, 25 MG PO BIDWMEALS for CARDIAC for 30 Days

, #60


   Prescribed by: TAMMI MENDES MD on 2/23/19 0952


Clopidogrel Bisulfate (Clopidogrel) 75 Mg Tablet, 75 MG PO DAILY for cad for 30 

Days, #30 Ref 0


   Prescribed by: TAMMI MENDES MD on 2/23/19 0952


Furosemide (Furosemide) 40 Mg Tablet, 40 MG PO DAILY for water pill for 30 Days

, #30


   Prescribed by: TAMMI MENDES MD on 2/23/19 0952


Insulin Glargine,Hum.rec.anlog (Lantus Solostar) 100 Unit/1 Ml Insuln.pen, 7 

UNITS SQ QHS for dm for 30 Days, #1


   Prescribed by: TAMMI MENDES MD on 2/23/19 0952


Insulin Lispro (Humalog) 100 Unit/1 Ml Insuln.pen, 10 UNITS SQ TIDWMEALS for 

dm2 for 30 Days, #1


   Prescribed by: TAMMI MENDES MD on 2/23/19 0952


Isosorbide Mononitrate (Isosorbide Mononitrate Er) 30 Mg Tab.er.24h, 30 MG PO 

DAILY for blood pressure for 30 Days, #30


   Prescribed by: TAMMI MENDES MD on 2/23/19 0952


Lisinopril (Lisinopril) 20 Mg Tablet, 1 TAB PO DAILY for htn for 30 Days, #30 

Ref 0


   Prescribed by: TAMMI MENDES MD on 2/23/19 0952





Scheduled PRN


Clonazepam (Clonazepam) 0.5 Mg Tablet, 0.5 MG PO TID PRN for ANXIETY / 

AGITATION for 30 Days, #30


   Prescribed by: TAMMI MENDES MD on 2/23/19 0952











TAMMI MENDES MD Feb 23, 2019 10:07

## 2019-02-28 ENCOUNTER — HOSPITAL ENCOUNTER (INPATIENT)
Dept: HOSPITAL 61 - ER | Age: 64
LOS: 1 days | Discharge: HOME | DRG: 640 | End: 2019-03-01
Attending: FAMILY MEDICINE | Admitting: FAMILY MEDICINE
Payer: COMMERCIAL

## 2019-02-28 VITALS — DIASTOLIC BLOOD PRESSURE: 46 MMHG | SYSTOLIC BLOOD PRESSURE: 141 MMHG

## 2019-02-28 VITALS — DIASTOLIC BLOOD PRESSURE: 73 MMHG | SYSTOLIC BLOOD PRESSURE: 147 MMHG

## 2019-02-28 VITALS — HEIGHT: 64 IN | WEIGHT: 189 LBS | BODY MASS INDEX: 32.27 KG/M2

## 2019-02-28 VITALS — SYSTOLIC BLOOD PRESSURE: 140 MMHG | DIASTOLIC BLOOD PRESSURE: 49 MMHG

## 2019-02-28 DIAGNOSIS — E87.70: Primary | ICD-10-CM

## 2019-02-28 DIAGNOSIS — E66.9: ICD-10-CM

## 2019-02-28 DIAGNOSIS — N18.6: ICD-10-CM

## 2019-02-28 DIAGNOSIS — Z99.2: ICD-10-CM

## 2019-02-28 DIAGNOSIS — E21.3: ICD-10-CM

## 2019-02-28 DIAGNOSIS — E78.00: ICD-10-CM

## 2019-02-28 DIAGNOSIS — K21.9: ICD-10-CM

## 2019-02-28 DIAGNOSIS — Z95.5: ICD-10-CM

## 2019-02-28 DIAGNOSIS — E87.5: ICD-10-CM

## 2019-02-28 DIAGNOSIS — F41.9: ICD-10-CM

## 2019-02-28 DIAGNOSIS — I25.2: ICD-10-CM

## 2019-02-28 DIAGNOSIS — E78.5: ICD-10-CM

## 2019-02-28 DIAGNOSIS — Z83.3: ICD-10-CM

## 2019-02-28 DIAGNOSIS — I50.9: ICD-10-CM

## 2019-02-28 DIAGNOSIS — Z91.19: ICD-10-CM

## 2019-02-28 DIAGNOSIS — I13.2: ICD-10-CM

## 2019-02-28 DIAGNOSIS — I25.10: ICD-10-CM

## 2019-02-28 DIAGNOSIS — D64.9: ICD-10-CM

## 2019-02-28 DIAGNOSIS — E11.22: ICD-10-CM

## 2019-02-28 LAB
ALBUMIN SERPL-MCNC: 3.1 G/DL (ref 3.4–5)
ALBUMIN/GLOB SERPL: 0.7 {RATIO} (ref 1–1.7)
ALP SERPL-CCNC: 107 U/L (ref 46–116)
ALT SERPL-CCNC: 10 U/L (ref 14–59)
ANION GAP SERPL CALC-SCNC: 20 MMOL/L (ref 6–14)
AST SERPL-CCNC: 9 U/L (ref 15–37)
BASOPHILS # BLD AUTO: 0 X10^3/UL (ref 0–0.2)
BASOPHILS NFR BLD: 1 % (ref 0–3)
BILIRUB SERPL-MCNC: 0.3 MG/DL (ref 0.2–1)
BUN SERPL-MCNC: 121 MG/DL (ref 7–20)
BUN/CREAT SERPL: 11 (ref 6–20)
CALCIUM SERPL-MCNC: 6.9 MG/DL (ref 8.5–10.1)
CHLORIDE SERPL-SCNC: 94 MMOL/L (ref 98–107)
CO2 SERPL-SCNC: 21 MMOL/L (ref 21–32)
CREAT SERPL-MCNC: 11.5 MG/DL (ref 0.6–1)
EOSINOPHIL NFR BLD: 0.2 X10^3/UL (ref 0–0.7)
EOSINOPHIL NFR BLD: 5 % (ref 0–3)
ERYTHROCYTE [DISTWIDTH] IN BLOOD BY AUTOMATED COUNT: 14.3 % (ref 11.5–14.5)
GFR SERPLBLD BASED ON 1.73 SQ M-ARVRAT: 3.4 ML/MIN
GLOBULIN SER-MCNC: 4.4 G/DL (ref 2.2–3.8)
GLUCOSE SERPL-MCNC: 274 MG/DL (ref 70–99)
HCT VFR BLD CALC: 18.8 % (ref 36–47)
HGB BLD-MCNC: 6.2 G/DL (ref 12–15.5)
LYMPHOCYTES # BLD: 0.8 X10^3/UL (ref 1–4.8)
LYMPHOCYTES NFR BLD AUTO: 20 % (ref 24–48)
MCH RBC QN AUTO: 32 PG (ref 25–35)
MCHC RBC AUTO-ENTMCNC: 33 G/DL (ref 31–37)
MCV RBC AUTO: 96 FL (ref 79–100)
MONO #: 0.4 X10^3/UL (ref 0–1.1)
MONOCYTES NFR BLD: 10 % (ref 0–9)
NEUT #: 2.7 X10^3UL (ref 1.8–7.7)
NEUTROPHILS NFR BLD AUTO: 64 % (ref 31–73)
PHOSPHATE SERPL-MCNC: 9.8 MG/DL (ref 2.6–4.7)
PLATELET # BLD AUTO: 175 X10^3/UL (ref 140–400)
POTASSIUM SERPL-SCNC: 5.9 MMOL/L (ref 3.5–5.1)
PROT SERPL-MCNC: 7.5 G/DL (ref 6.4–8.2)
PROTHROMBIN TIME: 14.1 SEC (ref 11.7–14)
RBC # BLD AUTO: 1.95 X10^6/UL (ref 3.5–5.4)
SODIUM SERPL-SCNC: 135 MMOL/L (ref 136–145)
WBC # BLD AUTO: 4.2 X10^3/UL (ref 4–11)

## 2019-02-28 PROCEDURE — 93005 ELECTROCARDIOGRAM TRACING: CPT

## 2019-02-28 PROCEDURE — 80048 BASIC METABOLIC PNL TOTAL CA: CPT

## 2019-02-28 PROCEDURE — 36415 COLL VENOUS BLD VENIPUNCTURE: CPT

## 2019-02-28 PROCEDURE — 85610 PROTHROMBIN TIME: CPT

## 2019-02-28 PROCEDURE — 83735 ASSAY OF MAGNESIUM: CPT

## 2019-02-28 PROCEDURE — 82962 GLUCOSE BLOOD TEST: CPT

## 2019-02-28 PROCEDURE — 80053 COMPREHEN METABOLIC PANEL: CPT

## 2019-02-28 PROCEDURE — 85025 COMPLETE CBC W/AUTO DIFF WBC: CPT

## 2019-02-28 PROCEDURE — 84100 ASSAY OF PHOSPHORUS: CPT

## 2019-02-28 NOTE — NUR
Patient admission

Pt arrived to the unit at approximately 1843 via gurney from the ED. Pt was oriented to room 
and unit routines. Patient information guide packet was explained and given to pt. Pt is 
Syriac speaking only, daughter at bedside to help translate. All questions and concerns 
regarding pt's POC was address and answered. Both pt and daughter verbalized understanding. 
Pt made comfortable in bed. Will continue to monitor pt closely.

## 2019-02-28 NOTE — PDOC1
History and Physical


Date of Admission


Date of Admission


DATE: 2/28/19 


TIME: 22:30





Identification/Chief Complaint


Chief Complaint





Patient is a 63-year-old female who presents with report of shortness of breath/

respiratory distress.MISSED DIALYSIS X 3 THIS WEEK





Past Medical History


Cardiovascular:  CAD, CHF, HTN, Hyperlipidemia


Pulmonary:  No pertinent hx, Other


CENTRAL NERVOUS SYSTEM:  Other


GI:  GERD


Heme/Onc:  No pertinent hx


Hepatobiliary:  No pertinent hx


Psych:  No pertinent hx


Rheumatologic:  No pertinent hx


Infectious disease:  No pertinent hx


Renal/:  Chronic renal failure


Endocrine:  Diabetes





Past Surgical History


Past Surgical History:  Other





Family History


Family History:  Diabetes, Heart Disease





Social History


ALCOHOL:  none


Drugs:  None


Cardiovascular:  CAD, CHF, HTN, Hyperlipidemia


Pulmonary:  No pertinent hx, Other


CENTRAL NERVOUS SYSTEM:  Other


GI:  GERD


Heme/Onc:  No pertinent hx


Hepatobiliary:  No pertinent hx


Psych:  No pertinent hx


Rheumatologic:  No pertinent hx


Infectious disease:  No pertinent hx


Renal/:  Chronic renal failure


Endocrine:  Diabetes





Past Surgical History


Past Surgical History:  Other





Family History


Family History:  Heart Disease





Social History


Smoke:  No


ALCOHOL:  none


Drugs:  None





Current Problem List


Problem List


Problems


Medical Problems:


(1) Anemia


Status: Acute  





(2) Hyperkalemia


Status: Acute  











Current Medications


Current Medications





Current Medications


Sodium Polystyrene Sulfonate (Kayexalate) 30 gm 1X  ONCE PO  Last administered 

on 2/28/19at 16:45;  Start 2/28/19 at 16:45;  Stop 2/28/19 at 16:46;  Status DC


Ondansetron HCl (Zofran) 4 mg PRN Q8HRS  PRN IV NAUSEA/VOMITING;  Start 2/28/19 

at 17:15;  Stop 3/1/19 at 17:14


Acetaminophen (Tylenol) 650 mg PRN Q4HRS  PRN PO FEVER;  Start 2/28/19 at 17:15

;  Stop 3/1/19 at 17:14


Pharmacy Consult (C.diff Med Screen By Rx) 1 each 1X  ONCE [facility] ;  Start 3/1/19 

at 09:00;  Stop 3/1/19 at 09:01;  Status UNV





Active Scripts


Active


Isosorbide Mononitrate Er (Isosorbide Mononitrate) 30 Mg Tab.er.24h 30 Mg PO 

DAILY 30 Days


Furosemide 40 Mg Tablet 40 Mg PO DAILY 30 Days


Clonazepam 0.5 Mg Tablet 0.5 Mg PO TID PRN 30 Days


Humalog (Insulin Lispro) 100 Unit/1 Ml Insuln.pen 10 Units SQ TIDWMEALS 30 Days


Lantus Solostar (Insulin Glargine,Hum.rec.anlog) 100 Unit/1 Ml Insuln.pen 7 

Units SQ QHS 30 Days


Atorvastatin Calcium 40 Mg Tablet 80 Mg PO QHS 30 Days


Amlodipine Besylate 5 Mg Tablet 5 Mg PO DAILYWLUN 30 Days


Clopidogrel (Clopidogrel Bisulfate) 75 Mg Tablet 75 Mg PO DAILY 30 Days


Lisinopril 20 Mg Tablet 1 Tab PO DAILY 30 Days


Aspirin Ec (Aspirin) 81 Mg Tablet.dr 81 Mg PO DAILYWBKFT 30 Days


Reported


Carvedilol 25 Mg Tablet 20 Mg PO BIDWMEALS





Allergies


Allergies:  


Coded Allergies:  


     No Known Drug Allergies (Unverified , 8/6/18)





ROS


Review of System





Review of Systems


Review of Systems





Constitutional: Denies fever []


Respiratory: Complains of cough and shortness of breath []


Cardiovascular: No additional information not addressed in HPI []


Integument: Denies rash or skin lesions []





14 PT review of systems OTHERWISE NEG.


General:  YES: Fatigue, Malaise


PSYCHOLOGICAL ROS:  No: Anxiety, Behavioral Disorder, Concentration difficultie

, Decreased libido, Depression, Disorientation, Hallucinations, Hostility, 

Irritablity, Memory difficulties, Mood Swings, Obsessive thoughts, Physical 

abuse, Sexual abuse, Sleep disturbances, Suicidal ideation, Other


Eyes:  No Blurry vision, No Decreased vision, No Double vision, No Dry eyes, No 

Excessive tearing, No Eye Pain, No Itchy Eyes, No Loss of vision, No Photophobia

, No Scotomata, No Uses contacts, No Uses glasses, No Other


HEENT:  No: Heacaches, Visual Changes, Hearing change, Nasal congestion, Nasal 

discharge, Oral lesions, Sinus pain, Sore Throat, Epistaxis, Sneezing, Snoring, 

Tinnitus, Vertigo, Vocal changes, Other


ALLERGY AND IMMUNOLOGY:  No: Hives, Insect Bite Sensitivity, Itchy/Watery Eyes, 

Nasal Congestion, Post Nasal Drip, Seasonal Allergies, Other


Hematological and Lymphatic:  No: Bleeding Problems, Blood Clots, Blood 

Transfusions, Brusing, Night Sweats, Pallor, Swollen Lymph Nodes, Other


ENDOCRINE:  No: Breast Changes, Galactorrhea, Hair Pattern Changes, Hot Flashes

, Malaise/lethargy, Mood Swings, Palpitations, Polydipsia/polyuria, Skin Changes

, Temperature Intolerance, Unexpected Weight Changes, Other


Respiratory:  YES: Shortness of breath, SOB with excertion


Cardiovascular:  yes Orthopnea


Musculoskeletal:  Yes Joint Stiffness


Neurological:  No Behavorial Changes, No Bowel/Bladder ControlChng, No Confusion

, No Dizziness, No Gait Disturbance, No Headaches, No Impaired Coord/balance, 

No Memory Loss, No Numbness/Tingling, No Seizures, No Speech Problems, No 

Tremors, No Visual Changes, No Weakness, No Other





Physical Exam





Constitutional: Well developed, well nourished, in MILD respiratory distress. []


HENT: Normocephalic, atraumatic, bilateral external ears normal, oropharynx 

moist, no oral exudates, nose normal. []


Eyes: PERRLA, EOMI, conjunctiva normal, no discharge. [] 


Neck: Normal range of motion, no tenderness, supple. [] 


Cardiovascular: Regular rate and rhythm[]


Lungs & Thorax: Fairly good air movement is noted throughout with rales in the 

lung bases to auscultation []


Abdomen: Bowel sounds normal, soft, no tenderness. [] 


Skin: Warm, dry, no erythema, no rash. [] 


Extremities: No tenderness, no cyanosis, no clubbing, ROM intact, no edema. [] 


Neurologic: Awake and alert, no focal deficits noted. []


General:  Oriented X3, Cooperative


HEENT:  PERRLA


Breasts:  Not examined


Abdomen:  Normal bowel sounds, Soft


Rectal Exam:  not examined


PELVIC:  Examination not indicated


Extremities:  No cyanosis


Skin:  No breakdown


Neuro:  Normal speech, Sensation intact, Cranial nerves 3-12 NL


Psych/Mental Status:  Mental status NL





Vitals


Vitals





Vital Signs








  Date Time  Temp Pulse Resp B/P (MAP) Pulse Ox O2 Delivery O2 Flow Rate FiO2


 


2/28/19 20:00      Room Air  


 


2/28/19 19:00 97.5 66 20 140/49 (79) 96   





 97.5       











Labs


Labs





Laboratory Tests








Test


 2/28/19


16:00 2/28/19


20:43


 


White Blood Count


 4.2 x10^3/uL


(4.0-11.0) 





 


Red Blood Count


 1.95 x10^6/uL


(3.50-5.40) 





 


Hemoglobin


 6.2 g/dL


(12.0-15.5) 





 


Hematocrit


 18.8 %


(36.0-47.0) 





 


Mean Corpuscular Volume 96 fL ()  


 


Mean Corpuscular Hemoglobin 32 pg (25-35)  


 


Mean Corpuscular Hemoglobin


Concent 33 g/dL


(31-37) 





 


Red Cell Distribution Width


 14.3 %


(11.5-14.5) 





 


Platelet Count


 175 x10^3/uL


(140-400) 





 


Neutrophils (%) (Auto) 64 % (31-73)  


 


Lymphocytes (%) (Auto) 20 % (24-48)  


 


Monocytes (%) (Auto) 10 % (0-9)  


 


Eosinophils (%) (Auto) 5 % (0-3)  


 


Basophils (%) (Auto) 1 % (0-3)  


 


Neutrophils # (Auto)


 2.7 x10^3uL


(1.8-7.7) 





 


Lymphocytes # (Auto)


 0.8 x10^3/uL


(1.0-4.8) 





 


Monocytes # (Auto)


 0.4 x10^3/uL


(0.0-1.1) 





 


Eosinophils # (Auto)


 0.2 x10^3/uL


(0.0-0.7) 





 


Basophils # (Auto)


 0.0 x10^3/uL


(0.0-0.2) 





 


Prothrombin Time


 14.1 SEC


(11.7-14.0) 





 


Prothromb Time International


Ratio 1.1 (0.8-1.1) 


 





 


Sodium Level


 135 mmol/L


(136-145) 





 


Potassium Level


 5.9 mmol/L


(3.5-5.1) 





 


Chloride Level


 94 mmol/L


() 





 


Carbon Dioxide Level


 21 mmol/L


(21-32) 





 


Anion Gap 20 (6-14)  


 


Blood Urea Nitrogen


 121 mg/dL


(7-20) 





 


Creatinine


 11.5 mg/dL


(0.6-1.0) 





 


Estimated GFR


(Cockcroft-Gault) 3.4 


 





 


BUN/Creatinine Ratio 11 (6-20)  


 


Glucose Level


 274 mg/dL


(70-99) 





 


Calcium Level


 6.9 mg/dL


(8.5-10.1) 





 


Phosphorus Level


 9.8 mg/dL


(2.6-4.7) 





 


Magnesium Level


 2.6 mg/dL


(1.8-2.4) 





 


Total Bilirubin


 0.3 mg/dL


(0.2-1.0) 





 


Aspartate Amino Transf


(AST/SGOT) 9 U/L (15-37) 


 





 


Alanine Aminotransferase


(ALT/SGPT) 10 U/L (14-59) 


 





 


Alkaline Phosphatase


 107 U/L


() 





 


Total Protein


 7.5 g/dL


(6.4-8.2) 





 


Albumin


 3.1 g/dL


(3.4-5.0) 





 


Albumin/Globulin Ratio 0.7 (1.0-1.7)  


 


Glucose (Fingerstick)


 


 177 mg/dL


(70-99)








Laboratory Tests








Test


 2/28/19


16:00 2/28/19


20:43


 


White Blood Count


 4.2 x10^3/uL


(4.0-11.0) 





 


Red Blood Count


 1.95 x10^6/uL


(3.50-5.40) 





 


Hemoglobin


 6.2 g/dL


(12.0-15.5) 





 


Hematocrit


 18.8 %


(36.0-47.0) 





 


Mean Corpuscular Volume 96 fL ()  


 


Mean Corpuscular Hemoglobin 32 pg (25-35)  


 


Mean Corpuscular Hemoglobin


Concent 33 g/dL


(31-37) 





 


Red Cell Distribution Width


 14.3 %


(11.5-14.5) 





 


Platelet Count


 175 x10^3/uL


(140-400) 





 


Neutrophils (%) (Auto) 64 % (31-73)  


 


Lymphocytes (%) (Auto) 20 % (24-48)  


 


Monocytes (%) (Auto) 10 % (0-9)  


 


Eosinophils (%) (Auto) 5 % (0-3)  


 


Basophils (%) (Auto) 1 % (0-3)  


 


Neutrophils # (Auto)


 2.7 x10^3uL


(1.8-7.7) 





 


Lymphocytes # (Auto)


 0.8 x10^3/uL


(1.0-4.8) 





 


Monocytes # (Auto)


 0.4 x10^3/uL


(0.0-1.1) 





 


Eosinophils # (Auto)


 0.2 x10^3/uL


(0.0-0.7) 





 


Basophils # (Auto)


 0.0 x10^3/uL


(0.0-0.2) 





 


Prothrombin Time


 14.1 SEC


(11.7-14.0) 





 


Prothromb Time International


Ratio 1.1 (0.8-1.1) 


 





 


Sodium Level


 135 mmol/L


(136-145) 





 


Potassium Level


 5.9 mmol/L


(3.5-5.1) 





 


Chloride Level


 94 mmol/L


() 





 


Carbon Dioxide Level


 21 mmol/L


(21-32) 





 


Anion Gap 20 (6-14)  


 


Blood Urea Nitrogen


 121 mg/dL


(7-20) 





 


Creatinine


 11.5 mg/dL


(0.6-1.0) 





 


Estimated GFR


(Cockcroft-Gault) 3.4 


 





 


BUN/Creatinine Ratio 11 (6-20)  


 


Glucose Level


 274 mg/dL


(70-99) 





 


Calcium Level


 6.9 mg/dL


(8.5-10.1) 





 


Phosphorus Level


 9.8 mg/dL


(2.6-4.7) 





 


Magnesium Level


 2.6 mg/dL


(1.8-2.4) 





 


Total Bilirubin


 0.3 mg/dL


(0.2-1.0) 





 


Aspartate Amino Transf


(AST/SGOT) 9 U/L (15-37) 


 





 


Alanine Aminotransferase


(ALT/SGPT) 10 U/L (14-59) 


 





 


Alkaline Phosphatase


 107 U/L


() 





 


Total Protein


 7.5 g/dL


(6.4-8.2) 





 


Albumin


 3.1 g/dL


(3.4-5.0) 





 


Albumin/Globulin Ratio 0.7 (1.0-1.7)  


 


Glucose (Fingerstick)


 


 177 mg/dL


(70-99)











VTE Prophylaxis Ordered


VTE Prophylaxis Devices:  Yes


VTE Pharmacological Prophylaxi:  Yes





Assessment/Plan


Assessment/Plan


IMPRESSION





1. ESRD ON DIALYSIS


2. NONCOMPLIANCE


3. HX HTN


4. OBESITY


5. ACUTE VOLUME OVERLOAD


6. SEVERE ANEMIA





PLAN





ADMIT


DIALYSIS


 CONSULT NEPHROLOGY


HOME MEDS


TRANSFUSE 1 UNIT PRBC'S


TELE











FULBRNATALIO,LUCIANA HELMS MD Feb 28, 2019 22:30

## 2019-03-01 VITALS — DIASTOLIC BLOOD PRESSURE: 44 MMHG | SYSTOLIC BLOOD PRESSURE: 140 MMHG

## 2019-03-01 VITALS — DIASTOLIC BLOOD PRESSURE: 50 MMHG | SYSTOLIC BLOOD PRESSURE: 154 MMHG

## 2019-03-01 VITALS — DIASTOLIC BLOOD PRESSURE: 45 MMHG | SYSTOLIC BLOOD PRESSURE: 172 MMHG

## 2019-03-01 VITALS — DIASTOLIC BLOOD PRESSURE: 57 MMHG | SYSTOLIC BLOOD PRESSURE: 163 MMHG

## 2019-03-01 VITALS — SYSTOLIC BLOOD PRESSURE: 163 MMHG | DIASTOLIC BLOOD PRESSURE: 57 MMHG

## 2019-03-01 LAB
ANION GAP SERPL CALC-SCNC: 22 MMOL/L (ref 6–14)
BASOPHILS # BLD AUTO: 0 X10^3/UL (ref 0–0.2)
BASOPHILS NFR BLD: 1 % (ref 0–3)
BUN SERPL-MCNC: 127 MG/DL (ref 7–20)
CALCIUM SERPL-MCNC: 6.9 MG/DL (ref 8.5–10.1)
CHLORIDE SERPL-SCNC: 95 MMOL/L (ref 98–107)
CO2 SERPL-SCNC: 21 MMOL/L (ref 21–32)
CREAT SERPL-MCNC: 11.9 MG/DL (ref 0.6–1)
EOSINOPHIL NFR BLD: 0.3 X10^3/UL (ref 0–0.7)
EOSINOPHIL NFR BLD: 5 % (ref 0–3)
ERYTHROCYTE [DISTWIDTH] IN BLOOD BY AUTOMATED COUNT: 14.4 % (ref 11.5–14.5)
GFR SERPLBLD BASED ON 1.73 SQ M-ARVRAT: 3.2 ML/MIN
GLUCOSE SERPL-MCNC: 135 MG/DL (ref 70–99)
HCT VFR BLD CALC: 18 % (ref 36–47)
HGB BLD-MCNC: 6 G/DL (ref 12–15.5)
LYMPHOCYTES # BLD: 0.9 X10^3/UL (ref 1–4.8)
LYMPHOCYTES NFR BLD AUTO: 16 % (ref 24–48)
MCH RBC QN AUTO: 32 PG (ref 25–35)
MCHC RBC AUTO-ENTMCNC: 34 G/DL (ref 31–37)
MCV RBC AUTO: 97 FL (ref 79–100)
MONO #: 0.4 X10^3/UL (ref 0–1.1)
MONOCYTES NFR BLD: 8 % (ref 0–9)
NEUT #: 3.8 X10^3UL (ref 1.8–7.7)
NEUTROPHILS NFR BLD AUTO: 70 % (ref 31–73)
PLATELET # BLD AUTO: 196 X10^3/UL (ref 140–400)
POTASSIUM SERPL-SCNC: 5.5 MMOL/L (ref 3.5–5.1)
RBC # BLD AUTO: 1.86 X10^6/UL (ref 3.5–5.4)
SODIUM SERPL-SCNC: 138 MMOL/L (ref 136–145)
WBC # BLD AUTO: 5.5 X10^3/UL (ref 4–11)

## 2019-03-01 PROCEDURE — 5A1D70Z PERFORMANCE OF URINARY FILTRATION, INTERMITTENT, LESS THAN 6 HOURS PER DAY: ICD-10-PCS | Performed by: FAMILY MEDICINE

## 2019-03-01 NOTE — DISCH
DISCHARGE INSTRUCTIONS


Condition on Discharge


Condition on Discharge:  Guarded





Activity After Discharge


Activity Instructions for Disc:  No restrictions


Lifting Instructions after Dis:  No heavy lifting, No pulling or pushing, Do 

not lift >10 pounds


Exercise Instruction after Dis:  Walk 10 min, 3 x per day, Progress as tolerated


Driving Instructions after Dis:  Do not drive


Weight Bearing Status after Di:  No restrictions





Diet after Discharge


Diet after Discharge:  Renal Dialysis


Diet Texture:  Regular


Liquid Texture:  Thin Liquid


Swallowing Supervision:  None needed





Wound Incision Care


Wound/Incision Care:  May get incision wet, No wound care needed





Checks after Discharge


Checks after discharge:  Check blood press - daily, Check blood sugar, ac/hs, 

Weigh Yourself Daily





Contacting the DR. after DC


Call your doctor for:  If your condition worsens





Follow-Up


Follow up with:  KEEP APPT FOR DIALYSIS THIS MONDAY 3 X A WEEK





Treatment/Equipment after DC


Adaptive Equipment Issued:  None











LUCIANA SELLERS MD Mar 1, 2019 15:02

## 2019-03-01 NOTE — PDOC
PROGRESS NOTES


History of Present Illness


History of Present Illness


discharge diagnosis==============








Assessment/Plan


IMPRESSION





1. ESRD ON DIALYSIS


2. NONCOMPLIANCE HIGH RISK OF COMPLICATIONS due to noncompliance  d/w DR CAMARILLO 3/

1


3. HX HTN


4. OBESITY


5. ACUTE VOLUME OVERLOAD


6. SEVERE ANEMIA





PLAN





ADMIT


DIALYSIS


 CONSULT NEPHROLOGY


HOME MEDS


TRANSFUSE 1 UNIT [facility]'S PT REFUSED AMA DUE TO [employer] BELIEFS, JEHOVAH'S 

WITNESS  Alevism


TELE





states she will keep dialysis appt monday, discussed in detail with daughter x 

25 min





Vitals


Vitals





Vital Signs








  Date Time  Temp Pulse Resp B/P (MAP) Pulse Ox O2 Delivery O2 Flow Rate FiO2


 


3/1/19 07:00 98.4 69 18 140/44 (76) 98 Room Air  





 98.4       











Physical Exam


General:  Alert, Oriented X3, Cooperative, mild distress


Heart:  Regular rate, Normal S1, Normal S2


Lungs:  Crackles, Other


Abdomen:  Normal bowel sounds, Soft, No tenderness


Extremities:  No clubbing


Skin:  No breakdown





Labs


LABS





Laboratory Tests








Test


 2/28/19


16:00 2/28/19


20:43 3/1/19


05:10 3/1/19


07:46


 


White Blood Count


 4.2 x10^3/uL


(4.0-11.0) 


 5.5 x10^3/uL


(4.0-11.0) 





 


Red Blood Count


 1.95 x10^6/uL


(3.50-5.40) 


 1.86 x10^6/uL


(3.50-5.40) 





 


Hemoglobin


 6.2 g/dL


(12.0-15.5) 


 6.0 g/dL


(12.0-15.5) 





 


Hematocrit


 18.8 %


(36.0-47.0) 


 18.0 %


(36.0-47.0) 





 


Mean Corpuscular Volume 96 fL ()   97 fL ()  


 


Mean Corpuscular Hemoglobin 32 pg (25-35)   32 pg (25-35)  


 


Mean Corpuscular Hemoglobin


Concent 33 g/dL


(31-37) 


 34 g/dL


(31-37) 





 


Red Cell Distribution Width


 14.3 %


(11.5-14.5) 


 14.4 %


(11.5-14.5) 





 


Platelet Count


 175 x10^3/uL


(140-400) 


 196 x10^3/uL


(140-400) 





 


Neutrophils (%) (Auto) 64 % (31-73)   70 % (31-73)  


 


Lymphocytes (%) (Auto) 20 % (24-48)   16 % (24-48)  


 


Monocytes (%) (Auto) 10 % (0-9)   8 % (0-9)  


 


Eosinophils (%) (Auto) 5 % (0-3)   5 % (0-3)  


 


Basophils (%) (Auto) 1 % (0-3)   1 % (0-3)  


 


Neutrophils # (Auto)


 2.7 x10^3uL


(1.8-7.7) 


 3.8 x10^3uL


(1.8-7.7) 





 


Lymphocytes # (Auto)


 0.8 x10^3/uL


(1.0-4.8) 


 0.9 x10^3/uL


(1.0-4.8) 





 


Monocytes # (Auto)


 0.4 x10^3/uL


(0.0-1.1) 


 0.4 x10^3/uL


(0.0-1.1) 





 


Eosinophils # (Auto)


 0.2 x10^3/uL


(0.0-0.7) 


 0.3 x10^3/uL


(0.0-0.7) 





 


Basophils # (Auto)


 0.0 x10^3/uL


(0.0-0.2) 


 0.0 x10^3/uL


(0.0-0.2) 





 


Prothrombin Time


 14.1 SEC


(11.7-14.0) 


 


 





 


Prothromb Time International


Ratio 1.1 (0.8-1.1) 


 


 


 





 


Sodium Level


 135 mmol/L


(136-145) 


 138 mmol/L


(136-145) 





 


Potassium Level


 5.9 mmol/L


(3.5-5.1) 


 5.5 mmol/L


(3.5-5.1) 





 


Chloride Level


 94 mmol/L


() 


 95 mmol/L


() 





 


Carbon Dioxide Level


 21 mmol/L


(21-32) 


 21 mmol/L


(21-32) 





 


Anion Gap 20 (6-14)   22 (6-14)  


 


Blood Urea Nitrogen


 121 mg/dL


(7-20) 


 127 mg/dL


(7-20) 





 


Creatinine


 11.5 mg/dL


(0.6-1.0) 


 11.9 mg/dL


(0.6-1.0) 





 


Estimated GFR


(Cockcroft-Gault) 3.4 


 


 3.2 


 





 


BUN/Creatinine Ratio 11 (6-20)    


 


Glucose Level


 274 mg/dL


(70-99) 


 135 mg/dL


(70-99) 





 


Calcium Level


 6.9 mg/dL


(8.5-10.1) 


 6.9 mg/dL


(8.5-10.1) 





 


Phosphorus Level


 9.8 mg/dL


(2.6-4.7) 


 


 





 


Magnesium Level


 2.6 mg/dL


(1.8-2.4) 


 


 





 


Total Bilirubin


 0.3 mg/dL


(0.2-1.0) 


 


 





 


Aspartate Amino Transf


(AST/SGOT) 9 U/L (15-37) 


 


 


 





 


Alanine Aminotransferase


(ALT/SGPT) 10 U/L (14-59) 


 


 


 





 


Alkaline Phosphatase


 107 U/L


() 


 


 





 


Total Protein


 7.5 g/dL


(6.4-8.2) 


 


 





 


Albumin


 3.1 g/dL


(3.4-5.0) 


 


 





 


Albumin/Globulin Ratio 0.7 (1.0-1.7)    


 


Glucose (Fingerstick)


 


 177 mg/dL


(70-99) 


 122 mg/dL


(70-99)


 


Test


 3/1/19


12:03 


 


 





 


Glucose (Fingerstick)


 107 mg/dL


(70-99) 


 


 














Assessment and Plan


Assessmemt and Plan


Problems


Medical Problems:


(1) Anemia


Status: Acute  





(2) Hyperkalemia


Status: Acute  











Comment


Review of Relevant


I have reviewed the following items lore (where applicable) has been applied.


Labs





Laboratory Tests








Test


 2/28/19


16:00 2/28/19


20:43 3/1/19


05:10 3/1/19


07:46


 


White Blood Count


 4.2 x10^3/uL


(4.0-11.0) 


 5.5 x10^3/uL


(4.0-11.0) 





 


Red Blood Count


 1.95 x10^6/uL


(3.50-5.40) 


 1.86 x10^6/uL


(3.50-5.40) 





 


Hemoglobin


 6.2 g/dL


(12.0-15.5) 


 6.0 g/dL


(12.0-15.5) 





 


Hematocrit


 18.8 %


(36.0-47.0) 


 18.0 %


(36.0-47.0) 





 


Mean Corpuscular Volume 96 fL ()   97 fL ()  


 


Mean Corpuscular Hemoglobin 32 pg (25-35)   32 pg (25-35)  


 


Mean Corpuscular Hemoglobin


Concent 33 g/dL


(31-37) 


 34 g/dL


(31-37) 





 


Red Cell Distribution Width


 14.3 %


(11.5-14.5) 


 14.4 %


(11.5-14.5) 





 


Platelet Count


 175 x10^3/uL


(140-400) 


 196 x10^3/uL


(140-400) 





 


Neutrophils (%) (Auto) 64 % (31-73)   70 % (31-73)  


 


Lymphocytes (%) (Auto) 20 % (24-48)   16 % (24-48)  


 


Monocytes (%) (Auto) 10 % (0-9)   8 % (0-9)  


 


Eosinophils (%) (Auto) 5 % (0-3)   5 % (0-3)  


 


Basophils (%) (Auto) 1 % (0-3)   1 % (0-3)  


 


Neutrophils # (Auto)


 2.7 x10^3uL


(1.8-7.7) 


 3.8 x10^3uL


(1.8-7.7) 





 


Lymphocytes # (Auto)


 0.8 x10^3/uL


(1.0-4.8) 


 0.9 x10^3/uL


(1.0-4.8) 





 


Monocytes # (Auto)


 0.4 x10^3/uL


(0.0-1.1) 


 0.4 x10^3/uL


(0.0-1.1) 





 


Eosinophils # (Auto)


 0.2 x10^3/uL


(0.0-0.7) 


 0.3 x10^3/uL


(0.0-0.7) 





 


Basophils # (Auto)


 0.0 x10^3/uL


(0.0-0.2) 


 0.0 x10^3/uL


(0.0-0.2) 





 


Prothrombin Time


 14.1 SEC


(11.7-14.0) 


 


 





 


Prothromb Time International


Ratio 1.1 (0.8-1.1) 


 


 


 





 


Sodium Level


 135 mmol/L


(136-145) 


 138 mmol/L


(136-145) 





 


Potassium Level


 5.9 mmol/L


(3.5-5.1) 


 5.5 mmol/L


(3.5-5.1) 





 


Chloride Level


 94 mmol/L


() 


 95 mmol/L


() 





 


Carbon Dioxide Level


 21 mmol/L


(21-32) 


 21 mmol/L


(21-32) 





 


Anion Gap 20 (6-14)   22 (6-14)  


 


Blood Urea Nitrogen


 121 mg/dL


(7-20) 


 127 mg/dL


(7-20) 





 


Creatinine


 11.5 mg/dL


(0.6-1.0) 


 11.9 mg/dL


(0.6-1.0) 





 


Estimated GFR


(Cockcroft-Gault) 3.4 


 


 3.2 


 





 


BUN/Creatinine Ratio 11 (6-20)    


 


Glucose Level


 274 mg/dL


(70-99) 


 135 mg/dL


(70-99) 





 


Calcium Level


 6.9 mg/dL


(8.5-10.1) 


 6.9 mg/dL


(8.5-10.1) 





 


Phosphorus Level


 9.8 mg/dL


(2.6-4.7) 


 


 





 


Magnesium Level


 2.6 mg/dL


(1.8-2.4) 


 


 





 


Total Bilirubin


 0.3 mg/dL


(0.2-1.0) 


 


 





 


Aspartate Amino Transf


(AST/SGOT) 9 U/L (15-37) 


 


 


 





 


Alanine Aminotransferase


(ALT/SGPT) 10 U/L (14-59) 


 


 


 





 


Alkaline Phosphatase


 107 U/L


() 


 


 





 


Total Protein


 7.5 g/dL


(6.4-8.2) 


 


 





 


Albumin


 3.1 g/dL


(3.4-5.0) 


 


 





 


Albumin/Globulin Ratio 0.7 (1.0-1.7)    


 


Glucose (Fingerstick)


 


 177 mg/dL


(70-99) 


 122 mg/dL


(70-99)


 


Test


 3/1/19


12:03 


 


 





 


Glucose (Fingerstick)


 107 mg/dL


(70-99) 


 


 











Laboratory Tests








Test


 2/28/19


16:00 2/28/19


20:43 3/1/19


05:10 3/1/19


07:46


 


White Blood Count


 4.2 x10^3/uL


(4.0-11.0) 


 5.5 x10^3/uL


(4.0-11.0) 





 


Red Blood Count


 1.95 x10^6/uL


(3.50-5.40) 


 1.86 x10^6/uL


(3.50-5.40) 





 


Hemoglobin


 6.2 g/dL


(12.0-15.5) 


 6.0 g/dL


(12.0-15.5) 





 


Hematocrit


 18.8 %


(36.0-47.0) 


 18.0 %


(36.0-47.0) 





 


Mean Corpuscular Volume 96 fL ()   97 fL ()  


 


Mean Corpuscular Hemoglobin 32 pg (25-35)   32 pg (25-35)  


 


Mean Corpuscular Hemoglobin


Concent 33 g/dL


(31-37) 


 34 g/dL


(31-37) 





 


Red Cell Distribution Width


 14.3 %


(11.5-14.5) 


 14.4 %


(11.5-14.5) 





 


Platelet Count


 175 x10^3/uL


(140-400) 


 196 x10^3/uL


(140-400) 





 


Neutrophils (%) (Auto) 64 % (31-73)   70 % (31-73)  


 


Lymphocytes (%) (Auto) 20 % (24-48)   16 % (24-48)  


 


Monocytes (%) (Auto) 10 % (0-9)   8 % (0-9)  


 


Eosinophils (%) (Auto) 5 % (0-3)   5 % (0-3)  


 


Basophils (%) (Auto) 1 % (0-3)   1 % (0-3)  


 


Neutrophils # (Auto)


 2.7 x10^3uL


(1.8-7.7) 


 3.8 x10^3uL


(1.8-7.7) 





 


Lymphocytes # (Auto)


 0.8 x10^3/uL


(1.0-4.8) 


 0.9 x10^3/uL


(1.0-4.8) 





 


Monocytes # (Auto)


 0.4 x10^3/uL


(0.0-1.1) 


 0.4 x10^3/uL


(0.0-1.1) 





 


Eosinophils # (Auto)


 0.2 x10^3/uL


(0.0-0.7) 


 0.3 x10^3/uL


(0.0-0.7) 





 


Basophils # (Auto)


 0.0 x10^3/uL


(0.0-0.2) 


 0.0 x10^3/uL


(0.0-0.2) 





 


Prothrombin Time


 14.1 SEC


(11.7-14.0) 


 


 





 


Prothromb Time International


Ratio 1.1 (0.8-1.1) 


 


 


 





 


Sodium Level


 135 mmol/L


(136-145) 


 138 mmol/L


(136-145) 





 


Potassium Level


 5.9 mmol/L


(3.5-5.1) 


 5.5 mmol/L


(3.5-5.1) 





 


Chloride Level


 94 mmol/L


() 


 95 mmol/L


() 





 


Carbon Dioxide Level


 21 mmol/L


(21-32) 


 21 mmol/L


(21-32) 





 


Anion Gap 20 (6-14)   22 (6-14)  


 


Blood Urea Nitrogen


 121 mg/dL


(7-20) 


 127 mg/dL


(7-20) 





 


Creatinine


 11.5 mg/dL


(0.6-1.0) 


 11.9 mg/dL


(0.6-1.0) 





 


Estimated GFR


(Cockcroft-Gault) 3.4 


 


 3.2 


 





 


BUN/Creatinine Ratio 11 (6-20)    


 


Glucose Level


 274 mg/dL


(70-99) 


 135 mg/dL


(70-99) 





 


Calcium Level


 6.9 mg/dL


(8.5-10.1) 


 6.9 mg/dL


(8.5-10.1) 





 


Phosphorus Level


 9.8 mg/dL


(2.6-4.7) 


 


 





 


Magnesium Level


 2.6 mg/dL


(1.8-2.4) 


 


 





 


Total Bilirubin


 0.3 mg/dL


(0.2-1.0) 


 


 





 


Aspartate Amino Transf


(AST/SGOT) 9 U/L (15-37) 


 


 


 





 


Alanine Aminotransferase


(ALT/SGPT) 10 U/L (14-59) 


 


 


 





 


Alkaline Phosphatase


 107 U/L


() 


 


 





 


Total Protein


 7.5 g/dL


(6.4-8.2) 


 


 





 


Albumin


 3.1 g/dL


(3.4-5.0) 


 


 





 


Albumin/Globulin Ratio 0.7 (1.0-1.7)    


 


Glucose (Fingerstick)


 


 177 mg/dL


(70-99) 


 122 mg/dL


(70-99)


 


Test


 3/1/19


12:03 


 


 





 


Glucose (Fingerstick)


 107 mg/dL


(70-99) 


 


 











Medications





Current Medications


Sodium Polystyrene Sulfonate (Kayexalate) 30 gm 1X  ONCE PO  Last administered 

on 2/28/19at 16:45;  Start 2/28/19 at 16:45;  Stop 2/28/19 at 16:46;  Status DC


Ondansetron HCl (Zofran) 4 mg PRN Q8HRS  PRN IV NAUSEA/VOMITING;  Start 2/28/19 

at 17:15;  Stop 3/1/19 at 17:14


Acetaminophen (Tylenol) 650 mg PRN Q4HRS  PRN PO FEVER;  Start 2/28/19 at 17:15

;  Stop 3/1/19 at 17:14


Pharmacy Consult (C.diff Med Screen By Rx) 1 each 1X  ONCE MC ;  Start 3/1/19 

at 09:00;  Stop 3/1/19 at 09:01;  Status UNV


Amlodipine Besylate (Norvasc) 5 mg DAILYWLUN PO ;  Start 3/1/19 at 12:00


Aspirin (Ecotrin) 81 mg DAILYWBKFT PO ;  Start 3/1/19 at 08:00


Atorvastatin Calcium (Lipitor) 80 mg QHS PO  Last administered on 2/28/19at 23:

27;  Start 2/28/19 at 23:00


Clonazepam (KlonoPIN) 0.5 mg PRN TID  PRN PO ANXIETY / AGITATION;  Start 2/28/ 19 at 22:45


Clopidogrel Bisulfate (Plavix) 75 mg DAILY PO ;  Start 3/1/19 at 09:00


Furosemide (Lasix) 40 mg DAILY PO ;  Start 3/1/19 at 09:00


Insulin Glargine (Lantus) 7 units QHS SQ  Last administered on 2/28/19at 23:30;

  Start 2/28/19 at 23:00


Insulin Human Lispro (HumaLOG) 10 units TIDWMEALS SQ ;  Start 3/1/19 at 08:00


Isosorbide Mononitrate (Imdur) 30 mg DAILY PO ;  Start 3/1/19 at 09:00


Lisinopril (Prinivil) 20 mg DAILY PO ;  Start 3/1/19 at 09:00


Carvedilol (Coreg) 12.5 mg BIDWMEALS PO ;  Start 3/1/19 at 08:00


Acetaminophen (Tylenol) 650 mg 1X PRN  PRN PO PRE-TRANSFUSION;  Start 2/28/19 

at 22:45;  Stop 2/28/19 at 23:24;  Status DC


Diphenhydramine HCl (Benadryl Oral Elixir) 12.5 mg 1X PRN  PRN PO PRE-

TRANSFUSION;  Start 2/28/19 at 22:45;  Stop 2/28/19 at 23:24;  Status DC


Dextrose (Dextrose 50%-Water Syringe) 12.5 gm PRN Q15MIN  PRN IV SEE COMMENTS;  

Start 3/1/19 at 02:45


Sodium Chloride 1,000 ml @  1,000 mls/hr Q1H PRN IV hypotension;  Start 3/1/19 

at 08:21;  Stop 3/1/19 at 14:20


Albumin Human 200 ml @  200 mls/hr 1X PRN  PRN IV Hypotension;  Start 3/1/19 at 

08:30;  Stop 3/1/19 at 14:29


Sodium Chloride (Normal Saline Flush) 10 ml 1X PRN  PRN IV AP catheter pack;  

Start 3/1/19 at 08:30;  Stop 3/2/19 at 08:29


Sodium Chloride (Normal Saline Flush) 10 ml 1X PRN  PRN IV  catheter pack;  

Start 3/1/19 at 08:30;  Stop 3/2/19 at 08:29


Sodium Chloride 1,000 ml @  400 mls/hr Q2H30M PRN IV PATENCY;  Start 3/1/19 at 

08:21;  Stop 3/1/19 at 20:20


Info (PHARMACY MONITORING -- do not chart) 1 each PRN DAILY  PRN MC SEE COMMENTS

;  Start 3/1/19 at 08:30;  Status UNV


Info (PHARMACY MONITORING -- do not chart) 1 each PRN DAILY  PRN MC SEE COMMENTS

;  Start 3/1/19 at 08:30


Darbepoetin Valeriano (Aranesp) 60 mcg WEEKLYHS SQ ;  Start 3/1/19 at 21:00





Active Scripts


Active


Isosorbide Mononitrate Er (Isosorbide Mononitrate) 30 Mg Tab.er.24h 30 Mg PO 

DAILY 30 Days


Furosemide 40 Mg Tablet 40 Mg PO DAILY 30 Days


Clonazepam 0.5 Mg Tablet 0.5 Mg PO TID PRN 30 Days


Humalog (Insulin Lispro) 100 Unit/1 Ml Insuln.pen 10 Units SQ TIDWMEALS 30 Days


Lantus Solostar (Insulin Glargine,Hum.rec.anlog) 100 Unit/1 Ml Insuln.pen 7 

Units SQ QHS 30 Days


Atorvastatin Calcium 40 Mg Tablet 80 Mg PO QHS 30 Days


Amlodipine Besylate 5 Mg Tablet 5 Mg PO DAILYWLUN 30 Days


Clopidogrel (Clopidogrel Bisulfate) 75 Mg Tablet 75 Mg PO DAILY 30 Days


Lisinopril 20 Mg Tablet 1 Tab PO DAILY 30 Days


Aspirin Ec (Aspirin) 81 Mg Tablet.dr 81 Mg PO DAILYWBKFT 30 Days


Reported


Carvedilol 25 Mg Tablet 20 Mg PO BIDWMEALS


Vitals/I & O





Vital Sign - Last 24 Hours








 2/28/19 2/28/19 2/28/19 2/28/19





 15:20 16:30 16:45 17:30


 


Temp 97.8  97.5 





 97.8  97.5 


 


Pulse 64 62 67 60


 


Resp 16 16 20 16


 


B/P (MAP) 147/70 (95) 146/69 (94) 147/73 (97) 147/64 (91)


 


Pulse Ox 99 100 95 98


 


O2 Delivery Room Air Room Air Room Air Room Air


 


    





    





 2/28/19 2/28/19 2/28/19 2/28/19





 18:30 19:00 20:00 23:00


 


Temp  97.5  97.5





  97.5  97.5


 


Pulse 62 66  68


 


Resp 16 20  20


 


B/P (MAP) 140/70 (93) 140/49 (79)  141/46 (77)


 


Pulse Ox 99 96  100


 


O2 Delivery Room Air Room Air Room Air Room Air


 


    





    





 3/1/19 3/1/19  





 03:00 07:00  


 


Temp 98.3 98.4  





 98.3 98.4  


 


Pulse 70 69  


 


Resp 20 18  


 


B/P (MAP) 154/50 (84) 140/44 (76)  


 


Pulse Ox 99 98  


 


O2 Delivery Room Air Room Air  














Intake and Output   


 


 2/28/19 2/28/19 3/1/19





 14:59 22:59 06:59


 


Intake Total  0 ml 420 ml


 


Balance  0 ml 420 ml

















LUCIANA SELLERS MD Mar 1, 2019 12:16

## 2019-03-01 NOTE — PDOC3
Discharge Summary


Date of Admission:  Feb 28, 2019


Date of Discharge:  Mar 1, 2019


Follow-Up:  3-5 days


Admitting Diagnosis comment:


discharge diagnosis==============








Assessment/Plan


IMPRESSION





1. ESRD ON DIALYSIS


2. NONCOMPLIANCE HIGH RISK OF COMPLICATIONS due to noncompliance  d/w DR CAMARILLO 3/

1


3. HX HTN


4. OBESITY


5. ACUTE VOLUME OVERLOAD


6. SEVERE ANEMIA





PLAN





ADMIT


DIALYSIS


 CONSULT NEPHROLOGY


HOME MEDS


TRANSFUSE 1 UNIT PRBC'S PT REFUSED AMA DUE TO [employer], [employer] 

WITNESS  [facility]


TELE





states she will keep dialysis appt monday, discussed in detail with daughter x 

25 min d/c planning 35 min





Vitals


Vitals





Vital Signs








  Date Time  Temp Pulse Resp B/P (MAP) Pulse Ox O2 Delivery O2 Flow Rate FiO2


 


3/1/19 07:00 98.4 69 18 140/44 (76) 98 Room Air  





 98.4       











Physical Exam


General:  Alert, Oriented X3, Cooperative, mild distress


Heart:  Regular rate, Normal S1, Normal S2


Lungs:  Crackles, Other


Abdomen:  Normal bowel sounds, Soft, No tenderness


Extremities:  No clubbing


Skin:  No breakdown


FINAL DIAGNOSIS


Problems


Medical Problems:


(1) Anemia


Status: Acute  





(2) Hyperkalemia


Status: Acute  








Brief Hospital Course


Ms. Ibrahim  is a 63 old [sex] who presented with [ ESRD VOLUME 

OVERLOAD ]


CONDITION AT DISCHARGE:  Improved


Discharge Medications





Current Medications


Sodium Polystyrene Sulfonate (Kayexalate) 30 gm 1X  ONCE PO  Last administered 

on 2/28/19at 16:45;  Start 2/28/19 at 16:45;  Stop 2/28/19 at 16:46;  Status DC


Ondansetron HCl (Zofran) 4 mg PRN Q8HRS  PRN IV NAUSEA/VOMITING;  Start 2/28/19 

at 17:15;  Stop 3/1/19 at 17:14


Acetaminophen (Tylenol) 650 mg PRN Q4HRS  PRN PO FEVER;  Start 2/28/19 at 17:15

;  Stop 3/1/19 at 17:14


Pharmacy Consult (C.diff Med Screen By Rx) 1 each 1X  ONCE MC ;  Start 3/1/19 

at 09:00;  Stop 3/1/19 at 09:01;  Status UNV


Amlodipine Besylate (Norvasc) 5 mg DAILYWLUN PO ;  Start 3/1/19 at 12:00


Aspirin (Ecotrin) 81 mg DAILYWBKFT PO ;  Start 3/1/19 at 08:00


Atorvastatin Calcium (Lipitor) 80 mg QHS PO  Last administered on 2/28/19at 23:

27;  Start 2/28/19 at 23:00


Clonazepam (KlonoPIN) 0.5 mg PRN TID  PRN PO ANXIETY / AGITATION;  Start 2/28/ 19 at 22:45


Clopidogrel Bisulfate (Plavix) 75 mg DAILY PO ;  Start 3/1/19 at 09:00


Furosemide (Lasix) 40 mg DAILY PO ;  Start 3/1/19 at 09:00


Insulin Glargine (Lantus) 7 units QHS SQ  Last administered on 2/28/19at 23:30;

  Start 2/28/19 at 23:00


Insulin Human Lispro (HumaLOG) 10 units TIDWMEALS SQ ;  Start 3/1/19 at 08:00


Isosorbide Mononitrate (Imdur) 30 mg DAILY PO ;  Start 3/1/19 at 09:00


Lisinopril (Prinivil) 20 mg DAILY PO ;  Start 3/1/19 at 09:00


Carvedilol (Coreg) 12.5 mg BIDWMEALS PO ;  Start 3/1/19 at 08:00


Acetaminophen (Tylenol) 650 mg 1X PRN  PRN PO PRE-TRANSFUSION;  Start 2/28/19 

at 22:45;  Stop 2/28/19 at 23:24;  Status DC


Diphenhydramine HCl (Benadryl Oral Elixir) 12.5 mg 1X PRN  PRN PO PRE-

TRANSFUSION;  Start 2/28/19 at 22:45;  Stop 2/28/19 at 23:24;  Status DC


Dextrose (Dextrose 50%-Water Syringe) 12.5 gm PRN Q15MIN  PRN IV SEE COMMENTS;  

Start 3/1/19 at 02:45


Sodium Chloride 1,000 ml @  1,000 mls/hr Q1H PRN IV hypotension;  Start 3/1/19 

at 08:21;  Stop 3/1/19 at 14:20;  Status DC


Albumin Human 200 ml @  200 mls/hr 1X PRN  PRN IV Hypotension;  Start 3/1/19 at 

08:30;  Stop 3/1/19 at 14:29;  Status DC


Sodium Chloride (Normal Saline Flush) 10 ml 1X PRN  PRN IV AP catheter pack;  

Start 3/1/19 at 08:30;  Stop 3/2/19 at 08:29


Sodium Chloride (Normal Saline Flush) 10 ml 1X PRN  PRN IV  catheter pack;  

Start 3/1/19 at 08:30;  Stop 3/2/19 at 08:29


Sodium Chloride 1,000 ml @  400 mls/hr Q2H30M PRN IV PATENCY;  Start 3/1/19 at 

08:21;  Stop 3/1/19 at 20:20


Info (PHARMACY MONITORING -- do not chart) 1 each PRN DAILY  PRN MC SEE COMMENTS

;  Start 3/1/19 at 08:30;  Status UNV


Info (PHARMACY MONITORING -- do not chart) 1 each PRN DAILY  PRN MC SEE COMMENTS

;  Start 3/1/19 at 08:30


Darbepoetin Valeriano (Aranesp) 60 mcg WEEKLYHS SQ ;  Start 3/1/19 at 21:00





Active Scripts


Active


Isosorbide Mononitrate Er (Isosorbide Mononitrate) 30 Mg Tab.er.24h 30 Mg PO 

DAILY 30 Days


Furosemide 40 Mg Tablet 40 Mg PO DAILY 30 Days


Clonazepam 0.5 Mg Tablet 0.5 Mg PO TID PRN 30 Days


Humalog (Insulin Lispro) 100 Unit/1 Ml Insuln.pen 10 Units SQ TIDWMEALS 30 Days


Lantus Solostar (Insulin Glargine,Hum.rec.anlog) 100 Unit/1 Ml Insuln.pen 7 

Units SQ QHS 30 Days


Atorvastatin Calcium 40 Mg Tablet 80 Mg PO QHS 30 Days


Amlodipine Besylate 5 Mg Tablet 5 Mg PO DAILYWLUN 30 Days


Clopidogrel (Clopidogrel Bisulfate) 75 Mg Tablet 75 Mg PO DAILY 30 Days


Lisinopril 20 Mg Tablet 1 Tab PO DAILY 30 Days


Aspirin Ec (Aspirin) 81 Mg Tablet.dr 81 Mg PO DAILYWBKFT 30 Days


Reported


Carvedilol 25 Mg Tablet 20 Mg PO BIDWMEALS


Vital Signs





Vital Signs








  Date Time  Temp Pulse Resp B/P (MAP) Pulse Ox O2 Delivery O2 Flow Rate FiO2


 


3/1/19 13:20 98.3 78 18 172/45 (87) 100 Room Air  





 98.3       








Labs





Laboratory Tests








Test


 2/28/19


16:00 2/28/19


20:43 3/1/19


05:10 3/1/19


07:46


 


White Blood Count


 4.2 x10^3/uL


(4.0-11.0) 


 5.5 x10^3/uL


(4.0-11.0) 





 


Red Blood Count


 1.95 x10^6/uL


(3.50-5.40) 


 1.86 x10^6/uL


(3.50-5.40) 





 


Hemoglobin


 6.2 g/dL


(12.0-15.5) 


 6.0 g/dL


(12.0-15.5) 





 


Hematocrit


 18.8 %


(36.0-47.0) 


 18.0 %


(36.0-47.0) 





 


Mean Corpuscular Volume 96 fL ()   97 fL ()  


 


Mean Corpuscular Hemoglobin 32 pg (25-35)   32 pg (25-35)  


 


Mean Corpuscular Hemoglobin


Concent 33 g/dL


(31-37) 


 34 g/dL


(31-37) 





 


Red Cell Distribution Width


 14.3 %


(11.5-14.5) 


 14.4 %


(11.5-14.5) 





 


Platelet Count


 175 x10^3/uL


(140-400) 


 196 x10^3/uL


(140-400) 





 


Neutrophils (%) (Auto) 64 % (31-73)   70 % (31-73)  


 


Lymphocytes (%) (Auto) 20 % (24-48)   16 % (24-48)  


 


Monocytes (%) (Auto) 10 % (0-9)   8 % (0-9)  


 


Eosinophils (%) (Auto) 5 % (0-3)   5 % (0-3)  


 


Basophils (%) (Auto) 1 % (0-3)   1 % (0-3)  


 


Neutrophils # (Auto)


 2.7 x10^3uL


(1.8-7.7) 


 3.8 x10^3uL


(1.8-7.7) 





 


Lymphocytes # (Auto)


 0.8 x10^3/uL


(1.0-4.8) 


 0.9 x10^3/uL


(1.0-4.8) 





 


Monocytes # (Auto)


 0.4 x10^3/uL


(0.0-1.1) 


 0.4 x10^3/uL


(0.0-1.1) 





 


Eosinophils # (Auto)


 0.2 x10^3/uL


(0.0-0.7) 


 0.3 x10^3/uL


(0.0-0.7) 





 


Basophils # (Auto)


 0.0 x10^3/uL


(0.0-0.2) 


 0.0 x10^3/uL


(0.0-0.2) 





 


Prothrombin Time


 14.1 SEC


(11.7-14.0) 


 


 





 


Prothromb Time International


Ratio 1.1 (0.8-1.1) 


 


 


 





 


Sodium Level


 135 mmol/L


(136-145) 


 138 mmol/L


(136-145) 





 


Potassium Level


 5.9 mmol/L


(3.5-5.1) 


 5.5 mmol/L


(3.5-5.1) 





 


Chloride Level


 94 mmol/L


() 


 95 mmol/L


() 





 


Carbon Dioxide Level


 21 mmol/L


(21-32) 


 21 mmol/L


(21-32) 





 


Anion Gap 20 (6-14)   22 (6-14)  


 


Blood Urea Nitrogen


 121 mg/dL


(7-20) 


 127 mg/dL


(7-20) 





 


Creatinine


 11.5 mg/dL


(0.6-1.0) 


 11.9 mg/dL


(0.6-1.0) 





 


Estimated GFR


(Cockcroft-Gault) 3.4 


 


 3.2 


 





 


BUN/Creatinine Ratio 11 (6-20)    


 


Glucose Level


 274 mg/dL


(70-99) 


 135 mg/dL


(70-99) 





 


Calcium Level


 6.9 mg/dL


(8.5-10.1) 


 6.9 mg/dL


(8.5-10.1) 





 


Phosphorus Level


 9.8 mg/dL


(2.6-4.7) 


 


 





 


Magnesium Level


 2.6 mg/dL


(1.8-2.4) 


 


 





 


Total Bilirubin


 0.3 mg/dL


(0.2-1.0) 


 


 





 


Aspartate Amino Transf


(AST/SGOT) 9 U/L (15-37) 


 


 


 





 


Alanine Aminotransferase


(ALT/SGPT) 10 U/L (14-59) 


 


 


 





 


Alkaline Phosphatase


 107 U/L


() 


 


 





 


Total Protein


 7.5 g/dL


(6.4-8.2) 


 


 





 


Albumin


 3.1 g/dL


(3.4-5.0) 


 


 





 


Albumin/Globulin Ratio 0.7 (1.0-1.7)    


 


Glucose (Fingerstick)


 


 177 mg/dL


(70-99) 


 122 mg/dL


(70-99)


 


Test


 3/1/19


12:03 


 


 





 


Glucose (Fingerstick)


 107 mg/dL


(70-99) 


 


 











Laboratory Tests








Test


 2/28/19


16:00 2/28/19


20:43 3/1/19


05:10 3/1/19


07:46


 


White Blood Count


 4.2 x10^3/uL


(4.0-11.0) 


 5.5 x10^3/uL


(4.0-11.0) 





 


Red Blood Count


 1.95 x10^6/uL


(3.50-5.40) 


 1.86 x10^6/uL


(3.50-5.40) 





 


Hemoglobin


 6.2 g/dL


(12.0-15.5) 


 6.0 g/dL


(12.0-15.5) 





 


Hematocrit


 18.8 %


(36.0-47.0) 


 18.0 %


(36.0-47.0) 





 


Mean Corpuscular Volume 96 fL ()   97 fL ()  


 


Mean Corpuscular Hemoglobin 32 pg (25-35)   32 pg (25-35)  


 


Mean Corpuscular Hemoglobin


Concent 33 g/dL


(31-37) 


 34 g/dL


(31-37) 





 


Red Cell Distribution Width


 14.3 %


(11.5-14.5) 


 14.4 %


(11.5-14.5) 





 


Platelet Count


 175 x10^3/uL


(140-400) 


 196 x10^3/uL


(140-400) 





 


Neutrophils (%) (Auto) 64 % (31-73)   70 % (31-73)  


 


Lymphocytes (%) (Auto) 20 % (24-48)   16 % (24-48)  


 


Monocytes (%) (Auto) 10 % (0-9)   8 % (0-9)  


 


Eosinophils (%) (Auto) 5 % (0-3)   5 % (0-3)  


 


Basophils (%) (Auto) 1 % (0-3)   1 % (0-3)  


 


Neutrophils # (Auto)


 2.7 x10^3uL


(1.8-7.7) 


 3.8 x10^3uL


(1.8-7.7) 





 


Lymphocytes # (Auto)


 0.8 x10^3/uL


(1.0-4.8) 


 0.9 x10^3/uL


(1.0-4.8) 





 


Monocytes # (Auto)


 0.4 x10^3/uL


(0.0-1.1) 


 0.4 x10^3/uL


(0.0-1.1) 





 


Eosinophils # (Auto)


 0.2 x10^3/uL


(0.0-0.7) 


 0.3 x10^3/uL


(0.0-0.7) 





 


Basophils # (Auto)


 0.0 x10^3/uL


(0.0-0.2) 


 0.0 x10^3/uL


(0.0-0.2) 





 


Prothrombin Time


 14.1 SEC


(11.7-14.0) 


 


 





 


Prothromb Time International


Ratio 1.1 (0.8-1.1) 


 


 


 





 


Sodium Level


 135 mmol/L


(136-145) 


 138 mmol/L


(136-145) 





 


Potassium Level


 5.9 mmol/L


(3.5-5.1) 


 5.5 mmol/L


(3.5-5.1) 





 


Chloride Level


 94 mmol/L


() 


 95 mmol/L


() 





 


Carbon Dioxide Level


 21 mmol/L


(21-32) 


 21 mmol/L


(21-32) 





 


Anion Gap 20 (6-14)   22 (6-14)  


 


Blood Urea Nitrogen


 121 mg/dL


(7-20) 


 127 mg/dL


(7-20) 





 


Creatinine


 11.5 mg/dL


(0.6-1.0) 


 11.9 mg/dL


(0.6-1.0) 





 


Estimated GFR


(Cockcroft-Gault) 3.4 


 


 3.2 


 





 


BUN/Creatinine Ratio 11 (6-20)    


 


Glucose Level


 274 mg/dL


(70-99) 


 135 mg/dL


(70-99) 





 


Calcium Level


 6.9 mg/dL


(8.5-10.1) 


 6.9 mg/dL


(8.5-10.1) 





 


Phosphorus Level


 9.8 mg/dL


(2.6-4.7) 


 


 





 


Magnesium Level


 2.6 mg/dL


(1.8-2.4) 


 


 





 


Total Bilirubin


 0.3 mg/dL


(0.2-1.0) 


 


 





 


Aspartate Amino Transf


(AST/SGOT) 9 U/L (15-37) 


 


 


 





 


Alanine Aminotransferase


(ALT/SGPT) 10 U/L (14-59) 


 


 


 





 


Alkaline Phosphatase


 107 U/L


() 


 


 





 


Total Protein


 7.5 g/dL


(6.4-8.2) 


 


 





 


Albumin


 3.1 g/dL


(3.4-5.0) 


 


 





 


Albumin/Globulin Ratio 0.7 (1.0-1.7)    


 


Glucose (Fingerstick)


 


 177 mg/dL


(70-99) 


 122 mg/dL


(70-99)


 


Test


 3/1/19


12:03 


 


 





 


Glucose (Fingerstick)


 107 mg/dL


(70-99) 


 


 











Allergies





 Allergies








Coded Allergies Type Severity Reaction Last Updated Verified


 


  No Known Drug Allergies    8/6/18 No








Disposition/Orders:  D/C to Home


Patient Instructions


D/C PLANNING 35 MIN











LUCIANA SELLERS MD Mar 1, 2019 15:00

## 2019-03-01 NOTE — NUR
Dr. Vazquez ordered 1 unit of PRBC to transfuse. RN educate pt again, and pt continue to 
refused d/t [employer] reason. Dr. Vazquez notified, orders cancelled. Will continue to 
monitor pt.

## 2019-03-01 NOTE — PDOC2
CONSULT


Date of Consult


Date of Consult


DATE: 3/1/19 


TIME: 11:12





Reason for Consult


Reason for Consult:


HIGH K





Referring Physician


Referring Physician:


VERITO





Identification/Chief Complaint


Chief Complaint


HAS NOT GONE TO DIALYSIS SINCE LAST MONDAY





Source


Source:  Chart review, Patient





History of Present Illness


Reason for Visit:


THIS IS A 63 YR OLD ESRD PT WHO IS NON COMPLIANT. HAS NOT BEEN HD IN OVER A 

WEEK. HAS ESRD DUE TO DM II AND HTN. LABS ARE C/W ESRD AND K OF 5.9 AND HGB OF 

6.0. SHE WILL NOT TAKE BLOOD PRODUCTS. SHE ALSO DOESN'T TAKE HER MEDS OR DO 

ANYTHING WE HAVE ASKED HER TO DO. COMPLAINTS OF WEAKNESS AND SOME SOB





Past Medical History


Cardiovascular:  CAD, CHF, HTN, Hyperlipidemia


Pulmonary:  No pertinent hx, Other


CENTRAL NERVOUS SYSTEM:  Other


GI:  GERD


Heme/Onc:  No pertinent hx


Hepatobiliary:  No pertinent hx


Psych:  No pertinent hx


Rheumatologic:  No pertinent hx


Infectious disease:  No pertinent hx


Renal/:  Chronic renal failure


Endocrine:  Diabetes, Hyperparathyroidism





Past Surgical History


Past Surgical History:  Other





Family History


Family History:  Heart Disease





Social History


No


ALCOHOL:  none


Drugs:  None


Lives:  with Family





Current Problem List


Problem List


Problems


Medical Problems:


(1) Anemia


Status: Acute  





(2) Hyperkalemia


Status: Acute  











Current Medications


Current Medications





Current Medications


Sodium Polystyrene Sulfonate (Kayexalate) 30 gm 1X  ONCE PO  Last administered 

on 2/28/19at 16:45;  Start 2/28/19 at 16:45;  Stop 2/28/19 at 16:46;  Status DC


Ondansetron HCl (Zofran) 4 mg PRN Q8HRS  PRN IV NAUSEA/VOMITING;  Start 2/28/19 

at 17:15;  Stop 3/1/19 at 17:14


Acetaminophen (Tylenol) 650 mg PRN Q4HRS  PRN PO FEVER;  Start 2/28/19 at 17:15

;  Stop 3/1/19 at 17:14


Pharmacy Consult (C.diff Med Screen By Rx) 1 each 1X  ONCE MC ;  Start 3/1/19 

at 09:00;  Stop 3/1/19 at 09:01;  Status UNV


Amlodipine Besylate (Norvasc) 5 mg DAILYWLUN PO ;  Start 3/1/19 at 12:00


Aspirin (Ecotrin) 81 mg DAILYWBKFT PO ;  Start 3/1/19 at 08:00


Atorvastatin Calcium (Lipitor) 80 mg QHS PO  Last administered on 2/28/19at 23:

27;  Start 2/28/19 at 23:00


Clonazepam (KlonoPIN) 0.5 mg PRN TID  PRN PO ANXIETY / AGITATION;  Start 2/28/ 19 at 22:45


Clopidogrel Bisulfate (Plavix) 75 mg DAILY PO ;  Start 3/1/19 at 09:00


Furosemide (Lasix) 40 mg DAILY PO ;  Start 3/1/19 at 09:00


Insulin Glargine (Lantus) 7 units QHS SQ  Last administered on 2/28/19at 23:30;

  Start 2/28/19 at 23:00


Insulin Human Lispro (HumaLOG) 10 units TIDWMEALS SQ ;  Start 3/1/19 at 08:00


Isosorbide Mononitrate (Imdur) 30 mg DAILY PO ;  Start 3/1/19 at 09:00


Lisinopril (Prinivil) 20 mg DAILY PO ;  Start 3/1/19 at 09:00


Carvedilol (Coreg) 12.5 mg BIDWMEALS PO ;  Start 3/1/19 at 08:00


Acetaminophen (Tylenol) 650 mg 1X PRN  PRN PO PRE-TRANSFUSION;  Start 2/28/19 

at 22:45;  Stop 2/28/19 at 23:24;  Status DC


Diphenhydramine HCl (Benadryl Oral Elixir) 12.5 mg 1X PRN  PRN PO PRE-

TRANSFUSION;  Start 2/28/19 at 22:45;  Stop 2/28/19 at 23:24;  Status DC


Dextrose (Dextrose 50%-Water Syringe) 12.5 gm PRN Q15MIN  PRN IV SEE COMMENTS;  

Start 3/1/19 at 02:45


Sodium Chloride 1,000 ml @  1,000 mls/hr Q1H PRN IV hypotension;  Start 3/1/19 

at 08:21;  Stop 3/1/19 at 14:20


Albumin Human 200 ml @  200 mls/hr 1X PRN  PRN IV Hypotension;  Start 3/1/19 at 

08:30;  Stop 3/1/19 at 14:29


Sodium Chloride (Normal Saline Flush) 10 ml 1X PRN  PRN IV AP catheter pack;  

Start 3/1/19 at 08:30;  Stop 3/2/19 at 08:29


Sodium Chloride (Normal Saline Flush) 10 ml 1X PRN  PRN IV  catheter pack;  

Start 3/1/19 at 08:30;  Stop 3/2/19 at 08:29


Sodium Chloride 1,000 ml @  400 mls/hr Q2H30M PRN IV PATENCY;  Start 3/1/19 at 

08:21;  Stop 3/1/19 at 20:20


Info (PHARMACY MONITORING -- do not chart) 1 each PRN DAILY  PRN MC SEE COMMENTS

;  Start 3/1/19 at 08:30;  Status UNV


Info (PHARMACY MONITORING -- do not chart) 1 each PRN DAILY  PRN MC SEE COMMENTS

;  Start 3/1/19 at 08:30





Active Scripts


Active


Isosorbide Mononitrate Er (Isosorbide Mononitrate) 30 Mg Tab.er.24h 30 Mg PO 

DAILY 30 Days


Furosemide 40 Mg Tablet 40 Mg PO DAILY 30 Days


Clonazepam 0.5 Mg Tablet 0.5 Mg PO TID PRN 30 Days


Humalog (Insulin Lispro) 100 Unit/1 Ml Insuln.pen 10 Units SQ TIDWMEALS 30 Days


Lantus Solostar (Insulin Glargine,Hum.rec.anlog) 100 Unit/1 Ml Insuln.pen 7 

Units SQ QHS 30 Days


Atorvastatin Calcium 40 Mg Tablet 80 Mg PO QHS 30 Days


Amlodipine Besylate 5 Mg Tablet 5 Mg PO DAILYWLUN 30 Days


Clopidogrel (Clopidogrel Bisulfate) 75 Mg Tablet 75 Mg PO DAILY 30 Days


Lisinopril 20 Mg Tablet 1 Tab PO DAILY 30 Days


Aspirin Ec (Aspirin) 81 Mg Tablet.dr 81 Mg PO DAILYWBKFT 30 Days


Reported


Carvedilol 25 Mg Tablet 20 Mg PO BIDWMEALS





Allergies


Allergies:  


Coded Allergies:  


     No Known Drug Allergies (Unverified , 8/6/18)





ROS


General:  YES: Fatigue, Malaise, Appetite


Eyes:  Yes Blurry vision


HEENT:  YES: Heacaches


ALLERGY AND IMMUNOLOGY:  YES: Seasonal Allergies


Respiratory:  YES: Cough, Shortness of breath


Cardiovascular:  yes Orthopnea


Gastrointestinal:  Yes Constipation


Genitourinary:  YES Other (NON OLIGURIC)


Musculoskeletal:  Yes Muscular Weakness


Neurological:  Yes Weakness


Skin:  Yes Dry Skin





Physical Exam


General:  Alert, Oriented X3, Cooperative, mild distress


HEENT:  Atraumatic, PERRLA, EOMI, Mucous membr. moist/pink


Lungs:  Other (DECREASED AT BASES)


Heart:  Regular rate, Normal S1, Normal S2


Abdomen:  Normal bowel sounds, Soft, No tenderness


Extremities:  No clubbing


Skin:  No breakdown


Neuro:  Normal speech, Cranial nerves 3-12 NL


Psych/Mental Status:  Mental status NL, Mood NL


MUSCULOSKELETAL:  No joint tenderness, No deformity, No swelling





Vitals


VITALS





Vital Signs








  Date Time  Temp Pulse Resp B/P (MAP) Pulse Ox O2 Delivery O2 Flow Rate FiO2


 


3/1/19 07:00 98.4 69 18 140/44 (76) 98 Room Air  





 98.4       











Labs


Labs





Laboratory Tests








Test


 2/28/19


16:00 2/28/19


20:43 3/1/19


05:10 3/1/19


07:46


 


White Blood Count


 4.2 x10^3/uL


(4.0-11.0) 


 5.5 x10^3/uL


(4.0-11.0) 





 


Red Blood Count


 1.95 x10^6/uL


(3.50-5.40) 


 1.86 x10^6/uL


(3.50-5.40) 





 


Hemoglobin


 6.2 g/dL


(12.0-15.5) 


 6.0 g/dL


(12.0-15.5) 





 


Hematocrit


 18.8 %


(36.0-47.0) 


 18.0 %


(36.0-47.0) 





 


Mean Corpuscular Volume 96 fL ()   97 fL ()  


 


Mean Corpuscular Hemoglobin 32 pg (25-35)   32 pg (25-35)  


 


Mean Corpuscular Hemoglobin


Concent 33 g/dL


(31-37) 


 34 g/dL


(31-37) 





 


Red Cell Distribution Width


 14.3 %


(11.5-14.5) 


 14.4 %


(11.5-14.5) 





 


Platelet Count


 175 x10^3/uL


(140-400) 


 196 x10^3/uL


(140-400) 





 


Neutrophils (%) (Auto) 64 % (31-73)   70 % (31-73)  


 


Lymphocytes (%) (Auto) 20 % (24-48)   16 % (24-48)  


 


Monocytes (%) (Auto) 10 % (0-9)   8 % (0-9)  


 


Eosinophils (%) (Auto) 5 % (0-3)   5 % (0-3)  


 


Basophils (%) (Auto) 1 % (0-3)   1 % (0-3)  


 


Neutrophils # (Auto)


 2.7 x10^3uL


(1.8-7.7) 


 3.8 x10^3uL


(1.8-7.7) 





 


Lymphocytes # (Auto)


 0.8 x10^3/uL


(1.0-4.8) 


 0.9 x10^3/uL


(1.0-4.8) 





 


Monocytes # (Auto)


 0.4 x10^3/uL


(0.0-1.1) 


 0.4 x10^3/uL


(0.0-1.1) 





 


Eosinophils # (Auto)


 0.2 x10^3/uL


(0.0-0.7) 


 0.3 x10^3/uL


(0.0-0.7) 





 


Basophils # (Auto)


 0.0 x10^3/uL


(0.0-0.2) 


 0.0 x10^3/uL


(0.0-0.2) 





 


Prothrombin Time


 14.1 SEC


(11.7-14.0) 


 


 





 


Prothromb Time International


Ratio 1.1 (0.8-1.1) 


 


 


 





 


Sodium Level


 135 mmol/L


(136-145) 


 138 mmol/L


(136-145) 





 


Potassium Level


 5.9 mmol/L


(3.5-5.1) 


 5.5 mmol/L


(3.5-5.1) 





 


Chloride Level


 94 mmol/L


() 


 95 mmol/L


() 





 


Carbon Dioxide Level


 21 mmol/L


(21-32) 


 21 mmol/L


(21-32) 





 


Anion Gap 20 (6-14)   22 (6-14)  


 


Blood Urea Nitrogen


 121 mg/dL


(7-20) 


 127 mg/dL


(7-20) 





 


Creatinine


 11.5 mg/dL


(0.6-1.0) 


 11.9 mg/dL


(0.6-1.0) 





 


Estimated GFR


(Cockcroft-Gault) 3.4 


 


 3.2 


 





 


BUN/Creatinine Ratio 11 (6-20)    


 


Glucose Level


 274 mg/dL


(70-99) 


 135 mg/dL


(70-99) 





 


Calcium Level


 6.9 mg/dL


(8.5-10.1) 


 6.9 mg/dL


(8.5-10.1) 





 


Phosphorus Level


 9.8 mg/dL


(2.6-4.7) 


 


 





 


Magnesium Level


 2.6 mg/dL


(1.8-2.4) 


 


 





 


Total Bilirubin


 0.3 mg/dL


(0.2-1.0) 


 


 





 


Aspartate Amino Transf


(AST/SGOT) 9 U/L (15-37) 


 


 


 





 


Alanine Aminotransferase


(ALT/SGPT) 10 U/L (14-59) 


 


 


 





 


Alkaline Phosphatase


 107 U/L


() 


 


 





 


Total Protein


 7.5 g/dL


(6.4-8.2) 


 


 





 


Albumin


 3.1 g/dL


(3.4-5.0) 


 


 





 


Albumin/Globulin Ratio 0.7 (1.0-1.7)    


 


Glucose (Fingerstick)


 


 177 mg/dL


(70-99) 


 122 mg/dL


(70-99)








Laboratory Tests








Test


 2/28/19


16:00 2/28/19


20:43 3/1/19


05:10 3/1/19


07:46


 


White Blood Count


 4.2 x10^3/uL


(4.0-11.0) 


 5.5 x10^3/uL


(4.0-11.0) 





 


Red Blood Count


 1.95 x10^6/uL


(3.50-5.40) 


 1.86 x10^6/uL


(3.50-5.40) 





 


Hemoglobin


 6.2 g/dL


(12.0-15.5) 


 6.0 g/dL


(12.0-15.5) 





 


Hematocrit


 18.8 %


(36.0-47.0) 


 18.0 %


(36.0-47.0) 





 


Mean Corpuscular Volume 96 fL ()   97 fL ()  


 


Mean Corpuscular Hemoglobin 32 pg (25-35)   32 pg (25-35)  


 


Mean Corpuscular Hemoglobin


Concent 33 g/dL


(31-37) 


 34 g/dL


(31-37) 





 


Red Cell Distribution Width


 14.3 %


(11.5-14.5) 


 14.4 %


(11.5-14.5) 





 


Platelet Count


 175 x10^3/uL


(140-400) 


 196 x10^3/uL


(140-400) 





 


Neutrophils (%) (Auto) 64 % (31-73)   70 % (31-73)  


 


Lymphocytes (%) (Auto) 20 % (24-48)   16 % (24-48)  


 


Monocytes (%) (Auto) 10 % (0-9)   8 % (0-9)  


 


Eosinophils (%) (Auto) 5 % (0-3)   5 % (0-3)  


 


Basophils (%) (Auto) 1 % (0-3)   1 % (0-3)  


 


Neutrophils # (Auto)


 2.7 x10^3uL


(1.8-7.7) 


 3.8 x10^3uL


(1.8-7.7) 





 


Lymphocytes # (Auto)


 0.8 x10^3/uL


(1.0-4.8) 


 0.9 x10^3/uL


(1.0-4.8) 





 


Monocytes # (Auto)


 0.4 x10^3/uL


(0.0-1.1) 


 0.4 x10^3/uL


(0.0-1.1) 





 


Eosinophils # (Auto)


 0.2 x10^3/uL


(0.0-0.7) 


 0.3 x10^3/uL


(0.0-0.7) 





 


Basophils # (Auto)


 0.0 x10^3/uL


(0.0-0.2) 


 0.0 x10^3/uL


(0.0-0.2) 





 


Prothrombin Time


 14.1 SEC


(11.7-14.0) 


 


 





 


Prothromb Time International


Ratio 1.1 (0.8-1.1) 


 


 


 





 


Sodium Level


 135 mmol/L


(136-145) 


 138 mmol/L


(136-145) 





 


Potassium Level


 5.9 mmol/L


(3.5-5.1) 


 5.5 mmol/L


(3.5-5.1) 





 


Chloride Level


 94 mmol/L


() 


 95 mmol/L


() 





 


Carbon Dioxide Level


 21 mmol/L


(21-32) 


 21 mmol/L


(21-32) 





 


Anion Gap 20 (6-14)   22 (6-14)  


 


Blood Urea Nitrogen


 121 mg/dL


(7-20) 


 127 mg/dL


(7-20) 





 


Creatinine


 11.5 mg/dL


(0.6-1.0) 


 11.9 mg/dL


(0.6-1.0) 





 


Estimated GFR


(Cockcroft-Gault) 3.4 


 


 3.2 


 





 


BUN/Creatinine Ratio 11 (6-20)    


 


Glucose Level


 274 mg/dL


(70-99) 


 135 mg/dL


(70-99) 





 


Calcium Level


 6.9 mg/dL


(8.5-10.1) 


 6.9 mg/dL


(8.5-10.1) 





 


Phosphorus Level


 9.8 mg/dL


(2.6-4.7) 


 


 





 


Magnesium Level


 2.6 mg/dL


(1.8-2.4) 


 


 





 


Total Bilirubin


 0.3 mg/dL


(0.2-1.0) 


 


 





 


Aspartate Amino Transf


(AST/SGOT) 9 U/L (15-37) 


 


 


 





 


Alanine Aminotransferase


(ALT/SGPT) 10 U/L (14-59) 


 


 


 





 


Alkaline Phosphatase


 107 U/L


() 


 


 





 


Total Protein


 7.5 g/dL


(6.4-8.2) 


 


 





 


Albumin


 3.1 g/dL


(3.4-5.0) 


 


 





 


Albumin/Globulin Ratio 0.7 (1.0-1.7)    


 


Glucose (Fingerstick)


 


 177 mg/dL


(70-99) 


 122 mg/dL


(70-99)











Assessment/Plan


Assessment/Plan


IMP





HYPERKALEMIA


ANEMIA


NON COMPLIANCE


ESRD


DM II


HTN





PLAN





ENC COMPLIANCE


ARANESP


PT WILL NOT ACCEPT BLOOD PRODUCTS


HD TODAY 


UF TO DW


OK TO D/C AFTER HD











JANE CAMARILLO MD Mar 1, 2019 11:18

## 2019-03-01 NOTE — EKG
[facility]

              [address] [place] [address]

Test Date:    2019               Test Time:    15:22:28

Pat Name:     GANGA Solerpartment:   

Patient ID:   [id number]           Room:         534 1

Gender:       F                        Technician:   SC

:          [id number]               Requested By: ANJALI GORMAN

Order Number: [id number]           Reading MD:   Won Brumfield MD

                                 Measurements

Intervals                              Axis          

Rate:         65                       P:            1

MO:           198                      QRS:          -9

QRSD:         96                       T:            134

QT:           488                                    

QTc:          508                                    

                           Interpretive Statements

SINUS RHYTHM

LEFTWARD AXIS

QRS(T) CONTOUR ABNORMALITY

CONSIDER ANTEROLATERAL MYOCARDIAL DAMAGE

PROLONGED QT

PROBABLE LVH



Electronically Signed On 3-5-2019 7:56:21 CST by Won Brumfield MD

## 2019-03-01 NOTE — NUR
Patient discharged home to daughter.  Daughter, patient have indicated they understand now 
how important it is to not miss any dialysis treatments.

## 2019-03-01 NOTE — PHYS DOC
Past Medical History


Past Medical History:  CHF, Diabetes-Type II, High Cholesterol, Hypertension, MI

, Renal Disease, Renal Failure, Other


Additional Past Medical Histor:  PSORASIS


Past Surgical History:  Angioplasty, Other


Additional Past Surgical Histo:  2L fluid removed from lungs, CARDIAC CATH/

STENTS,DIALYSIS CATH R CHEST


Alcohol Use:  None


Drug Use:  None





Adult General


Chief Complaint


Chief Complaint:  DIALYSIS PROBLEM





HPI


HPI





Patient is a 63  year old female who presents with a need for dialysis. The 

patient was supposed to have dialysis Monday and Wednesday. She did not go to 

those appointments. She was called by her nephrologist office yesterday and 

told to come to the emergency department. Her granddaughter stated that she was 

busy on Monday and Wednesday so her granddaughter was going to take her on 

Thursday Friday and Saturday to make up for 3 appointments. I tried to explain 

to her that dialysis is not a matter of just picking any 3 days of the week but 

I'm not sure that she understood the process.





Review of Systems


Review of Systems





Constitutional: Denies fever or chills []


Eyes: Denies change in visual acuity, redness, or eye pain []


HENT: Denies nasal congestion or sore throat []


Respiratory: Denies cough or shortness of breath []


Cardiovascular: No additional information not addressed in HPI []


GI: Denies abdominal pain, nausea, vomiting, bloody stools or diarrhea []


: Denies dysuria or hematuria []


Musculoskeletal: Denies back pain or joint pain []


Integument: Denies rash or skin lesions []


Neurologic: Denies headache, focal weakness or sensory changes []


Endocrine: Denies polyuria or polydipsia []





All other systems were reviewed and found to be within normal limits, except as 

documented in this note.





Allergies


Allergies





Allergies








Coded Allergies Type Severity Reaction Last Updated Verified


 


  No Known Drug Allergies    8/6/18 No











Physical Exam


Physical Exam





Constitutional: Well developed, well nourished, no acute distress, non-toxic 

appearance. []


Cardiovascular:Heart rate regular rhythm, no murmur []


Lungs & Thorax:  Bilateral breath sounds clear to auscultation []


Abdomen: Bowel sounds normal, soft, no tenderness, no masses, no pulsatile 

masses. [] 


Skin: Warm, dry, no erythema, no rash. [] 


Back: No tenderness, no CVA tenderness. [] 


Extremities: No tenderness, no cyanosis, no clubbing, ROM intact, no edema. [] 


Neurologic: Alert and oriented X 3, normal motor function, normal sensory 

function, no focal deficits noted. []


Psychologic: Affect normal, judgement normal, mood normal. []





Current Patient Data


Vital Signs





 Vital Signs








  Date Time  Temp Pulse Resp B/P (MAP) Pulse Ox O2 Delivery O2 Flow Rate FiO2


 


2/28/19 15:20 97.8 64 16 147/70 (95) 99 Room Air  





 97.8       








Lab Values





 Laboratory Tests








Test


 2/28/19


16:00


 


White Blood Count


 4.2 x10^3/uL


(4.0-11.0)


 


Red Blood Count


 1.95 x10^6/uL


(3.50-5.40)  L


 


Hemoglobin


 6.2 g/dL


(12.0-15.5)  *L


 


Hematocrit


 18.8 %


(36.0-47.0)  *L


 


Mean Corpuscular Volume


 96 fL ()





 


Mean Corpuscular Hemoglobin 32 pg (25-35)  


 


Mean Corpuscular Hemoglobin


Concent 33 g/dL


(31-37)


 


Red Cell Distribution Width


 14.3 %


(11.5-14.5)


 


Platelet Count


 175 x10^3/uL


(140-400)


 


Neutrophils (%) (Auto) 64 % (31-73)  


 


Lymphocytes (%) (Auto) 20 % (24-48)  L


 


Monocytes (%) (Auto) 10 % (0-9)  H


 


Eosinophils (%) (Auto) 5 % (0-3)  H


 


Basophils (%) (Auto) 1 % (0-3)  


 


Neutrophils # (Auto)


 2.7 x10^3uL


(1.8-7.7)


 


Lymphocytes # (Auto)


 0.8 x10^3/uL


(1.0-4.8)  L


 


Monocytes # (Auto)


 0.4 x10^3/uL


(0.0-1.1)


 


Eosinophils # (Auto)


 0.2 x10^3/uL


(0.0-0.7)


 


Basophils # (Auto)


 0.0 x10^3/uL


(0.0-0.2)


 


Prothrombin Time


 14.1 SEC


(11.7-14.0)  H


 


Prothrombin Time INR 1.1 (0.8-1.1)  


 


Sodium Level


 135 mmol/L


(136-145)  L


 


Potassium Level


 5.9 mmol/L


(3.5-5.1)  H


 


Chloride Level


 94 mmol/L


()  L


 


Carbon Dioxide Level


 21 mmol/L


(21-32)


 


Anion Gap 20 (6-14)  H


 


Blood Urea Nitrogen


 121 mg/dL


(7-20)  H


 


Creatinine


 11.5 mg/dL


(0.6-1.0)  H


 


Estimated GFR


(Cockcroft-Gault) 3.4  





 


BUN/Creatinine Ratio 11 (6-20)  


 


Glucose Level


 274 mg/dL


(70-99)  H


 


Calcium Level


 6.9 mg/dL


(8.5-10.1)  L


 


Phosphorus Level


 9.8 mg/dL


(2.6-4.7)  H


 


Magnesium Level


 2.6 mg/dL


(1.8-2.4)  H


 


Total Bilirubin


 0.3 mg/dL


(0.2-1.0)


 


Aspartate Amino Transferase


(AST) 9 U/L (15-37)


L


 


Alanine Aminotransferase (ALT)


 10 U/L (14-59)


L


 


Alkaline Phosphatase


 107 U/L


()


 


Total Protein


 7.5 g/dL


(6.4-8.2)


 


Albumin


 3.1 g/dL


(3.4-5.0)  L


 


Albumin/Globulin Ratio


 0.7 (1.0-1.7)


L





 Laboratory Tests


2/28/19 16:00








 Laboratory Tests


2/28/19 16:00














EKG


EKG


[]





Radiology/Procedures


Radiology/Procedures


[]





Course & Med Decision Making


Course & Med Decision Making


Pertinent Labs and Imaging studies reviewed. (See chart for details)





[]The patient is being admitted to Dr. Vazquez. Dr. Mcconnell has been consulted 

in the care of this patient and she will be dialyzed tomorrow. She was given 

Kayexalate in the emergency department.





Dragon Disclaimer


Dragon Disclaimer


This electronic medical record was generated, in whole or in part, using a 

voice recognition dictation system.





Departure


Departure


Impression:  


 Primary Impression:  


 Chronic renal failure


 Additional Impressions:  


 Hyperkalemia


 Anemia


Disposition:  09 ADMITTED AS INPATIENT


Condition:  GUARDED


Referrals:  


NO PCP (PCP)





Problem Qualifiers











ANJALI GORMAN Mar 1, 2019 00:49

## 2019-04-11 ENCOUNTER — HOSPITAL ENCOUNTER (INPATIENT)
Dept: HOSPITAL 61 - ER | Age: 64
LOS: 6 days | Discharge: HOME | DRG: 314 | End: 2019-04-17
Attending: FAMILY MEDICINE | Admitting: FAMILY MEDICINE
Payer: COMMERCIAL

## 2019-04-11 VITALS — HEIGHT: 64 IN | WEIGHT: 177.19 LBS | BODY MASS INDEX: 30.25 KG/M2

## 2019-04-11 VITALS — SYSTOLIC BLOOD PRESSURE: 155 MMHG | DIASTOLIC BLOOD PRESSURE: 63 MMHG

## 2019-04-11 VITALS — SYSTOLIC BLOOD PRESSURE: 171 MMHG | DIASTOLIC BLOOD PRESSURE: 55 MMHG

## 2019-04-11 DIAGNOSIS — Z82.49: ICD-10-CM
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
## 2019-04-11 DIAGNOSIS — E66.9: ICD-10-CM

## 2019-04-11 DIAGNOSIS — I25.2: ICD-10-CM

## 2019-04-11 DIAGNOSIS — K21.9: ICD-10-CM
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
## 2019-04-11 DIAGNOSIS — I13.2: ICD-10-CM

## 2019-04-11 DIAGNOSIS — E78.00: ICD-10-CM

## 2019-04-11 DIAGNOSIS — L40.9: ICD-10-CM

## 2019-04-11 DIAGNOSIS — E83.39: ICD-10-CM

## 2019-04-11 DIAGNOSIS — E78.5: ICD-10-CM

## 2019-04-11 DIAGNOSIS — E83.41: ICD-10-CM

## 2019-04-11 DIAGNOSIS — N18.6: ICD-10-CM

## 2019-04-11 DIAGNOSIS — E87.5: ICD-10-CM

## 2019-04-11 DIAGNOSIS — Z91.15: ICD-10-CM

## 2019-04-11 DIAGNOSIS — Z91.19: ICD-10-CM

## 2019-04-11 DIAGNOSIS — I16.0: ICD-10-CM

## 2019-04-11 DIAGNOSIS — Z99.2: ICD-10-CM

## 2019-04-11 DIAGNOSIS — D64.9: ICD-10-CM

## 2019-04-11 DIAGNOSIS — T82.49XA: Primary | ICD-10-CM

## 2019-04-11 DIAGNOSIS — I50.9: ICD-10-CM

## 2019-04-11 DIAGNOSIS — I25.10: ICD-10-CM

## 2019-04-11 DIAGNOSIS — E11.22: ICD-10-CM

## 2019-04-11 LAB
ALBUMIN SERPL-MCNC: 3.3 G/DL (ref 3.4–5)
ALBUMIN/GLOB SERPL: 0.7 {RATIO} (ref 1–1.7)
ALP SERPL-CCNC: 107 U/L (ref 46–116)
ALT SERPL-CCNC: 8 U/L (ref 14–59)
ANION GAP SERPL CALC-SCNC: 17 MMOL/L (ref 6–14)
AST SERPL-CCNC: 15 U/L (ref 15–37)
BASOPHILS # BLD AUTO: 0.1 X10^3/UL (ref 0–0.2)
BASOPHILS NFR BLD: 1 % (ref 0–3)
BILIRUB SERPL-MCNC: 0.4 MG/DL (ref 0.2–1)
BUN SERPL-MCNC: 90 MG/DL (ref 7–20)
BUN/CREAT SERPL: 7 (ref 6–20)
CALCIUM SERPL-MCNC: 8.3 MG/DL (ref 8.5–10.1)
CHLORIDE SERPL-SCNC: 100 MMOL/L (ref 98–107)
CO2 SERPL-SCNC: 19 MMOL/L (ref 21–32)
CREAT SERPL-MCNC: 12.3 MG/DL (ref 0.6–1)
EOSINOPHIL NFR BLD: 0.3 X10^3/UL (ref 0–0.7)
EOSINOPHIL NFR BLD: 6 % (ref 0–3)
ERYTHROCYTE [DISTWIDTH] IN BLOOD BY AUTOMATED COUNT: 15.3 % (ref 11.5–14.5)
GFR SERPLBLD BASED ON 1.73 SQ M-ARVRAT: 3.1 ML/MIN
GLOBULIN SER-MCNC: 4.5 G/DL (ref 2.2–3.8)
GLUCOSE SERPL-MCNC: 229 MG/DL (ref 70–99)
HCT VFR BLD CALC: 30.9 % (ref 36–47)
HGB BLD-MCNC: 10.2 G/DL (ref 12–15.5)
LYMPHOCYTES # BLD: 1.2 X10^3/UL (ref 1–4.8)
LYMPHOCYTES NFR BLD AUTO: 23 % (ref 24–48)
MAGNESIUM SERPL-MCNC: 2.9 MG/DL (ref 1.8–2.4)
MCH RBC QN AUTO: 33 PG (ref 25–35)
MCHC RBC AUTO-ENTMCNC: 33 G/DL (ref 31–37)
MCV RBC AUTO: 99 FL (ref 79–100)
MONO #: 0.4 X10^3/UL (ref 0–1.1)
MONOCYTES NFR BLD: 8 % (ref 0–9)
NEUT #: 3.2 X10^3UL (ref 1.8–7.7)
NEUTROPHILS NFR BLD AUTO: 61 % (ref 31–73)
PHOSPHATE SERPL-MCNC: 7.4 MG/DL (ref 2.6–4.7)
PLATELET # BLD AUTO: 195 X10^3/UL (ref 140–400)
POTASSIUM SERPL-SCNC: 7.1 MMOL/L (ref 3.5–5.1)
PROT SERPL-MCNC: 7.8 G/DL (ref 6.4–8.2)
RBC # BLD AUTO: 3.13 X10^6/UL (ref 3.5–5.4)
SODIUM SERPL-SCNC: 136 MMOL/L (ref 136–145)
WBC # BLD AUTO: 5.3 X10^3/UL (ref 4–11)

## 2019-04-11 PROCEDURE — 96375 TX/PRO/DX INJ NEW DRUG ADDON: CPT

## 2019-04-11 PROCEDURE — 99153 MOD SED SAME PHYS/QHP EA: CPT

## 2019-04-11 PROCEDURE — 80053 COMPREHEN METABOLIC PANEL: CPT

## 2019-04-11 PROCEDURE — 77001 FLUOROGUIDE FOR VEIN DEVICE: CPT

## 2019-04-11 PROCEDURE — 36581 REPLACE TUNNELED CV CATH: CPT

## 2019-04-11 PROCEDURE — 85610 PROTHROMBIN TIME: CPT

## 2019-04-11 PROCEDURE — 99152 MOD SED SAME PHYS/QHP 5/>YRS: CPT

## 2019-04-11 PROCEDURE — 99291 CRITICAL CARE FIRST HOUR: CPT

## 2019-04-11 PROCEDURE — 80069 RENAL FUNCTION PANEL: CPT

## 2019-04-11 PROCEDURE — 82962 GLUCOSE BLOOD TEST: CPT

## 2019-04-11 PROCEDURE — 5A1D70Z PERFORMANCE OF URINARY FILTRATION, INTERMITTENT, LESS THAN 6 HOURS PER DAY: ICD-10-PCS | Performed by: INTERNAL MEDICINE

## 2019-04-11 PROCEDURE — 85730 THROMBOPLASTIN TIME PARTIAL: CPT

## 2019-04-11 PROCEDURE — 85027 COMPLETE CBC AUTOMATED: CPT

## 2019-04-11 PROCEDURE — 96374 THER/PROPH/DIAG INJ IV PUSH: CPT

## 2019-04-11 PROCEDURE — 83735 ASSAY OF MAGNESIUM: CPT

## 2019-04-11 PROCEDURE — 84100 ASSAY OF PHOSPHORUS: CPT

## 2019-04-11 PROCEDURE — 94640 AIRWAY INHALATION TREATMENT: CPT

## 2019-04-11 PROCEDURE — 71046 X-RAY EXAM CHEST 2 VIEWS: CPT

## 2019-04-11 PROCEDURE — 36415 COLL VENOUS BLD VENIPUNCTURE: CPT

## 2019-04-11 PROCEDURE — C1769 GUIDE WIRE: HCPCS

## 2019-04-11 PROCEDURE — C1750 CATH, HEMODIALYSIS,LONG-TERM: HCPCS

## 2019-04-11 PROCEDURE — 93005 ELECTROCARDIOGRAM TRACING: CPT

## 2019-04-11 PROCEDURE — 80048 BASIC METABOLIC PNL TOTAL CA: CPT

## 2019-04-11 PROCEDURE — 85025 COMPLETE CBC W/AUTO DIFF WBC: CPT

## 2019-04-11 NOTE — RAD
Chest, PA and Lateral:

 

Technique:  PA and lateral views of the chest were obtained.

 

History:  Shortness of breath.

 

Comparison: 2/20/2019.

 

Findings:

 

 Low lung volumes and technique accentuates heart size and pulmonary 

vascularity.. Right internal jugular dialysis catheter is unchanged.. 

Minimal bibasilar lung airspace opacities. Minimal prominent interstitial 

lung markings.

 

Impression:

 

1. Minimal congestive changes. Minimal bibasilar lung airspace opacities 

likely atelectasis or infiltrates.. 

 

Electronically signed by: Bryn Ocasio MD (4/11/2019 4:38 PM) [facility]

## 2019-04-11 NOTE — PDOC1
History and Physical


Date of Admission


Date of Admission


DATE: 4/11/19 


TIME: 17:23





Identification/Chief Complaint


Chief Complaint





Identification/Chief Complaint


Chief Complaint


HAS NOT GONE TO DIALYSIS SINCE LAST MONDAY





Source


Source:  Chart review, Patient





History of Present Illness


Reason for Visit:


THIS IS A 63 YR OLD ESRD PT WHO IS NON COMPLIANT. HAS NOT BEEN HD IN OVER A 

WEEK. HAS ESRD DUE TO DM II AND HTN. LABS ARE C/W ESRD AND K OF 7.1 AND HGB OF 

10.2 SHE WILL NOT TAKE BLOOD PRODUCTS. SHE ALSO DOESN'T TAKE HER MEDS OR DO 

ANYTHING WE HAVE ASKED HER TO DO. COMPLAINTS OF WEAKNESS AND SOME SOB





Past Medical History


Cardiovascular:  CAD, CHF, HTN, Hyperlipidemia


Pulmonary:  No pertinent hx, Other


CENTRAL NERVOUS SYSTEM:  Other


GI:  GERD


Heme/Onc:  No pertinent hx


Hepatobiliary:  No pertinent hx


Psych:  No pertinent hx


Rheumatologic:  No pertinent hx


Infectious disease:  No pertinent hx


Renal/:  Chronic renal failure


Endocrine:  Diabetes, Hyperparathyroidism





Past Surgical History


Past Surgical History:  Other





Family History


Family History:  Heart Disease





Social History


No


ALCOHOL:  none


Drugs:  None


Lives:  with Family





Past Medical History


Cardiovascular:  CAD, CHF, HTN, Hyperlipidemia


Pulmonary:  No pertinent hx, Other


CENTRAL NERVOUS SYSTEM:  Other


GI:  GERD


Heme/Onc:  No pertinent hx


Hepatobiliary:  No pertinent hx


Psych:  No pertinent hx


Rheumatologic:  No pertinent hx


Infectious disease:  No pertinent hx


Renal/:  Chronic renal failure


Endocrine:  Diabetes, Hyperparathyroidism





Past Surgical History


Past Surgical History:  Other





Family History


Family History:  Heart Disease





Social History


Smoke:  No


ALCOHOL:  none


Drugs:  None





Current Problem List


Problem List


Problems


Medical Problems:


(1) Hypertensive urgency


Status: Acute  





(2) Missed dialysis


Status: Acute  





(3) Problem with dialysis access


Status: Acute  











Current Medications


Current Medications





Current Medications


Clonidine HCl (Catapres) 0.1 mg 1X  ONCE PO  Last administered on 4/11/19at 16:

50;  Start 4/11/19 at 16:15;  Stop 4/11/19 at 16:16;  Status DC


Albuterol Sulfate (Ventolin Neb Soln) 10 mg 1X  ONCE CONT NEB ;  Start 4/11/19 

at 17:15;  Stop 4/11/19 at 17:18;  Status DC


Dextrose (Dextrose 50%-Water Syringe) 25 gm 1X  ONCE IV ;  Start 4/11/19 at 17:

15;  Stop 4/11/19 at 17:18;  Status DC


Insulin Human Regular (HumuLIN R VIAL) 10 unit 1X  ONCE IV ;  Start 4/11/19 at 

17:15;  Stop 4/11/19 at 17:18;  Status DC


Sodium Bicarbonate (Sodium Bicarb Adult 8.4% Syr) 50 meq 1X  ONCE IV ;  Start 4/ 11/19 at 17:15;  Stop 4/11/19 at 17:18;  Status DC





Active Scripts


Active


Isosorbide Mononitrate Er (Isosorbide Mononitrate) 30 Mg Tab.er.24h 30 Mg PO 

DAILY 30 Days


Furosemide 40 Mg Tablet 40 Mg PO DAILY 30 Days


Clonazepam 0.5 Mg Tablet 0.5 Mg PO TID PRN 30 Days


Humalog (Insulin Lispro) 100 Unit/1 Ml Insuln.pen 10 Units SQ TIDWMEALS 30 Days


Lantus Solostar (Insulin Glargine,Hum.rec.anlog) 100 Unit/1 Ml Insuln.pen 7 

Units SQ QHS 30 Days


Atorvastatin Calcium 40 Mg Tablet 80 Mg PO QHS 30 Days


Amlodipine Besylate 5 Mg Tablet 5 Mg PO DAILYWLUN 30 Days


Clopidogrel (Clopidogrel Bisulfate) 75 Mg Tablet 75 Mg PO DAILY 30 Days


Lisinopril 20 Mg Tablet 1 Tab PO DAILY 30 Days


Aspirin Ec (Aspirin) 81 Mg Tablet.dr 81 Mg PO DAILYWBKFT 30 Days


Reported


Carvedilol 25 Mg Tablet 20 Mg PO BIDWMEALS





Allergies


Allergies:  


Coded Allergies:  


     No Known Drug Allergies (Unverified , 8/6/18)





ROS


Review of System


Review of Systems


Review of Systems





Constitutional: Denies fever or chills []


Eyes: Denies change in visual acuity, redness, or eye pain []


HENT: Denies nasal congestion or sore throat []


Respiratory: Denies cough, reports shortness of breath []


Cardiovascular: No additional information not addressed in HPI []


GI: Denies abdominal pain, nausea, vomiting, bloody stools or diarrhea []


: Denies dysuria or hematuria []


Musculoskeletal: Denies back pain or joint pain []


Integument: Denies rash or skin lesions []


Neurologic: Denies headache, focal weakness or sensory changes []


Endocrine: Denies polyuria or polydipsia []





14 PT  systems were reviewed and found to be within normal limits, except as 

documented


General:  YES: Fatigue, Malaise


PSYCHOLOGICAL ROS:  YES: Anxiety


ENDOCRINE:  No: Breast Changes, Galactorrhea, Hair Pattern Changes, Hot Flashes

, Malaise/lethargy, Mood Swings, Palpitations, Polydipsia/polyuria, Skin Changes

, Temperature Intolerance, Unexpected Weight Changes, Other


Breast:  No New/Changing Breast Lumps, No Nipple changes, No Nipple discharge, 

No Other


Respiratory:  YES: Shortness of breath, SOB with excertion; 


   No: Cough, Hemoptysis, Orthopnea, Pleuritic Pain, Sputum Changes, Stridor, 

Tachypnea, Wheezing, Other


Skin:  Yes Dry Skin





Physical Exam


Physical Exam





Physical Exam


Physical Exam





Constitutional: Well developed, well nourished, no acute distress, non-toxic 

appearance. []


HENT: Normocephalic, atraumatic


Eyes: PERRLA, EOMI, conjunctiva normal, no discharge. [] 


Neck: Normal range of motion, no tenderness, supple, no stridor. [] 


Cardiovascular:Heart rate regular rhythm, no murmur []


Lungs & Thorax: Mild  basilar rales


Abdomen: Bowel sounds normal, soft, no tenderness, no masses, no pulsatile 

masses. [] 


Skin: Warm, dry, no erythema, no rash. [] 


Back: No tenderness, no CVA tenderness. [] 


Extremities: No tenderness, no cyanosis, no clubbing, ROM intact, no edema. [] 


Neurologic: Alert and oriented X 3, normal motor function, normal sensory 

function, no focal deficits noted. []


Psychologic: Affect normal, judgement POOR, mood normal. []


General:  Alert, Oriented X3, Cooperative, mild distress


HEENT:  Atraumatic, EOMI


Heart:  RRR


Breasts:  Not examined


Abdomen:  Soft


Neuro:  Normal speech, Cranial nerves 3-12 NL


Psych/Mental Status:  Mood NL





Vitals


Vitals





Vital Signs








  Date Time  Temp Pulse Resp B/P (MAP) Pulse Ox O2 Delivery O2 Flow Rate FiO2


 


4/11/19 16:50  61  176/74    


 


4/11/19 16:39   18  99 Room Air  


 


4/11/19 15:56 97.4       





 97.4       











Labs


Labs





Laboratory Tests








Test


 4/11/19


16:20


 


White Blood Count


 5.3 x10^3/uL


(4.0-11.0)


 


Red Blood Count


 3.13 x10^6/uL


(3.50-5.40)


 


Hemoglobin


 10.2 g/dL


(12.0-15.5)


 


Hematocrit


 30.9 %


(36.0-47.0)


 


Mean Corpuscular Volume 99 fL () 


 


Mean Corpuscular Hemoglobin 33 pg (25-35) 


 


Mean Corpuscular Hemoglobin


Concent 33 g/dL


(31-37)


 


Red Cell Distribution Width


 15.3 %


(11.5-14.5)


 


Platelet Count


 195 x10^3/uL


(140-400)


 


Neutrophils (%) (Auto) 61 % (31-73) 


 


Lymphocytes (%) (Auto) 23 % (24-48) 


 


Monocytes (%) (Auto) 8 % (0-9) 


 


Eosinophils (%) (Auto) 6 % (0-3) 


 


Basophils (%) (Auto) 1 % (0-3) 


 


Neutrophils # (Auto)


 3.2 x10^3uL


(1.8-7.7)


 


Lymphocytes # (Auto)


 1.2 x10^3/uL


(1.0-4.8)


 


Monocytes # (Auto)


 0.4 x10^3/uL


(0.0-1.1)


 


Eosinophils # (Auto)


 0.3 x10^3/uL


(0.0-0.7)


 


Basophils # (Auto)


 0.1 x10^3/uL


(0.0-0.2)


 


Sodium Level


 136 mmol/L


(136-145)


 


Potassium Level


 7.1 mmol/L


(3.5-5.1)


 


Chloride Level


 100 mmol/L


()


 


Carbon Dioxide Level


 19 mmol/L


(21-32)


 


Anion Gap 17 (6-14) 


 


Blood Urea Nitrogen


 90 mg/dL


(7-20)


 


Creatinine


 12.3 mg/dL


(0.6-1.0)


 


Estimated GFR


(Cockcroft-Gault) 3.1 





 


BUN/Creatinine Ratio 7 (6-20) 


 


Glucose Level


 229 mg/dL


(70-99)


 


Calcium Level


 8.3 mg/dL


(8.5-10.1)


 


Phosphorus Level


 7.4 mg/dL


(2.6-4.7)


 


Magnesium Level


 2.9 mg/dL


(1.8-2.4)


 


Total Bilirubin


 0.4 mg/dL


(0.2-1.0)


 


Aspartate Amino Transf


(AST/SGOT) 15 U/L (15-37) 





 


Alanine Aminotransferase


(ALT/SGPT) 8 U/L (14-59) 





 


Alkaline Phosphatase


 107 U/L


()


 


Total Protein


 7.8 g/dL


(6.4-8.2)


 


Albumin


 3.3 g/dL


(3.4-5.0)


 


Albumin/Globulin Ratio 0.7 (1.0-1.7) 








Laboratory Tests








Test


 4/11/19


16:20


 


White Blood Count


 5.3 x10^3/uL


(4.0-11.0)


 


Red Blood Count


 3.13 x10^6/uL


(3.50-5.40)


 


Hemoglobin


 10.2 g/dL


(12.0-15.5)


 


Hematocrit


 30.9 %


(36.0-47.0)


 


Mean Corpuscular Volume 99 fL () 


 


Mean Corpuscular Hemoglobin 33 pg (25-35) 


 


Mean Corpuscular Hemoglobin


Concent 33 g/dL


(31-37)


 


Red Cell Distribution Width


 15.3 %


(11.5-14.5)


 


Platelet Count


 195 x10^3/uL


(140-400)


 


Neutrophils (%) (Auto) 61 % (31-73) 


 


Lymphocytes (%) (Auto) 23 % (24-48) 


 


Monocytes (%) (Auto) 8 % (0-9) 


 


Eosinophils (%) (Auto) 6 % (0-3) 


 


Basophils (%) (Auto) 1 % (0-3) 


 


Neutrophils # (Auto)


 3.2 x10^3uL


(1.8-7.7)


 


Lymphocytes # (Auto)


 1.2 x10^3/uL


(1.0-4.8)


 


Monocytes # (Auto)


 0.4 x10^3/uL


(0.0-1.1)


 


Eosinophils # (Auto)


 0.3 x10^3/uL


(0.0-0.7)


 


Basophils # (Auto)


 0.1 x10^3/uL


(0.0-0.2)


 


Sodium Level


 136 mmol/L


(136-145)


 


Potassium Level


 7.1 mmol/L


(3.5-5.1)


 


Chloride Level


 100 mmol/L


()


 


Carbon Dioxide Level


 19 mmol/L


(21-32)


 


Anion Gap 17 (6-14) 


 


Blood Urea Nitrogen


 90 mg/dL


(7-20)


 


Creatinine


 12.3 mg/dL


(0.6-1.0)


 


Estimated GFR


(Cockcroft-Gault) 3.1 





 


BUN/Creatinine Ratio 7 (6-20) 


 


Glucose Level


 229 mg/dL


(70-99)


 


Calcium Level


 8.3 mg/dL


(8.5-10.1)


 


Phosphorus Level


 7.4 mg/dL


(2.6-4.7)


 


Magnesium Level


 2.9 mg/dL


(1.8-2.4)


 


Total Bilirubin


 0.4 mg/dL


(0.2-1.0)


 


Aspartate Amino Transf


(AST/SGOT) 15 U/L (15-37) 





 


Alanine Aminotransferase


(ALT/SGPT) 8 U/L (14-59) 





 


Alkaline Phosphatase


 107 U/L


()


 


Total Protein


 7.8 g/dL


(6.4-8.2)


 


Albumin


 3.3 g/dL


(3.4-5.0)


 


Albumin/Globulin Ratio 0.7 (1.0-1.7) 











Images


Images


 


Chest, PA and Lateral:


 


Technique:  PA and lateral views of the chest were obtained.


 


History:  Shortness of breath.


 


Comparison: 2/20/2019.


 


Findings:


 


 Low lung volumes and technique accentuates heart size and pulmonary 


vascularity.. Right internal jugular dialysis catheter is unchanged.. 


Minimal bibasilar lung airspace opacities. Minimal prominent interstitial 


lung markings.


 


Impression:


 


1. Minimal congestive changes. Minimal bibasilar lung airspace opacities 


likely atelectasis or infiltrates.. 


 


Electronically signed by: Bryn Ocasio MD (4/11/2019 4:38 PM) [facility]





VTE Prophylaxis Ordered


VTE Prophylaxis Devices:  Yes


VTE Pharmacological Prophylaxi:  Yes





Assessment/Plan


Assessment/Plan





Assessment/Plan


IMPRESSION





1. ESRD ON DIALYSIS


2. NONCOMPLIANCE HIGH RISK OF COMPLICATIONS//death due to noncompliance  


3. HX HTN


4. OBESITY


5. ACUTE VOLUME OVERLOAD


6.  ANEMIA


7. Minimal bibasilar lung airspace opacities 


likely atelectasis or infiltrates.. 





PLAN





ICU BED


ADMIT


DIALYSIS emergent tonight


CONSULT NEPHROLOGY


HOME MEDS


[employer]  [employer] NO BLOOD PRODUCTS


[facility] EMERGENTLY


CONSULT DR DIAS IN ER 





43 MIN CC TIME











LUCIANA SELLERS MD Apr 11, 2019 17:23

## 2019-04-11 NOTE — PHYS DOC
Past Medical History


Past Medical History:  CHF, Diabetes-Type II, High Cholesterol, Hypertension, MI

, Renal Disease, Renal Failure, Other


Additional Past Medical Histor:  PSORASIS


Past Surgical History:  Angioplasty, Other


Additional Past Surgical Histo:  2L fluid removed from lungs, CARDIAC CATH/

STENTS,DIALYSIS CATH R CHEST


Alcohol Use:  None


Drug Use:  None





Adult General


Chief Complaint


Chief Complaint:  DIALYSIS PROBLEM





HPI


HPI





Patient is a 63  year old Kinyarwanda speaking who was seen by her daughter because 

of problem with dialysis catheter. Patient daughter states she was not able to 

have dialysis 4 days ago and was told to come to ER for changing the catheter 

but she was not able to bring her sooner. Patient complaining of shortness of 

breath that getting worse with supine position and denies nausea and vomiting, 

diarrhea and constipation, chest pain, fever and chills. Patient's last 

dialysis was one week ago.





Review of Systems


Review of Systems





Constitutional: Denies fever or chills []


Eyes: Denies change in visual acuity, redness, or eye pain []


HENT: Denies nasal congestion or sore throat []


Respiratory: Denies cough, reports shortness of breath []


Cardiovascular: No additional information not addressed in HPI []


GI: Denies abdominal pain, nausea, vomiting, bloody stools or diarrhea []


: Denies dysuria or hematuria []


Musculoskeletal: Denies back pain or joint pain []


Integument: Denies rash or skin lesions []


Neurologic: Denies headache, focal weakness or sensory changes []


Endocrine: Denies polyuria or polydipsia []





All other systems were reviewed and found to be within normal limits, except as 

documented in this note.





Current Medications


Current Medications





Current Medications








 Medications


  (Trade)  Dose


 Ordered  Sig/Gume  Start Time


 Stop Time Status Last Admin


Dose Admin


 


 Albuterol Sulfate


  (Ventolin Neb


 Soln)  10 mg  1X  ONCE  19 17:15


 19 17:18 DC  





 


 Clonidine HCl


  (Catapres)  0.1 mg  1X  ONCE  19 16:15


 19 16:16 DC 19 16:50


0.1 MG


 


 Dextrose


  (Dextrose


 50%-Water Syringe)  25 gm  1X  ONCE  19 17:15


 19 17:18 DC  





 


 Insulin Human


 Regular


  (HumuLIN R VIAL)  10 unit  1X  ONCE  19 17:15


 19 17:18 DC  





 


 Sodium Bicarbonate


  (Sodium Bicarb


 Adult 8.4% Syr)  50 meq  1X  ONCE  19 17:15


 19 17:18 DC  














Allergies


Allergies





Allergies








Coded Allergies Type Severity Reaction Last Updated Verified


 


  No Known Drug Allergies    18 No











Physical Exam


Physical Exam





Constitutional: Well developed, well nourished, no acute distress, non-toxic 

appearance. []


HENT: Normocephalic, atraumatic


Eyes: PERRLA, EOMI, conjunctiva normal, no discharge. [] 


Neck: Normal range of motion, no tenderness, supple, no stridor. [] 


Cardiovascular:Heart rate regular rhythm, no murmur []


Lungs & Thorax: Mild  basilar rales


Abdomen: Bowel sounds normal, soft, no tenderness, no masses, no pulsatile 

masses. [] 


Skin: Warm, dry, no erythema, no rash. [] 


Back: No tenderness, no CVA tenderness. [] 


Extremities: No tenderness, no cyanosis, no clubbing, ROM intact, no edema. [] 


Neurologic: Alert and oriented X 3, normal motor function, normal sensory 

function, no focal deficits noted. []


Psychologic: Affect normal, judgement normal, mood normal. []





Current Patient Data


Vital Signs





 Vital Signs








  Date Time  Temp Pulse Resp B/P (MAP) Pulse Ox O2 Delivery O2 Flow Rate FiO2


 


19 16:50  61  176/74    


 


19 16:39   18  99 Room Air  


 


19 15:56 97.4       





 97.4       








Lab Values





 Laboratory Tests








Test


 19


16:20


 


White Blood Count


 5.3 x10^3/uL


(4.0-11.0)


 


Red Blood Count


 3.13 x10^6/uL


(3.50-5.40)  L


 


Hemoglobin


 10.2 g/dL


(12.0-15.5)  L


 


Hematocrit


 30.9 %


(36.0-47.0)  L


 


Mean Corpuscular Volume


 99 fL ()





 


Mean Corpuscular Hemoglobin 33 pg (25-35)  


 


Mean Corpuscular Hemoglobin


Concent 33 g/dL


(31-37)


 


Red Cell Distribution Width


 15.3 %


(11.5-14.5)  H


 


Platelet Count


 195 x10^3/uL


(140-400)


 


Neutrophils (%) (Auto) 61 % (31-73)  


 


Lymphocytes (%) (Auto) 23 % (24-48)  L


 


Monocytes (%) (Auto) 8 % (0-9)  


 


Eosinophils (%) (Auto) 6 % (0-3)  H


 


Basophils (%) (Auto) 1 % (0-3)  


 


Neutrophils # (Auto)


 3.2 x10^3uL


(1.8-7.7)


 


Lymphocytes # (Auto)


 1.2 x10^3/uL


(1.0-4.8)


 


Monocytes # (Auto)


 0.4 x10^3/uL


(0.0-1.1)


 


Eosinophils # (Auto)


 0.3 x10^3/uL


(0.0-0.7)


 


Basophils # (Auto)


 0.1 x10^3/uL


(0.0-0.2)


 


Sodium Level


 136 mmol/L


(136-145)


 


Potassium Level


 7.1 mmol/L


(3.5-5.1)  *H


 


Chloride Level


 100 mmol/L


()


 


Carbon Dioxide Level


 19 mmol/L


(21-32)  L


 


Anion Gap 17 (6-14)  H


 


Blood Urea Nitrogen


 90 mg/dL


(7-20)  H


 


Creatinine


 12.3 mg/dL


(0.6-1.0)  H


 


Estimated GFR


(Cockcroft-Gault) 3.1  





 


BUN/Creatinine Ratio 7 (6-20)  


 


Glucose Level


 229 mg/dL


(70-99)  H


 


Calcium Level


 8.3 mg/dL


(8.5-10.1)  L


 


Phosphorus Level


 7.4 mg/dL


(2.6-4.7)  H


 


Magnesium Level


 2.9 mg/dL


(1.8-2.4)  H


 


Total Bilirubin


 0.4 mg/dL


(0.2-1.0)


 


Aspartate Amino Transferase


(AST) 15 U/L (15-37)





 


Alanine Aminotransferase (ALT)


 8 U/L (14-59)


L


 


Alkaline Phosphatase


 107 U/L


()


 


Total Protein


 7.8 g/dL


(6.4-8.2)


 


Albumin


 3.3 g/dL


(3.4-5.0)  L


 


Albumin/Globulin Ratio


 0.7 (1.0-1.7)


L





 Laboratory Tests


19 16:20








 Laboratory Tests


19 16:20











EKG


EKG


EKG interpreted by me. EKG at 1648 showed normal sinus rhythm at rate of 62, 

corrected QT of 459, left axis, T-wave abnormality and high lateral leads, no 

acute ST and T-wave abnormalities. No hyperacute T waves.





Radiology/Procedures


Radiology/Procedures


[facility]


 [address] [place] [address]


 [phone]


 


 IMAGING REPORT





 Signed





PATIENT: GANGA FAROOQ ACCOUNT: [id number] MRN#: [id number]


: 1955 LOCATION: ER AGE: 63


SEX: F EXAM DT: 19 ACCESSION#: [id number]


STATUS: REG ER ORD. PHYSICIAN: DANDRE CUNNINGHAM MD 


REASON: shortness of breath


PROCEDURE: CHEST PA & LATERAL





 


Chest, PA and Lateral:


 


Technique:  PA and lateral views of the chest were obtained.


 


History:  Shortness of breath.


 


Comparison: 2019.


 


Findings:


 


 Low lung volumes and technique accentuates heart size and pulmonary 


vascularity.. Right internal jugular dialysis catheter is unchanged.. 


Minimal bibasilar lung airspace opacities. Minimal prominent interstitial 


lung markings.


 


Impression:


 


1. Minimal congestive changes. Minimal bibasilar lung airspace opacities 


likely atelectasis or infiltrates.. 


 


Electronically signed by: Bryn Ocasio MD (2019 4:38 PM) [facility]














DICTATED and SIGNED BY:     BRYN OCASIO MD


DATE:     19





Course & Med Decision Making


Course & Med Decision Making


Pertinent Labs and Imaging studies reviewed. (See chart for details)





Evaluation of the patient in ER showed 63-year-old female patient who missed 

her dialysis because of problem with dialysis access. Patient was alert and 

oriented in no distress. Blood pressure was 195 and the patient.treated with 

oral clonidine. On-call nephrologist Dr. Gardner was consulted at 1627 and 1737 

regarding abnormal potassium of 7.1 and recommended emergency dialysis. Plan to 

consult on-call interventional radiologist for dialysis access placement.


Patient requiring admission for further evaluation and treatment. Discussed 

with Dr. Vazquez  who is in agreement with admission. Discussed findings and 

plan with patient and family, who acknowledge understanding and agreement.





Dragon Disclaimer


Dragon Disclaimer


This electronic medical record was generated, in whole or in part, using a 

voice recognition dictation system.





Departure


Departure


Impression:  


 Primary Impression:  


 Problem with dialysis access


 Additional Impressions:  


 Missed dialysis


 Hypertensive urgency


 Hyperkalemia


 Hypermagnesemia


 Hyperphosphatemia


 Uncontrolled diabetes mellitus


Disposition:   ADMITTED AS INPATIENT


Admitting Physician:  Harry Welch (accepted admission at 1724)


Condition:  GUARDED


Referrals:  


NO PCP (PCP)





Critical Care Time


 Critical care time was 60 minutes exclusive of procedures.





Problem Qualifiers








 Primary Impression:  


 Problem with dialysis access


 Encounter type:  initial encounter  Qualified Codes:  T82.898A - Other 

specified complication of vascular prosthetic devices, implants and grafts, 

initial encounter


 Additional Impressions:  


 Uncontrolled diabetes mellitus


 Diabetes mellitus type:  other specified (including LANDY)  Glycemic state:  

with hyperglycemia  Qualified Codes:  E13.65 - Other specified diabetes 

mellitus with hyperglycemia








DANDRE CUNNINGHAM MD 2019 16:17

## 2019-04-11 NOTE — EKG
[facility]

              [address] [place] [address]

Test Date:    2019               Test Time:    16:48:27

Pat Name:     GANGA FAROOQDepartment:   

Patient ID:   [id number]           Room:         [facility]

Gender:       F                        Technician:   [facility] ER

:          1955               Requested By: DANDRE CUNNINGHAM

Order Number: [id number]           Reading MD:   Won Brumfield MD

                                 Measurements

Intervals                              Axis          

Rate:         62                       P:            37

MN:           180                      QRS:          -6

QRSD:         92                       T:            116

QT:           450                                    

QTc:          459                                    

                           Interpretive Statements

SINUS RHYTHM

LVH WITH LATERAL TWI, CONSIDER ISCHEMIA BASED ON CLINICAL SCENARIO

Electronically Signed On 2019 10:58:29 CDT by Won Brumfield MD

## 2019-04-12 VITALS — SYSTOLIC BLOOD PRESSURE: 136 MMHG | DIASTOLIC BLOOD PRESSURE: 46 MMHG

## 2019-04-12 VITALS — SYSTOLIC BLOOD PRESSURE: 162 MMHG | DIASTOLIC BLOOD PRESSURE: 60 MMHG

## 2019-04-12 VITALS — DIASTOLIC BLOOD PRESSURE: 64 MMHG | SYSTOLIC BLOOD PRESSURE: 156 MMHG

## 2019-04-12 VITALS — DIASTOLIC BLOOD PRESSURE: 63 MMHG | SYSTOLIC BLOOD PRESSURE: 145 MMHG

## 2019-04-12 VITALS — DIASTOLIC BLOOD PRESSURE: 57 MMHG | SYSTOLIC BLOOD PRESSURE: 140 MMHG

## 2019-04-12 VITALS — DIASTOLIC BLOOD PRESSURE: 66 MMHG | SYSTOLIC BLOOD PRESSURE: 180 MMHG

## 2019-04-12 LAB
ALBUMIN SERPL-MCNC: 3.2 G/DL (ref 3.4–5)
ALBUMIN SERPL-MCNC: 3.5 G/DL (ref 3.4–5)
ANION GAP SERPL CALC-SCNC: 11 MMOL/L (ref 6–14)
ANION GAP SERPL CALC-SCNC: 12 MMOL/L (ref 6–14)
APTT BLD: 35 SEC (ref 24–38)
BASOPHILS # BLD AUTO: 0 X10^3/UL (ref 0–0.2)
BASOPHILS NFR BLD: 1 % (ref 0–3)
BUN SERPL-MCNC: 29 MG/DL (ref 7–20)
BUN SERPL-MCNC: 36 MG/DL (ref 7–20)
CALCIUM SERPL-MCNC: 8.2 MG/DL (ref 8.5–10.1)
CALCIUM SERPL-MCNC: 8.3 MG/DL (ref 8.5–10.1)
CHLORIDE SERPL-SCNC: 99 MMOL/L (ref 98–107)
CHLORIDE SERPL-SCNC: 99 MMOL/L (ref 98–107)
CO2 SERPL-SCNC: 27 MMOL/L (ref 21–32)
CO2 SERPL-SCNC: 29 MMOL/L (ref 21–32)
CREAT SERPL-MCNC: 5.1 MG/DL (ref 0.6–1)
CREAT SERPL-MCNC: 6.4 MG/DL (ref 0.6–1)
EOSINOPHIL NFR BLD: 0.2 X10^3/UL (ref 0–0.7)
EOSINOPHIL NFR BLD: 4 % (ref 0–3)
ERYTHROCYTE [DISTWIDTH] IN BLOOD BY AUTOMATED COUNT: 15 % (ref 11.5–14.5)
GFR SERPLBLD BASED ON 1.73 SQ M-ARVRAT: 6.6 ML/MIN
GFR SERPLBLD BASED ON 1.73 SQ M-ARVRAT: 8.6 ML/MIN
GLUCOSE SERPL-MCNC: 115 MG/DL (ref 70–99)
GLUCOSE SERPL-MCNC: 201 MG/DL (ref 70–99)
HCT VFR BLD CALC: 31.3 % (ref 36–47)
HGB BLD-MCNC: 10.2 G/DL (ref 12–15.5)
LYMPHOCYTES # BLD: 1 X10^3/UL (ref 1–4.8)
LYMPHOCYTES NFR BLD AUTO: 20 % (ref 24–48)
MCH RBC QN AUTO: 32 PG (ref 25–35)
MCHC RBC AUTO-ENTMCNC: 33 G/DL (ref 31–37)
MCV RBC AUTO: 97 FL (ref 79–100)
MONO #: 0.4 X10^3/UL (ref 0–1.1)
MONOCYTES NFR BLD: 9 % (ref 0–9)
NEUT #: 3.2 X10^3UL (ref 1.8–7.7)
NEUTROPHILS NFR BLD AUTO: 66 % (ref 31–73)
PHOSPHATE SERPL-MCNC: 3.5 MG/DL (ref 2.6–4.7)
PHOSPHATE SERPL-MCNC: 4.9 MG/DL (ref 2.6–4.7)
PLATELET # BLD AUTO: 164 X10^3/UL (ref 140–400)
POTASSIUM SERPL-SCNC: 3.7 MMOL/L (ref 3.5–5.1)
POTASSIUM SERPL-SCNC: 4.1 MMOL/L (ref 3.5–5.1)
PROTHROMBIN TIME: 13.1 SEC (ref 11.7–14)
RBC # BLD AUTO: 3.22 X10^6/UL (ref 3.5–5.4)
SODIUM SERPL-SCNC: 138 MMOL/L (ref 136–145)
SODIUM SERPL-SCNC: 139 MMOL/L (ref 136–145)
WBC # BLD AUTO: 4.9 X10^3/UL (ref 4–11)

## 2019-04-12 PROCEDURE — 5A1D70Z PERFORMANCE OF URINARY FILTRATION, INTERMITTENT, LESS THAN 6 HOURS PER DAY: ICD-10-PCS | Performed by: INTERNAL MEDICINE

## 2019-04-12 PROCEDURE — 0J2TXYZ CHANGE OTHER DEVICE IN TRUNK SUBCUTANEOUS TISSUE AND FASCIA, EXTERNAL APPROACH: ICD-10-PCS | Performed by: RADIOLOGY

## 2019-04-12 NOTE — PDOC
PROGRESS NOTES


History of Present Illness


History of Present Illness





Assessment/Plan


Assessment/Plan





Assessment/Plan


IMPRESSION





1. ESRD ON DIALYSIS


2. NONCOMPLIANCE    remains at HIGH RISK OF COMPLICATIONS//death due to 

noncompliance  with dialysis


3. HX HTN


4. OBESITY


5. ACUTE VOLUME OVERLOAD


6.  ANEMIA


7. Minimal bibasilar lung airspace opacities 


likely atelectasis or infiltrates.. 





PLAN





tele BED


ADMIT


DIALYSIS emergent 4/11


CONSULT NEPHROLOGY


HOME MEDS


[facility]  [employer] NO BLOOD PRODUCTS


LOWER K EMERGENTLY


CONSULT DR DIAS





Vitals


Vitals





Vital Signs








  Date Time  Temp Pulse Resp B/P (MAP) Pulse Ox O2 Delivery O2 Flow Rate FiO2


 


4/12/19 07:00 98.0 66 18 156/61 (92) 99 Room Air  





 98.0       











Physical Exam


General:  Alert, Oriented X3, Cooperative, No acute distress, mild distress


Heart:  Regular rate, Normal S1


Lungs:  Crackles, Other


Abdomen:  Soft


Extremities:  No clubbing, No cyanosis


Skin:  No significant lesion





Labs


LABS





Laboratory Tests








Test


 4/11/19


16:20 4/12/19


00:05 4/12/19


04:30 4/12/19


08:09


 


White Blood Count


 5.3 x10^3/uL


(4.0-11.0) 


 4.9 x10^3/uL


(4.0-11.0) 





 


Red Blood Count


 3.13 x10^6/uL


(3.50-5.40) 


 3.22 x10^6/uL


(3.50-5.40) 





 


Hemoglobin


 10.2 g/dL


(12.0-15.5) 


 10.2 g/dL


(12.0-15.5) 





 


Hematocrit


 30.9 %


(36.0-47.0) 


 31.3 %


(36.0-47.0) 





 


Mean Corpuscular Volume 99 fL ()   97 fL ()  


 


Mean Corpuscular Hemoglobin 33 pg (25-35)   32 pg (25-35)  


 


Mean Corpuscular Hemoglobin


Concent 33 g/dL


(31-37) 


 33 g/dL


(31-37) 





 


Red Cell Distribution Width


 15.3 %


(11.5-14.5) 


 15.0 %


(11.5-14.5) 





 


Platelet Count


 195 x10^3/uL


(140-400) 


 164 x10^3/uL


(140-400) 





 


Neutrophils (%) (Auto) 61 % (31-73)   66 % (31-73)  


 


Lymphocytes (%) (Auto) 23 % (24-48)   20 % (24-48)  


 


Monocytes (%) (Auto) 8 % (0-9)   9 % (0-9)  


 


Eosinophils (%) (Auto) 6 % (0-3)   4 % (0-3)  


 


Basophils (%) (Auto) 1 % (0-3)   1 % (0-3)  


 


Neutrophils # (Auto)


 3.2 x10^3uL


(1.8-7.7) 


 3.2 x10^3uL


(1.8-7.7) 





 


Lymphocytes # (Auto)


 1.2 x10^3/uL


(1.0-4.8) 


 1.0 x10^3/uL


(1.0-4.8) 





 


Monocytes # (Auto)


 0.4 x10^3/uL


(0.0-1.1) 


 0.4 x10^3/uL


(0.0-1.1) 





 


Eosinophils # (Auto)


 0.3 x10^3/uL


(0.0-0.7) 


 0.2 x10^3/uL


(0.0-0.7) 





 


Basophils # (Auto)


 0.1 x10^3/uL


(0.0-0.2) 


 0.0 x10^3/uL


(0.0-0.2) 





 


Sodium Level


 136 mmol/L


(136-145) 138 mmol/L


(136-145) 139 mmol/L


(136-145) 





 


Potassium Level


 7.1 mmol/L


(3.5-5.1) 3.7 mmol/L


(3.5-5.1) 4.1 mmol/L


(3.5-5.1) 





 


Chloride Level


 100 mmol/L


() 99 mmol/L


() 99 mmol/L


() 





 


Carbon Dioxide Level


 19 mmol/L


(21-32) 27 mmol/L


(21-32) 29 mmol/L


(21-32) 





 


Anion Gap 17 (6-14)  12 (6-14)  11 (6-14)  


 


Blood Urea Nitrogen


 90 mg/dL


(7-20) 29 mg/dL


(7-20) 36 mg/dL


(7-20) 





 


Creatinine


 12.3 mg/dL


(0.6-1.0) 5.1 mg/dL


(0.6-1.0) 6.4 mg/dL


(0.6-1.0) 





 


Estimated GFR


(Cockcroft-Gault) 3.1 


 8.6 


 6.6 


 





 


BUN/Creatinine Ratio 7 (6-20)    


 


Glucose Level


 229 mg/dL


(70-99) 115 mg/dL


(70-99) 201 mg/dL


(70-99) 





 


Calcium Level


 8.3 mg/dL


(8.5-10.1) 8.3 mg/dL


(8.5-10.1) 8.2 mg/dL


(8.5-10.1) 





 


Phosphorus Level


 7.4 mg/dL


(2.6-4.7) 3.5 mg/dL


(2.6-4.7) 4.9 mg/dL


(2.6-4.7) 





 


Magnesium Level


 2.9 mg/dL


(1.8-2.4) 


 


 





 


Total Bilirubin


 0.4 mg/dL


(0.2-1.0) 


 


 





 


Aspartate Amino Transf


(AST/SGOT) 15 U/L (15-37) 


 


 


 





 


Alanine Aminotransferase


(ALT/SGPT) 8 U/L (14-59) 


 


 


 





 


Alkaline Phosphatase


 107 U/L


() 


 


 





 


Total Protein


 7.8 g/dL


(6.4-8.2) 


 


 





 


Albumin


 3.3 g/dL


(3.4-5.0) 3.5 g/dL


(3.4-5.0) 3.2 g/dL


(3.4-5.0) 





 


Albumin/Globulin Ratio 0.7 (1.0-1.7)    


 


Glucose (Fingerstick)


 


 


 


 168 mg/dL


(70-99)











Assessment and Plan


Assessmemt and Plan


Problems


Medical Problems:


(1) Hyperkalemia


Status: Acute  





(2) Hypermagnesemia


Status: Acute  





(3) Hyperphosphatemia


Status: Acute  





(4) Hypertensive urgency


Status: Acute  





(5) Missed dialysis


Status: Acute  





(6) Problem with dialysis access


Status: Acute  





(7) Uncontrolled diabetes mellitus


Status: Acute  











Comment


Review of Relevant


I have reviewed the following items lore (where applicable) has been applied.


Labs





Laboratory Tests








Test


 4/11/19


16:20 4/12/19


00:05 4/12/19


04:30 4/12/19


08:09


 


White Blood Count


 5.3 x10^3/uL


(4.0-11.0) 


 4.9 x10^3/uL


(4.0-11.0) 





 


Red Blood Count


 3.13 x10^6/uL


(3.50-5.40) 


 3.22 x10^6/uL


(3.50-5.40) 





 


Hemoglobin


 10.2 g/dL


(12.0-15.5) 


 10.2 g/dL


(12.0-15.5) 





 


Hematocrit


 30.9 %


(36.0-47.0) 


 31.3 %


(36.0-47.0) 





 


Mean Corpuscular Volume 99 fL ()   97 fL ()  


 


Mean Corpuscular Hemoglobin 33 pg (25-35)   32 pg (25-35)  


 


Mean Corpuscular Hemoglobin


Concent 33 g/dL


(31-37) 


 33 g/dL


(31-37) 





 


Red Cell Distribution Width


 15.3 %


(11.5-14.5) 


 15.0 %


(11.5-14.5) 





 


Platelet Count


 195 x10^3/uL


(140-400) 


 164 x10^3/uL


(140-400) 





 


Neutrophils (%) (Auto) 61 % (31-73)   66 % (31-73)  


 


Lymphocytes (%) (Auto) 23 % (24-48)   20 % (24-48)  


 


Monocytes (%) (Auto) 8 % (0-9)   9 % (0-9)  


 


Eosinophils (%) (Auto) 6 % (0-3)   4 % (0-3)  


 


Basophils (%) (Auto) 1 % (0-3)   1 % (0-3)  


 


Neutrophils # (Auto)


 3.2 x10^3uL


(1.8-7.7) 


 3.2 x10^3uL


(1.8-7.7) 





 


Lymphocytes # (Auto)


 1.2 x10^3/uL


(1.0-4.8) 


 1.0 x10^3/uL


(1.0-4.8) 





 


Monocytes # (Auto)


 0.4 x10^3/uL


(0.0-1.1) 


 0.4 x10^3/uL


(0.0-1.1) 





 


Eosinophils # (Auto)


 0.3 x10^3/uL


(0.0-0.7) 


 0.2 x10^3/uL


(0.0-0.7) 





 


Basophils # (Auto)


 0.1 x10^3/uL


(0.0-0.2) 


 0.0 x10^3/uL


(0.0-0.2) 





 


Sodium Level


 136 mmol/L


(136-145) 138 mmol/L


(136-145) 139 mmol/L


(136-145) 





 


Potassium Level


 7.1 mmol/L


(3.5-5.1) 3.7 mmol/L


(3.5-5.1) 4.1 mmol/L


(3.5-5.1) 





 


Chloride Level


 100 mmol/L


() 99 mmol/L


() 99 mmol/L


() 





 


Carbon Dioxide Level


 19 mmol/L


(21-32) 27 mmol/L


(21-32) 29 mmol/L


(21-32) 





 


Anion Gap 17 (6-14)  12 (6-14)  11 (6-14)  


 


Blood Urea Nitrogen


 90 mg/dL


(7-20) 29 mg/dL


(7-20) 36 mg/dL


(7-20) 





 


Creatinine


 12.3 mg/dL


(0.6-1.0) 5.1 mg/dL


(0.6-1.0) 6.4 mg/dL


(0.6-1.0) 





 


Estimated GFR


(Cockcroft-Gault) 3.1 


 8.6 


 6.6 


 





 


BUN/Creatinine Ratio 7 (6-20)    


 


Glucose Level


 229 mg/dL


(70-99) 115 mg/dL


(70-99) 201 mg/dL


(70-99) 





 


Calcium Level


 8.3 mg/dL


(8.5-10.1) 8.3 mg/dL


(8.5-10.1) 8.2 mg/dL


(8.5-10.1) 





 


Phosphorus Level


 7.4 mg/dL


(2.6-4.7) 3.5 mg/dL


(2.6-4.7) 4.9 mg/dL


(2.6-4.7) 





 


Magnesium Level


 2.9 mg/dL


(1.8-2.4) 


 


 





 


Total Bilirubin


 0.4 mg/dL


(0.2-1.0) 


 


 





 


Aspartate Amino Transf


(AST/SGOT) 15 U/L (15-37) 


 


 


 





 


Alanine Aminotransferase


(ALT/SGPT) 8 U/L (14-59) 


 


 


 





 


Alkaline Phosphatase


 107 U/L


() 


 


 





 


Total Protein


 7.8 g/dL


(6.4-8.2) 


 


 





 


Albumin


 3.3 g/dL


(3.4-5.0) 3.5 g/dL


(3.4-5.0) 3.2 g/dL


(3.4-5.0) 





 


Albumin/Globulin Ratio 0.7 (1.0-1.7)    


 


Glucose (Fingerstick)


 


 


 


 168 mg/dL


(70-99)








Laboratory Tests








Test


 4/11/19


16:20 4/12/19


00:05 4/12/19


04:30 4/12/19


08:09


 


White Blood Count


 5.3 x10^3/uL


(4.0-11.0) 


 4.9 x10^3/uL


(4.0-11.0) 





 


Red Blood Count


 3.13 x10^6/uL


(3.50-5.40) 


 3.22 x10^6/uL


(3.50-5.40) 





 


Hemoglobin


 10.2 g/dL


(12.0-15.5) 


 10.2 g/dL


(12.0-15.5) 





 


Hematocrit


 30.9 %


(36.0-47.0) 


 31.3 %


(36.0-47.0) 





 


Mean Corpuscular Volume 99 fL ()   97 fL ()  


 


Mean Corpuscular Hemoglobin 33 pg (25-35)   32 pg (25-35)  


 


Mean Corpuscular Hemoglobin


Concent 33 g/dL


(31-37) 


 33 g/dL


(31-37) 





 


Red Cell Distribution Width


 15.3 %


(11.5-14.5) 


 15.0 %


(11.5-14.5) 





 


Platelet Count


 195 x10^3/uL


(140-400) 


 164 x10^3/uL


(140-400) 





 


Neutrophils (%) (Auto) 61 % (31-73)   66 % (31-73)  


 


Lymphocytes (%) (Auto) 23 % (24-48)   20 % (24-48)  


 


Monocytes (%) (Auto) 8 % (0-9)   9 % (0-9)  


 


Eosinophils (%) (Auto) 6 % (0-3)   4 % (0-3)  


 


Basophils (%) (Auto) 1 % (0-3)   1 % (0-3)  


 


Neutrophils # (Auto)


 3.2 x10^3uL


(1.8-7.7) 


 3.2 x10^3uL


(1.8-7.7) 





 


Lymphocytes # (Auto)


 1.2 x10^3/uL


(1.0-4.8) 


 1.0 x10^3/uL


(1.0-4.8) 





 


Monocytes # (Auto)


 0.4 x10^3/uL


(0.0-1.1) 


 0.4 x10^3/uL


(0.0-1.1) 





 


Eosinophils # (Auto)


 0.3 x10^3/uL


(0.0-0.7) 


 0.2 x10^3/uL


(0.0-0.7) 





 


Basophils # (Auto)


 0.1 x10^3/uL


(0.0-0.2) 


 0.0 x10^3/uL


(0.0-0.2) 





 


Sodium Level


 136 mmol/L


(136-145) 138 mmol/L


(136-145) 139 mmol/L


(136-145) 





 


Potassium Level


 7.1 mmol/L


(3.5-5.1) 3.7 mmol/L


(3.5-5.1) 4.1 mmol/L


(3.5-5.1) 





 


Chloride Level


 100 mmol/L


() 99 mmol/L


() 99 mmol/L


() 





 


Carbon Dioxide Level


 19 mmol/L


(21-32) 27 mmol/L


(21-32) 29 mmol/L


(21-32) 





 


Anion Gap 17 (6-14)  12 (6-14)  11 (6-14)  


 


Blood Urea Nitrogen


 90 mg/dL


(7-20) 29 mg/dL


(7-20) 36 mg/dL


(7-20) 





 


Creatinine


 12.3 mg/dL


(0.6-1.0) 5.1 mg/dL


(0.6-1.0) 6.4 mg/dL


(0.6-1.0) 





 


Estimated GFR


(Cockcroft-Gault) 3.1 


 8.6 


 6.6 


 





 


BUN/Creatinine Ratio 7 (6-20)    


 


Glucose Level


 229 mg/dL


(70-99) 115 mg/dL


(70-99) 201 mg/dL


(70-99) 





 


Calcium Level


 8.3 mg/dL


(8.5-10.1) 8.3 mg/dL


(8.5-10.1) 8.2 mg/dL


(8.5-10.1) 





 


Phosphorus Level


 7.4 mg/dL


(2.6-4.7) 3.5 mg/dL


(2.6-4.7) 4.9 mg/dL


(2.6-4.7) 





 


Magnesium Level


 2.9 mg/dL


(1.8-2.4) 


 


 





 


Total Bilirubin


 0.4 mg/dL


(0.2-1.0) 


 


 





 


Aspartate Amino Transf


(AST/SGOT) 15 U/L (15-37) 


 


 


 





 


Alanine Aminotransferase


(ALT/SGPT) 8 U/L (14-59) 


 


 


 





 


Alkaline Phosphatase


 107 U/L


() 


 


 





 


Total Protein


 7.8 g/dL


(6.4-8.2) 


 


 





 


Albumin


 3.3 g/dL


(3.4-5.0) 3.5 g/dL


(3.4-5.0) 3.2 g/dL


(3.4-5.0) 





 


Albumin/Globulin Ratio 0.7 (1.0-1.7)    


 


Glucose (Fingerstick)


 


 


 


 168 mg/dL


(70-99)








Medications





Current Medications


Clonidine HCl (Catapres) 0.1 mg 1X  ONCE PO  Last administered on 4/11/19at 16:

50;  Start 4/11/19 at 16:15;  Stop 4/11/19 at 16:16;  Status DC


Albuterol Sulfate (Ventolin Neb Soln) 10 mg 1X  ONCE CONT NEB  Last 

administered on 4/11/19at 17:32;  Start 4/11/19 at 17:15;  Stop 4/11/19 at 17:18

;  Status DC


Dextrose (Dextrose 50%-Water Syringe) 25 gm 1X  ONCE IV  Last administered on 4/ 11/19at 17:41;  Start 4/11/19 at 17:15;  Stop 4/11/19 at 17:18;  Status DC


Insulin Human Regular (HumuLIN R VIAL) 10 unit 1X  ONCE IV  Last administered 

on 4/11/19at 17:45;  Start 4/11/19 at 17:15;  Stop 4/11/19 at 17:18;  Status DC


Sodium Bicarbonate (Sodium Bicarb Adult 8.4% Syr) 50 meq 1X  ONCE IV  Last 

administered on 4/11/19at 17:46;  Start 4/11/19 at 17:15;  Stop 4/11/19 at 17:18

;  Status DC


Sodium Chloride 1,000 ml @  1,000 mls/hr Q1H PRN IV hypotension;  Start 4/11/19 

at 20:00;  Stop 4/12/19 at 06:00;  Status DC


Albumin Human 200 ml @  200 mls/hr 1X PRN  PRN IV Hypotension;  Start 4/11/19 

at 20:00;  Stop 4/12/19 at 06:00;  Status DC


Sodium Chloride (Normal Saline Flush) 10 ml 1X PRN  PRN IV AP catheter pack;  

Start 4/11/19 at 19:45;  Stop 4/12/19 at 06:00;  Status DC


Sodium Chloride (Normal Saline Flush) 10 ml 1X PRN  PRN IV  catheter pack;  

Start 4/11/19 at 19:45;  Stop 4/12/19 at 06:00;  Status DC


Sodium Chloride 1,000 ml @  400 mls/hr Q2H30M PRN IV PATENCY;  Start 4/11/19 at 

20:00;  Stop 4/12/19 at 06:00;  Status DC


Info (PHARMACY MONITORING -- do not chart) 1 each PRN DAILY  PRN MC SEE COMMENTS

;  Start 4/11/19 at 19:45


Info (PHARMACY MONITORING -- do not chart) 1 each PRN DAILY  PRN MC SEE COMMENTS

;  Start 4/11/19 at 19:45;  Status UNV


Alteplase, Recombinant (Cathflo For Central Catheter Clearance) 2 mg 1X  ONCE 

INT CAT  Last administered on 4/12/19at 08:50;  Start 4/12/19 at 08:15;  Stop 4/ 12/19 at 08:16;  Status DC


Sodium Chloride 1,000 ml @  1,000 mls/hr Q1H PRN IV hypotension;  Start 4/12/19 

at 10:05;  Stop 4/12/19 at 16:30


Sodium Chloride 1,000 ml @  400 mls/hr Q2H30M PRN IV PATENCY;  Start 4/12/19 at 

10:05;  Stop 4/12/19 at 16:30


Info (PHARMACY MONITORING -- do not chart) 1 each PRN DAILY  PRN MC SEE COMMENTS

;  Start 4/12/19 at 10:15;  Status UNV


Info (PHARMACY MONITORING -- do not chart) 1 each PRN DAILY  PRN MC SEE COMMENTS

;  Start 4/12/19 at 10:15;  Status UNV





Active Scripts


Active


Isosorbide Mononitrate Er (Isosorbide Mononitrate) 30 Mg Tab.er.24h 30 Mg PO 

DAILY 30 Days


Furosemide 40 Mg Tablet 40 Mg PO DAILY 30 Days


Clonazepam 0.5 Mg Tablet 0.5 Mg PO TID PRN 30 Days


Humalog (Insulin Lispro) 100 Unit/1 Ml Insuln.pen 10 Units SQ TIDWMEALS 30 Days


Lantus Solostar (Insulin Glargine,Hum.rec.anlog) 100 Unit/1 Ml Insuln.pen 7 

Units SQ QHS 30 Days


Atorvastatin Calcium 40 Mg Tablet 80 Mg PO QHS 30 Days


Amlodipine Besylate 5 Mg Tablet 5 Mg PO DAILYWLUN 30 Days


Clopidogrel (Clopidogrel Bisulfate) 75 Mg Tablet 75 Mg PO DAILY 30 Days


Lisinopril 20 Mg Tablet 1 Tab PO DAILY 30 Days


Aspirin Ec (Aspirin) 81 Mg Tablet.dr 81 Mg PO DAILYWBKFT 30 Days


Reported


Carvedilol 25 Mg Tablet 20 Mg PO BIDWMEALS


Vitals/I & O





Vital Sign - Last 24 Hours








 4/11/19 4/11/19 4/11/19 4/11/19





 15:56 16:39 16:50 16:51


 


Temp 97.4   





 97.4   


 


Pulse 68 62 61 62


 


Resp 18 18  18


 


B/P (MAP) 194/87 (122) 176/74 (108) 176/74 173/74 (107)


 


Pulse Ox 99 99  99


 


O2 Delivery Room Air Room Air  Room Air


 


    





    





 4/11/19 4/11/19 4/11/19 4/11/19





 17:01 17:31 17:32 18:01


 


Pulse 60 60  62


 


Resp 18 18  18


 


B/P (MAP) 163/71 (101) 181/77 (111)  180/77 (111)


 


Pulse Ox 98 96 98 99


 


O2 Delivery Room Air Room Air Room Air Room Air





 4/11/19 4/11/19 4/11/19 4/12/19





 19:20 19:20 23:54 03:30


 


Temp  97.6 97.6 97.6





  97.6 97.6 97.6


 


Pulse  58 63 76


 


Resp  18 18 18


 


B/P (MAP)  155/63 (93) 171/55 (93) 162/60 (94)


 


Pulse Ox  98 98 97


 


O2 Delivery Room Air Room Air Room Air Room Air


 


    





    





 4/12/19   





 07:00   


 


Temp 98.0   





 98.0   


 


Pulse 66   


 


Resp 18   


 


B/P (MAP) 156/61 (92)   


 


Pulse Ox 99   


 


O2 Delivery Room Air   














Intake and Output   


 


 4/11/19 4/11/19 4/12/19





 15:00 23:00 07:00


 


Intake Total   350 ml


 


Balance   350 ml

















LUCIANA SELLERS MD Apr 12, 2019 10:26

## 2019-04-12 NOTE — RAD
Replacement of the pre-existing right internal jugular tunnel dialysis

catheter over guidewire 4/12/2019



Indication: Nonfunctional catheter



Discussion: The risks and benefits of the procedure discussed the patient and

her daughter.  Informed consent was obtained.  Timeout procedure was

performed.  The pre-existing catheter was evaluated fluoroscopically and found

to be somewhat high cavoatrial junction.  The right upper chest including the

catheter was prepped and draped using sterile barrier technique.  1% lidocaine

was administered for local anesthesia.  Guidewire was advanced to the

pre-existing catheter into the IVC.  The catheter was removed using blunt

dissection and traction.  A new 23 cm tipped cuff palindrome dialysis catheter

was advanced such that its tip was in the proximal right atrium with the

patient supine position.  The catheter secured in place.  Sterile dressings

were applied.  The new catheter was found to flush and aspirate normally.



Fluoroscopy time: 0.4MIN

Dose area product: 1 GYCM2



The procedures performed under conscious sedation including continuous

cardiopulmonary monitoring via dedicated sedation nurse.  Face-to-face

sedation time: 20 minutes



Impression: Replacement of right internal jugular tunnel dialysis catheter as

described

## 2019-04-12 NOTE — NUR
SW following pt for anticipated dc needs. Pt has OP HD at [facility] on Mon, Wed and 
Friday at 1600. No other needs noted at this time.

## 2019-04-12 NOTE — PDOC2
CONSULT


Date of Consult


Date of Consult


DATE: 4/12/19 


TIME: 10:05





Reason for Consult


Reason for Consult:


ESRD





Source


Source:  Chart review, Patient





History of Present Illness


Reason for Visit:


Patient is a 63  year old Romanian speaking seen in ED for problem with dialysis 

catheter. Patient daughter states she was not able to have dialysis 4 days ago 

and was told to come to ER for changing the catheter but she was not able to 

bring her sooner. Patient complaining of shortness of breath that getting worse 

with supine position and denies nausea and vomiting, diarrhea and constipation, 

chest pain, fever and chills. Patient's last dialysis was one week ago.


Chronic Hx of Non compliance , frequent hospitalizations. 


Dialysis last night, catheter not working well.





Past Medical History


Cardiovascular:  CAD, CHF, HTN, Hyperlipidemia


Pulmonary:  No pertinent hx, Other


CENTRAL NERVOUS SYSTEM:  Other


GI:  GERD


Heme/Onc:  No pertinent hx


Hepatobiliary:  No pertinent hx


Psych:  No pertinent hx


Rheumatologic:  No pertinent hx


Infectious disease:  No pertinent hx


Renal/:  Chronic renal failure


Endocrine:  Diabetes, Hyperparathyroidism





Past Surgical History


Past Surgical History:  Other





Family History


Family History:  Heart Disease





Social History


No


ALCOHOL:  none


Drugs:  None


Lives:  with Family





Current Problem List


Problem List


Problems


Medical Problems:


(1) Hyperkalemia


Status: Acute  





(2) Hypermagnesemia


Status: Acute  





(3) Hyperphosphatemia


Status: Acute  





(4) Hypertensive urgency


Status: Acute  





(5) Missed dialysis


Status: Acute  





(6) Problem with dialysis access


Status: Acute  





(7) Uncontrolled diabetes mellitus


Status: Acute  











Current Medications


Current Medications





Current Medications


Clonidine HCl (Catapres) 0.1 mg 1X  ONCE PO  Last administered on 4/11/19at 16:

50;  Start 4/11/19 at 16:15;  Stop 4/11/19 at 16:16;  Status DC


Albuterol Sulfate (Ventolin Neb Soln) 10 mg 1X  ONCE CONT NEB  Last 

administered on 4/11/19at 17:32;  Start 4/11/19 at 17:15;  Stop 4/11/19 at 17:18

;  Status DC


Dextrose (Dextrose 50%-Water Syringe) 25 gm 1X  ONCE IV  Last administered on 4/ 11/19at 17:41;  Start 4/11/19 at 17:15;  Stop 4/11/19 at 17:18;  Status DC


Insulin Human Regular (HumuLIN R VIAL) 10 unit 1X  ONCE IV  Last administered 

on 4/11/19at 17:45;  Start 4/11/19 at 17:15;  Stop 4/11/19 at 17:18;  Status DC


Sodium Bicarbonate (Sodium Bicarb Adult 8.4% Syr) 50 meq 1X  ONCE IV  Last 

administered on 4/11/19at 17:46;  Start 4/11/19 at 17:15;  Stop 4/11/19 at 17:18

;  Status DC


Sodium Chloride 1,000 ml @  1,000 mls/hr Q1H PRN IV hypotension;  Start 4/11/19 

at 20:00;  Stop 4/12/19 at 06:00;  Status DC


Albumin Human 200 ml @  200 mls/hr 1X PRN  PRN IV Hypotension;  Start 4/11/19 

at 20:00;  Stop 4/12/19 at 06:00;  Status DC


Sodium Chloride (Normal Saline Flush) 10 ml 1X PRN  PRN IV AP catheter pack;  

Start 4/11/19 at 19:45;  Stop 4/12/19 at 06:00;  Status DC


Sodium Chloride (Normal Saline Flush) 10 ml 1X PRN  PRN IV  catheter pack;  

Start 4/11/19 at 19:45;  Stop 4/12/19 at 06:00;  Status DC


Sodium Chloride 1,000 ml @  400 mls/hr Q2H30M PRN IV PATENCY;  Start 4/11/19 at 

20:00;  Stop 4/12/19 at 06:00;  Status DC


Info (PHARMACY MONITORING -- do not chart) 1 each PRN DAILY  PRN MC SEE COMMENTS

;  Start 4/11/19 at 19:45


Info (PHARMACY MONITORING -- do not chart) 1 each PRN DAILY  PRN MC SEE COMMENTS

;  Start 4/11/19 at 19:45;  Status UNV


Alteplase, Recombinant (Cathflo For Central Catheter Clearance) 2 mg 1X  ONCE 

INT CAT  Last administered on 4/12/19at 08:50;  Start 4/12/19 at 08:15;  Stop 4/ 12/19 at 08:16;  Status DC





Active Scripts


Active


Isosorbide Mononitrate Er (Isosorbide Mononitrate) 30 Mg Tab.er.24h 30 Mg PO 

DAILY 30 Days


Furosemide 40 Mg Tablet 40 Mg PO DAILY 30 Days


Clonazepam 0.5 Mg Tablet 0.5 Mg PO TID PRN 30 Days


Humalog (Insulin Lispro) 100 Unit/1 Ml Insuln.pen 10 Units SQ TIDWMEALS 30 Days


Lantus Solostar (Insulin Glargine,Hum.rec.anlog) 100 Unit/1 Ml Insuln.pen 7 

Units SQ QHS 30 Days


Atorvastatin Calcium 40 Mg Tablet 80 Mg PO QHS 30 Days


Amlodipine Besylate 5 Mg Tablet 5 Mg PO DAILYWLUN 30 Days


Clopidogrel (Clopidogrel Bisulfate) 75 Mg Tablet 75 Mg PO DAILY 30 Days


Lisinopril 20 Mg Tablet 1 Tab PO DAILY 30 Days


Aspirin Ec (Aspirin) 81 Mg Tablet.dr 81 Mg PO DAILYWBKFT 30 Days


Reported


Carvedilol 25 Mg Tablet 20 Mg PO BIDWMEALS





Allergies


Allergies:  


Coded Allergies:  


     No Known Drug Allergies (Unverified , 8/6/18)





ROS


Review of System


   As per HPI





Physical Exam


Physical Exam


GEN:    NAD 


HEEN:  OM moist  


NECK:   Supple 


CVS:   RRR  


RESP: CTA Bilat,    No Acc. Muscle Use


GI:        BS + ve, NO Bruit, Non Tender, 


:      No CVA tenderness, No Suprapubic Tenderness, No Resendiz


NEURO- AXOX3


Derm-   No Rash


Vital Signs





Vital Signs








  Date Time  Temp Pulse Resp B/P (MAP) Pulse Ox O2 Delivery O2 Flow Rate FiO2


 


4/12/19 07:00 98.0 66 18 156/61 (92) 99 Room Air  





 98.0       








Assessment & Plan


ESRD--


Chronic Non compliance, came with BUN 90  


Pt was advised by OP Dialysis Unit to go to ED for TDC replacement 


They decided to wait  and came to ER last night 4-5 days later  


Emergent HD last night for Hyperkalemia


HD cath non functioning well, will retry this am , may need to change 





Hyperkalemia --Resolved with HD last night  





HTN-  Improved Post HD


Continue  Antihypertensives





Labs


Labs





Laboratory Tests








Test


 4/11/19


16:20 4/12/19


00:05 4/12/19


04:30 4/12/19


08:09


 


White Blood Count


 5.3 x10^3/uL


(4.0-11.0) 


 4.9 x10^3/uL


(4.0-11.0) 





 


Red Blood Count


 3.13 x10^6/uL


(3.50-5.40) 


 3.22 x10^6/uL


(3.50-5.40) 





 


Hemoglobin


 10.2 g/dL


(12.0-15.5) 


 10.2 g/dL


(12.0-15.5) 





 


Hematocrit


 30.9 %


(36.0-47.0) 


 31.3 %


(36.0-47.0) 





 


Mean Corpuscular Volume 99 fL ()   97 fL ()  


 


Mean Corpuscular Hemoglobin 33 pg (25-35)   32 pg (25-35)  


 


Mean Corpuscular Hemoglobin


Concent 33 g/dL


(31-37) 


 33 g/dL


(31-37) 





 


Red Cell Distribution Width


 15.3 %


(11.5-14.5) 


 15.0 %


(11.5-14.5) 





 


Platelet Count


 195 x10^3/uL


(140-400) 


 164 x10^3/uL


(140-400) 





 


Neutrophils (%) (Auto) 61 % (31-73)   66 % (31-73)  


 


Lymphocytes (%) (Auto) 23 % (24-48)   20 % (24-48)  


 


Monocytes (%) (Auto) 8 % (0-9)   9 % (0-9)  


 


Eosinophils (%) (Auto) 6 % (0-3)   4 % (0-3)  


 


Basophils (%) (Auto) 1 % (0-3)   1 % (0-3)  


 


Neutrophils # (Auto)


 3.2 x10^3uL


(1.8-7.7) 


 3.2 x10^3uL


(1.8-7.7) 





 


Lymphocytes # (Auto)


 1.2 x10^3/uL


(1.0-4.8) 


 1.0 x10^3/uL


(1.0-4.8) 





 


Monocytes # (Auto)


 0.4 x10^3/uL


(0.0-1.1) 


 0.4 x10^3/uL


(0.0-1.1) 





 


Eosinophils # (Auto)


 0.3 x10^3/uL


(0.0-0.7) 


 0.2 x10^3/uL


(0.0-0.7) 





 


Basophils # (Auto)


 0.1 x10^3/uL


(0.0-0.2) 


 0.0 x10^3/uL


(0.0-0.2) 





 


Sodium Level


 136 mmol/L


(136-145) 138 mmol/L


(136-145) 139 mmol/L


(136-145) 





 


Potassium Level


 7.1 mmol/L


(3.5-5.1) 3.7 mmol/L


(3.5-5.1) 4.1 mmol/L


(3.5-5.1) 





 


Chloride Level


 100 mmol/L


() 99 mmol/L


() 99 mmol/L


() 





 


Carbon Dioxide Level


 19 mmol/L


(21-32) 27 mmol/L


(21-32) 29 mmol/L


(21-32) 





 


Anion Gap 17 (6-14)  12 (6-14)  11 (6-14)  


 


Blood Urea Nitrogen


 90 mg/dL


(7-20) 29 mg/dL


(7-20) 36 mg/dL


(7-20) 





 


Creatinine


 12.3 mg/dL


(0.6-1.0) 5.1 mg/dL


(0.6-1.0) 6.4 mg/dL


(0.6-1.0) 





 


Estimated GFR


(Cockcroft-Gault) 3.1 


 8.6 


 6.6 


 





 


BUN/Creatinine Ratio 7 (6-20)    


 


Glucose Level


 229 mg/dL


(70-99) 115 mg/dL


(70-99) 201 mg/dL


(70-99) 





 


Calcium Level


 8.3 mg/dL


(8.5-10.1) 8.3 mg/dL


(8.5-10.1) 8.2 mg/dL


(8.5-10.1) 





 


Phosphorus Level


 7.4 mg/dL


(2.6-4.7) 3.5 mg/dL


(2.6-4.7) 4.9 mg/dL


(2.6-4.7) 





 


Magnesium Level


 2.9 mg/dL


(1.8-2.4) 


 


 





 


Total Bilirubin


 0.4 mg/dL


(0.2-1.0) 


 


 





 


Aspartate Amino Transf


(AST/SGOT) 15 U/L (15-37) 


 


 


 





 


Alanine Aminotransferase


(ALT/SGPT) 8 U/L (14-59) 


 


 


 





 


Alkaline Phosphatase


 107 U/L


() 


 


 





 


Total Protein


 7.8 g/dL


(6.4-8.2) 


 


 





 


Albumin


 3.3 g/dL


(3.4-5.0) 3.5 g/dL


(3.4-5.0) 3.2 g/dL


(3.4-5.0) 





 


Albumin/Globulin Ratio 0.7 (1.0-1.7)    


 


Glucose (Fingerstick)


 


 


 


 168 mg/dL


(70-99)








Laboratory Tests








Test


 4/11/19


16:20 4/12/19


00:05 4/12/19


04:30 4/12/19


08:09


 


White Blood Count


 5.3 x10^3/uL


(4.0-11.0) 


 4.9 x10^3/uL


(4.0-11.0) 





 


Red Blood Count


 3.13 x10^6/uL


(3.50-5.40) 


 3.22 x10^6/uL


(3.50-5.40) 





 


Hemoglobin


 10.2 g/dL


(12.0-15.5) 


 10.2 g/dL


(12.0-15.5) 





 


Hematocrit


 30.9 %


(36.0-47.0) 


 31.3 %


(36.0-47.0) 





 


Mean Corpuscular Volume 99 fL ()   97 fL ()  


 


Mean Corpuscular Hemoglobin 33 pg (25-35)   32 pg (25-35)  


 


Mean Corpuscular Hemoglobin


Concent 33 g/dL


(31-37) 


 33 g/dL


(31-37) 





 


Red Cell Distribution Width


 15.3 %


(11.5-14.5) 


 15.0 %


(11.5-14.5) 





 


Platelet Count


 195 x10^3/uL


(140-400) 


 164 x10^3/uL


(140-400) 





 


Neutrophils (%) (Auto) 61 % (31-73)   66 % (31-73)  


 


Lymphocytes (%) (Auto) 23 % (24-48)   20 % (24-48)  


 


Monocytes (%) (Auto) 8 % (0-9)   9 % (0-9)  


 


Eosinophils (%) (Auto) 6 % (0-3)   4 % (0-3)  


 


Basophils (%) (Auto) 1 % (0-3)   1 % (0-3)  


 


Neutrophils # (Auto)


 3.2 x10^3uL


(1.8-7.7) 


 3.2 x10^3uL


(1.8-7.7) 





 


Lymphocytes # (Auto)


 1.2 x10^3/uL


(1.0-4.8) 


 1.0 x10^3/uL


(1.0-4.8) 





 


Monocytes # (Auto)


 0.4 x10^3/uL


(0.0-1.1) 


 0.4 x10^3/uL


(0.0-1.1) 





 


Eosinophils # (Auto)


 0.3 x10^3/uL


(0.0-0.7) 


 0.2 x10^3/uL


(0.0-0.7) 





 


Basophils # (Auto)


 0.1 x10^3/uL


(0.0-0.2) 


 0.0 x10^3/uL


(0.0-0.2) 





 


Sodium Level


 136 mmol/L


(136-145) 138 mmol/L


(136-145) 139 mmol/L


(136-145) 





 


Potassium Level


 7.1 mmol/L


(3.5-5.1) 3.7 mmol/L


(3.5-5.1) 4.1 mmol/L


(3.5-5.1) 





 


Chloride Level


 100 mmol/L


() 99 mmol/L


() 99 mmol/L


() 





 


Carbon Dioxide Level


 19 mmol/L


(21-32) 27 mmol/L


(21-32) 29 mmol/L


(21-32) 





 


Anion Gap 17 (6-14)  12 (6-14)  11 (6-14)  


 


Blood Urea Nitrogen


 90 mg/dL


(7-20) 29 mg/dL


(7-20) 36 mg/dL


(7-20) 





 


Creatinine


 12.3 mg/dL


(0.6-1.0) 5.1 mg/dL


(0.6-1.0) 6.4 mg/dL


(0.6-1.0) 





 


Estimated GFR


(Cockcroft-Gault) 3.1 


 8.6 


 6.6 


 





 


BUN/Creatinine Ratio 7 (6-20)    


 


Glucose Level


 229 mg/dL


(70-99) 115 mg/dL


(70-99) 201 mg/dL


(70-99) 





 


Calcium Level


 8.3 mg/dL


(8.5-10.1) 8.3 mg/dL


(8.5-10.1) 8.2 mg/dL


(8.5-10.1) 





 


Phosphorus Level


 7.4 mg/dL


(2.6-4.7) 3.5 mg/dL


(2.6-4.7) 4.9 mg/dL


(2.6-4.7) 





 


Magnesium Level


 2.9 mg/dL


(1.8-2.4) 


 


 





 


Total Bilirubin


 0.4 mg/dL


(0.2-1.0) 


 


 





 


Aspartate Amino Transf


(AST/SGOT) 15 U/L (15-37) 


 


 


 





 


Alanine Aminotransferase


(ALT/SGPT) 8 U/L (14-59) 


 


 


 





 


Alkaline Phosphatase


 107 U/L


() 


 


 





 


Total Protein


 7.8 g/dL


(6.4-8.2) 


 


 





 


Albumin


 3.3 g/dL


(3.4-5.0) 3.5 g/dL


(3.4-5.0) 3.2 g/dL


(3.4-5.0) 





 


Albumin/Globulin Ratio 0.7 (1.0-1.7)    


 


Glucose (Fingerstick)


 


 


 


 168 mg/dL


(70-99)








Review


All relevant outside records, renal labs, imaging studies, telemetry/EKG's were 

reviewed.











MABLE DIAS MD Apr 12, 2019 10:13

## 2019-04-13 VITALS — SYSTOLIC BLOOD PRESSURE: 163 MMHG | DIASTOLIC BLOOD PRESSURE: 54 MMHG

## 2019-04-13 VITALS — DIASTOLIC BLOOD PRESSURE: 56 MMHG | SYSTOLIC BLOOD PRESSURE: 145 MMHG

## 2019-04-13 VITALS — SYSTOLIC BLOOD PRESSURE: 171 MMHG | DIASTOLIC BLOOD PRESSURE: 52 MMHG

## 2019-04-13 VITALS — DIASTOLIC BLOOD PRESSURE: 70 MMHG | SYSTOLIC BLOOD PRESSURE: 196 MMHG

## 2019-04-13 VITALS — SYSTOLIC BLOOD PRESSURE: 162 MMHG | DIASTOLIC BLOOD PRESSURE: 52 MMHG

## 2019-04-13 VITALS — DIASTOLIC BLOOD PRESSURE: 56 MMHG | SYSTOLIC BLOOD PRESSURE: 152 MMHG

## 2019-04-13 LAB
ALBUMIN SERPL-MCNC: 3.2 G/DL (ref 3.4–5)
ANION GAP SERPL CALC-SCNC: 10 MMOL/L (ref 6–14)
BASOPHILS # BLD AUTO: 0 X10^3/UL (ref 0–0.2)
BASOPHILS NFR BLD: 1 % (ref 0–3)
BUN SERPL-MCNC: 29 MG/DL (ref 7–20)
CALCIUM SERPL-MCNC: 8 MG/DL (ref 8.5–10.1)
CHLORIDE SERPL-SCNC: 98 MMOL/L (ref 98–107)
CO2 SERPL-SCNC: 28 MMOL/L (ref 21–32)
CREAT SERPL-MCNC: 5.5 MG/DL (ref 0.6–1)
EOSINOPHIL NFR BLD: 0.3 X10^3/UL (ref 0–0.7)
EOSINOPHIL NFR BLD: 6 % (ref 0–3)
ERYTHROCYTE [DISTWIDTH] IN BLOOD BY AUTOMATED COUNT: 15.1 % (ref 11.5–14.5)
GFR SERPLBLD BASED ON 1.73 SQ M-ARVRAT: 7.9 ML/MIN
GLUCOSE SERPL-MCNC: 129 MG/DL (ref 70–99)
HCT VFR BLD CALC: 32.3 % (ref 36–47)
HGB BLD-MCNC: 10.5 G/DL (ref 12–15.5)
LYMPHOCYTES # BLD: 1.5 X10^3/UL (ref 1–4.8)
LYMPHOCYTES NFR BLD AUTO: 28 % (ref 24–48)
MCH RBC QN AUTO: 32 PG (ref 25–35)
MCHC RBC AUTO-ENTMCNC: 33 G/DL (ref 31–37)
MCV RBC AUTO: 98 FL (ref 79–100)
MONO #: 0.5 X10^3/UL (ref 0–1.1)
MONOCYTES NFR BLD: 10 % (ref 0–9)
NEUT #: 2.9 X10^3UL (ref 1.8–7.7)
NEUTROPHILS NFR BLD AUTO: 55 % (ref 31–73)
PHOSPHATE SERPL-MCNC: 6.1 MG/DL (ref 2.6–4.7)
PLATELET # BLD AUTO: 160 X10^3/UL (ref 140–400)
POTASSIUM SERPL-SCNC: 4.2 MMOL/L (ref 3.5–5.1)
RBC # BLD AUTO: 3.3 X10^6/UL (ref 3.5–5.4)
SODIUM SERPL-SCNC: 136 MMOL/L (ref 136–145)
WBC # BLD AUTO: 5.2 X10^3/UL (ref 4–11)

## 2019-04-13 NOTE — PDOC
Renal-Progress Notes


Subjective Notes


Notes


NONE





History of Present Illness


Hx of present illness


NO CHANGE





Vitals


Vitals





Vital Signs








  Date Time  Temp Pulse Resp B/P (MAP) Pulse Ox O2 Delivery O2 Flow Rate FiO2


 


4/13/19 11:27    196/70    


 


4/13/19 11:00 98.7 68 16  97 Room Air  





 98.7       


 


4/12/19 16:14       2.0 








Weight


Weight [ ]





I.O.


Intake and Output











Intake and Output 


 


 4/13/19





 07:00


 


Intake Total 175 ml


 


Balance 175 ml


 


 


 


Intake Oral 175 ml


 


# Voids 1











Labs


Labs





Laboratory Tests








Test


 4/12/19


16:36 4/12/19


21:21 4/13/19


04:20 4/13/19


07:04


 


Glucose (Fingerstick)


 136 mg/dL


(70-99) 212 mg/dL


(70-99) 


 126 mg/dL


(70-99)


 


White Blood Count


 


 


 5.2 x10^3/uL


(4.0-11.0) 





 


Red Blood Count


 


 


 3.30 x10^6/uL


(3.50-5.40) 





 


Hemoglobin


 


 


 10.5 g/dL


(12.0-15.5) 





 


Hematocrit


 


 


 32.3 %


(36.0-47.0) 





 


Mean Corpuscular Volume   98 fL ()  


 


Mean Corpuscular Hemoglobin   32 pg (25-35)  


 


Mean Corpuscular Hemoglobin


Concent 


 


 33 g/dL


(31-37) 





 


Red Cell Distribution Width


 


 


 15.1 %


(11.5-14.5) 





 


Platelet Count


 


 


 160 x10^3/uL


(140-400) 





 


Neutrophils (%) (Auto)   55 % (31-73)  


 


Lymphocytes (%) (Auto)   28 % (24-48)  


 


Monocytes (%) (Auto)   10 % (0-9)  


 


Eosinophils (%) (Auto)   6 % (0-3)  


 


Basophils (%) (Auto)   1 % (0-3)  


 


Neutrophils # (Auto)


 


 


 2.9 x10^3uL


(1.8-7.7) 





 


Lymphocytes # (Auto)


 


 


 1.5 x10^3/uL


(1.0-4.8) 





 


Monocytes # (Auto)


 


 


 0.5 x10^3/uL


(0.0-1.1) 





 


Eosinophils # (Auto)


 


 


 0.3 x10^3/uL


(0.0-0.7) 





 


Basophils # (Auto)


 


 


 0.0 x10^3/uL


(0.0-0.2) 





 


Sodium Level


 


 


 136 mmol/L


(136-145) 





 


Potassium Level


 


 


 4.2 mmol/L


(3.5-5.1) 





 


Chloride Level


 


 


 98 mmol/L


() 





 


Carbon Dioxide Level


 


 


 28 mmol/L


(21-32) 





 


Anion Gap   10 (6-14)  


 


Blood Urea Nitrogen


 


 


 29 mg/dL


(7-20) 





 


Creatinine


 


 


 5.5 mg/dL


(0.6-1.0) 





 


Estimated GFR


(Cockcroft-Gault) 


 


 7.9 


 





 


Glucose Level


 


 


 129 mg/dL


(70-99) 





 


Calcium Level


 


 


 8.0 mg/dL


(8.5-10.1) 





 


Phosphorus Level


 


 


 6.1 mg/dL


(2.6-4.7) 





 


Albumin


 


 


 3.2 g/dL


(3.4-5.0) 





 


Test


 4/13/19


11:05 


 


 





 


Glucose (Fingerstick)


 251 mg/dL


(70-99) 


 


 














Review of Systems


Constitutional:  yes: weakness, alert, oriented


Ears/Nose/Throat:  Yes: no symptom reported


Eyes:  Yes: no symptom reported


Pulmonary:  Yes no symptom reported


Cardiovascular:  Yes no symptom reported


Gastrointestional:  Yes: no symptom reported


Genitourinary:  Yes: no symptom reported


Musculoskeletal:  Yes: no symptom reported


Skin:  Yes no symptom reported


Psychiatric/Neurological:  Yes: no symptom reported


Endocrine:  Yes: no symptom reported





Physical Exam


General Appearance:  no apparent distress


Skin:  warm


Respiratory:  bilateral CTA


Heart:  S1S2


Abdomen:  soft, bowel sounds present


Genitourinary:  bladder flat


Extremities:  pulses present


Neurology:  alert, follow commands





Assessment


Assessment


IMP





HYPERKALEMIA


NON FUNCTIONING TDC


S/P TEMP HD CATHETER


ESRD


DM II


HTN - NOT CONTROLLED


NON COMPLIANCE


VOLUME OVERLOAD





PLAN





ENC COMPLIANCE


PT WILL NOT ALLOW FOR AV ACCESS PLACEMENT


NEW TUNNELED HD CATHETER NEXT WEEK


WILL FOLLOW











JANE CAMARILLO MD Apr 13, 2019 14:48

## 2019-04-13 NOTE — PDOC
PROGRESS NOTES


History of Present Illness


History of Present Illness





Assessment/Plan


Assessment/Plan





Assessment/Plan


IMPRESSION





1. ESRD ON DIALYSIS


2. NONCOMPLIANCE    remains at HIGH RISK OF COMPLICATIONS//death due to 

noncompliance  with dialysis


3. HX HTN


4. OBESITY


5. ACUTE VOLUME OVERLOAD


6.  ANEMIA


7. Minimal bibasilar lung airspace opacities 


likely atelectasis or infiltrates.. 


8. uncontrolled hypertension 4/13 196/70





PLAN





Replacement of right internal jugular tunnel dialysis catheter  4/12


iv hydralazine 10mg q 4 hrs prn bp support


tele BED


ADMIT


DIALYSIS emergent 4/11


CONSULT NEPHROLOGY


HOME MEDS


[employer] NO BLOOD PRODUCTS


LOWER K EMERGENTLY


DR DIAS following





Vitals


Vitals





Vital Signs








  Date Time  Temp Pulse Resp B/P (MAP) Pulse Ox O2 Delivery O2 Flow Rate FiO2


 


4/13/19 07:00 97.6 59 16 152/56 (88) 96 Room Air  





 97.6       


 


4/12/19 16:14       2.0 











Physical Exam


General:  Alert, Oriented X3, Cooperative, No acute distress, mild distress


Heart:  Regular rate, Normal S1


Lungs:  Clear, Crackles, Other


Abdomen:  Soft


Extremities:  No clubbing, No cyanosis


Skin:  No significant lesion





Labs


LABS





Replacement of the pre-existing right internal jugular tunnel dialysis


catheter over guidewire 4/12/2019





Indication: Nonfunctional catheter





Discussion: The risks and benefits of the procedure discussed the patient and


her daughter.  Informed consent was obtained.  Timeout procedure was


performed.  The pre-existing catheter was evaluated fluoroscopically and found


to be somewhat high cavoatrial junction.  The right upper chest including the


catheter was prepped and draped using sterile barrier technique.  1% lidocaine


was administered for local anesthesia.  Guidewire was advanced to the


pre-existing catheter into the IVC.  The catheter was removed using blunt


dissection and traction.  A new 23 cm tipped cuff palindrome dialysis catheter


was advanced such that its tip was in the proximal right atrium with the


patient supine position.  The catheter secured in place.  Sterile dressings


were applied.  The new catheter was found to flush and aspirate normally.





Fluoroscopy time: 0.4MIN


Dose area product: 1 GYCM2





The procedures performed under conscious sedation including continuous


cardiopulmonary monitoring via dedicated sedation nurse.  Face-to-face


sedation time: 20 minutes





Impression: Replacement of right internal jugular tunnel dialysis catheter as


described


Laboratory Tests








Test


 4/12/19


11:45 4/12/19


13:45 4/12/19


16:36 4/12/19


21:21


 


Prothrombin Time


 13.1 SEC


(11.7-14.0) 


 


 





 


Prothromb Time International


Ratio 1.0 (0.8-1.1) 


 


 


 





 


Activated Partial


Thromboplast Time 35 SEC (24-38) 


 


 


 





 


Glucose (Fingerstick)


 


 126 mg/dL


(70-99) 136 mg/dL


(70-99) 212 mg/dL


(70-99)


 


Test


 4/13/19


04:20 4/13/19


07:04 


 





 


White Blood Count


 5.2 x10^3/uL


(4.0-11.0) 


 


 





 


Red Blood Count


 3.30 x10^6/uL


(3.50-5.40) 


 


 





 


Hemoglobin


 10.5 g/dL


(12.0-15.5) 


 


 





 


Hematocrit


 32.3 %


(36.0-47.0) 


 


 





 


Mean Corpuscular Volume 98 fL ()    


 


Mean Corpuscular Hemoglobin 32 pg (25-35)    


 


Mean Corpuscular Hemoglobin


Concent 33 g/dL


(31-37) 


 


 





 


Red Cell Distribution Width


 15.1 %


(11.5-14.5) 


 


 





 


Platelet Count


 160 x10^3/uL


(140-400) 


 


 





 


Neutrophils (%) (Auto) 55 % (31-73)    


 


Lymphocytes (%) (Auto) 28 % (24-48)    


 


Monocytes (%) (Auto) 10 % (0-9)    


 


Eosinophils (%) (Auto) 6 % (0-3)    


 


Basophils (%) (Auto) 1 % (0-3)    


 


Neutrophils # (Auto)


 2.9 x10^3uL


(1.8-7.7) 


 


 





 


Lymphocytes # (Auto)


 1.5 x10^3/uL


(1.0-4.8) 


 


 





 


Monocytes # (Auto)


 0.5 x10^3/uL


(0.0-1.1) 


 


 





 


Eosinophils # (Auto)


 0.3 x10^3/uL


(0.0-0.7) 


 


 





 


Basophils # (Auto)


 0.0 x10^3/uL


(0.0-0.2) 


 


 





 


Sodium Level


 136 mmol/L


(136-145) 


 


 





 


Potassium Level


 4.2 mmol/L


(3.5-5.1) 


 


 





 


Chloride Level


 98 mmol/L


() 


 


 





 


Carbon Dioxide Level


 28 mmol/L


(21-32) 


 


 





 


Anion Gap 10 (6-14)    


 


Blood Urea Nitrogen


 29 mg/dL


(7-20) 


 


 





 


Creatinine


 5.5 mg/dL


(0.6-1.0) 


 


 





 


Estimated GFR


(Cockcroft-Gault) 7.9 


 


 


 





 


Glucose Level


 129 mg/dL


(70-99) 


 


 





 


Calcium Level


 8.0 mg/dL


(8.5-10.1) 


 


 





 


Phosphorus Level


 6.1 mg/dL


(2.6-4.7) 


 


 





 


Albumin


 3.2 g/dL


(3.4-5.0) 


 


 





 


Glucose (Fingerstick)


 


 126 mg/dL


(70-99) 


 














Assessment and Plan


Assessmemt and Plan


Problems


Medical Problems:


(1) Hyperkalemia


Status: Acute  





(2) Hypermagnesemia


Status: Acute  





(3) Hyperphosphatemia


Status: Acute  





(4) Hypertensive urgency


Status: Acute  





(5) Missed dialysis


Status: Acute  





(6) Problem with dialysis access


Status: Acute  





(7) Uncontrolled diabetes mellitus


Status: Acute  











Comment


Review of Relevant


I have reviewed the following items lore (where applicable) has been applied.


Labs





Laboratory Tests








Test


 4/11/19


16:20 4/12/19


00:05 4/12/19


04:30 4/12/19


08:09


 


White Blood Count


 5.3 x10^3/uL


(4.0-11.0) 


 4.9 x10^3/uL


(4.0-11.0) 





 


Red Blood Count


 3.13 x10^6/uL


(3.50-5.40) 


 3.22 x10^6/uL


(3.50-5.40) 





 


Hemoglobin


 10.2 g/dL


(12.0-15.5) 


 10.2 g/dL


(12.0-15.5) 





 


Hematocrit


 30.9 %


(36.0-47.0) 


 31.3 %


(36.0-47.0) 





 


Mean Corpuscular Volume 99 fL ()   97 fL ()  


 


Mean Corpuscular Hemoglobin 33 pg (25-35)   32 pg (25-35)  


 


Mean Corpuscular Hemoglobin


Concent 33 g/dL


(31-37) 


 33 g/dL


(31-37) 





 


Red Cell Distribution Width


 15.3 %


(11.5-14.5) 


 15.0 %


(11.5-14.5) 





 


Platelet Count


 195 x10^3/uL


(140-400) 


 164 x10^3/uL


(140-400) 





 


Neutrophils (%) (Auto) 61 % (31-73)   66 % (31-73)  


 


Lymphocytes (%) (Auto) 23 % (24-48)   20 % (24-48)  


 


Monocytes (%) (Auto) 8 % (0-9)   9 % (0-9)  


 


Eosinophils (%) (Auto) 6 % (0-3)   4 % (0-3)  


 


Basophils (%) (Auto) 1 % (0-3)   1 % (0-3)  


 


Neutrophils # (Auto)


 3.2 x10^3uL


(1.8-7.7) 


 3.2 x10^3uL


(1.8-7.7) 





 


Lymphocytes # (Auto)


 1.2 x10^3/uL


(1.0-4.8) 


 1.0 x10^3/uL


(1.0-4.8) 





 


Monocytes # (Auto)


 0.4 x10^3/uL


(0.0-1.1) 


 0.4 x10^3/uL


(0.0-1.1) 





 


Eosinophils # (Auto)


 0.3 x10^3/uL


(0.0-0.7) 


 0.2 x10^3/uL


(0.0-0.7) 





 


Basophils # (Auto)


 0.1 x10^3/uL


(0.0-0.2) 


 0.0 x10^3/uL


(0.0-0.2) 





 


Sodium Level


 136 mmol/L


(136-145) 138 mmol/L


(136-145) 139 mmol/L


(136-145) 





 


Potassium Level


 7.1 mmol/L


(3.5-5.1) 3.7 mmol/L


(3.5-5.1) 4.1 mmol/L


(3.5-5.1) 





 


Chloride Level


 100 mmol/L


() 99 mmol/L


() 99 mmol/L


() 





 


Carbon Dioxide Level


 19 mmol/L


(21-32) 27 mmol/L


(21-32) 29 mmol/L


(21-32) 





 


Anion Gap 17 (6-14)  12 (6-14)  11 (6-14)  


 


Blood Urea Nitrogen


 90 mg/dL


(7-20) 29 mg/dL


(7-20) 36 mg/dL


(7-20) 





 


Creatinine


 12.3 mg/dL


(0.6-1.0) 5.1 mg/dL


(0.6-1.0) 6.4 mg/dL


(0.6-1.0) 





 


Estimated GFR


(Cockcroft-Gault) 3.1 


 8.6 


 6.6 


 





 


BUN/Creatinine Ratio 7 (6-20)    


 


Glucose Level


 229 mg/dL


(70-99) 115 mg/dL


(70-99) 201 mg/dL


(70-99) 





 


Calcium Level


 8.3 mg/dL


(8.5-10.1) 8.3 mg/dL


(8.5-10.1) 8.2 mg/dL


(8.5-10.1) 





 


Phosphorus Level


 7.4 mg/dL


(2.6-4.7) 3.5 mg/dL


(2.6-4.7) 4.9 mg/dL


(2.6-4.7) 





 


Magnesium Level


 2.9 mg/dL


(1.8-2.4) 


 


 





 


Total Bilirubin


 0.4 mg/dL


(0.2-1.0) 


 


 





 


Aspartate Amino Transf


(AST/SGOT) 15 U/L (15-37) 


 


 


 





 


Alanine Aminotransferase


(ALT/SGPT) 8 U/L (14-59) 


 


 


 





 


Alkaline Phosphatase


 107 U/L


() 


 


 





 


Total Protein


 7.8 g/dL


(6.4-8.2) 


 


 





 


Albumin


 3.3 g/dL


(3.4-5.0) 3.5 g/dL


(3.4-5.0) 3.2 g/dL


(3.4-5.0) 





 


Albumin/Globulin Ratio 0.7 (1.0-1.7)    


 


Glucose (Fingerstick)


 


 


 


 168 mg/dL


(70-99)


 


Test


 4/12/19


11:45 4/12/19


13:45 4/12/19


16:36 4/12/19


21:21


 


Prothrombin Time


 13.1 SEC


(11.7-14.0) 


 


 





 


Prothromb Time International


Ratio 1.0 (0.8-1.1) 


 


 


 





 


Activated Partial


Thromboplast Time 35 SEC (24-38) 


 


 


 





 


Glucose (Fingerstick)


 


 126 mg/dL


(70-99) 136 mg/dL


(70-99) 212 mg/dL


(70-99)


 


Test


 4/13/19


04:20 4/13/19


07:04 


 





 


White Blood Count


 5.2 x10^3/uL


(4.0-11.0) 


 


 





 


Red Blood Count


 3.30 x10^6/uL


(3.50-5.40) 


 


 





 


Hemoglobin


 10.5 g/dL


(12.0-15.5) 


 


 





 


Hematocrit


 32.3 %


(36.0-47.0) 


 


 





 


Mean Corpuscular Volume 98 fL ()    


 


Mean Corpuscular Hemoglobin 32 pg (25-35)    


 


Mean Corpuscular Hemoglobin


Concent 33 g/dL


(31-37) 


 


 





 


Red Cell Distribution Width


 15.1 %


(11.5-14.5) 


 


 





 


Platelet Count


 160 x10^3/uL


(140-400) 


 


 





 


Neutrophils (%) (Auto) 55 % (31-73)    


 


Lymphocytes (%) (Auto) 28 % (24-48)    


 


Monocytes (%) (Auto) 10 % (0-9)    


 


Eosinophils (%) (Auto) 6 % (0-3)    


 


Basophils (%) (Auto) 1 % (0-3)    


 


Neutrophils # (Auto)


 2.9 x10^3uL


(1.8-7.7) 


 


 





 


Lymphocytes # (Auto)


 1.5 x10^3/uL


(1.0-4.8) 


 


 





 


Monocytes # (Auto)


 0.5 x10^3/uL


(0.0-1.1) 


 


 





 


Eosinophils # (Auto)


 0.3 x10^3/uL


(0.0-0.7) 


 


 





 


Basophils # (Auto)


 0.0 x10^3/uL


(0.0-0.2) 


 


 





 


Sodium Level


 136 mmol/L


(136-145) 


 


 





 


Potassium Level


 4.2 mmol/L


(3.5-5.1) 


 


 





 


Chloride Level


 98 mmol/L


() 


 


 





 


Carbon Dioxide Level


 28 mmol/L


(21-32) 


 


 





 


Anion Gap 10 (6-14)    


 


Blood Urea Nitrogen


 29 mg/dL


(7-20) 


 


 





 


Creatinine


 5.5 mg/dL


(0.6-1.0) 


 


 





 


Estimated GFR


(Cockcroft-Gault) 7.9 


 


 


 





 


Glucose Level


 129 mg/dL


(70-99) 


 


 





 


Calcium Level


 8.0 mg/dL


(8.5-10.1) 


 


 





 


Phosphorus Level


 6.1 mg/dL


(2.6-4.7) 


 


 





 


Albumin


 3.2 g/dL


(3.4-5.0) 


 


 





 


Glucose (Fingerstick)


 


 126 mg/dL


(70-99) 


 











Laboratory Tests








Test


 4/12/19


11:45 4/12/19


13:45 4/12/19


16:36 4/12/19


21:21


 


Prothrombin Time


 13.1 SEC


(11.7-14.0) 


 


 





 


Prothromb Time International


Ratio 1.0 (0.8-1.1) 


 


 


 





 


Activated Partial


Thromboplast Time 35 SEC (24-38) 


 


 


 





 


Glucose (Fingerstick)


 


 126 mg/dL


(70-99) 136 mg/dL


(70-99) 212 mg/dL


(70-99)


 


Test


 4/13/19


04:20 4/13/19


07:04 


 





 


White Blood Count


 5.2 x10^3/uL


(4.0-11.0) 


 


 





 


Red Blood Count


 3.30 x10^6/uL


(3.50-5.40) 


 


 





 


Hemoglobin


 10.5 g/dL


(12.0-15.5) 


 


 





 


Hematocrit


 32.3 %


(36.0-47.0) 


 


 





 


Mean Corpuscular Volume 98 fL ()    


 


Mean Corpuscular Hemoglobin 32 pg (25-35)    


 


Mean Corpuscular Hemoglobin


Concent 33 g/dL


(31-37) 


 


 





 


Red Cell Distribution Width


 15.1 %


(11.5-14.5) 


 


 





 


Platelet Count


 160 x10^3/uL


(140-400) 


 


 





 


Neutrophils (%) (Auto) 55 % (31-73)    


 


Lymphocytes (%) (Auto) 28 % (24-48)    


 


Monocytes (%) (Auto) 10 % (0-9)    


 


Eosinophils (%) (Auto) 6 % (0-3)    


 


Basophils (%) (Auto) 1 % (0-3)    


 


Neutrophils # (Auto)


 2.9 x10^3uL


(1.8-7.7) 


 


 





 


Lymphocytes # (Auto)


 1.5 x10^3/uL


(1.0-4.8) 


 


 





 


Monocytes # (Auto)


 0.5 x10^3/uL


(0.0-1.1) 


 


 





 


Eosinophils # (Auto)


 0.3 x10^3/uL


(0.0-0.7) 


 


 





 


Basophils # (Auto)


 0.0 x10^3/uL


(0.0-0.2) 


 


 





 


Sodium Level


 136 mmol/L


(136-145) 


 


 





 


Potassium Level


 4.2 mmol/L


(3.5-5.1) 


 


 





 


Chloride Level


 98 mmol/L


() 


 


 





 


Carbon Dioxide Level


 28 mmol/L


(21-32) 


 


 





 


Anion Gap 10 (6-14)    


 


Blood Urea Nitrogen


 29 mg/dL


(7-20) 


 


 





 


Creatinine


 5.5 mg/dL


(0.6-1.0) 


 


 





 


Estimated GFR


(Cockcroft-Gault) 7.9 


 


 


 





 


Glucose Level


 129 mg/dL


(70-99) 


 


 





 


Calcium Level


 8.0 mg/dL


(8.5-10.1) 


 


 





 


Phosphorus Level


 6.1 mg/dL


(2.6-4.7) 


 


 





 


Albumin


 3.2 g/dL


(3.4-5.0) 


 


 





 


Glucose (Fingerstick)


 


 126 mg/dL


(70-99) 


 











Medications





Current Medications


Clonidine HCl (Catapres) 0.1 mg 1X  ONCE PO  Last administered on 4/11/19at 16:

50;  Start 4/11/19 at 16:15;  Stop 4/11/19 at 16:16;  Status DC


Albuterol Sulfate (Ventolin Neb Soln) 10 mg 1X  ONCE CONT NEB  Last 

administered on 4/11/19at 17:32;  Start 4/11/19 at 17:15;  Stop 4/11/19 at 17:18

;  Status DC


Dextrose (Dextrose 50%-Water Syringe) 25 gm 1X  ONCE IV  Last administered on 4/ 11/19at 17:41;  Start 4/11/19 at 17:15;  Stop 4/11/19 at 17:18;  Status DC


Insulin Human Regular (HumuLIN R VIAL) 10 unit 1X  ONCE IV  Last administered 

on 4/11/19at 17:45;  Start 4/11/19 at 17:15;  Stop 4/11/19 at 17:18;  Status DC


Sodium Bicarbonate (Sodium Bicarb Adult 8.4% Syr) 50 meq 1X  ONCE IV  Last 

administered on 4/11/19at 17:46;  Start 4/11/19 at 17:15;  Stop 4/11/19 at 17:18

;  Status DC


Sodium Chloride 1,000 ml @  1,000 mls/hr Q1H PRN IV hypotension;  Start 4/11/19 

at 20:00;  Stop 4/12/19 at 06:00;  Status DC


Albumin Human 200 ml @  200 mls/hr 1X PRN  PRN IV Hypotension;  Start 4/11/19 

at 20:00;  Stop 4/12/19 at 06:00;  Status DC


Sodium Chloride (Normal Saline Flush) 10 ml 1X PRN  PRN IV AP catheter pack;  

Start 4/11/19 at 19:45;  Stop 4/12/19 at 06:00;  Status DC


Sodium Chloride (Normal Saline Flush) 10 ml 1X PRN  PRN IV  catheter pack;  

Start 4/11/19 at 19:45;  Stop 4/12/19 at 06:00;  Status DC


Sodium Chloride 1,000 ml @  400 mls/hr Q2H30M PRN IV PATENCY;  Start 4/11/19 at 

20:00;  Stop 4/12/19 at 06:00;  Status DC


Info (PHARMACY MONITORING -- do not chart) 1 each PRN DAILY  PRN MC SEE COMMENTS

;  Start 4/11/19 at 19:45


Info (PHARMACY MONITORING -- do not chart) 1 each PRN DAILY  PRN MC SEE COMMENTS

;  Start 4/11/19 at 19:45;  Status UNV


Alteplase, Recombinant (Cathflo For Central Catheter Clearance) 2 mg 1X  ONCE 

INT CAT  Last administered on 4/12/19at 08:50;  Start 4/12/19 at 08:15;  Stop 4/ 12/19 at 08:16;  Status DC


Sodium Chloride 1,000 ml @  1,000 mls/hr Q1H PRN IV hypotension;  Start 4/12/19 

at 10:05;  Stop 4/12/19 at 16:30;  Status DC


Sodium Chloride 1,000 ml @  400 mls/hr Q2H30M PRN IV PATENCY;  Start 4/12/19 at 

10:05;  Stop 4/12/19 at 16:30;  Status DC


Info (PHARMACY MONITORING -- do not chart) 1 each PRN DAILY  PRN MC SEE COMMENTS

;  Start 4/12/19 at 10:15;  Status UNV


Info (PHARMACY MONITORING -- do not chart) 1 each PRN DAILY  PRN MC SEE COMMENTS

;  Start 4/12/19 at 10:15;  Status UNV


Lidocaine/ Epinephrine (LIDOCAINE 1%-EPI 1:100,000 Multi-Dose) 20 ml STK-MED 

ONCE .ROUTE ;  Start 4/12/19 at 14:53;  Stop 4/12/19 at 14:54;  Status DC


Midazolam HCl (Versed) 2 mg STK-MED ONCE .ROUTE ;  Start 4/12/19 at 15:42;  

Stop 4/12/19 at 15:43;  Status DC


Fentanyl Citrate (Fentanyl 2ml Vial) 100 mcg STK-MED ONCE .ROUTE ;  Start 4/12/ 19 at 15:42;  Stop 4/12/19 at 15:43;  Status DC


Cefazolin Sodium/ Dextrose 50 ml @ As Directed STK-MED ONCE IV ;  Start 4/12/19 

at 15:42;  Stop 4/12/19 at 15:43;  Status DC


Cefazolin Sodium/ Dextrose 50 ml @  100 mls/hr 1X  ONCE IV  Last administered 

on 4/12/19at 16:09;  Start 4/12/19 at 16:15;  Stop 4/12/19 at 16:44;  Status DC


Midazolam HCl (Versed) 2 mg 1X  ONCE IV  Last administered on 4/12/19at 16:11;  

Start 4/12/19 at 16:15;  Stop 4/12/19 at 16:16;  Status DC


Fentanyl Citrate (Fentanyl 2ml Vial) 100 mcg 1X  ONCE IV  Last administered on 4 /12/19at 16:12;  Start 4/12/19 at 16:15;  Stop 4/12/19 at 16:16;  Status DC


Lidocaine/ Epinephrine (LIDOCAINE 1%-EPI 1:100,000 Multi-Dose) 20 ml 1X  ONCE 

IJ  Last administered on 4/12/19at 16:10;  Start 4/12/19 at 16:15;  Stop 4/12/ 19 at 16:16;  Status DC





Active Scripts


Active


Isosorbide Mononitrate Er (Isosorbide Mononitrate) 30 Mg Tab.er.24h 30 Mg PO 

DAILY 30 Days


Furosemide 40 Mg Tablet 40 Mg PO DAILY 30 Days


Clonazepam 0.5 Mg Tablet 0.5 Mg PO TID PRN 30 Days


Humalog (Insulin Lispro) 100 Unit/1 Ml Insuln.pen 10 Units SQ TIDWMEALS 30 Days


Lantus Solostar (Insulin Glargine,Hum.rec.anlog) 100 Unit/1 Ml Insuln.pen 7 

Units SQ QHS 30 Days


Atorvastatin Calcium 40 Mg Tablet 80 Mg PO QHS 30 Days


Amlodipine Besylate 5 Mg Tablet 5 Mg PO DAILYWLUN 30 Days


Clopidogrel (Clopidogrel Bisulfate) 75 Mg Tablet 75 Mg PO DAILY 30 Days


Lisinopril 20 Mg Tablet 1 Tab PO DAILY 30 Days


Aspirin Ec (Aspirin) 81 Mg Tablet. 81 Mg PO DAILYWBKFT 30 Days


Reported


Carvedilol 25 Mg Tablet 20 Mg PO BIDWMEALS


Vitals/I & O





Vital Sign - Last 24 Hours








 4/12/19 4/12/19 4/12/19 4/12/19





 15:00 16:12 16:14 16:42


 


Temp 98.6   





 98.6   


 


Pulse 60  67 


 


Resp 18 18 20 18


 


B/P (MAP) 180/66 (104)   


 


Pulse Ox 93  100 


 


O2 Delivery Room Air  Nasal Cannula Room Air


 


O2 Flow Rate   2.0 


 


    





    





 4/12/19 4/12/19 4/12/19 4/13/19





 19:30 20:00 23:32 03:59


 


Temp 98.3  98.6 97.9





 98.3  98.6 97.9


 


Pulse 66  66 61


 


Resp 16  16 16


 


B/P (MAP) 136/46 (76)  140/57 (84) 162/52 (88)


 


Pulse Ox 97  98 96


 


O2 Delivery Room Air Room Air Room Air Room Air


 


    





    





 4/13/19   





 07:00   


 


Temp 97.6   





 97.6   


 


Pulse 59   


 


Resp 16   


 


B/P (MAP) 152/56 (88)   


 


Pulse Ox 96   


 


O2 Delivery Room Air   














Intake and Output   


 


 4/12/19 4/12/19 4/13/19





 15:00 23:00 07:00


 


Intake Total 0 ml 175 ml 0 ml


 


Balance 0 ml 175 ml 0 ml

















LUCIANA SELLERS MD Apr 13, 2019 09:45

## 2019-04-14 VITALS — DIASTOLIC BLOOD PRESSURE: 46 MMHG | SYSTOLIC BLOOD PRESSURE: 164 MMHG

## 2019-04-14 VITALS — DIASTOLIC BLOOD PRESSURE: 46 MMHG | SYSTOLIC BLOOD PRESSURE: 171 MMHG

## 2019-04-14 VITALS — SYSTOLIC BLOOD PRESSURE: 166 MMHG | DIASTOLIC BLOOD PRESSURE: 62 MMHG

## 2019-04-14 VITALS — SYSTOLIC BLOOD PRESSURE: 178 MMHG | DIASTOLIC BLOOD PRESSURE: 67 MMHG

## 2019-04-14 VITALS — SYSTOLIC BLOOD PRESSURE: 177 MMHG | DIASTOLIC BLOOD PRESSURE: 45 MMHG

## 2019-04-14 VITALS — SYSTOLIC BLOOD PRESSURE: 161 MMHG | DIASTOLIC BLOOD PRESSURE: 57 MMHG

## 2019-04-14 RX ADMIN — INSULIN LISPRO SCH UNITS: 100 INJECTION, SOLUTION INTRAVENOUS; SUBCUTANEOUS at 23:09

## 2019-04-14 NOTE — PDOC
PROGRESS NOTES


History of Present Illness


History of Present Illness





Assessment/Plan


Assessment/Plan





Assessment/Plan


IMPRESSION





1. ESRD ON DIALYSIS


2. NONCOMPLIANCE    remains at HIGH RISK OF COMPLICATIONS//death due to 

noncompliance  with dialysis


3. HX HTN


4. OBESITY


5. ACUTE VOLUME OVERLOAD


6.  ANEMIA


7. Minimal bibasilar lung airspace opacities 


likely atelectasis or infiltrates.. 


8. uncontrolled hypertension 4/13 196/70





PLAN





Replacement of right internal jugular tunnel dialysis catheter  4/12


iv hydralazine 10mg q 4 hrs prn bp support


tele BED


ADMIT


DIALYSIS emergent 4/11


CONSULT NEPHROLOGY


HOME MEDS


[employer]  [employer] NO BLOOD PRODUCTS


LOWER K EMERGENTLY


DR DIAS following 


NEW TUNNELED CATHETER 4/15





Vitals


Vitals





Vital Signs








  Date Time  Temp Pulse Resp B/P (MAP) Pulse Ox O2 Delivery O2 Flow Rate FiO2


 


4/14/19 08:00      Room Air  


 


4/14/19 07:00 97.5 67 16 161/57 (91) 98   





 97.5       











Physical Exam


General:  Alert, Oriented X3, Cooperative, No acute distress, mild distress


Heart:  Regular rate, Normal S1


Lungs:  Clear, Crackles, Other


Abdomen:  Soft


Extremities:  No clubbing, No cyanosis


Skin:  No significant lesion





Labs


LABS





Laboratory Tests








Test


 4/13/19


16:36 4/13/19


19:55 4/14/19


07:46


 


Glucose (Fingerstick)


 282 mg/dL


(70-99) 246 mg/dL


(70-99) 164 mg/dL


(70-99)











Assessment and Plan


Assessmemt and Plan


Problems


Medical Problems:


(1) Hyperkalemia


Status: Acute  





(2) Hypermagnesemia


Status: Acute  





(3) Hyperphosphatemia


Status: Acute  





(4) Hypertensive urgency


Status: Acute  





(5) Missed dialysis


Status: Acute  





(6) Problem with dialysis access


Status: Acute  





(7) Uncontrolled diabetes mellitus


Status: Acute  











Comment


Review of Relevant


I have reviewed the following items lore (where applicable) has been applied.


Labs





Laboratory Tests








Test


 4/12/19


11:45 4/12/19


13:45 4/12/19


16:36 4/12/19


21:21


 


Prothrombin Time


 13.1 SEC


(11.7-14.0) 


 


 





 


Prothromb Time International


Ratio 1.0 (0.8-1.1) 


 


 


 





 


Activated Partial


Thromboplast Time 35 SEC (24-38) 


 


 


 





 


Glucose (Fingerstick)


 


 126 mg/dL


(70-99) 136 mg/dL


(70-99) 212 mg/dL


(70-99)


 


Test


 4/13/19


04:20 4/13/19


07:04 4/13/19


11:05 4/13/19


16:36


 


White Blood Count


 5.2 x10^3/uL


(4.0-11.0) 


 


 





 


Red Blood Count


 3.30 x10^6/uL


(3.50-5.40) 


 


 





 


Hemoglobin


 10.5 g/dL


(12.0-15.5) 


 


 





 


Hematocrit


 32.3 %


(36.0-47.0) 


 


 





 


Mean Corpuscular Volume 98 fL ()    


 


Mean Corpuscular Hemoglobin 32 pg (25-35)    


 


Mean Corpuscular Hemoglobin


Concent 33 g/dL


(31-37) 


 


 





 


Red Cell Distribution Width


 15.1 %


(11.5-14.5) 


 


 





 


Platelet Count


 160 x10^3/uL


(140-400) 


 


 





 


Neutrophils (%) (Auto) 55 % (31-73)    


 


Lymphocytes (%) (Auto) 28 % (24-48)    


 


Monocytes (%) (Auto) 10 % (0-9)    


 


Eosinophils (%) (Auto) 6 % (0-3)    


 


Basophils (%) (Auto) 1 % (0-3)    


 


Neutrophils # (Auto)


 2.9 x10^3uL


(1.8-7.7) 


 


 





 


Lymphocytes # (Auto)


 1.5 x10^3/uL


(1.0-4.8) 


 


 





 


Monocytes # (Auto)


 0.5 x10^3/uL


(0.0-1.1) 


 


 





 


Eosinophils # (Auto)


 0.3 x10^3/uL


(0.0-0.7) 


 


 





 


Basophils # (Auto)


 0.0 x10^3/uL


(0.0-0.2) 


 


 





 


Sodium Level


 136 mmol/L


(136-145) 


 


 





 


Potassium Level


 4.2 mmol/L


(3.5-5.1) 


 


 





 


Chloride Level


 98 mmol/L


() 


 


 





 


Carbon Dioxide Level


 28 mmol/L


(21-32) 


 


 





 


Anion Gap 10 (6-14)    


 


Blood Urea Nitrogen


 29 mg/dL


(7-20) 


 


 





 


Creatinine


 5.5 mg/dL


(0.6-1.0) 


 


 





 


Estimated GFR


(Cockcroft-Gault) 7.9 


 


 


 





 


Glucose Level


 129 mg/dL


(70-99) 


 


 





 


Calcium Level


 8.0 mg/dL


(8.5-10.1) 


 


 





 


Phosphorus Level


 6.1 mg/dL


(2.6-4.7) 


 


 





 


Albumin


 3.2 g/dL


(3.4-5.0) 


 


 





 


Glucose (Fingerstick)


 


 126 mg/dL


(70-99) 251 mg/dL


(70-99) 282 mg/dL


(70-99)


 


Test


 4/13/19


19:55 4/14/19


07:46 


 





 


Glucose (Fingerstick)


 246 mg/dL


(70-99) 164 mg/dL


(70-99) 


 











Laboratory Tests








Test


 4/13/19


16:36 4/13/19


19:55 4/14/19


07:46


 


Glucose (Fingerstick)


 282 mg/dL


(70-99) 246 mg/dL


(70-99) 164 mg/dL


(70-99)








Medications





Current Medications


Clonidine HCl (Catapres) 0.1 mg 1X  ONCE PO  Last administered on 4/11/19at 16:

50;  Start 4/11/19 at 16:15;  Stop 4/11/19 at 16:16;  Status DC


Albuterol Sulfate (Ventolin Neb Soln) 10 mg 1X  ONCE CONT NEB  Last 

administered on 4/11/19at 17:32;  Start 4/11/19 at 17:15;  Stop 4/11/19 at 17:18

;  Status DC


Dextrose (Dextrose 50%-Water Syringe) 25 gm 1X  ONCE IV  Last administered on 4/ 11/19at 17:41;  Start 4/11/19 at 17:15;  Stop 4/11/19 at 17:18;  Status DC


Insulin Human Regular (HumuLIN R VIAL) 10 unit 1X  ONCE IV  Last administered 

on 4/11/19at 17:45;  Start 4/11/19 at 17:15;  Stop 4/11/19 at 17:18;  Status DC


Sodium Bicarbonate (Sodium Bicarb Adult 8.4% Syr) 50 meq 1X  ONCE IV  Last 

administered on 4/11/19at 17:46;  Start 4/11/19 at 17:15;  Stop 4/11/19 at 17:18

;  Status DC


Sodium Chloride 1,000 ml @  1,000 mls/hr Q1H PRN IV hypotension;  Start 4/11/19 

at 20:00;  Stop 4/12/19 at 06:00;  Status DC


Albumin Human 200 ml @  200 mls/hr 1X PRN  PRN IV Hypotension;  Start 4/11/19 

at 20:00;  Stop 4/12/19 at 06:00;  Status DC


Sodium Chloride (Normal Saline Flush) 10 ml 1X PRN  PRN IV AP catheter pack;  

Start 4/11/19 at 19:45;  Stop 4/12/19 at 06:00;  Status DC


Sodium Chloride (Normal Saline Flush) 10 ml 1X PRN  PRN IV  catheter pack;  

Start 4/11/19 at 19:45;  Stop 4/12/19 at 06:00;  Status DC


Sodium Chloride 1,000 ml @  400 mls/hr Q2H30M PRN IV PATENCY;  Start 4/11/19 at 

20:00;  Stop 4/12/19 at 06:00;  Status DC


Info (PHARMACY MONITORING -- do not chart) 1 each PRN DAILY  PRN MC SEE COMMENTS

;  Start 4/11/19 at 19:45


Info (PHARMACY MONITORING -- do not chart) 1 each PRN DAILY  PRN MC SEE COMMENTS

;  Start 4/11/19 at 19:45;  Status UNV


Alteplase, Recombinant (Cathflo For Central Catheter Clearance) 2 mg 1X  ONCE 

INT CAT  Last administered on 4/12/19at 08:50;  Start 4/12/19 at 08:15;  Stop 4/ 12/19 at 08:16;  Status DC


Sodium Chloride 1,000 ml @  1,000 mls/hr Q1H PRN IV hypotension;  Start 4/12/19 

at 10:05;  Stop 4/12/19 at 16:30;  Status DC


Sodium Chloride 1,000 ml @  400 mls/hr Q2H30M PRN IV PATENCY;  Start 4/12/19 at 

10:05;  Stop 4/12/19 at 16:30;  Status DC


Info (PHARMACY MONITORING -- do not chart) 1 each PRN DAILY  PRN MC SEE COMMENTS

;  Start 4/12/19 at 10:15;  Status UNV


Info (PHARMACY MONITORING -- do not chart) 1 each PRN DAILY  PRN MC SEE COMMENTS

;  Start 4/12/19 at 10:15;  Status UNV


Lidocaine/ Epinephrine (LIDOCAINE 1%-EPI 1:100,000 Multi-Dose) 20 ml STK-MED 

ONCE .ROUTE ;  Start 4/12/19 at 14:53;  Stop 4/12/19 at 14:54;  Status DC


Midazolam HCl (Versed) 2 mg STK-MED ONCE .ROUTE ;  Start 4/12/19 at 15:42;  

Stop 4/12/19 at 15:43;  Status DC


Fentanyl Citrate (Fentanyl 2ml Vial) 100 mcg STK-MED ONCE .ROUTE ;  Start 4/12/ 19 at 15:42;  Stop 4/12/19 at 15:43;  Status DC


Cefazolin Sodium/ Dextrose 50 ml @ As Directed STK-MED ONCE IV ;  Start 4/12/19 

at 15:42;  Stop 4/12/19 at 15:43;  Status DC


Cefazolin Sodium/ Dextrose 50 ml @  100 mls/hr 1X  ONCE IV  Last administered 

on 4/12/19at 16:09;  Start 4/12/19 at 16:15;  Stop 4/12/19 at 16:44;  Status DC


Midazolam HCl (Versed) 2 mg 1X  ONCE IV  Last administered on 4/12/19at 16:11;  

Start 4/12/19 at 16:15;  Stop 4/12/19 at 16:16;  Status DC


Fentanyl Citrate (Fentanyl 2ml Vial) 100 mcg 1X  ONCE IV  Last administered on 4 /12/19at 16:12;  Start 4/12/19 at 16:15;  Stop 4/12/19 at 16:16;  Status DC


Lidocaine/ Epinephrine (LIDOCAINE 1%-EPI 1:100,000 Multi-Dose) 20 ml 1X  ONCE 

IJ  Last administered on 4/12/19at 16:10;  Start 4/12/19 at 16:15;  Stop 4/12/ 19 at 16:16;  Status DC


Hydralazine HCl (Apresoline Inj) 10 mg 1X  ONCE IVP  Last administered on 4/13/ 19at 11:27;  Start 4/13/19 at 11:15;  Stop 4/13/19 at 11:16;  Status DC


Insulin Human Lispro (HumaLOG) 10 units TIDWMEALS SQ ;  Start 4/13/19 at 12:00;

  Stop 4/13/19 at 12:00;  Status DC


Insulin Human Lispro (HumaLOG) 5 units STAT SQ ;  Start 4/13/19 at 11:45;  Stop 

4/13/19 at 16:41;  Status DC





Active Scripts


Active


Isosorbide Mononitrate Er (Isosorbide Mononitrate) 30 Mg Tab.er.24h 30 Mg PO 

DAILY 30 Days


Furosemide 40 Mg Tablet 40 Mg PO DAILY 30 Days


Clonazepam 0.5 Mg Tablet 0.5 Mg PO TID PRN 30 Days


Humalog (Insulin Lispro) 100 Unit/1 Ml Insuln.pen 10 Units SQ TIDWMEALS 30 Days


Lantus Solostar (Insulin Glargine,Hum.rec.anlog) 100 Unit/1 Ml Insuln.pen 7 

Units SQ QHS 30 Days


Atorvastatin Calcium 40 Mg Tablet 80 Mg PO QHS 30 Days


Amlodipine Besylate 5 Mg Tablet 5 Mg PO DAILYWLUN 30 Days


Clopidogrel (Clopidogrel Bisulfate) 75 Mg Tablet 75 Mg PO DAILY 30 Days


Lisinopril 20 Mg Tablet 1 Tab PO DAILY 30 Days


Aspirin Ec (Aspirin) 81 Mg Tablet.dr 81 Mg PO DAILYWBKFT 30 Days


Reported


Carvedilol 25 Mg Tablet 20 Mg PO BIDWMEALS


Vitals/I & O





Vital Sign - Last 24 Hours








 4/13/19 4/13/19 4/13/19 4/13/19





 11:27 15:00 19:30 20:00


 


Temp  98.9 98.8 





  98.9 98.8 


 


Pulse  67 76 


 


Resp  16 18 


 


B/P (MAP) 196/70 171/52 (91) 145/56 (85) 


 


Pulse Ox  98 99 


 


O2 Delivery  Room Air Room Air Room Air


 


    





    





 4/13/19 4/14/19 4/14/19 4/14/19





 23:46 03:55 07:00 08:00


 


Temp 98.4 98.2 97.5 





 98.4 98.2 97.5 


 


Pulse 68 63 67 


 


Resp 18 18 16 


 


B/P (MAP) 163/54 (90) 171/46 (87) 161/57 (91) 


 


Pulse Ox 91 93 98 


 


O2 Delivery Room Air Room Air Room Air Room Air














Intake and Output   


 


 4/13/19 4/13/19 4/14/19





 14:59 22:59 06:59


 


Intake Total 75 ml 150 ml 250 ml


 


Balance 75 ml 150 ml 250 ml

















LUCIANA SELLERS MD Apr 14, 2019 11:13

## 2019-04-14 NOTE — PDOC
Renal-Progress Notes


Subjective Notes


Notes


NONE





History of Present Illness


Hx of present illness


STABLE





Vitals


Vitals





Vital Signs








  Date Time  Temp Pulse Resp B/P (MAP) Pulse Ox O2 Delivery O2 Flow Rate FiO2


 


4/14/19 11:00 98.2 65 16 166/62 (96) 98 Room Air  





 98.2       








Weight


Weight [ ]





I.O.


Intake and Output











Intake and Output 


 


 4/14/19





 06:59


 


Intake Total 475 ml


 


Balance 475 ml


 


 


 


Intake Oral 475 ml











Labs


Labs





Laboratory Tests








Test


 4/13/19


16:36 4/13/19


19:55 4/14/19


07:46 4/14/19


11:04


 


Glucose (Fingerstick)


 282 mg/dL


(70-99) 246 mg/dL


(70-99) 164 mg/dL


(70-99) 247 mg/dL


(70-99)











Review of Systems


Constitutional:  yes: weakness, alert, oriented


Ears/Nose/Throat:  Yes: no symptom reported


Eyes:  Yes: no symptom reported


Pulmonary:  Yes no symptom reported


Cardiovascular:  Yes no symptom reported


Gastrointestional:  Yes: no symptom reported


Genitourinary:  Yes: no symptom reported


Musculoskeletal:  Yes: no symptom reported


Skin:  Yes no symptom reported


Psychiatric/Neurological:  Yes: no symptom reported


Endocrine:  Yes: no symptom reported





Physical Exam


General Appearance:  no apparent distress


Skin:  warm


Respiratory:  bilateral CTA


Heart:  S1S2


Abdomen:  soft, bowel sounds present


Genitourinary:  bladder flat


Extremities:  pulses present


Neurology:  alert, follow commands





Assessment


Assessment


IMP





HYPERKALEMIA


NON FUNCTIONING TDC


S/P TEMP HD CATHETER


ESRD


DM II


HTN - NOT CONTROLLED


NON COMPLIANCE


VOLUME OVERLOAD





PLAN





ENC COMPLIANCE


PT WILL NOT ALLOW FOR AV ACCESS PLACEMENT


NEW TUNNELED HD CATHETER TOMORROW


HD AFTER NEW CATHETER THEN OK TO D/C


WILL FOLLOW











JANE CAMARILLO MD Apr 14, 2019 12:17

## 2019-04-15 VITALS — SYSTOLIC BLOOD PRESSURE: 169 MMHG | DIASTOLIC BLOOD PRESSURE: 43 MMHG

## 2019-04-15 VITALS — SYSTOLIC BLOOD PRESSURE: 169 MMHG | DIASTOLIC BLOOD PRESSURE: 51 MMHG

## 2019-04-15 VITALS — SYSTOLIC BLOOD PRESSURE: 215 MMHG | DIASTOLIC BLOOD PRESSURE: 64 MMHG

## 2019-04-15 VITALS — DIASTOLIC BLOOD PRESSURE: 47 MMHG | SYSTOLIC BLOOD PRESSURE: 180 MMHG

## 2019-04-15 VITALS — DIASTOLIC BLOOD PRESSURE: 61 MMHG | SYSTOLIC BLOOD PRESSURE: 182 MMHG

## 2019-04-15 VITALS — SYSTOLIC BLOOD PRESSURE: 171 MMHG | DIASTOLIC BLOOD PRESSURE: 57 MMHG

## 2019-04-15 LAB
ANION GAP SERPL CALC-SCNC: 16 MMOL/L (ref 6–14)
BUN SERPL-MCNC: 59 MG/DL (ref 7–20)
CALCIUM SERPL-MCNC: 7.3 MG/DL (ref 8.5–10.1)
CHLORIDE SERPL-SCNC: 98 MMOL/L (ref 98–107)
CO2 SERPL-SCNC: 23 MMOL/L (ref 21–32)
CREAT SERPL-MCNC: 9.1 MG/DL (ref 0.6–1)
ERYTHROCYTE [DISTWIDTH] IN BLOOD BY AUTOMATED COUNT: 14.6 % (ref 11.5–14.5)
GFR SERPLBLD BASED ON 1.73 SQ M-ARVRAT: 4.4 ML/MIN
GLUCOSE SERPL-MCNC: 161 MG/DL (ref 70–99)
HCT VFR BLD CALC: 30.2 % (ref 36–47)
HGB BLD-MCNC: 10 G/DL (ref 12–15.5)
MCH RBC QN AUTO: 32 PG (ref 25–35)
MCHC RBC AUTO-ENTMCNC: 33 G/DL (ref 31–37)
MCV RBC AUTO: 97 FL (ref 79–100)
PLATELET # BLD AUTO: 153 X10^3/UL (ref 140–400)
POTASSIUM SERPL-SCNC: 4.8 MMOL/L (ref 3.5–5.1)
RBC # BLD AUTO: 3.11 X10^6/UL (ref 3.5–5.4)
SODIUM SERPL-SCNC: 137 MMOL/L (ref 136–145)
WBC # BLD AUTO: 5.9 X10^3/UL (ref 4–11)

## 2019-04-15 PROCEDURE — 5A1D70Z PERFORMANCE OF URINARY FILTRATION, INTERMITTENT, LESS THAN 6 HOURS PER DAY: ICD-10-PCS | Performed by: INTERNAL MEDICINE

## 2019-04-15 RX ADMIN — INSULIN LISPRO SCH UNITS: 100 INJECTION, SOLUTION INTRAVENOUS; SUBCUTANEOUS at 11:53

## 2019-04-15 RX ADMIN — ATORVASTATIN CALCIUM SCH MG: 40 TABLET, FILM COATED ORAL at 21:06

## 2019-04-15 RX ADMIN — CARVEDILOL SCH MG: 12.5 TABLET, FILM COATED ORAL at 21:07

## 2019-04-15 RX ADMIN — INSULIN GLARGINE SCH UNITS: 100 INJECTION, SOLUTION SUBCUTANEOUS at 21:08

## 2019-04-15 RX ADMIN — INSULIN LISPRO SCH UNITS: 100 INJECTION, SOLUTION INTRAVENOUS; SUBCUTANEOUS at 21:08

## 2019-04-15 RX ADMIN — INSULIN LISPRO SCH UNITS: 100 INJECTION, SOLUTION INTRAVENOUS; SUBCUTANEOUS at 17:00

## 2019-04-15 RX ADMIN — INSULIN LISPRO SCH UNITS: 100 INJECTION, SOLUTION INTRAVENOUS; SUBCUTANEOUS at 08:46

## 2019-04-15 NOTE — PDOC
SUBJECTIVE


ROS


Stable





OBJECTIVE


Vital Signs





Vital Signs








  Date Time  Temp Pulse Resp B/P (MAP) Pulse Ox O2 Delivery O2 Flow Rate FiO2


 


4/15/19 14:26 98.5 69 18 169/43 (85) 98 Room Air  





 98.5       


 


4/15/19 08:00       2.0 








I & 0











Intake and Output 


 


 4/15/19





 06:59


 


Intake Total 350 ml


 


Balance 350 ml


 


 


 


Intake Oral 350 ml











PHYSICAL EXAM


Physical Exam


GEN:    NAD 


HEEN:  OM moist  


NECK:   Supple 


CVS:   RRR  


RESP: CTA Bilat,    No Acc. Muscle Use


GI:        BS + ve, NO Bruit, Non Tender, 


:      No CVA tenderness, No Suprapubic Tenderness, No Resendiz


NEURO- AXOX3


Derm-   No Rash





DIAGNOSIS/ASSESSMENT


Assessment & Plan





ESRD--


Chronic Non compliance,


Seen on HD, tolerating well  


Continue as ordered , [facility] RN 





Access - Perm-a-cath replaced on 4/12





HTN-  Improved Post HD


Continue  Antihypertensives





DM





COMMENT/RELEVANT DATA


Meds





Current Medications








 Medications


  (Trade)  Dose


 Ordered  Sig/Gume  Start Time


 Stop Time Status Last Admin


Dose Admin


 


 Albumin Human  200 ml @ 


 200 mls/hr  1X PRN  PRN  4/11/19 20:00


 4/12/19 06:00 DC  





 


 Albuterol Sulfate


  (Ventolin Neb


 Soln)  10 mg  1X  ONCE  4/11/19 17:15


 4/11/19 17:18 DC 4/11/19 17:32


10 MG


 


 Alteplase,


 Recombinant


  (Cathflo For


 Central Catheter


 Clearance)  2 mg  1X  ONCE  4/12/19 08:15


 4/12/19 08:16 DC 4/12/19 08:50


2 MG


 


 Cefazolin Sodium/


 Dextrose  50 ml @ 


 100 mls/hr  1X  ONCE  4/12/19 16:15


 4/12/19 16:44 DC 4/12/19 16:09


100 MLS/HR


 


 Clonidine HCl


  (Catapres)  0.1 mg  1X  ONCE  4/11/19 16:15


 4/11/19 16:16 DC 4/11/19 16:50


0.1 MG


 


 Darbepoetin Valeriano


  (Aranesp)  60 mcg  WEEKLYHS  4/14/19 21:00


    4/14/19 23:05


60 MCG


 


 Dextrose


  (Dextrose


 50%-Water Syringe)  12.5 gm  PRN Q15MIN  PRN  4/14/19 23:00


     





 


 Diphenhydramine


 HCl


  (Benadryl)  25 mg  1X PRN  PRN  4/15/19 14:15


 4/16/19 14:14   





 


 Fentanyl Citrate


  (Fentanyl 2ml


 Vial)  100 mcg  1X  ONCE  4/12/19 16:15


 4/12/19 16:16 DC 4/12/19 16:12


50 MCG


 


 Hydralazine HCl


  (Apresoline Inj)  10 mg  1X  ONCE  4/13/19 11:15


 4/13/19 11:16 DC 4/13/19 11:27


10 MG


 


 Info


  (PHARMACY


 MONITORING -- do


 not chart)  1 each  PRN DAILY  PRN  4/15/19 14:15


   UNV  





 


 Insulin Human


 Lispro


  (HumaLOG)  0-9 UNITS  TIDWMEALS  4/14/19 23:00


    4/15/19 11:53


4 UNITS


 


 Insulin Human


 Regular


  (HumuLIN R VIAL)  10 unit  1X  ONCE  4/11/19 17:15


 4/11/19 17:18 DC 4/11/19 17:45


10 UNIT


 


 Lidocaine/


 Epinephrine


  (LIDOCAINE


 1%-EPI 1:100,000


 Multi-Dose)  20 ml  1X  ONCE  4/12/19 16:15


 4/12/19 16:16 DC 4/12/19 16:10


5 ML


 


 Midazolam HCl


  (Versed)  2 mg  1X  ONCE  4/12/19 16:15


 4/12/19 16:16 DC 4/12/19 16:11


1 MG


 


 Sodium Bicarbonate


  (Sodium Bicarb


 Adult 8.4% Syr)  50 meq  1X  ONCE  4/11/19 17:15


 4/11/19 17:18 DC 4/11/19 17:46


50 MEQ


 


 Sodium Chloride  1,000 ml @ 


 400 mls/hr  Q2H30M PRN  4/15/19 14:13


 4/16/19 02:12   





 


 Sodium Chloride


  (Normal Saline


 Flush)  10 ml  1X PRN  PRN  4/11/19 19:45


 4/12/19 06:00 DC  











Lab





Laboratory Tests








Test


 4/14/19


17:01 4/14/19


19:45 4/15/19


04:23 4/15/19


07:18


 


Glucose (Fingerstick)


 220 mg/dL


(70-99) 306 mg/dL


(70-99) 


 156 mg/dL


(70-99)


 


White Blood Count


 


 


 5.9 x10^3/uL


(4.0-11.0) 





 


Red Blood Count


 


 


 3.11 x10^6/uL


(3.50-5.40) 





 


Hemoglobin


 


 


 10.0 g/dL


(12.0-15.5) 





 


Hematocrit


 


 


 30.2 %


(36.0-47.0) 





 


Mean Corpuscular Volume   97 fL ()  


 


Mean Corpuscular Hemoglobin   32 pg (25-35)  


 


Mean Corpuscular Hemoglobin


Concent 


 


 33 g/dL


(31-37) 





 


Red Cell Distribution Width


 


 


 14.6 %


(11.5-14.5) 





 


Platelet Count


 


 


 153 x10^3/uL


(140-400) 





 


Sodium Level


 


 


 137 mmol/L


(136-145) 





 


Potassium Level


 


 


 4.8 mmol/L


(3.5-5.1) 





 


Chloride Level


 


 


 98 mmol/L


() 





 


Carbon Dioxide Level


 


 


 23 mmol/L


(21-32) 





 


Anion Gap   16 (6-14)  


 


Blood Urea Nitrogen


 


 


 59 mg/dL


(7-20) 





 


Creatinine


 


 


 9.1 mg/dL


(0.6-1.0) 





 


Estimated GFR


(Cockcroft-Gault) 


 


 4.4 


 





 


Glucose Level


 


 


 161 mg/dL


(70-99) 





 


Calcium Level


 


 


 7.3 mg/dL


(8.5-10.1) 





 


Test


 4/15/19


11:23 


 


 





 


Glucose (Fingerstick)


 190 mg/dL


(70-99) 


 


 











Results


All relevant outside records, renal labs, imaging studies, telemetry/EKG's were 

reviewed.











MABLE DIAS MD Apr 15, 2019 15:03

## 2019-04-15 NOTE — PDOC
PROGRESS NOTES


History of Present Illness


History of Present Illness





Assessment/Plan


Assessment/Plan





Assessment/Plan


IMPRESSION





1. ESRD ON DIALYSIS


2. NONCOMPLIANCE    remains at HIGH RISK OF COMPLICATIONS//death due to 

noncompliance  with dialysis


3. HX HTN


4. OBESITY


5. ACUTE VOLUME OVERLOAD


6.  ANEMIA


7. Minimal bibasilar lung airspace opacities 


likely atelectasis or infiltrates.. 


8. uncontrolled hypertension 4/13 196/70





PLAN





Replacement of right internal jugular tunnel dialysis catheter  4/12


iv hydralazine 10mg q 4 hrs prn bp support


tele BED


ADMIT


DIALYSIS emergent 4/11


CONSULT NEPHROLOGY


HOME MEDS


[facility] NO BLOOD PRODUCTS


[facility] EMERGENTLY


DR DIAS following 


NEW TUNNELED CATHETER 4/15


HD AFTER NEW CATHETER THEN OK TO D/C





44 min pt exam, chart review, > 50% of time spent with exam, chart review, pt 

care coordination





Vitals


Vitals





Vital Signs








  Date Time  Temp Pulse Resp B/P (MAP) Pulse Ox O2 Delivery O2 Flow Rate FiO2


 


4/15/19 07:16 97.5 68 19 169/51 (90) 94 Room Air  





 97.5       











Physical Exam


General:  Alert, Oriented X3, Cooperative, No acute distress


Heart:  Regular rate, Normal S1


Lungs:  Clear, Crackles, Other


Abdomen:  Normal bowel sounds, Soft, No tenderness


Extremities:  No clubbing, No cyanosis


Skin:  No significant lesion





Labs


LABS





Laboratory Tests








Test


 4/14/19


11:04 4/14/19


17:01 4/14/19


19:45 4/15/19


04:23


 


Glucose (Fingerstick)


 247 mg/dL


(70-99) 220 mg/dL


(70-99) 306 mg/dL


(70-99) 





 


White Blood Count


 


 


 


 5.9 x10^3/uL


(4.0-11.0)


 


Red Blood Count


 


 


 


 3.11 x10^6/uL


(3.50-5.40)


 


Hemoglobin


 


 


 


 10.0 g/dL


(12.0-15.5)


 


Hematocrit


 


 


 


 30.2 %


(36.0-47.0)


 


Mean Corpuscular Volume    97 fL () 


 


Mean Corpuscular Hemoglobin    32 pg (25-35) 


 


Mean Corpuscular Hemoglobin


Concent 


 


 


 33 g/dL


(31-37)


 


Red Cell Distribution Width


 


 


 


 14.6 %


(11.5-14.5)


 


Platelet Count


 


 


 


 153 x10^3/uL


(140-400)


 


Sodium Level


 


 


 


 137 mmol/L


(136-145)


 


Potassium Level


 


 


 


 4.8 mmol/L


(3.5-5.1)


 


Chloride Level


 


 


 


 98 mmol/L


()


 


Carbon Dioxide Level


 


 


 


 23 mmol/L


(21-32)


 


Anion Gap    16 (6-14) 


 


Blood Urea Nitrogen


 


 


 


 59 mg/dL


(7-20)


 


Creatinine


 


 


 


 9.1 mg/dL


(0.6-1.0)


 


Estimated GFR


(Cockcroft-Gault) 


 


 


 4.4 





 


Glucose Level


 


 


 


 161 mg/dL


(70-99)


 


Calcium Level


 


 


 


 7.3 mg/dL


(8.5-10.1)


 


Test


 4/15/19


07:18 


 


 





 


Glucose (Fingerstick)


 156 mg/dL


(70-99) 


 


 














Assessment and Plan


Assessmemt and Plan


Problems


Medical Problems:


(1) Hyperkalemia


Status: Acute  





(2) Hypermagnesemia


Status: Acute  





(3) Hyperphosphatemia


Status: Acute  





(4) Hypertensive urgency


Status: Acute  





(5) Missed dialysis


Status: Acute  





(6) Problem with dialysis access


Status: Acute  





(7) Uncontrolled diabetes mellitus


Status: Acute  











Comment


Review of Relevant


I have reviewed the following items lore (where applicable) has been applied.


Labs





Laboratory Tests








Test


 4/13/19


11:05 4/13/19


16:36 4/13/19


19:55 4/14/19


07:46


 


Glucose (Fingerstick)


 251 mg/dL


(70-99) 282 mg/dL


(70-99) 246 mg/dL


(70-99) 164 mg/dL


(70-99)


 


Test


 4/14/19


11:04 4/14/19


17:01 4/14/19


19:45 4/15/19


04:23


 


Glucose (Fingerstick)


 247 mg/dL


(70-99) 220 mg/dL


(70-99) 306 mg/dL


(70-99) 





 


White Blood Count


 


 


 


 5.9 x10^3/uL


(4.0-11.0)


 


Red Blood Count


 


 


 


 3.11 x10^6/uL


(3.50-5.40)


 


Hemoglobin


 


 


 


 10.0 g/dL


(12.0-15.5)


 


Hematocrit


 


 


 


 30.2 %


(36.0-47.0)


 


Mean Corpuscular Volume    97 fL () 


 


Mean Corpuscular Hemoglobin    32 pg (25-35) 


 


Mean Corpuscular Hemoglobin


Concent 


 


 


 33 g/dL


(31-37)


 


Red Cell Distribution Width


 


 


 


 14.6 %


(11.5-14.5)


 


Platelet Count


 


 


 


 153 x10^3/uL


(140-400)


 


Sodium Level


 


 


 


 137 mmol/L


(136-145)


 


Potassium Level


 


 


 


 4.8 mmol/L


(3.5-5.1)


 


Chloride Level


 


 


 


 98 mmol/L


()


 


Carbon Dioxide Level


 


 


 


 23 mmol/L


(21-32)


 


Anion Gap    16 (6-14) 


 


Blood Urea Nitrogen


 


 


 


 59 mg/dL


(7-20)


 


Creatinine


 


 


 


 9.1 mg/dL


(0.6-1.0)


 


Estimated GFR


(Cockcroft-Gault) 


 


 


 4.4 





 


Glucose Level


 


 


 


 161 mg/dL


(70-99)


 


Calcium Level


 


 


 


 7.3 mg/dL


(8.5-10.1)


 


Test


 4/15/19


07:18 


 


 





 


Glucose (Fingerstick)


 156 mg/dL


(70-99) 


 


 











Laboratory Tests








Test


 4/14/19


11:04 4/14/19


17:01 4/14/19


19:45 4/15/19


04:23


 


Glucose (Fingerstick)


 247 mg/dL


(70-99) 220 mg/dL


(70-99) 306 mg/dL


(70-99) 





 


White Blood Count


 


 


 


 5.9 x10^3/uL


(4.0-11.0)


 


Red Blood Count


 


 


 


 3.11 x10^6/uL


(3.50-5.40)


 


Hemoglobin


 


 


 


 10.0 g/dL


(12.0-15.5)


 


Hematocrit


 


 


 


 30.2 %


(36.0-47.0)


 


Mean Corpuscular Volume    97 fL () 


 


Mean Corpuscular Hemoglobin    32 pg (25-35) 


 


Mean Corpuscular Hemoglobin


Concent 


 


 


 33 g/dL


(31-37)


 


Red Cell Distribution Width


 


 


 


 14.6 %


(11.5-14.5)


 


Platelet Count


 


 


 


 153 x10^3/uL


(140-400)


 


Sodium Level


 


 


 


 137 mmol/L


(136-145)


 


Potassium Level


 


 


 


 4.8 mmol/L


(3.5-5.1)


 


Chloride Level


 


 


 


 98 mmol/L


()


 


Carbon Dioxide Level


 


 


 


 23 mmol/L


(21-32)


 


Anion Gap    16 (6-14) 


 


Blood Urea Nitrogen


 


 


 


 59 mg/dL


(7-20)


 


Creatinine


 


 


 


 9.1 mg/dL


(0.6-1.0)


 


Estimated GFR


(Cockcroft-Gault) 


 


 


 4.4 





 


Glucose Level


 


 


 


 161 mg/dL


(70-99)


 


Calcium Level


 


 


 


 7.3 mg/dL


(8.5-10.1)


 


Test


 4/15/19


07:18 


 


 





 


Glucose (Fingerstick)


 156 mg/dL


(70-99) 


 


 











Medications





Current Medications


Clonidine HCl (Catapres) 0.1 mg 1X  ONCE PO  Last administered on 4/11/19at 16:

50;  Start 4/11/19 at 16:15;  Stop 4/11/19 at 16:16;  Status DC


Albuterol Sulfate (Ventolin Neb Soln) 10 mg 1X  ONCE CONT NEB  Last 

administered on 4/11/19at 17:32;  Start 4/11/19 at 17:15;  Stop 4/11/19 at 17:18

;  Status DC


Dextrose (Dextrose 50%-Water Syringe) 25 gm 1X  ONCE IV  Last administered on 4/ 11/19at 17:41;  Start 4/11/19 at 17:15;  Stop 4/11/19 at 17:18;  Status DC


Insulin Human Regular (HumuLIN R VIAL) 10 unit 1X  ONCE IV  Last administered 

on 4/11/19at 17:45;  Start 4/11/19 at 17:15;  Stop 4/11/19 at 17:18;  Status DC


Sodium Bicarbonate (Sodium Bicarb Adult 8.4% Syr) 50 meq 1X  ONCE IV  Last 

administered on 4/11/19at 17:46;  Start 4/11/19 at 17:15;  Stop 4/11/19 at 17:18

;  Status DC


Sodium Chloride 1,000 ml @  1,000 mls/hr Q1H PRN IV hypotension;  Start 4/11/19 

at 20:00;  Stop 4/12/19 at 06:00;  Status DC


Albumin Human 200 ml @  200 mls/hr 1X PRN  PRN IV Hypotension;  Start 4/11/19 

at 20:00;  Stop 4/12/19 at 06:00;  Status DC


Sodium Chloride (Normal Saline Flush) 10 ml 1X PRN  PRN IV AP catheter pack;  

Start 4/11/19 at 19:45;  Stop 4/12/19 at 06:00;  Status DC


Sodium Chloride (Normal Saline Flush) 10 ml 1X PRN  PRN IV  catheter pack;  

Start 4/11/19 at 19:45;  Stop 4/12/19 at 06:00;  Status DC


Sodium Chloride 1,000 ml @  400 mls/hr Q2H30M PRN IV PATENCY;  Start 4/11/19 at 

20:00;  Stop 4/12/19 at 06:00;  Status DC


Info (PHARMACY MONITORING -- do not chart) 1 each PRN DAILY  PRN MC SEE COMMENTS

;  Start 4/11/19 at 19:45


Info (PHARMACY MONITORING -- do not chart) 1 each PRN DAILY  PRN MC SEE COMMENTS

;  Start 4/11/19 at 19:45;  Status UNV


Alteplase, Recombinant (Cathflo For Central Catheter Clearance) 2 mg 1X  ONCE 

INT CAT  Last administered on 4/12/19at 08:50;  Start 4/12/19 at 08:15;  Stop 4/ 12/19 at 08:16;  Status DC


Sodium Chloride 1,000 ml @  1,000 mls/hr Q1H PRN IV hypotension;  Start 4/12/19 

at 10:05;  Stop 4/12/19 at 16:30;  Status DC


Sodium Chloride 1,000 ml @  400 mls/hr Q2H30M PRN IV PATENCY;  Start 4/12/19 at 

10:05;  Stop 4/12/19 at 16:30;  Status DC


Info (PHARMACY MONITORING -- do not chart) 1 each PRN DAILY  PRN MC SEE COMMENTS

;  Start 4/12/19 at 10:15;  Status UNV


Info (PHARMACY MONITORING -- do not chart) 1 each PRN DAILY  PRN MC SEE COMMENTS

;  Start 4/12/19 at 10:15;  Status UNV


Lidocaine/ Epinephrine (LIDOCAINE 1%-EPI 1:100,000 Multi-Dose) 20 ml STK-MED 

ONCE .ROUTE ;  Start 4/12/19 at 14:53;  Stop 4/12/19 at 14:54;  Status DC


Midazolam HCl (Versed) 2 mg STK-MED ONCE .ROUTE ;  Start 4/12/19 at 15:42;  

Stop 4/12/19 at 15:43;  Status DC


Fentanyl Citrate (Fentanyl 2ml Vial) 100 mcg STK-MED ONCE .ROUTE ;  Start 4/12/ 19 at 15:42;  Stop 4/12/19 at 15:43;  Status DC


Cefazolin Sodium/ Dextrose 50 ml @ As Directed STK-MED ONCE IV ;  Start 4/12/19 

at 15:42;  Stop 4/12/19 at 15:43;  Status DC


Cefazolin Sodium/ Dextrose 50 ml @  100 mls/hr 1X  ONCE IV  Last administered 

on 4/12/19at 16:09;  Start 4/12/19 at 16:15;  Stop 4/12/19 at 16:44;  Status DC


Midazolam HCl (Versed) 2 mg 1X  ONCE IV  Last administered on 4/12/19at 16:11;  

Start 4/12/19 at 16:15;  Stop 4/12/19 at 16:16;  Status DC


Fentanyl Citrate (Fentanyl 2ml Vial) 100 mcg 1X  ONCE IV  Last administered on 4 /12/19at 16:12;  Start 4/12/19 at 16:15;  Stop 4/12/19 at 16:16;  Status DC


Lidocaine/ Epinephrine (LIDOCAINE 1%-EPI 1:100,000 Multi-Dose) 20 ml 1X  ONCE 

IJ  Last administered on 4/12/19at 16:10;  Start 4/12/19 at 16:15;  Stop 4/12/ 19 at 16:16;  Status DC


Hydralazine HCl (Apresoline Inj) 10 mg 1X  ONCE IVP  Last administered on 4/13/ 19at 11:27;  Start 4/13/19 at 11:15;  Stop 4/13/19 at 11:16;  Status DC


Insulin Human Lispro (HumaLOG) 10 units TIDWMEALS SQ ;  Start 4/13/19 at 12:00;

  Stop 4/13/19 at 12:00;  Status DC


Insulin Human Lispro (HumaLOG) 5 units STAT SQ ;  Start 4/13/19 at 11:45;  Stop 

4/13/19 at 16:41;  Status DC


Darbepoetin Valeriano (Aranesp) 60 mcg WEEKLYHS SQ  Last administered on 4/14/19at 23

:05;  Start 4/14/19 at 21:00


Insulin Human Lispro (HumaLOG) 0-9 UNITS TIDWMEALS SQ  Last administered on 4/15

/19at 08:46;  Start 4/14/19 at 23:00


Dextrose (Dextrose 50%-Water Syringe) 12.5 gm PRN Q15MIN  PRN IV SEE COMMENTS;  

Start 4/14/19 at 23:00





Active Scripts


Active


Isosorbide Mononitrate Er (Isosorbide Mononitrate) 30 Mg Tab.er.24h 30 Mg PO 

DAILY 30 Days


Furosemide 40 Mg Tablet 40 Mg PO DAILY 30 Days


Clonazepam 0.5 Mg Tablet 0.5 Mg PO TID PRN 30 Days


Humalog (Insulin Lispro) 100 Unit/1 Ml Insuln.pen 10 Units SQ TIDWMEALS 30 Days


Lantus Solostar (Insulin Glargine,Hum.rec.anlog) 100 Unit/1 Ml Insuln.pen 7 

Units SQ QHS 30 Days


Atorvastatin Calcium 40 Mg Tablet 80 Mg PO QHS 30 Days


Amlodipine Besylate 5 Mg Tablet 5 Mg PO DAILYWLUN 30 Days


Clopidogrel (Clopidogrel Bisulfate) 75 Mg Tablet 75 Mg PO DAILY 30 Days


Lisinopril 20 Mg Tablet 1 Tab PO DAILY 30 Days


Aspirin Ec (Aspirin) 81 Mg Tablet.dr 81 Mg PO DAILYWBKFT 30 Days


Reported


Carvedilol 25 Mg Tablet 20 Mg PO BIDWMEALS


Vitals/I & O





Vital Sign - Last 24 Hours








 4/14/19 4/14/19 4/14/19 4/14/19





 11:00 15:00 19:30 20:00


 


Temp 98.2 97.6 99.2 





 98.2 97.6 99.2 


 


Pulse 65 68 72 


 


Resp 16 16 20 


 


B/P (MAP) 166/62 (96) 178/67 (104) 177/45 (89) 


 


Pulse Ox 98 100 96 


 


O2 Delivery Room Air Room Air Room Air Room Air


 


    





    





 4/14/19 4/15/19 4/15/19 





 23:45 03:49 07:16 


 


Temp 98.7 98.1 97.5 





 98.7 98.1 97.5 


 


Pulse 66 64 68 


 


Resp 18 18 19 


 


B/P (MAP) 164/46 (85) 182/61 (101) 169/51 (90) 


 


Pulse Ox 97 95 94 


 


O2 Delivery Room Air Room Air Room Air 














Intake and Output   


 


 4/14/19 4/14/19 4/15/19





 14:59 22:59 06:59


 


Intake Total  100 ml 250 ml


 


Balance  100 ml 250 ml

















LUCIANA SELLERS MD Apr 15, 2019 09:38

## 2019-04-15 NOTE — NUR
This RN administered patient's 1700 sliding scale insulin at this time as an HS insulin 
dose. Patient was in dialysis when medication was due and during dinner time. Patient 
arrived back in room around 1845 and finished eating at about 2015, when blood glucose was 
tested. Patient's glucose was 293. MD paged around 2040. Patient received 4 units of homolog 
per sliding scale. This RN will continue to monitor the patient at this time.

## 2019-04-16 VITALS — SYSTOLIC BLOOD PRESSURE: 178 MMHG | DIASTOLIC BLOOD PRESSURE: 57 MMHG

## 2019-04-16 VITALS — DIASTOLIC BLOOD PRESSURE: 49 MMHG | SYSTOLIC BLOOD PRESSURE: 145 MMHG

## 2019-04-16 VITALS — SYSTOLIC BLOOD PRESSURE: 175 MMHG | DIASTOLIC BLOOD PRESSURE: 57 MMHG

## 2019-04-16 VITALS — SYSTOLIC BLOOD PRESSURE: 131 MMHG | DIASTOLIC BLOOD PRESSURE: 59 MMHG

## 2019-04-16 VITALS — SYSTOLIC BLOOD PRESSURE: 133 MMHG | DIASTOLIC BLOOD PRESSURE: 46 MMHG

## 2019-04-16 VITALS — SYSTOLIC BLOOD PRESSURE: 162 MMHG | DIASTOLIC BLOOD PRESSURE: 47 MMHG

## 2019-04-16 VITALS — DIASTOLIC BLOOD PRESSURE: 50 MMHG | SYSTOLIC BLOOD PRESSURE: 159 MMHG

## 2019-04-16 LAB
ALBUMIN SERPL-MCNC: 2.9 G/DL (ref 3.4–5)
ANION GAP SERPL CALC-SCNC: 10 MMOL/L (ref 6–14)
BUN SERPL-MCNC: 32 MG/DL (ref 7–20)
CALCIUM SERPL-MCNC: 7.7 MG/DL (ref 8.5–10.1)
CHLORIDE SERPL-SCNC: 98 MMOL/L (ref 98–107)
CO2 SERPL-SCNC: 27 MMOL/L (ref 21–32)
CREAT SERPL-MCNC: 6.3 MG/DL (ref 0.6–1)
GFR SERPLBLD BASED ON 1.73 SQ M-ARVRAT: 6.7 ML/MIN
GLUCOSE SERPL-MCNC: 240 MG/DL (ref 70–99)
PHOSPHATE SERPL-MCNC: 5.9 MG/DL (ref 2.6–4.7)
POTASSIUM SERPL-SCNC: 4.8 MMOL/L (ref 3.5–5.1)
SODIUM SERPL-SCNC: 135 MMOL/L (ref 136–145)

## 2019-04-16 RX ADMIN — INSULIN GLARGINE SCH UNITS: 100 INJECTION, SOLUTION SUBCUTANEOUS at 20:52

## 2019-04-16 RX ADMIN — INSULIN LISPRO SCH UNITS: 100 INJECTION, SOLUTION INTRAVENOUS; SUBCUTANEOUS at 12:01

## 2019-04-16 RX ADMIN — CARVEDILOL SCH MG: 12.5 TABLET, FILM COATED ORAL at 08:41

## 2019-04-16 RX ADMIN — LISINOPRIL SCH MG: 20 TABLET ORAL at 08:40

## 2019-04-16 RX ADMIN — INSULIN LISPRO SCH UNITS: 100 INJECTION, SOLUTION INTRAVENOUS; SUBCUTANEOUS at 08:00

## 2019-04-16 RX ADMIN — ISOSORBIDE MONONITRATE SCH MG: 30 TABLET, EXTENDED RELEASE ORAL at 08:40

## 2019-04-16 RX ADMIN — ATORVASTATIN CALCIUM SCH MG: 40 TABLET, FILM COATED ORAL at 20:45

## 2019-04-16 RX ADMIN — CARVEDILOL SCH MG: 12.5 TABLET, FILM COATED ORAL at 17:07

## 2019-04-16 RX ADMIN — CLOPIDOGREL BISULFATE SCH MG: 75 TABLET ORAL at 08:37

## 2019-04-16 RX ADMIN — FUROSEMIDE SCH MG: 40 TABLET ORAL at 08:40

## 2019-04-16 RX ADMIN — INSULIN LISPRO SCH UNITS: 100 INJECTION, SOLUTION INTRAVENOUS; SUBCUTANEOUS at 16:59

## 2019-04-16 RX ADMIN — ASPIRIN SCH MG: 81 TABLET, COATED ORAL at 08:40

## 2019-04-16 NOTE — PDOC
PROGRESS NOTES


History of Present Illness


History of Present Illness





Assessment/Plan


Assessment/Plan





Assessment/Plan


IMPRESSION





1. ESRD ON DIALYSIS


2. NONCOMPLIANCE    remains at HIGH RISK OF COMPLICATIONS//death due to 

noncompliance  with dialysis


3. HX HTN


4. OBESITY


5. ACUTE VOLUME OVERLOAD


6.  ANEMIA


7. Minimal bibasilar lung airspace opacities 


likely atelectasis or infiltrates.. 


8. uncontrolled hypertension 4/13, 4/16 196/70, not improved





PLAN





Replacement of right internal jugular tunnel dialysis catheter  4/12


iv hydralazine 10mg q 4 hrs prn bp support


tele BED


ADMIT


DIALYSIS emergent 4/11


CONSULT NEPHROLOGY


HOME MEDS


[facility] NO BLOOD PRODUCTS


[facility] EMERGENTLY


DR DIAS following 


NEW TUNNELED CATHETER 4/15 planned


HD AFTER NEW CATHETER 


inc norvasc to 5 mg po bid





Vitals


Vitals





Vital Signs








  Date Time  Temp Pulse Resp B/P (MAP) Pulse Ox O2 Delivery O2 Flow Rate FiO2


 


4/16/19 10:38 98.6 65 18 175/57 (96) 100 Room Air  





 98.6       


 


4/15/19 08:00       2.0 











Physical Exam


General:  Alert, Oriented X3, Cooperative, No acute distress


Heart:  Regular rate, Normal S1


Lungs:  Clear, Crackles, Other


Abdomen:  Normal bowel sounds, Soft, No tenderness


Extremities:  No clubbing, No cyanosis


Skin:  No significant lesion





Labs


LABS





Laboratory Tests








Test


 4/15/19


11:23 4/15/19


18:42 4/15/19


20:04 4/16/19


07:00


 


Glucose (Fingerstick)


 190 mg/dL


(70-99) 102 mg/dL


(70-99) 293 mg/dL


(70-99) 138 mg/dL


(70-99)











Assessment and Plan


Assessmemt and Plan


Problems


Medical Problems:


(1) Hyperkalemia


Status: Acute  





(2) Hypermagnesemia


Status: Acute  





(3) Hyperphosphatemia


Status: Acute  





(4) Hypertensive urgency


Status: Acute  





(5) Missed dialysis


Status: Acute  





(6) Problem with dialysis access


Status: Acute  





(7) Uncontrolled diabetes mellitus


Status: Acute  











Comment


Review of Relevant


I have reviewed the following items lore (where applicable) has been applied.


Labs





Laboratory Tests








Test


 4/14/19


11:04 4/14/19


17:01 4/14/19


19:45 4/15/19


04:23


 


Glucose (Fingerstick)


 247 mg/dL


(70-99) 220 mg/dL


(70-99) 306 mg/dL


(70-99) 





 


White Blood Count


 


 


 


 5.9 x10^3/uL


(4.0-11.0)


 


Red Blood Count


 


 


 


 3.11 x10^6/uL


(3.50-5.40)


 


Hemoglobin


 


 


 


 10.0 g/dL


(12.0-15.5)


 


Hematocrit


 


 


 


 30.2 %


(36.0-47.0)


 


Mean Corpuscular Volume    97 fL () 


 


Mean Corpuscular Hemoglobin    32 pg (25-35) 


 


Mean Corpuscular Hemoglobin


Concent 


 


 


 33 g/dL


(31-37)


 


Red Cell Distribution Width


 


 


 


 14.6 %


(11.5-14.5)


 


Platelet Count


 


 


 


 153 x10^3/uL


(140-400)


 


Sodium Level


 


 


 


 137 mmol/L


(136-145)


 


Potassium Level


 


 


 


 4.8 mmol/L


(3.5-5.1)


 


Chloride Level


 


 


 


 98 mmol/L


()


 


Carbon Dioxide Level


 


 


 


 23 mmol/L


(21-32)


 


Anion Gap    16 (6-14) 


 


Blood Urea Nitrogen


 


 


 


 59 mg/dL


(7-20)


 


Creatinine


 


 


 


 9.1 mg/dL


(0.6-1.0)


 


Estimated GFR


(Cockcroft-Gault) 


 


 


 4.4 





 


Glucose Level


 


 


 


 161 mg/dL


(70-99)


 


Calcium Level


 


 


 


 7.3 mg/dL


(8.5-10.1)


 


Test


 4/15/19


07:18 4/15/19


11:23 4/15/19


18:42 4/15/19


20:04


 


Glucose (Fingerstick)


 156 mg/dL


(70-99) 190 mg/dL


(70-99) 102 mg/dL


(70-99) 293 mg/dL


(70-99)


 


Test


 4/16/19


07:00 


 


 





 


Glucose (Fingerstick)


 138 mg/dL


(70-99) 


 


 











Laboratory Tests








Test


 4/15/19


11:23 4/15/19


18:42 4/15/19


20:04 4/16/19


07:00


 


Glucose (Fingerstick)


 190 mg/dL


(70-99) 102 mg/dL


(70-99) 293 mg/dL


(70-99) 138 mg/dL


(70-99)








Medications





Current Medications


Clonidine HCl (Catapres) 0.1 mg 1X  ONCE PO  Last administered on 4/11/19at 16:

50;  Start 4/11/19 at 16:15;  Stop 4/11/19 at 16:16;  Status DC


Albuterol Sulfate (Ventolin Neb Soln) 10 mg 1X  ONCE CONT NEB  Last 

administered on 4/11/19at 17:32;  Start 4/11/19 at 17:15;  Stop 4/11/19 at 17:18

;  Status DC


Dextrose (Dextrose 50%-Water Syringe) 25 gm 1X  ONCE IV  Last administered on 4/ 11/19at 17:41;  Start 4/11/19 at 17:15;  Stop 4/11/19 at 17:18;  Status DC


Insulin Human Regular (HumuLIN R VIAL) 10 unit 1X  ONCE IV  Last administered 

on 4/11/19at 17:45;  Start 4/11/19 at 17:15;  Stop 4/11/19 at 17:18;  Status DC


Sodium Bicarbonate (Sodium Bicarb Adult 8.4% Syr) 50 meq 1X  ONCE IV  Last 

administered on 4/11/19at 17:46;  Start 4/11/19 at 17:15;  Stop 4/11/19 at 17:18

;  Status DC


Sodium Chloride 1,000 ml @  1,000 mls/hr Q1H PRN IV hypotension;  Start 4/11/19 

at 20:00;  Stop 4/12/19 at 06:00;  Status DC


Albumin Human 200 ml @  200 mls/hr 1X PRN  PRN IV Hypotension;  Start 4/11/19 

at 20:00;  Stop 4/12/19 at 06:00;  Status DC


Sodium Chloride (Normal Saline Flush) 10 ml 1X PRN  PRN IV AP catheter pack;  

Start 4/11/19 at 19:45;  Stop 4/12/19 at 06:00;  Status DC


Sodium Chloride (Normal Saline Flush) 10 ml 1X PRN  PRN IV  catheter pack;  

Start 4/11/19 at 19:45;  Stop 4/12/19 at 06:00;  Status DC


Sodium Chloride 1,000 ml @  400 mls/hr Q2H30M PRN IV PATENCY;  Start 4/11/19 at 

20:00;  Stop 4/12/19 at 06:00;  Status DC


Info (PHARMACY MONITORING -- do not chart) 1 each PRN DAILY  PRN MC SEE COMMENTS

;  Start 4/11/19 at 19:45


Info (PHARMACY MONITORING -- do not chart) 1 each PRN DAILY  PRN MC SEE COMMENTS

;  Start 4/11/19 at 19:45;  Status UNV


Alteplase, Recombinant (Cathflo For Central Catheter Clearance) 2 mg 1X  ONCE 

INT CAT  Last administered on 4/12/19at 08:50;  Start 4/12/19 at 08:15;  Stop 4/ 12/19 at 08:16;  Status DC


Sodium Chloride 1,000 ml @  1,000 mls/hr Q1H PRN IV hypotension;  Start 4/12/19 

at 10:05;  Stop 4/12/19 at 16:30;  Status DC


Sodium Chloride 1,000 ml @  400 mls/hr Q2H30M PRN IV PATENCY;  Start 4/12/19 at 

10:05;  Stop 4/12/19 at 16:30;  Status DC


Info (PHARMACY MONITORING -- do not chart) 1 each PRN DAILY  PRN MC SEE COMMENTS

;  Start 4/12/19 at 10:15;  Status UNV


Info (PHARMACY MONITORING -- do not chart) 1 each PRN DAILY  PRN MC SEE COMMENTS

;  Start 4/12/19 at 10:15;  Status UNV


Lidocaine/ Epinephrine (LIDOCAINE 1%-EPI 1:100,000 Multi-Dose) 20 ml STK-MED 

ONCE .ROUTE ;  Start 4/12/19 at 14:53;  Stop 4/12/19 at 14:54;  Status DC


Midazolam HCl (Versed) 2 mg STK-MED ONCE .ROUTE ;  Start 4/12/19 at 15:42;  

Stop 4/12/19 at 15:43;  Status DC


Fentanyl Citrate (Fentanyl 2ml Vial) 100 mcg STK-MED ONCE .ROUTE ;  Start 4/12/ 19 at 15:42;  Stop 4/12/19 at 15:43;  Status DC


Cefazolin Sodium/ Dextrose 50 ml @ As Directed STK-MED ONCE IV ;  Start 4/12/19 

at 15:42;  Stop 4/12/19 at 15:43;  Status DC


Cefazolin Sodium/ Dextrose 50 ml @  100 mls/hr 1X  ONCE IV  Last administered 

on 4/12/19at 16:09;  Start 4/12/19 at 16:15;  Stop 4/12/19 at 16:44;  Status DC


Midazolam HCl (Versed) 2 mg 1X  ONCE IV  Last administered on 4/12/19at 16:11;  

Start 4/12/19 at 16:15;  Stop 4/12/19 at 16:16;  Status DC


Fentanyl Citrate (Fentanyl 2ml Vial) 100 mcg 1X  ONCE IV  Last administered on 4 /12/19at 16:12;  Start 4/12/19 at 16:15;  Stop 4/12/19 at 16:16;  Status DC


Lidocaine/ Epinephrine (LIDOCAINE 1%-EPI 1:100,000 Multi-Dose) 20 ml 1X  ONCE 

IJ  Last administered on 4/12/19at 16:10;  Start 4/12/19 at 16:15;  Stop 4/12/ 19 at 16:16;  Status DC


Hydralazine HCl (Apresoline Inj) 10 mg 1X  ONCE IVP  Last administered on 4/13/ 19at 11:27;  Start 4/13/19 at 11:15;  Stop 4/13/19 at 11:16;  Status DC


Insulin Human Lispro (HumaLOG) 10 units TIDWMEALS SQ ;  Start 4/13/19 at 12:00;

  Stop 4/13/19 at 12:00;  Status DC


Insulin Human Lispro (HumaLOG) 5 units STAT SQ ;  Start 4/13/19 at 11:45;  Stop 

4/13/19 at 16:41;  Status DC


Darbepoetin Valeriano (Aranesp) 60 mcg WEEKLYHS SQ  Last administered on 4/14/19at 23

:05;  Start 4/14/19 at 21:00


Insulin Human Lispro (HumaLOG) 0-9 UNITS TIDWMEALS SQ  Last administered on 4/15

/19at 21:08;  Start 4/14/19 at 23:00


Dextrose (Dextrose 50%-Water Syringe) 12.5 gm PRN Q15MIN  PRN IV SEE COMMENTS;  

Start 4/14/19 at 23:00


Sodium Chloride 1,000 ml @  1,000 mls/hr Q1H PRN IV hypotension;  Start 4/15/19 

at 14:13;  Stop 4/15/19 at 20:12;  Status DC


Diphenhydramine HCl (Benadryl) 25 mg 1X PRN  PRN IV ITCHING;  Start 4/15/19 at 

14:15;  Stop 4/16/19 at 14:14


Diphenhydramine HCl (Benadryl) 25 mg 1X PRN  PRN IV ITCHING;  Start 4/15/19 at 

14:15;  Stop 4/16/19 at 14:14


Sodium Chloride 1,000 ml @  400 mls/hr Q2H30M PRN IV PATENCY;  Start 4/15/19 at 

14:13;  Stop 4/16/19 at 02:12;  Status DC


Info (PHARMACY MONITORING -- do not chart) 1 each PRN DAILY  PRN MC SEE COMMENTS

;  Start 4/15/19 at 14:15;  Status UNV


Amlodipine Besylate (Norvasc) 5 mg DAILYWLUN PO ;  Start 4/16/19 at 12:00


Aspirin (Ecotrin) 81 mg DAILYWBKFT PO  Last administered on 4/16/19at 08:40;  

Start 4/16/19 at 08:00


Atorvastatin Calcium (Lipitor) 80 mg QHS PO  Last administered on 4/15/19at 21:

06;  Start 4/15/19 at 21:00


Clonazepam (KlonoPIN) 0.5 mg PRN TID  PRN PO ANXIETY / AGITATION;  Start 4/15/

19 at 18:30


Clopidogrel Bisulfate (Plavix) 75 mg DAILY PO  Last administered on 4/16/19at 08

:37;  Start 4/16/19 at 09:00


Furosemide (Lasix) 40 mg DAILY PO  Last administered on 4/16/19at 08:40;  Start 

4/16/19 at 09:00


Insulin Glargine (Lantus) 7 units QHS SQ  Last administered on 4/15/19at 21:08;

  Start 4/15/19 at 21:00


Isosorbide Mononitrate (Imdur) 30 mg DAILY PO  Last administered on 4/16/19at 08

:40;  Start 4/16/19 at 09:00


Lisinopril (Prinivil) 20 mg DAILY PO  Last administered on 4/16/19at 08:40;  

Start 4/16/19 at 09:00


Carvedilol (Coreg) 25 mg BIDWMEALS PO  Last administered on 4/16/19at 08:41;  

Start 4/15/19 at 21:00





Active Scripts


Active


Isosorbide Mononitrate Er (Isosorbide Mononitrate) 30 Mg Tab.er.24h 30 Mg PO 

DAILY 30 Days


Furosemide 40 Mg Tablet 40 Mg PO DAILY 30 Days


Clonazepam 0.5 Mg Tablet 0.5 Mg PO TID PRN 30 Days


Humalog (Insulin Lispro) 100 Unit/1 Ml Insuln.pen 10 Units SQ TIDWMEALS 30 Days


Lantus Solostar (Insulin Glargine,Hum.rec.anlog) 100 Unit/1 Ml Insuln.pen 7 

Units SQ QHS 30 Days


Atorvastatin Calcium 40 Mg Tablet 80 Mg PO QHS 30 Days


Amlodipine Besylate 5 Mg Tablet 5 Mg PO DAILYWLUN 30 Days


Clopidogrel (Clopidogrel Bisulfate) 75 Mg Tablet 75 Mg PO DAILY 30 Days


Lisinopril 20 Mg Tablet 1 Tab PO DAILY 30 Days


Aspirin Ec (Aspirin) 81 Mg Tablet.dr 81 Mg PO DAILYWBKFT 30 Days


Reported


Carvedilol 25 Mg Tablet 20 Mg PO BIDWMEALS


Vitals/I & O





Vital Sign - Last 24 Hours








 4/15/19 4/15/19 4/15/19 4/15/19





 14:26 19:46 20:00 21:07


 


Temp 98.5 97.5  





 98.5 97.5  


 


Pulse 69 64  75


 


Resp 18 16  


 


B/P (MAP) 169/43 (85) 215/64 (114)  198/62


 


Pulse Ox 98 100  


 


O2 Delivery Room Air Room Air Room Air 


 


    





    





 4/15/19 4/16/19 4/16/19 4/16/19





 23:20 03:16 07:11 08:40


 


Temp 99.2 99.1 98.7 





 99.2 99.1 98.7 


 


Pulse 75 67 64 68


 


Resp 16 16 18 


 


B/P (MAP) 171/57 (95) 131/59 (83) 159/50 (86) 195/50


 


Pulse Ox 95 98 97 


 


O2 Delivery Room Air Room Air Room Air 


 


    





    





 4/16/19 4/16/19 4/16/19 





 08:40 08:41 10:38 


 


Temp   98.6 





   98.6 


 


Pulse 68 68 65 


 


Resp   18 


 


B/P (MAP) 195/50 195/50 175/57 (96) 


 


Pulse Ox   100 


 


O2 Delivery   Room Air 














Intake and Output   


 


 4/15/19 4/15/19 4/16/19





 15:00 23:00 07:00


 


Intake Total 300 ml 540 ml 300 ml


 


Balance 300 ml 540 ml 300 ml

















LUCIANA SELLERS MD Apr 16, 2019 10:40

## 2019-04-17 VITALS — SYSTOLIC BLOOD PRESSURE: 145 MMHG | DIASTOLIC BLOOD PRESSURE: 47 MMHG

## 2019-04-17 VITALS — DIASTOLIC BLOOD PRESSURE: 87 MMHG | SYSTOLIC BLOOD PRESSURE: 175 MMHG

## 2019-04-17 VITALS — SYSTOLIC BLOOD PRESSURE: 148 MMHG | DIASTOLIC BLOOD PRESSURE: 52 MMHG

## 2019-04-17 PROCEDURE — 5A1D70Z PERFORMANCE OF URINARY FILTRATION, INTERMITTENT, LESS THAN 6 HOURS PER DAY: ICD-10-PCS | Performed by: INTERNAL MEDICINE

## 2019-04-17 RX ADMIN — ASPIRIN SCH MG: 81 TABLET, COATED ORAL at 13:14

## 2019-04-17 RX ADMIN — FUROSEMIDE SCH MG: 40 TABLET ORAL at 13:15

## 2019-04-17 RX ADMIN — CARVEDILOL SCH MG: 12.5 TABLET, FILM COATED ORAL at 13:13

## 2019-04-17 RX ADMIN — ISOSORBIDE MONONITRATE SCH MG: 30 TABLET, EXTENDED RELEASE ORAL at 13:12

## 2019-04-17 RX ADMIN — INSULIN LISPRO SCH UNITS: 100 INJECTION, SOLUTION INTRAVENOUS; SUBCUTANEOUS at 08:00

## 2019-04-17 RX ADMIN — INSULIN LISPRO SCH UNITS: 100 INJECTION, SOLUTION INTRAVENOUS; SUBCUTANEOUS at 12:00

## 2019-04-17 RX ADMIN — LISINOPRIL SCH MG: 20 TABLET ORAL at 13:17

## 2019-04-17 RX ADMIN — CLOPIDOGREL BISULFATE SCH MG: 75 TABLET ORAL at 13:11

## 2019-04-17 NOTE — PDOC3
Discharge Summary


Date of Admission:  Apr 11, 2019


Date of Discharge:  Apr 17, 2019


Follow-Up:  1-2 days


Admitting Diagnosis comment:


DISCHARGE DX =============








Assessment/Plan





Assessment/Plan


IMPRESSION





1. ESRD ON DIALYSIS


2. NONCOMPLIANCE    remains at HIGH RISK OF COMPLICATIONS//death due to 

noncompliance  with dialysis


3. HX HTN


4. OBESITY


5. ACUTE VOLUME OVERLOAD, RESOLVED


6.  ANEMIA


7. Minimal bibasilar lung airspace opacities 


likely atelectasis or infiltrates.. 


8. uncontrolled hypertension SEC TO NONCOMPLIANCE





PLAN





Replacement of right internal jugular tunnel dialysis catheter  4/12


iv hydralazine 10mg q 4 hrs prn bp support


tele BED


ADMIT


DIALYSIS emergent 4/11


CONSULT NEPHROLOGY


HOME MEDS


[facility] NO BLOOD PRODUCTS


LOWER K EMERGENTLY


DR DIAS following 


NEW TUNNELED CATHETER 4/15


HD AFTER NEW CATHETER THEN OK TO D/C





34 min pt exam, D/C PLANNING TIME chart review, > 50% of time spent with exam, 

chart review, pt care coordination





Vitals


Vitals





Vital Signs








  Date Time  Temp Pulse Resp B/P (MAP) Pulse Ox O2 Delivery O2 Flow Rate FiO2


 


4/17/19 08:00      Room Air  


 


4/17/19 07:05 98.1 65 17 145/47 (79) 97   





 98.1       











Physical Exam


General:  Alert, Oriented X3, Cooperative, No acute distress


Heart:  Regular rate, Normal S1


Lungs:  Clear, Crackles, Other


Abdomen:  Normal bowel sounds, Soft, No tenderness


Extremities:  No clubbing, No cyanosis, No edema


Skin:  No significant lesion


FINAL DIAGNOSIS


Problems


Medical Problems:


(1) Hyperkalemia


Status: Acute  





(2) Hypermagnesemia


Status: Acute  





(3) Hyperphosphatemia


Status: Acute  





(4) Hypertensive urgency


Status: Acute  





(5) Missed dialysis


Status: Acute  





(6) Problem with dialysis access


Status: Acute  





(7) Uncontrolled diabetes mellitus


Status: Acute  








Brief Hospital Course


Ms. Ibrahim  is a 63 old [sex] who presented with [ MALIGNANT HTN]


CONDITION AT DISCHARGE:  Improved


Discharge Medications





Current Medications


Clonidine HCl (Catapres) 0.1 mg 1X  ONCE PO  Last administered on 4/11/19at 16:

50;  Start 4/11/19 at 16:15;  Stop 4/11/19 at 16:16;  Status DC


Albuterol Sulfate (Ventolin Neb Soln) 10 mg 1X  ONCE CONT NEB  Last 

administered on 4/11/19at 17:32;  Start 4/11/19 at 17:15;  Stop 4/11/19 at 17:18

;  Status DC


Dextrose (Dextrose 50%-Water Syringe) 25 gm 1X  ONCE IV  Last administered on 4/ 11/19at 17:41;  Start 4/11/19 at 17:15;  Stop 4/11/19 at 17:18;  Status DC


Insulin Human Regular (HumuLIN R VIAL) 10 unit 1X  ONCE IV  Last administered 

on 4/11/19at 17:45;  Start 4/11/19 at 17:15;  Stop 4/11/19 at 17:18;  Status DC


Sodium Bicarbonate (Sodium Bicarb Adult 8.4% Syr) 50 meq 1X  ONCE IV  Last 

administered on 4/11/19at 17:46;  Start 4/11/19 at 17:15;  Stop 4/11/19 at 17:18

;  Status DC


Sodium Chloride 1,000 ml @  1,000 mls/hr Q1H PRN IV hypotension;  Start 4/11/19 

at 20:00;  Stop 4/12/19 at 06:00;  Status DC


Albumin Human 200 ml @  200 mls/hr 1X PRN  PRN IV Hypotension;  Start 4/11/19 

at 20:00;  Stop 4/12/19 at 06:00;  Status DC


Sodium Chloride (Normal Saline Flush) 10 ml 1X PRN  PRN IV AP catheter pack;  

Start 4/11/19 at 19:45;  Stop 4/12/19 at 06:00;  Status DC


Sodium Chloride (Normal Saline Flush) 10 ml 1X PRN  PRN IV  catheter pack;  

Start 4/11/19 at 19:45;  Stop 4/12/19 at 06:00;  Status DC


Sodium Chloride 1,000 ml @  400 mls/hr Q2H30M PRN IV PATENCY;  Start 4/11/19 at 

20:00;  Stop 4/12/19 at 06:00;  Status DC


Info (PHARMACY MONITORING -- do not chart) 1 each PRN DAILY  PRN MC SEE COMMENTS

;  Start 4/11/19 at 19:45


Info (PHARMACY MONITORING -- do not chart) 1 each PRN DAILY  PRN MC SEE COMMENTS

;  Start 4/11/19 at 19:45;  Status UNV


Alteplase, Recombinant (Cathflo For Central Catheter Clearance) 2 mg 1X  ONCE 

INT CAT  Last administered on 4/12/19at 08:50;  Start 4/12/19 at 08:15;  Stop 4/ 12/19 at 08:16;  Status DC


Sodium Chloride 1,000 ml @  1,000 mls/hr Q1H PRN IV hypotension;  Start 4/12/19 

at 10:05;  Stop 4/12/19 at 16:30;  Status DC


Sodium Chloride 1,000 ml @  400 mls/hr Q2H30M PRN IV PATENCY;  Start 4/12/19 at 

10:05;  Stop 4/12/19 at 16:30;  Status DC


Info (PHARMACY MONITORING -- do not chart) 1 each PRN DAILY  PRN MC SEE COMMENTS

;  Start 4/12/19 at 10:15;  Status UNV


Info (PHARMACY MONITORING -- do not chart) 1 each PRN DAILY  PRN MC SEE COMMENTS

;  Start 4/12/19 at 10:15;  Status UNV


Lidocaine/ Epinephrine (LIDOCAINE 1%-EPI 1:100,000 Multi-Dose) 20 ml STK-MED 

ONCE .ROUTE ;  Start 4/12/19 at 14:53;  Stop 4/12/19 at 14:54;  Status DC


Midazolam HCl (Versed) 2 mg STK-MED ONCE .ROUTE ;  Start 4/12/19 at 15:42;  

Stop 4/12/19 at 15:43;  Status DC


Fentanyl Citrate (Fentanyl 2ml Vial) 100 mcg STK-MED ONCE .ROUTE ;  Start 4/12/ 19 at 15:42;  Stop 4/12/19 at 15:43;  Status DC


Cefazolin Sodium/ Dextrose 50 ml @ As Directed STK-MED ONCE IV ;  Start 4/12/19 

at 15:42;  Stop 4/12/19 at 15:43;  Status DC


Cefazolin Sodium/ Dextrose 50 ml @  100 mls/hr 1X  ONCE IV  Last administered 

on 4/12/19at 16:09;  Start 4/12/19 at 16:15;  Stop 4/12/19 at 16:44;  Status DC


Midazolam HCl (Versed) 2 mg 1X  ONCE IV  Last administered on 4/12/19at 16:11;  

Start 4/12/19 at 16:15;  Stop 4/12/19 at 16:16;  Status DC


Fentanyl Citrate (Fentanyl 2ml Vial) 100 mcg 1X  ONCE IV  Last administered on 4 /12/19at 16:12;  Start 4/12/19 at 16:15;  Stop 4/12/19 at 16:16;  Status DC


Lidocaine/ Epinephrine (LIDOCAINE 1%-EPI 1:100,000 Multi-Dose) 20 ml 1X  ONCE 

IJ  Last administered on 4/12/19at 16:10;  Start 4/12/19 at 16:15;  Stop 4/12/ 19 at 16:16;  Status DC


Hydralazine HCl (Apresoline Inj) 10 mg 1X  ONCE IVP  Last administered on 4/13/ 19at 11:27;  Start 4/13/19 at 11:15;  Stop 4/13/19 at 11:16;  Status DC


Insulin Human Lispro (HumaLOG) 10 units TIDWMEALS SQ ;  Start 4/13/19 at 12:00;

  Stop 4/13/19 at 12:00;  Status DC


Insulin Human Lispro (HumaLOG) 5 units STAT SQ ;  Start 4/13/19 at 11:45;  Stop 

4/13/19 at 16:41;  Status DC


Darbepoetin Valeriano (Aranesp) 60 mcg WEEKLYHS SQ  Last administered on 4/14/19at 23

:05;  Start 4/14/19 at 21:00


Insulin Human Lispro (HumaLOG) 0-9 UNITS TIDWMEALS SQ  Last administered on 4/16 /19at 12:01;  Start 4/14/19 at 23:00


Dextrose (Dextrose 50%-Water Syringe) 12.5 gm PRN Q15MIN  PRN IV SEE COMMENTS;  

Start 4/14/19 at 23:00


Sodium Chloride 1,000 ml @  1,000 mls/hr Q1H PRN IV hypotension;  Start 4/15/19 

at 14:13;  Stop 4/15/19 at 20:12;  Status DC


Diphenhydramine HCl (Benadryl) 25 mg 1X PRN  PRN IV ITCHING;  Start 4/15/19 at 

14:15;  Stop 4/16/19 at 14:14;  Status DC


Diphenhydramine HCl (Benadryl) 25 mg 1X PRN  PRN IV ITCHING;  Start 4/15/19 at 

14:15;  Stop 4/16/19 at 14:14;  Status DC


Sodium Chloride 1,000 ml @  400 mls/hr Q2H30M PRN IV PATENCY;  Start 4/15/19 at 

14:13;  Stop 4/16/19 at 02:12;  Status DC


Info (PHARMACY MONITORING -- do not chart) 1 each PRN DAILY  PRN MC SEE COMMENTS

;  Start 4/15/19 at 14:15;  Status UNV


Amlodipine Besylate (Norvasc) 5 mg DAILYWLUN PO  Last administered on 4/16/19at 

12:00;  Start 4/16/19 at 12:00;  Stop 4/16/19 at 12:14;  Status DC


Aspirin (Ecotrin) 81 mg DAILYWBKFT PO  Last administered on 4/16/19at 08:40;  

Start 4/16/19 at 08:00


Atorvastatin Calcium (Lipitor) 80 mg QHS PO  Last administered on 4/16/19at 20:

45;  Start 4/15/19 at 21:00


Clonazepam (KlonoPIN) 0.5 mg PRN TID  PRN PO ANXIETY / AGITATION;  Start 4/15/

19 at 18:30


Clopidogrel Bisulfate (Plavix) 75 mg DAILY PO  Last administered on 4/16/19at 08

:37;  Start 4/16/19 at 09:00


Furosemide (Lasix) 40 mg DAILY PO  Last administered on 4/16/19at 08:40;  Start 

4/16/19 at 09:00


Insulin Glargine (Lantus) 7 units QHS SQ  Last administered on 4/16/19at 20:52;

  Start 4/15/19 at 21:00


Isosorbide Mononitrate (Imdur) 30 mg DAILY PO  Last administered on 4/16/19at 08

:40;  Start 4/16/19 at 09:00


Lisinopril (Prinivil) 20 mg DAILY PO  Last administered on 4/16/19at 08:40;  

Start 4/16/19 at 09:00


Carvedilol (Coreg) 25 mg BIDWMEALS PO  Last administered on 4/16/19at 17:07;  

Start 4/15/19 at 21:00


Amlodipine Besylate (Norvasc) 5 mg BID PO  Last administered on 4/16/19at 20:46

;  Start 4/16/19 at 21:00


Hydralazine HCl (Apresoline) 50 mg TID PO  Last administered on 4/16/19at 20:46

;  Start 4/16/19 at 14:00


Sodium Chloride 1,000 ml @  1,000 mls/hr Q1H PRN IV hypotension;  Start 4/17/19 

at 07:48;  Stop 4/17/19 at 13:47


Albumin Human 200 ml @  200 mls/hr 1X PRN  PRN IV Hypotension;  Start 4/17/19 

at 08:00;  Stop 4/17/19 at 13:59


Sodium Chloride 1,000 ml @  400 mls/hr Q2H30M PRN IV PATENCY;  Start 4/17/19 at 

07:48;  Stop 4/17/19 at 19:47


Info (PHARMACY MONITORING -- do not chart) 1 each PRN DAILY  PRN MC SEE COMMENTS

;  Start 4/17/19 at 08:00;  Status UNV


Info (PHARMACY MONITORING -- do not chart) 1 each PRN DAILY  PRN MC SEE COMMENTS

;  Start 4/17/19 at 08:00;  Status UNV





Active Scripts


Active


Isosorbide Mononitrate Er (Isosorbide Mononitrate) 30 Mg Tab.er.24h 30 Mg PO 

DAILY 30 Days


Furosemide 40 Mg Tablet 40 Mg PO DAILY 30 Days


Clonazepam 0.5 Mg Tablet 0.5 Mg PO TID PRN 30 Days


Humalog (Insulin Lispro) 100 Unit/1 Ml Insuln.pen 10 Units SQ TIDWMEALS 30 Days


Lantus Solostar (Insulin Glargine,Hum.rec.anlog) 100 Unit/1 Ml Insuln.pen 7 

Units SQ QHS 30 Days


Atorvastatin Calcium 40 Mg Tablet 80 Mg PO QHS 30 Days


Amlodipine Besylate 5 Mg Tablet 5 Mg PO DAILYWLUN 30 Days


Clopidogrel (Clopidogrel Bisulfate) 75 Mg Tablet 75 Mg PO DAILY 30 Days


Lisinopril 20 Mg Tablet 1 Tab PO DAILY 30 Days


Aspirin Ec (Aspirin) 81 Mg Tablet.dr 81 Mg PO DAILYWBKFT 30 Days


Reported


Carvedilol 25 Mg Tablet 20 Mg PO BIDWMEALS


Vital Signs





Vital Signs








  Date Time  Temp Pulse Resp B/P (MAP) Pulse Ox O2 Delivery O2 Flow Rate FiO2


 


4/17/19 08:00      Room Air  


 


4/17/19 07:05 98.1 65 17 145/47 (79) 97   





 98.1       








Labs





Laboratory Tests








Test


 4/15/19


18:42 4/15/19


20:04 4/16/19


07:00 4/16/19


11:34


 


Glucose (Fingerstick)


 102 mg/dL


(70-99) 293 mg/dL


(70-99) 138 mg/dL


(70-99) 272 mg/dL


(70-99)


 


Test


 4/16/19


13:15 4/16/19


16:52 4/16/19


20:21 4/17/19


07:03


 


Sodium Level


 135 mmol/L


(136-145) 


 


 





 


Potassium Level


 4.8 mmol/L


(3.5-5.1) 


 


 





 


Chloride Level


 98 mmol/L


() 


 


 





 


Carbon Dioxide Level


 27 mmol/L


(21-32) 


 


 





 


Anion Gap 10 (6-14)    


 


Blood Urea Nitrogen


 32 mg/dL


(7-20) 


 


 





 


Creatinine


 6.3 mg/dL


(0.6-1.0) 


 


 





 


Estimated GFR


(Cockcroft-Gault) 6.7 


 


 


 





 


Glucose Level


 240 mg/dL


(70-99) 


 


 





 


Calcium Level


 7.7 mg/dL


(8.5-10.1) 


 


 





 


Phosphorus Level


 5.9 mg/dL


(2.6-4.7) 


 


 





 


Albumin


 2.9 g/dL


(3.4-5.0) 


 


 





 


Glucose (Fingerstick)


 


 121 mg/dL


(70-99) 273 mg/dL


(70-99) 156 mg/dL


(70-99)








Laboratory Tests








Test


 4/16/19


13:15 4/16/19


16:52 4/16/19


20:21 4/17/19


07:03


 


Sodium Level


 135 mmol/L


(136-145) 


 


 





 


Potassium Level


 4.8 mmol/L


(3.5-5.1) 


 


 





 


Chloride Level


 98 mmol/L


() 


 


 





 


Carbon Dioxide Level


 27 mmol/L


(21-32) 


 


 





 


Anion Gap 10 (6-14)    


 


Blood Urea Nitrogen


 32 mg/dL


(7-20) 


 


 





 


Creatinine


 6.3 mg/dL


(0.6-1.0) 


 


 





 


Estimated GFR


(Cockcroft-Gault) 6.7 


 


 


 





 


Glucose Level


 240 mg/dL


(70-99) 


 


 





 


Calcium Level


 7.7 mg/dL


(8.5-10.1) 


 


 





 


Phosphorus Level


 5.9 mg/dL


(2.6-4.7) 


 


 





 


Albumin


 2.9 g/dL


(3.4-5.0) 


 


 





 


Glucose (Fingerstick)


 


 121 mg/dL


(70-99) 273 mg/dL


(70-99) 156 mg/dL


(70-99)








Allergies





 Allergies








Coded Allergies Type Severity Reaction Last Updated Verified


 


  No Known Drug Allergies    8/6/18 No








Disposition/Orders:  D/C to Home











LUCIANA SELLERS MD Apr 17, 2019 13:05

## 2019-04-17 NOTE — DISCH
DISCHARGE INSTRUCTIONS


Condition on Discharge


Condition on Discharge:  Guarded





Activity After Discharge


Activity Instructions for Disc:  Activity as tolerated


Lifting Instructions after Dis:  No heavy lifting, No pulling or pushing, Do 

not lift >10 pounds


Exercise Instruction after Dis:  Walk 10 min, 3 x per day, Progress as tolerated


Driving Instructions after Dis:  Do not drive


Weight Bearing Status after Di:  No restrictions





Diet after Discharge


Diet after Discharge:  Renal Dialysis, Diabetic No Calorie Level


Diet Texture:  Regular


Liquid Texture:  Thin Liquid


Swallowing Supervision:  None needed





Wound Incision Care


Wound/Incision Care:  May get incision wet, No wound care needed





Checks after Discharge


Checks after discharge:  Check blood press - daily, Check blood sugar, ac/hs, 

Weigh Yourself Daily





Contacting the DR. after DC


Call your doctor for:  If your condition worsens





Treatment/Equipment after DC


Adaptive Equipment Issued:  None





Warfarin Follow-Up


Warfarin Follow UP:  SEE PCP TOMORROW











LUCIANA SELLERS MD Apr 17, 2019 13:11

## 2019-04-17 NOTE — PDOC
PROGRESS NOTES


History of Present Illness


History of Present Illness





Assessment/Plan


Assessment/Plan





Assessment/Plan


IMPRESSION





1. ESRD ON DIALYSIS


2. NONCOMPLIANCE    remains at HIGH RISK OF COMPLICATIONS//death due to 

noncompliance  with dialysis


3. HX HTN


4. OBESITY


5. ACUTE VOLUME OVERLOAD


6.  ANEMIA


7. Minimal bibasilar lung airspace opacities 


likely atelectasis or infiltrates.. 


8. uncontrolled hypertension 4/13 196/70





PLAN





Replacement of right internal jugular tunnel dialysis catheter  4/12


iv hydralazine 10mg q 4 hrs prn bp support


tele BED


ADMIT


DIALYSIS emergent 4/11


CONSULT NEPHROLOGY


HOME MEDS


[facility]  [employer] NO BLOOD PRODUCTS


LOWER K EMERGENTLY


DR DIAS following 


NEW TUNNELED CATHETER 4/15


HD AFTER NEW CATHETER THEN OK TO D/C





34 min pt exam, D/C PLANNING TIME chart review, > 50% of time spent with exam, 

chart review, pt care coordination





Vitals


Vitals





Vital Signs








  Date Time  Temp Pulse Resp B/P (MAP) Pulse Ox O2 Delivery O2 Flow Rate FiO2


 


4/17/19 08:00      Room Air  


 


4/17/19 07:05 98.1 65 17 145/47 (79) 97   





 98.1       











Physical Exam


General:  Alert, Oriented X3, Cooperative, No acute distress


Heart:  Regular rate, Normal S1


Lungs:  Clear, Crackles, Other


Abdomen:  Normal bowel sounds, Soft, No tenderness


Extremities:  No clubbing, No cyanosis, No edema


Skin:  No significant lesion





Labs


LABS





Laboratory Tests








Test


 4/16/19


11:34 4/16/19


13:15 4/16/19


16:52 4/16/19


20:21


 


Glucose (Fingerstick)


 272 mg/dL


(70-99) 


 121 mg/dL


(70-99) 273 mg/dL


(70-99)


 


Sodium Level


 


 135 mmol/L


(136-145) 


 





 


Potassium Level


 


 4.8 mmol/L


(3.5-5.1) 


 





 


Chloride Level


 


 98 mmol/L


() 


 





 


Carbon Dioxide Level


 


 27 mmol/L


(21-32) 


 





 


Anion Gap  10 (6-14)   


 


Blood Urea Nitrogen


 


 32 mg/dL


(7-20) 


 





 


Creatinine


 


 6.3 mg/dL


(0.6-1.0) 


 





 


Estimated GFR


(Cockcroft-Gault) 


 6.7 


 


 





 


Glucose Level


 


 240 mg/dL


(70-99) 


 





 


Calcium Level


 


 7.7 mg/dL


(8.5-10.1) 


 





 


Phosphorus Level


 


 5.9 mg/dL


(2.6-4.7) 


 





 


Albumin


 


 2.9 g/dL


(3.4-5.0) 


 





 


Test


 4/17/19


07:03 


 


 





 


Glucose (Fingerstick)


 156 mg/dL


(70-99) 


 


 














Assessment and Plan


Assessmemt and Plan


Problems


Medical Problems:


(1) Hyperkalemia


Status: Acute  





(2) Hypermagnesemia


Status: Acute  





(3) Hyperphosphatemia


Status: Acute  





(4) Hypertensive urgency


Status: Acute  





(5) Missed dialysis


Status: Acute  





(6) Problem with dialysis access


Status: Acute  





(7) Uncontrolled diabetes mellitus


Status: Acute  











Comment


Review of Relevant


I have reviewed the following items lore (where applicable) has been applied.


Labs





Laboratory Tests








Test


 4/15/19


11:23 4/15/19


18:42 4/15/19


20:04 4/16/19


07:00


 


Glucose (Fingerstick)


 190 mg/dL


(70-99) 102 mg/dL


(70-99) 293 mg/dL


(70-99) 138 mg/dL


(70-99)


 


Test


 4/16/19


11:34 4/16/19


13:15 4/16/19


16:52 4/16/19


20:21


 


Glucose (Fingerstick)


 272 mg/dL


(70-99) 


 121 mg/dL


(70-99) 273 mg/dL


(70-99)


 


Sodium Level


 


 135 mmol/L


(136-145) 


 





 


Potassium Level


 


 4.8 mmol/L


(3.5-5.1) 


 





 


Chloride Level


 


 98 mmol/L


() 


 





 


Carbon Dioxide Level


 


 27 mmol/L


(21-32) 


 





 


Anion Gap  10 (6-14)   


 


Blood Urea Nitrogen


 


 32 mg/dL


(7-20) 


 





 


Creatinine


 


 6.3 mg/dL


(0.6-1.0) 


 





 


Estimated GFR


(Cockcroft-Gault) 


 6.7 


 


 





 


Glucose Level


 


 240 mg/dL


(70-99) 


 





 


Calcium Level


 


 7.7 mg/dL


(8.5-10.1) 


 





 


Phosphorus Level


 


 5.9 mg/dL


(2.6-4.7) 


 





 


Albumin


 


 2.9 g/dL


(3.4-5.0) 


 





 


Test


 4/17/19


07:03 


 


 





 


Glucose (Fingerstick)


 156 mg/dL


(70-99) 


 


 











Laboratory Tests








Test


 4/16/19


11:34 4/16/19


13:15 4/16/19


16:52 4/16/19


20:21


 


Glucose (Fingerstick)


 272 mg/dL


(70-99) 


 121 mg/dL


(70-99) 273 mg/dL


(70-99)


 


Sodium Level


 


 135 mmol/L


(136-145) 


 





 


Potassium Level


 


 4.8 mmol/L


(3.5-5.1) 


 





 


Chloride Level


 


 98 mmol/L


() 


 





 


Carbon Dioxide Level


 


 27 mmol/L


(21-32) 


 





 


Anion Gap  10 (6-14)   


 


Blood Urea Nitrogen


 


 32 mg/dL


(7-20) 


 





 


Creatinine


 


 6.3 mg/dL


(0.6-1.0) 


 





 


Estimated GFR


(Cockcroft-Gault) 


 6.7 


 


 





 


Glucose Level


 


 240 mg/dL


(70-99) 


 





 


Calcium Level


 


 7.7 mg/dL


(8.5-10.1) 


 





 


Phosphorus Level


 


 5.9 mg/dL


(2.6-4.7) 


 





 


Albumin


 


 2.9 g/dL


(3.4-5.0) 


 





 


Test


 4/17/19


07:03 


 


 





 


Glucose (Fingerstick)


 156 mg/dL


(70-99) 


 


 











Medications





Current Medications


Clonidine HCl (Catapres) 0.1 mg 1X  ONCE PO  Last administered on 4/11/19at 16:

50;  Start 4/11/19 at 16:15;  Stop 4/11/19 at 16:16;  Status DC


Albuterol Sulfate (Ventolin Neb Soln) 10 mg 1X  ONCE CONT NEB  Last 

administered on 4/11/19at 17:32;  Start 4/11/19 at 17:15;  Stop 4/11/19 at 17:18

;  Status DC


Dextrose (Dextrose 50%-Water Syringe) 25 gm 1X  ONCE IV  Last administered on 4/ 11/19at 17:41;  Start 4/11/19 at 17:15;  Stop 4/11/19 at 17:18;  Status DC


Insulin Human Regular (HumuLIN R VIAL) 10 unit 1X  ONCE IV  Last administered 

on 4/11/19at 17:45;  Start 4/11/19 at 17:15;  Stop 4/11/19 at 17:18;  Status DC


Sodium Bicarbonate (Sodium Bicarb Adult 8.4% Syr) 50 meq 1X  ONCE IV  Last 

administered on 4/11/19at 17:46;  Start 4/11/19 at 17:15;  Stop 4/11/19 at 17:18

;  Status DC


Sodium Chloride 1,000 ml @  1,000 mls/hr Q1H PRN IV hypotension;  Start 4/11/19 

at 20:00;  Stop 4/12/19 at 06:00;  Status DC


Albumin Human 200 ml @  200 mls/hr 1X PRN  PRN IV Hypotension;  Start 4/11/19 

at 20:00;  Stop 4/12/19 at 06:00;  Status DC


Sodium Chloride (Normal Saline Flush) 10 ml 1X PRN  PRN IV AP catheter pack;  

Start 4/11/19 at 19:45;  Stop 4/12/19 at 06:00;  Status DC


Sodium Chloride (Normal Saline Flush) 10 ml 1X PRN  PRN IV  catheter pack;  

Start 4/11/19 at 19:45;  Stop 4/12/19 at 06:00;  Status DC


Sodium Chloride 1,000 ml @  400 mls/hr Q2H30M PRN IV PATENCY;  Start 4/11/19 at 

20:00;  Stop 4/12/19 at 06:00;  Status DC


Info (PHARMACY MONITORING -- do not chart) 1 each PRN DAILY  PRN MC SEE COMMENTS

;  Start 4/11/19 at 19:45


Info (PHARMACY MONITORING -- do not chart) 1 each PRN DAILY  PRN MC SEE COMMENTS

;  Start 4/11/19 at 19:45;  Status UNV


Alteplase, Recombinant (Cathflo For Central Catheter Clearance) 2 mg 1X  ONCE 

INT CAT  Last administered on 4/12/19at 08:50;  Start 4/12/19 at 08:15;  Stop 4/ 12/19 at 08:16;  Status DC


Sodium Chloride 1,000 ml @  1,000 mls/hr Q1H PRN IV hypotension;  Start 4/12/19 

at 10:05;  Stop 4/12/19 at 16:30;  Status DC


Sodium Chloride 1,000 ml @  400 mls/hr Q2H30M PRN IV PATENCY;  Start 4/12/19 at 

10:05;  Stop 4/12/19 at 16:30;  Status DC


Info (PHARMACY MONITORING -- do not chart) 1 each PRN DAILY  PRN MC SEE COMMENTS

;  Start 4/12/19 at 10:15;  Status UNV


Info (PHARMACY MONITORING -- do not chart) 1 each PRN DAILY  PRN MC SEE COMMENTS

;  Start 4/12/19 at 10:15;  Status UNV


Lidocaine/ Epinephrine (LIDOCAINE 1%-EPI 1:100,000 Multi-Dose) 20 ml STK-MED 

ONCE .ROUTE ;  Start 4/12/19 at 14:53;  Stop 4/12/19 at 14:54;  Status DC


Midazolam HCl (Versed) 2 mg STK-MED ONCE .ROUTE ;  Start 4/12/19 at 15:42;  

Stop 4/12/19 at 15:43;  Status DC


Fentanyl Citrate (Fentanyl 2ml Vial) 100 mcg STK-MED ONCE .ROUTE ;  Start 4/12/ 19 at 15:42;  Stop 4/12/19 at 15:43;  Status DC


Cefazolin Sodium/ Dextrose 50 ml @ As Directed STK-MED ONCE IV ;  Start 4/12/19 

at 15:42;  Stop 4/12/19 at 15:43;  Status DC


Cefazolin Sodium/ Dextrose 50 ml @  100 mls/hr 1X  ONCE IV  Last administered 

on 4/12/19at 16:09;  Start 4/12/19 at 16:15;  Stop 4/12/19 at 16:44;  Status DC


Midazolam HCl (Versed) 2 mg 1X  ONCE IV  Last administered on 4/12/19at 16:11;  

Start 4/12/19 at 16:15;  Stop 4/12/19 at 16:16;  Status DC


Fentanyl Citrate (Fentanyl 2ml Vial) 100 mcg 1X  ONCE IV  Last administered on 4 /12/19at 16:12;  Start 4/12/19 at 16:15;  Stop 4/12/19 at 16:16;  Status DC


Lidocaine/ Epinephrine (LIDOCAINE 1%-EPI 1:100,000 Multi-Dose) 20 ml 1X  ONCE 

IJ  Last administered on 4/12/19at 16:10;  Start 4/12/19 at 16:15;  Stop 4/12/ 19 at 16:16;  Status DC


Hydralazine HCl (Apresoline Inj) 10 mg 1X  ONCE IVP  Last administered on 4/13/ 19at 11:27;  Start 4/13/19 at 11:15;  Stop 4/13/19 at 11:16;  Status DC


Insulin Human Lispro (HumaLOG) 10 units TIDWMEALS SQ ;  Start 4/13/19 at 12:00;

  Stop 4/13/19 at 12:00;  Status DC


Insulin Human Lispro (HumaLOG) 5 units STAT SQ ;  Start 4/13/19 at 11:45;  Stop 

4/13/19 at 16:41;  Status DC


Darbepoetin Valeriano (Aranesp) 60 mcg WEEKLYHS SQ  Last administered on 4/14/19at 23

:05;  Start 4/14/19 at 21:00


Insulin Human Lispro (HumaLOG) 0-9 UNITS TIDWMEALS SQ  Last administered on 4/16 /19at 12:01;  Start 4/14/19 at 23:00


Dextrose (Dextrose 50%-Water Syringe) 12.5 gm PRN Q15MIN  PRN IV SEE COMMENTS;  

Start 4/14/19 at 23:00


Sodium Chloride 1,000 ml @  1,000 mls/hr Q1H PRN IV hypotension;  Start 4/15/19 

at 14:13;  Stop 4/15/19 at 20:12;  Status DC


Diphenhydramine HCl (Benadryl) 25 mg 1X PRN  PRN IV ITCHING;  Start 4/15/19 at 

14:15;  Stop 4/16/19 at 14:14;  Status DC


Diphenhydramine HCl (Benadryl) 25 mg 1X PRN  PRN IV ITCHING;  Start 4/15/19 at 

14:15;  Stop 4/16/19 at 14:14;  Status DC


Sodium Chloride 1,000 ml @  400 mls/hr Q2H30M PRN IV PATENCY;  Start 4/15/19 at 

14:13;  Stop 4/16/19 at 02:12;  Status DC


Info (PHARMACY MONITORING -- do not chart) 1 each PRN DAILY  PRN MC SEE COMMENTS

;  Start 4/15/19 at 14:15;  Status UNV


Amlodipine Besylate (Norvasc) 5 mg DAILYWLUN PO  Last administered on 4/16/19at 

12:00;  Start 4/16/19 at 12:00;  Stop 4/16/19 at 12:14;  Status DC


Aspirin (Ecotrin) 81 mg DAILYWBKFT PO  Last administered on 4/16/19at 08:40;  

Start 4/16/19 at 08:00


Atorvastatin Calcium (Lipitor) 80 mg QHS PO  Last administered on 4/16/19at 20:

45;  Start 4/15/19 at 21:00


Clonazepam (KlonoPIN) 0.5 mg PRN TID  PRN PO ANXIETY / AGITATION;  Start 4/15/

19 at 18:30


Clopidogrel Bisulfate (Plavix) 75 mg DAILY PO  Last administered on 4/16/19at 08

:37;  Start 4/16/19 at 09:00


Furosemide (Lasix) 40 mg DAILY PO  Last administered on 4/16/19at 08:40;  Start 

4/16/19 at 09:00


Insulin Glargine (Lantus) 7 units QHS SQ  Last administered on 4/16/19at 20:52;

  Start 4/15/19 at 21:00


Isosorbide Mononitrate (Imdur) 30 mg DAILY PO  Last administered on 4/16/19at 08

:40;  Start 4/16/19 at 09:00


Lisinopril (Prinivil) 20 mg DAILY PO  Last administered on 4/16/19at 08:40;  

Start 4/16/19 at 09:00


Carvedilol (Coreg) 25 mg BIDWMEALS PO  Last administered on 4/16/19at 17:07;  

Start 4/15/19 at 21:00


Amlodipine Besylate (Norvasc) 5 mg BID PO  Last administered on 4/16/19at 20:46

;  Start 4/16/19 at 21:00


Hydralazine HCl (Apresoline) 50 mg TID PO  Last administered on 4/16/19at 20:46

;  Start 4/16/19 at 14:00


Sodium Chloride 1,000 ml @  1,000 mls/hr Q1H PRN IV hypotension;  Start 4/17/19 

at 07:48;  Stop 4/17/19 at 13:47


Albumin Human 200 ml @  200 mls/hr 1X PRN  PRN IV Hypotension;  Start 4/17/19 

at 08:00;  Stop 4/17/19 at 13:59


Sodium Chloride 1,000 ml @  400 mls/hr Q2H30M PRN IV PATENCY;  Start 4/17/19 at 

07:48;  Stop 4/17/19 at 19:47


Info (PHARMACY MONITORING -- do not chart) 1 each PRN DAILY  PRN MC SEE COMMENTS

;  Start 4/17/19 at 08:00;  Status UNV


Info (PHARMACY MONITORING -- do not chart) 1 each PRN DAILY  PRN MC SEE COMMENTS

;  Start 4/17/19 at 08:00;  Status UNV





Active Scripts


Active


Isosorbide Mononitrate Er (Isosorbide Mononitrate) 30 Mg Tab.er.24h 30 Mg PO 

DAILY 30 Days


Furosemide 40 Mg Tablet 40 Mg PO DAILY 30 Days


Clonazepam 0.5 Mg Tablet 0.5 Mg PO TID PRN 30 Days


Humalog (Insulin Lispro) 100 Unit/1 Ml Insuln.pen 10 Units SQ TIDWMEALS 30 Days


Lantus Solostar (Insulin Glargine,Hum.rec.anlog) 100 Unit/1 Ml Insuln.pen 7 

Units SQ QHS 30 Days


Atorvastatin Calcium 40 Mg Tablet 80 Mg PO QHS 30 Days


Amlodipine Besylate 5 Mg Tablet 5 Mg PO DAILYWLUN 30 Days


Clopidogrel (Clopidogrel Bisulfate) 75 Mg Tablet 75 Mg PO DAILY 30 Days


Lisinopril 20 Mg Tablet 1 Tab PO DAILY 30 Days


Aspirin Ec (Aspirin) 81 Mg Tablet.dr 81 Mg PO DAILYWBKFT 30 Days


Reported


Carvedilol 25 Mg Tablet 20 Mg PO BIDWMEALS


Vitals/I & O





Vital Sign - Last 24 Hours








 4/16/19 4/16/19 4/16/19 4/16/19





 10:38 10:48 12:00 13:57


 


Temp 98.6   98.5





 98.6   98.5


 


Pulse 65  65 73


 


Resp 18   16


 


B/P (MAP) 175/57 (96)  175/57 145/49 (81)


 


Pulse Ox 100   98


 


O2 Delivery Room Air Room Air  Room Air


 


    





    





 4/16/19 4/16/19 4/16/19 4/16/19





 13:59 15:04 17:07 19:00


 


Temp  98.5  98.3





  98.5  98.3


 


Pulse 73 67 67 71


 


Resp  17  16


 


B/P (MAP) 145/49 133/46 (75) 144/52 162/47 (85)


 


Pulse Ox  99  96


 


O2 Delivery  Room Air  Room Air


 


    





    





 4/16/19 4/16/19 4/16/19 4/16/19





 20:00 20:46 20:46 23:00


 


Temp    99.0





    99.0


 


Pulse  68 68 68


 


Resp    16


 


B/P (MAP)  152/58 152/58 178/57 (97)


 


Pulse Ox    97


 


O2 Delivery Room Air   Room Air


 


    





    





 4/17/19 4/17/19 4/17/19 





 03:00 07:05 08:00 


 


Temp 98.2 98.1  





 98.2 98.1  


 


Pulse 65 65  


 


Resp 16 17  


 


B/P (MAP) 148/52 (84) 145/47 (79)  


 


Pulse Ox 99 97  


 


O2 Delivery Room Air Room Air Room Air 














Intake and Output   


 


 4/16/19 4/16/19 4/17/19





 14:59 22:59 06:59


 


Intake Total 420 ml 560 ml 200 ml


 


Output Total   0 ml


 


Balance 420 ml 560 ml 200 ml

















LUCIANA SELLERS MD Apr 17, 2019 10:10

## 2019-04-17 NOTE — PDOC
SUBJECTIVE


ROS


Stable





OBJECTIVE


Vital Signs





Vital Signs








  Date Time  Temp Pulse Resp B/P (MAP) Pulse Ox O2 Delivery O2 Flow Rate FiO2


 


4/17/19 13:17  65  175/87    


 


4/17/19 08:00      Room Air  


 


4/17/19 07:05 98.1  17  97   





 98.1       








I & 0











Intake and Output 


 


 4/17/19





 06:59


 


Intake Total 1180 ml


 


Output Total 0 ml


 


Balance 1180 ml


 


 


 


Intake Oral 1180 ml


 


Output Urine Total 0 ml











PHYSICAL EXAM


Physical Exam


GEN:    NAD 


HEEN:  OM moist  


NECK:   Supple 


CVS:   RRR  


RESP: CTA Bilat,    No Acc. Muscle Use


GI:        BS + ve, NO Bruit, Non Tender, 


:      No CVA tenderness, No Suprapubic Tenderness, No Resendiz


NEURO- AXOX3


Derm-   No Rash





DIAGNOSIS/ASSESSMENT


Assessment & Plan


ESRD--


Chronic Non compliance,


Seen on HD, tolerating well  


Continue as ordered , dw Dialysis RN 





Access - Perm-a-cath replaced on 4/12





HTN-  Improved Post HD


Continue  Antihypertensives





DM





COMMENT/RELEVANT DATA


Meds





Current Medications








 Medications


  (Trade)  Dose


 Ordered  Sig/Gume  Start Time


 Stop Time Status Last Admin


Dose Admin


 


 Albumin Human  200 ml @ 


 200 mls/hr  1X PRN  PRN  4/17/19 08:00


 4/17/19 13:59 DC  





 


 Albuterol Sulfate


  (Ventolin Neb


 Soln)  10 mg  1X  ONCE  4/11/19 17:15


 4/11/19 17:18 DC 4/11/19 17:32


10 MG


 


 Alteplase,


 Recombinant


  (Cathflo For


 Central Catheter


 Clearance)  2 mg  1X  ONCE  4/12/19 08:15


 4/12/19 08:16 DC 4/12/19 08:50


2 MG


 


 Amlodipine


 Besylate


  (Norvasc)  5 mg  BID  4/16/19 21:00


 4/17/19 14:49 DC 4/17/19 13:16


5 MG


 


 Aspirin


  (Ecotrin)  81 mg  DAILYWBKFT  4/16/19 08:00


 4/17/19 14:49 DC 4/17/19 13:14


81 MG


 


 Atorvastatin


 Calcium


  (Lipitor)  80 mg  QHS  4/15/19 21:00


 4/17/19 14:49 DC 4/16/19 20:45


80 MG


 


 Carvedilol


  (Coreg)  25 mg  BIDWMEALS  4/15/19 21:00


 4/17/19 14:49 DC 4/17/19 13:13


25 MG


 


 Cefazolin Sodium/


 Dextrose  50 ml @ 


 100 mls/hr  1X  ONCE  4/12/19 16:15


 4/12/19 16:44 DC 4/12/19 16:09


100 MLS/HR


 


 Clonazepam


  (KlonoPIN)  0.5 mg  PRN TID  PRN  4/15/19 18:30


 4/17/19 14:49 DC  





 


 Clonidine HCl


  (Catapres)  0.1 mg  1X  ONCE  4/11/19 16:15


 4/11/19 16:16 DC 4/11/19 16:50


0.1 MG


 


 Clopidogrel


 Bisulfate


  (Plavix)  75 mg  DAILY  4/16/19 09:00


 4/17/19 14:49 DC 4/17/19 13:11


75 MG


 


 Darbepoetin Valeriano


  (Aranesp)  60 mcg  WEEKLYHS  4/14/19 21:00


 4/17/19 14:49 DC 4/14/19 23:05


60 MCG


 


 Dextrose


  (Dextrose


 50%-Water Syringe)  12.5 gm  PRN Q15MIN  PRN  4/14/19 23:00


 4/17/19 14:49 DC  





 


 Diphenhydramine


 HCl


  (Benadryl)  25 mg  1X PRN  PRN  4/15/19 14:15


 4/16/19 14:14 DC  





 


 Fentanyl Citrate


  (Fentanyl 2ml


 Vial)  100 mcg  1X  ONCE  4/12/19 16:15


 4/12/19 16:16 DC 4/12/19 16:12


50 MCG


 


 Furosemide


  (Lasix)  40 mg  DAILY  4/16/19 09:00


 4/17/19 14:49 DC 4/17/19 13:15


40 MG


 


 Hydralazine HCl


  (Apresoline Inj)  10 mg  1X  ONCE  4/13/19 11:15


 4/13/19 11:16 DC 4/13/19 11:27


10 MG


 


 Hydralazine HCl


  (Apresoline)  50 mg  TID  4/16/19 14:00


 4/17/19 14:49 DC 4/17/19 13:16


50 MG


 


 Info


  (PHARMACY


 MONITORING -- do


 not chart)  1 each  PRN DAILY  PRN  4/17/19 08:00


   UNV  





 


 Insulin Glargine


  (Lantus)  7 units  QHS  4/15/19 21:00


 4/17/19 14:49 DC 4/16/19 20:52


7 UNITS


 


 Insulin Human


 Lispro


  (HumaLOG)  0-9 UNITS  TIDWMEALS  4/14/19 23:00


 4/17/19 14:49 DC 4/16/19 12:01


7 UNITS


 


 Insulin Human


 Regular


  (HumuLIN R VIAL)  10 unit  1X  ONCE  4/11/19 17:15


 4/11/19 17:18 DC 4/11/19 17:45


10 UNIT


 


 Isosorbide


 Mononitrate


  (Imdur)  30 mg  DAILY  4/16/19 09:00


 4/17/19 14:49 DC 4/17/19 13:12


30 MG


 


 Lidocaine/


 Epinephrine


  (LIDOCAINE


 1%-EPI 1:100,000


 Multi-Dose)  20 ml  1X  ONCE  4/12/19 16:15


 4/12/19 16:16 DC 4/12/19 16:10


5 ML


 


 Lisinopril


  (Prinivil)  20 mg  DAILY  4/16/19 09:00


 4/17/19 14:49 DC 4/17/19 13:17


20 MG


 


 Midazolam HCl


  (Versed)  2 mg  1X  ONCE  4/12/19 16:15


 4/12/19 16:16 DC 4/12/19 16:11


1 MG


 


 Sodium Bicarbonate


  (Sodium Bicarb


 Adult 8.4% Syr)  50 meq  1X  ONCE  4/11/19 17:15


 4/11/19 17:18 DC 4/11/19 17:46


50 MEQ


 


 Sodium Chloride  1,000 ml @ 


 400 mls/hr  Q2H30M PRN  4/17/19 07:48


 4/17/19 14:49 DC  





 


 Sodium Chloride


  (Normal Saline


 Flush)  10 ml  1X PRN  PRN  4/11/19 19:45


 4/12/19 06:00 DC  











Lab





Laboratory Tests








Test


 4/16/19


16:52 4/16/19


20:21 4/17/19


07:03


 


Glucose (Fingerstick)


 121 mg/dL


(70-99) 273 mg/dL


(70-99) 156 mg/dL


(70-99)








Results


All relevant outside records, renal labs, imaging studies, telemetry/EKG's were 

reviewed.











MABLE DIAS MD Apr 17, 2019 15:54

## 2019-04-17 NOTE — NUR
Discharge Note:



EVANGELINA [facility]OO[facility]



Discharge instructions and discharge home medications reviewed with Family Member and a copy 
given. All questions have been answered and understanding verbalized. 



The following instructions and handouts were given: Discharge Instructions, F/U with PCP, 
Teaching materials for Dialysis, Heart Failure 



Discontinued lines and drains: Peripheral IV removed, Catheter intact.



Patient discharged to Home via Private Vehicle

## 2020-10-12 VITALS — SYSTOLIC BLOOD PRESSURE: 151 MMHG | DIASTOLIC BLOOD PRESSURE: 73 MMHG

## 2020-11-20 ENCOUNTER — HOSPITAL ENCOUNTER (OUTPATIENT)
Dept: HOSPITAL 61 - LAB | Age: 65
End: 2020-11-20
Attending: INTERNAL MEDICINE
Payer: MEDICARE

## 2020-11-20 DIAGNOSIS — N18.6: Primary | ICD-10-CM

## 2020-11-20 DIAGNOSIS — E87.5: ICD-10-CM

## 2020-11-20 PROCEDURE — 36415 COLL VENOUS BLD VENIPUNCTURE: CPT

## 2020-11-20 PROCEDURE — 84132 ASSAY OF SERUM POTASSIUM: CPT

## 2021-01-29 ENCOUNTER — HOSPITAL ENCOUNTER (INPATIENT)
Dept: HOSPITAL 61 - ER | Age: 66
LOS: 5 days | Discharge: HOME HEALTH | DRG: 291 | End: 2021-02-03
Attending: FAMILY MEDICINE | Admitting: FAMILY MEDICINE
Payer: COMMERCIAL

## 2021-01-29 VITALS — HEIGHT: 64 IN | BODY MASS INDEX: 33.87 KG/M2 | WEIGHT: 198.42 LBS

## 2021-01-29 DIAGNOSIS — E78.5: ICD-10-CM

## 2021-01-29 DIAGNOSIS — Z99.2: ICD-10-CM

## 2021-01-29 DIAGNOSIS — L40.9: ICD-10-CM

## 2021-01-29 DIAGNOSIS — Z91.15: ICD-10-CM

## 2021-01-29 DIAGNOSIS — E87.70: ICD-10-CM

## 2021-01-29 DIAGNOSIS — K21.9: ICD-10-CM

## 2021-01-29 DIAGNOSIS — D64.9: ICD-10-CM

## 2021-01-29 DIAGNOSIS — Z83.3: ICD-10-CM

## 2021-01-29 DIAGNOSIS — I25.2: ICD-10-CM

## 2021-01-29 DIAGNOSIS — I50.33: ICD-10-CM

## 2021-01-29 DIAGNOSIS — Z95.5: ICD-10-CM

## 2021-01-29 DIAGNOSIS — I13.2: Primary | ICD-10-CM

## 2021-01-29 DIAGNOSIS — Z96.0: ICD-10-CM

## 2021-01-29 DIAGNOSIS — E11.22: ICD-10-CM

## 2021-01-29 DIAGNOSIS — I25.10: ICD-10-CM

## 2021-01-29 DIAGNOSIS — E78.00: ICD-10-CM

## 2021-01-29 DIAGNOSIS — E87.5: ICD-10-CM

## 2021-01-29 DIAGNOSIS — N18.6: ICD-10-CM

## 2021-01-29 DIAGNOSIS — Z87.01: ICD-10-CM

## 2021-01-29 LAB
ALBUMIN SERPL-MCNC: 3 G/DL (ref 3.4–5)
ALBUMIN/GLOB SERPL: 0.7 {RATIO} (ref 1–1.7)
ALP SERPL-CCNC: 67 U/L (ref 46–116)
ALT SERPL-CCNC: 8 U/L (ref 14–59)
ANION GAP SERPL CALC-SCNC: 15 MMOL/L (ref 6–14)
AST SERPL-CCNC: 11 U/L (ref 15–37)
BASOPHILS # BLD AUTO: 0.1 X10^3/UL (ref 0–0.2)
BASOPHILS NFR BLD: 1 % (ref 0–3)
BILIRUB SERPL-MCNC: 0.3 MG/DL (ref 0.2–1)
BUN SERPL-MCNC: 90 MG/DL (ref 7–20)
BUN/CREAT SERPL: 7 (ref 6–20)
CALCIUM SERPL-MCNC: 9.3 MG/DL (ref 8.5–10.1)
CHLORIDE SERPL-SCNC: 102 MMOL/L (ref 98–107)
CO2 SERPL-SCNC: 21 MMOL/L (ref 21–32)
CREAT SERPL-MCNC: 12.4 MG/DL (ref 0.6–1)
EOSINOPHIL NFR BLD: 0.3 X10^3/UL (ref 0–0.7)
EOSINOPHIL NFR BLD: 4 % (ref 0–3)
ERYTHROCYTE [DISTWIDTH] IN BLOOD BY AUTOMATED COUNT: 12.6 % (ref 11.5–14.5)
GFR SERPLBLD BASED ON 1.73 SQ M-ARVRAT: 3.1 ML/MIN
GLUCOSE SERPL-MCNC: 150 MG/DL (ref 70–99)
HCT VFR BLD CALC: 25.4 % (ref 36–47)
HGB BLD-MCNC: 8.8 G/DL (ref 12–15.5)
LIPASE: 124 U/L (ref 73–393)
LYMPHOCYTES # BLD: 1.3 X10^3/UL (ref 1–4.8)
LYMPHOCYTES NFR BLD AUTO: 19 % (ref 24–48)
MAGNESIUM SERPL-MCNC: 3.1 MG/DL (ref 1.8–2.4)
MCH RBC QN AUTO: 35 PG (ref 25–35)
MCHC RBC AUTO-ENTMCNC: 35 G/DL (ref 31–37)
MCV RBC AUTO: 101 FL (ref 79–100)
MONO #: 0.7 X10^3/UL (ref 0–1.1)
MONOCYTES NFR BLD: 10 % (ref 0–9)
NEUT #: 4.7 X10^3/UL (ref 1.8–7.7)
NEUTROPHILS NFR BLD AUTO: 66 % (ref 31–73)
PLATELET # BLD AUTO: 267 X10^3/UL (ref 140–400)
POTASSIUM SERPL-SCNC: 5.9 MMOL/L (ref 3.5–5.1)
PROT SERPL-MCNC: 7.1 G/DL (ref 6.4–8.2)
RBC # BLD AUTO: 2.52 X10^6/UL (ref 3.5–5.4)
SODIUM SERPL-SCNC: 138 MMOL/L (ref 136–145)
WBC # BLD AUTO: 7.1 X10^3/UL (ref 4–11)

## 2021-01-29 PROCEDURE — 36415 COLL VENOUS BLD VENIPUNCTURE: CPT

## 2021-01-29 PROCEDURE — 99285 EMERGENCY DEPT VISIT HI MDM: CPT

## 2021-01-29 PROCEDURE — 84484 ASSAY OF TROPONIN QUANT: CPT

## 2021-01-29 PROCEDURE — 82962 GLUCOSE BLOOD TEST: CPT

## 2021-01-29 PROCEDURE — 83880 ASSAY OF NATRIURETIC PEPTIDE: CPT

## 2021-01-29 PROCEDURE — 5A1D70Z PERFORMANCE OF URINARY FILTRATION, INTERMITTENT, LESS THAN 6 HOURS PER DAY: ICD-10-PCS | Performed by: INTERNAL MEDICINE

## 2021-01-29 PROCEDURE — 93005 ELECTROCARDIOGRAM TRACING: CPT

## 2021-01-29 PROCEDURE — 80053 COMPREHEN METABOLIC PANEL: CPT

## 2021-01-29 PROCEDURE — 83690 ASSAY OF LIPASE: CPT

## 2021-01-29 PROCEDURE — 83735 ASSAY OF MAGNESIUM: CPT

## 2021-01-29 PROCEDURE — 85025 COMPLETE CBC W/AUTO DIFF WBC: CPT

## 2021-01-29 PROCEDURE — 71045 X-RAY EXAM CHEST 1 VIEW: CPT

## 2021-01-29 PROCEDURE — 80048 BASIC METABOLIC PNL TOTAL CA: CPT

## 2021-01-29 PROCEDURE — G0378 HOSPITAL OBSERVATION PER HR: HCPCS

## 2021-01-29 NOTE — RAD
Single view of the chest. 1/29/2021 3:49 PM



Indication: Reason: chest pain / Spl. Instructions:  / History: 



Comparison: Chest radiograph July 12, 2019



Findings: There is a right internal jugular tunnel dialysis catheter with tip in the appropriate posi
tion. Heart is mildly enlarged but similar to comparison study. No pneumothorax or pleural effusion i
s seen. Minimal left basilar scarring or atelectasis is present, but similar to comparison study. No 
acute osseous changes are identified.



IMPRESSION: Grossly stable radiographic appearance of the chest without evidence of acute cardiopulmo
nary process



Electronically signed by: William Smith MD (1/29/2021 4:13 PM) [id number]

## 2021-01-30 VITALS — DIASTOLIC BLOOD PRESSURE: 74 MMHG | SYSTOLIC BLOOD PRESSURE: 212 MMHG

## 2021-01-30 VITALS — SYSTOLIC BLOOD PRESSURE: 185 MMHG | DIASTOLIC BLOOD PRESSURE: 78 MMHG

## 2021-01-30 VITALS — SYSTOLIC BLOOD PRESSURE: 206 MMHG | DIASTOLIC BLOOD PRESSURE: 79 MMHG

## 2021-01-30 VITALS — SYSTOLIC BLOOD PRESSURE: 215 MMHG | DIASTOLIC BLOOD PRESSURE: 59 MMHG

## 2021-01-30 VITALS — SYSTOLIC BLOOD PRESSURE: 214 MMHG | DIASTOLIC BLOOD PRESSURE: 97 MMHG

## 2021-01-30 VITALS — DIASTOLIC BLOOD PRESSURE: 67 MMHG | SYSTOLIC BLOOD PRESSURE: 208 MMHG

## 2021-01-30 VITALS — DIASTOLIC BLOOD PRESSURE: 60 MMHG | SYSTOLIC BLOOD PRESSURE: 205 MMHG

## 2021-01-30 VITALS — DIASTOLIC BLOOD PRESSURE: 116 MMHG | SYSTOLIC BLOOD PRESSURE: 222 MMHG

## 2021-01-30 LAB
ANION GAP SERPL CALC-SCNC: 11 MMOL/L (ref 6–14)
BASOPHILS # BLD AUTO: 0.1 X10^3/UL (ref 0–0.2)
BASOPHILS NFR BLD: 1 % (ref 0–3)
BUN SERPL-MCNC: 45 MG/DL (ref 7–20)
CALCIUM SERPL-MCNC: 8.5 MG/DL (ref 8.5–10.1)
CHLORIDE SERPL-SCNC: 99 MMOL/L (ref 98–107)
CO2 SERPL-SCNC: 27 MMOL/L (ref 21–32)
CREAT SERPL-MCNC: 8.1 MG/DL (ref 0.6–1)
EOSINOPHIL NFR BLD: 0.2 X10^3/UL (ref 0–0.7)
EOSINOPHIL NFR BLD: 4 % (ref 0–3)
ERYTHROCYTE [DISTWIDTH] IN BLOOD BY AUTOMATED COUNT: 12.3 % (ref 11.5–14.5)
GFR SERPLBLD BASED ON 1.73 SQ M-ARVRAT: 5 ML/MIN
GLUCOSE SERPL-MCNC: 178 MG/DL (ref 70–99)
HCT VFR BLD CALC: 25.5 % (ref 36–47)
HGB BLD-MCNC: 8.6 G/DL (ref 12–15.5)
LYMPHOCYTES # BLD: 1.2 X10^3/UL (ref 1–4.8)
LYMPHOCYTES NFR BLD AUTO: 21 % (ref 24–48)
MCH RBC QN AUTO: 34 PG (ref 25–35)
MCHC RBC AUTO-ENTMCNC: 34 G/DL (ref 31–37)
MCV RBC AUTO: 101 FL (ref 79–100)
MONO #: 0.5 X10^3/UL (ref 0–1.1)
MONOCYTES NFR BLD: 8 % (ref 0–9)
NEUT #: 3.8 X10^3/UL (ref 1.8–7.7)
NEUTROPHILS NFR BLD AUTO: 66 % (ref 31–73)
PLATELET # BLD AUTO: 174 X10^3/UL (ref 140–400)
POTASSIUM SERPL-SCNC: 4.7 MMOL/L (ref 3.5–5.1)
RBC # BLD AUTO: 2.53 X10^6/UL (ref 3.5–5.4)
SODIUM SERPL-SCNC: 137 MMOL/L (ref 136–145)
WBC # BLD AUTO: 5.8 X10^3/UL (ref 4–11)

## 2021-01-30 RX ADMIN — ISOSORBIDE MONONITRATE SCH MG: 30 TABLET, EXTENDED RELEASE ORAL at 11:30

## 2021-01-30 RX ADMIN — FUROSEMIDE SCH MG: 40 TABLET ORAL at 11:30

## 2021-01-30 RX ADMIN — LISINOPRIL SCH MG: 20 TABLET ORAL at 14:38

## 2021-01-30 RX ADMIN — ASPIRIN SCH MG: 81 TABLET, COATED ORAL at 11:31

## 2021-01-30 RX ADMIN — CALCIUM ACETATE SCH MG: 667 CAPSULE ORAL at 17:53

## 2021-01-30 RX ADMIN — CALCIUM ACETATE SCH MG: 667 CAPSULE ORAL at 11:31

## 2021-01-30 RX ADMIN — ATORVASTATIN CALCIUM SCH MG: 40 TABLET, FILM COATED ORAL at 19:54

## 2021-01-30 RX ADMIN — CLOPIDOGREL BISULFATE SCH MG: 75 TABLET ORAL at 11:30

## 2021-01-30 RX ADMIN — FUROSEMIDE SCH MG: 40 TABLET ORAL at 16:00

## 2021-01-30 RX ADMIN — LABETALOL HCL SCH MG: 200 TABLET, FILM COATED ORAL at 19:54

## 2021-01-30 NOTE — PDOC2
CONSULT


Date of Consult


Date of Consult


DATE: 1/30/21 


TIME: 13:18





Reason for Consult


Reason for Consult:


Chest pain





Referring Physician


Referring Physician:


Dr. Feldman





Identification/Chief Complaint


Chief Complaint


Chest pain





Source


Source:  Chart review, Patient





History of Present Illness


Reason for Visit:


65-year-old  female with known history of coronary artery disease s/p 

PCI/stent to RCA in 2016 and end-stage renal disease on hemodialysis presented 

with retrosternal chest pain that she described as a dull ache, associated with 

nausea and worse during the night for the past 3 days.   She denied any 

orthopnea/PND, palpitations or syncope.  She apparently had left upper extremity

fistula repair by Dr. Ramos on Tuesday and had multiple episodes of nausea 

vomiting after the surgery.  She apparently missed dialysis on Wednesday because

her left arm was hurting and she could not sleep.  She stated that she is 

currently chest pain-free.





Past Medical History


Cardiovascular:  CAD, CHF, HTN, Hyperlipidemia


Pulmonary:  No pertinent hx, Other


CENTRAL NERVOUS SYSTEM:  Other


GI:  GERD


Heme/Onc:  No pertinent hx


Hepatobiliary:  No pertinent hx


Psych:  No pertinent hx


Rheumatologic:  No pertinent hx


Infectious disease:  No pertinent hx


Renal/:  Chronic renal failure


Endocrine:  Diabetes, Hyperparathyroidism





Past Surgical History


Past Surgical History:  Other





Family History


Family History:  Heart Disease





Social History


ALCOHOL:  none


Drugs:  None


Lives:  with Family





Current Problem List


Problem List


Problems


Medical Problems:


(1) Chest pain


Status: Acute  





(2) Hypertension with goal of symptom management only


Status: Acute  











Current Medications


Current Medications





Current Medications


Nitroglycerin (Nitrostat) 0.4 mg PRN Q5MIN  PRN SL CHEST PAIN;  Start 1/29/21 at

17:15


Nitroglycerin (Nitro-Bid Oint) 1 inch 1X  ONCE TP  Last administered on 

1/29/21at 18:22;  Start 1/29/21 at 17:45;  Stop 1/29/21 at 17:46;  Status DC


Sodium Chloride 1,000 ml @  1,000 mls/hr Q1H PRN IV hypotension;  Start 1/29/21 

at 22:15;  Stop 1/30/21 at 04:14;  Status DC


Albumin Human 200 ml @  200 mls/hr 1X PRN  PRN IV Hypotension;  Start 1/29/21 at

22:15;  Stop 1/30/21 at 04:14;  Status DC


Sodium Chloride (Normal Saline Flush) 10 ml 1X PRN  PRN IV AP catheter pack;  

Start 1/29/21 at 22:15;  Stop 1/30/21 at 22:14


Sodium Chloride (Normal Saline Flush) 10 ml 1X PRN  PRN IV  catheter pack;  

Start 1/29/21 at 22:15;  Stop 1/30/21 at 22:14


Sodium Chloride 1,000 ml @  400 mls/hr Q2H30M PRN IV PATENCY;  Start 1/29/21 at 

22:15;  Stop 1/30/21 at 10:14;  Status DC


Info (PHARMACY MONITORING -- do not chart) 1 each PRN DAILY  PRN MC SEE 

COMMENTS;  Start 1/29/21 at 22:15;  Status UNV


Info (PHARMACY MONITORING -- do not chart) 1 each PRN DAILY  PRN MC SEE 

COMMENTS;  Start 1/29/21 at 22:15


Amlodipine Besylate (Norvasc) 5 mg DAILY PO  Last administered on 1/30/21at 

07:43;  Start 1/30/21 at 09:00;  Stop 1/30/21 at 10:57;  Status DC


Amlodipine Besylate (Norvasc) 5 mg 1X  ONCE PO  Last administered on 1/30/21at 

06:18;  Start 1/30/21 at 06:30;  Stop 1/30/21 at 06:31;  Status DC


Amlodipine Besylate (Norvasc) 10 mg DAILY PO ;  Start 1/30/21 at 11:30


Aspirin (Ecotrin) 81 mg DAILYWBKFT PO  Last administered on 1/30/21at 11:31;  

Start 1/30/21 at 11:30


Atorvastatin Calcium (Lipitor) 80 mg QHS PO ;  Start 1/30/21 at 21:00


Clonazepam (KlonoPIN) 0.5 mg TID  PRN PO ANXIETY / AGITATION;  Start 1/30/21 at 

10:45


Clopidogrel Bisulfate (Plavix) 75 mg DAILY PO  Last administered on 1/30/21at 

11:30;  Start 1/30/21 at 11:30


Furosemide (Lasix) 40 mg BID94 PO  Last administered on 1/30/21at 11:30;  Start 

1/30/21 at 11:30


Isosorbide Mononitrate (Imdur) 30 mg DAILY PO  Last administered on 1/30/21at 

11:30;  Start 1/30/21 at 11:30


Lisinopril (Prinivil) 40 mg BID PO ;  Start 1/30/21 at 21:00;  Status UNV


Calcium Acetate (Phoslo) 2,001 mg TIDWMEALS PO  Last administered on 1/30/21at 

11:31;  Start 1/30/21 at 12:00


Carvedilol (Coreg) 25 mg BIDWMEALS PO  Last administered on 1/30/21at 11:31;  

Start 1/30/21 at 11:30





Active Scripts


Active


Proair Hfa Inhaler (Albuterol Sulfate) 8.5 Gm Hfa.aer.ad 1 Puff INH PRN Q6HRS 

PRN 30 Days


Furosemide 40 Mg Tablet 40 Mg PO BID 30 Days


Carvedilol 25 Mg Tablet 25 Mg PO BIDWMEALS 30 Days


Isosorbide Mononitrate Er (Isosorbide Mononitrate) 30 Mg Tab.er.24h 30 Mg PO 

DAILY 30 Days


Atorvastatin Calcium 40 Mg Tablet 80 Mg PO QHS 30 Days


     Can replace with 80mg Tabs #30 per month


Clopidogrel (Clopidogrel Bisulfate) 75 Mg Tablet 75 Mg PO DAILY 30 Days


Aspirin Ec (Aspirin) 81 Mg Tablet.dr 81 Mg PO DAILYWBKFT 30 Days


Clonazepam ** (Clonazepam) 0.5 Mg Tablet 0.5 Mg PO TID PRN 30 Days


Reported


Calcium Acetate 667 Mg Tablet 3 Tab PO TID 30 Days


Lisinopril 40 Mg Tablet 1 Tab PO BID


Amlodipine Besylate 10 Mg Tablet 10 Mg PO DAILY





Allergies


Allergies:  


Coded Allergies:  


     No Known Drug Allergies (Unverified , 8/6/18)





ROS


Review of System


Full review of systems cannot be obtained since patient does not speak English 

but this is positive for chest pain, nausea and vomiting and otherwise negative.





Physical Exam


General:  Alert, Oriented X3


HEENT:  Atraumatic


Lungs:  Clear to auscultation


Heart:  Regular rate


Abdomen:  Soft, No hepatosplenomegaly


Neuro:  Normal speech


Psych/Mental Status:  Mood NL





Vitals


VITALS





Vital Signs








  Date Time  Temp Pulse Resp B/P (MAP) Pulse Ox O2 Delivery O2 Flow Rate FiO2


 


1/30/21 11:31  75  206/79    


 


1/30/21 07:40      Room Air  


 


1/30/21 07:06 98.3  19  96   





 98.3       











Labs


Labs





Laboratory Tests








Test


 1/29/21


16:40 1/29/21


17:30 1/29/21


21:45 1/30/21


11:30


 


White Blood Count


 7.1 x10^3/uL


(4.0-11.0) 


 


 





 


Red Blood Count


 2.52 x10^6/uL


(3.50-5.40) 


 


 





 


Hemoglobin


 8.8 g/dL


(12.0-15.5) 


 


 





 


Hematocrit


 25.4 %


(36.0-47.0) 


 


 





 


Mean Corpuscular Volume


 101 fL


() 


 


 





 


Mean Corpuscular Hemoglobin 35 pg (25-35)    


 


Mean Corpuscular Hemoglobin


Concent 35 g/dL


(31-37) 


 


 





 


Red Cell Distribution Width


 12.6 %


(11.5-14.5) 


 


 





 


Platelet Count


 267 x10^3/uL


(140-400) 


 


 





 


Neutrophils (%) (Auto) 66 % (31-73)    


 


Lymphocytes (%) (Auto) 19 % (24-48)    


 


Monocytes (%) (Auto) 10 % (0-9)    


 


Eosinophils (%) (Auto) 4 % (0-3)    


 


Basophils (%) (Auto) 1 % (0-3)    


 


Neutrophils # (Auto)


 4.7 x10^3/uL


(1.8-7.7) 


 


 





 


Lymphocytes # (Auto)


 1.3 x10^3/uL


(1.0-4.8) 


 


 





 


Monocytes # (Auto)


 0.7 x10^3/uL


(0.0-1.1) 


 


 





 


Eosinophils # (Auto)


 0.3 x10^3/uL


(0.0-0.7) 


 


 





 


Basophils # (Auto)


 0.1 x10^3/uL


(0.0-0.2) 


 


 





 


Sodium Level


 


 138 mmol/L


(136-145) 


 





 


Potassium Level


 


 5.9 mmol/L


(3.5-5.1) 


 





 


Chloride Level


 


 102 mmol/L


() 


 





 


Carbon Dioxide Level


 


 21 mmol/L


(21-32) 


 





 


Anion Gap  15 (6-14)   


 


Blood Urea Nitrogen


 


 90 mg/dL


(7-20) 


 





 


Creatinine


 


 12.4 mg/dL


(0.6-1.0) 


 





 


Estimated GFR


(Cockcroft-Gault) 


 3.1 


 


 





 


BUN/Creatinine Ratio  7 (6-20)   


 


Glucose Level


 


 150 mg/dL


(70-99) 


 





 


Calcium Level


 


 9.3 mg/dL


(8.5-10.1) 


 





 


Magnesium Level


 


 3.1 mg/dL


(1.8-2.4) 


 





 


Total Bilirubin


 


 0.3 mg/dL


(0.2-1.0) 


 





 


Aspartate Amino Transf


(AST/SGOT) 


 11 U/L (15-37) 


 


 





 


Alanine Aminotransferase


(ALT/SGPT) 


 8 U/L (14-59) 


 


 





 


Alkaline Phosphatase


 


 67 U/L


() 


 





 


Troponin I Quantitative


 


 0.092 ng/mL


(0.000-0.055) 0.089 ng/mL


(0.000-0.055) 





 


NT-Pro-B-Type Natriuretic


Peptide 


 > 83837 pg/mL


(0-124) 


 





 


Total Protein


 


 7.1 g/dL


(6.4-8.2) 


 





 


Albumin


 


 3.0 g/dL


(3.4-5.0) 


 





 


Albumin/Globulin Ratio  0.7 (1.0-1.7)   


 


Lipase


 


 124 U/L


() 


 





 


Glucose (Fingerstick)


 


 


 


 115 mg/dL


(70-99)


 


Test


 1/30/21


12:45 


 


 





 


White Blood Count


 5.8 x10^3/uL


(4.0-11.0) 


 


 





 


Red Blood Count


 2.53 x10^6/uL


(3.50-5.40) 


 


 





 


Hemoglobin


 8.6 g/dL


(12.0-15.5) 


 


 





 


Hematocrit


 25.5 %


(36.0-47.0) 


 


 





 


Mean Corpuscular Volume


 101 fL


() 


 


 





 


Mean Corpuscular Hemoglobin 34 pg (25-35)    


 


Mean Corpuscular Hemoglobin


Concent 34 g/dL


(31-37) 


 


 





 


Red Cell Distribution Width


 12.3 %


(11.5-14.5) 


 


 





 


Platelet Count


 174 x10^3/uL


(140-400) 


 


 





 


Neutrophils (%) (Auto) 66 % (31-73)    


 


Lymphocytes (%) (Auto) 21 % (24-48)    


 


Monocytes (%) (Auto) 8 % (0-9)    


 


Eosinophils (%) (Auto) 4 % (0-3)    


 


Basophils (%) (Auto) 1 % (0-3)    


 


Neutrophils # (Auto)


 3.8 x10^3/uL


(1.8-7.7) 


 


 





 


Lymphocytes # (Auto)


 1.2 x10^3/uL


(1.0-4.8) 


 


 





 


Monocytes # (Auto)


 0.5 x10^3/uL


(0.0-1.1) 


 


 





 


Eosinophils # (Auto)


 0.2 x10^3/uL


(0.0-0.7) 


 


 





 


Basophils # (Auto)


 0.1 x10^3/uL


(0.0-0.2) 


 


 





 


Sodium Level


 137 mmol/L


(136-145) 


 


 





 


Potassium Level


 4.7 mmol/L


(3.5-5.1) 


 


 





 


Chloride Level


 99 mmol/L


() 


 


 





 


Carbon Dioxide Level


 27 mmol/L


(21-32) 


 


 





 


Anion Gap 11 (6-14)    


 


Blood Urea Nitrogen


 45 mg/dL


(7-20) 


 


 





 


Creatinine


 8.1 mg/dL


(0.6-1.0) 


 


 





 


Estimated GFR


(Cockcroft-Gault) 5.0 


 


 


 





 


Glucose Level


 178 mg/dL


(70-99) 


 


 





 


Calcium Level


 8.5 mg/dL


(8.5-10.1) 


 


 











Laboratory Tests








Test


 1/29/21


16:40 1/29/21


17:30 1/29/21


21:45 1/30/21


11:30


 


White Blood Count


 7.1 x10^3/uL


(4.0-11.0) 


 


 





 


Red Blood Count


 2.52 x10^6/uL


(3.50-5.40) 


 


 





 


Hemoglobin


 8.8 g/dL


(12.0-15.5) 


 


 





 


Hematocrit


 25.4 %


(36.0-47.0) 


 


 





 


Mean Corpuscular Volume


 101 fL


() 


 


 





 


Mean Corpuscular Hemoglobin 35 pg (25-35)    


 


Mean Corpuscular Hemoglobin


Concent 35 g/dL


(31-37) 


 


 





 


Red Cell Distribution Width


 12.6 %


(11.5-14.5) 


 


 





 


Platelet Count


 267 x10^3/uL


(140-400) 


 


 





 


Neutrophils (%) (Auto) 66 % (31-73)    


 


Lymphocytes (%) (Auto) 19 % (24-48)    


 


Monocytes (%) (Auto) 10 % (0-9)    


 


Eosinophils (%) (Auto) 4 % (0-3)    


 


Basophils (%) (Auto) 1 % (0-3)    


 


Neutrophils # (Auto)


 4.7 x10^3/uL


(1.8-7.7) 


 


 





 


Lymphocytes # (Auto)


 1.3 x10^3/uL


(1.0-4.8) 


 


 





 


Monocytes # (Auto)


 0.7 x10^3/uL


(0.0-1.1) 


 


 





 


Eosinophils # (Auto)


 0.3 x10^3/uL


(0.0-0.7) 


 


 





 


Basophils # (Auto)


 0.1 x10^3/uL


(0.0-0.2) 


 


 





 


Sodium Level


 


 138 mmol/L


(136-145) 


 





 


Potassium Level


 


 5.9 mmol/L


(3.5-5.1) 


 





 


Chloride Level


 


 102 mmol/L


() 


 





 


Carbon Dioxide Level


 


 21 mmol/L


(21-32) 


 





 


Anion Gap  15 (6-14)   


 


Blood Urea Nitrogen


 


 90 mg/dL


(7-20) 


 





 


Creatinine


 


 12.4 mg/dL


(0.6-1.0) 


 





 


Estimated GFR


(Cockcroft-Gault) 


 3.1 


 


 





 


BUN/Creatinine Ratio  7 (6-20)   


 


Glucose Level


 


 150 mg/dL


(70-99) 


 





 


Calcium Level


 


 9.3 mg/dL


(8.5-10.1) 


 





 


Magnesium Level


 


 3.1 mg/dL


(1.8-2.4) 


 





 


Total Bilirubin


 


 0.3 mg/dL


(0.2-1.0) 


 





 


Aspartate Amino Transf


(AST/SGOT) 


 11 U/L (15-37) 


 


 





 


Alanine Aminotransferase


(ALT/SGPT) 


 8 U/L (14-59) 


 


 





 


Alkaline Phosphatase


 


 67 U/L


() 


 





 


Troponin I Quantitative


 


 0.092 ng/mL


(0.000-0.055) 0.089 ng/mL


(0.000-0.055) 





 


NT-Pro-B-Type Natriuretic


Peptide 


 > 08965 pg/mL


(0-124) 


 





 


Total Protein


 


 7.1 g/dL


(6.4-8.2) 


 





 


Albumin


 


 3.0 g/dL


(3.4-5.0) 


 





 


Albumin/Globulin Ratio  0.7 (1.0-1.7)   


 


Lipase


 


 124 U/L


() 


 





 


Glucose (Fingerstick)


 


 


 


 115 mg/dL


(70-99)


 


Test


 1/30/21


12:45 


 


 





 


White Blood Count


 5.8 x10^3/uL


(4.0-11.0) 


 


 





 


Red Blood Count


 2.53 x10^6/uL


(3.50-5.40) 


 


 





 


Hemoglobin


 8.6 g/dL


(12.0-15.5) 


 


 





 


Hematocrit


 25.5 %


(36.0-47.0) 


 


 





 


Mean Corpuscular Volume


 101 fL


() 


 


 





 


Mean Corpuscular Hemoglobin 34 pg (25-35)    


 


Mean Corpuscular Hemoglobin


Concent 34 g/dL


(31-37) 


 


 





 


Red Cell Distribution Width


 12.3 %


(11.5-14.5) 


 


 





 


Platelet Count


 174 x10^3/uL


(140-400) 


 


 





 


Neutrophils (%) (Auto) 66 % (31-73)    


 


Lymphocytes (%) (Auto) 21 % (24-48)    


 


Monocytes (%) (Auto) 8 % (0-9)    


 


Eosinophils (%) (Auto) 4 % (0-3)    


 


Basophils (%) (Auto) 1 % (0-3)    


 


Neutrophils # (Auto)


 3.8 x10^3/uL


(1.8-7.7) 


 


 





 


Lymphocytes # (Auto)


 1.2 x10^3/uL


(1.0-4.8) 


 


 





 


Monocytes # (Auto)


 0.5 x10^3/uL


(0.0-1.1) 


 


 





 


Eosinophils # (Auto)


 0.2 x10^3/uL


(0.0-0.7) 


 


 





 


Basophils # (Auto)


 0.1 x10^3/uL


(0.0-0.2) 


 


 





 


Sodium Level


 137 mmol/L


(136-145) 


 


 





 


Potassium Level


 4.7 mmol/L


(3.5-5.1) 


 


 





 


Chloride Level


 99 mmol/L


() 


 


 





 


Carbon Dioxide Level


 27 mmol/L


(21-32) 


 


 





 


Anion Gap 11 (6-14)    


 


Blood Urea Nitrogen


 45 mg/dL


(7-20) 


 


 





 


Creatinine


 8.1 mg/dL


(0.6-1.0) 


 


 





 


Estimated GFR


(Cockcroft-Gault) 5.0 


 


 


 





 


Glucose Level


 178 mg/dL


(70-99) 


 


 





 


Calcium Level


 8.5 mg/dL


(8.5-10.1) 


 


 














Assessment/Plan


Assessment/Plan


1.  Chest pain with atypical features, most probably GI etiology since this is 

associated with nausea and worse in the night.  Slight troponin elevation 

probably secondary to demand ischemia/ESRD.  She is currently chest pain-free.  

Patient has known history of coronary artery disease and has undergone PCI/stent

placement to RCA in 2016.  Her 2D echo in February 2019 showed LVEF 50 to 55%.  

She would benefit from repeat 2D echo to assess LVEF and Lexiscan nuclear stress

test to rule out ischemia.  However, these tests could be done as an outpatient.

 Continue current secondary prevention measures.


2.  Accelerated hypertension: Blood pressure significantly elevated.  Resume 

home medication lisinopril and change carvedilol to labetalol 200 mg twice daily

for better control.


3.  Hyperlipidemia: Continue statin therapy


4.  End-stage renal disease: Continue hemodialysis per nephrology team


Thank you for your consultation











NONA INFANTE MD           Jan 30, 2021 13:27

## 2021-01-30 NOTE — HP
ADMIT DATE:  01/29/2021



CHIEF COMPLAINT:  Chest pain.



HISTORY OF PRESENT ILLNESS:  The patient is a pleasant 65-year-old female with

multiple comorbidities.  She is well known to my service.  She basically

presented last night with chest pain, rates it at 6/10, has been occurring for

several days, worse with moving, better with sitting still.  She increased her

home meds, but that did not work, described as very irritating.  I discussed the

case with ER physician.  We are admitting the patient with consultation to

Cardiology.



PAST MEDICAL HISTORY:  End-stage renal disease on dialysis, noncompliance, CHF,

diabetes, hypertension, hyperlipidemia, pneumonia, psoriasis, angioplasty,

thoracentesis, cardiac stents, dialysis catheter placement.



ALLERGIES:  None.



FAMILY HISTORY:  Diabetes.



SOCIAL HISTORY:  She does not drink, smoke or take drugs.



MEDICATIONS:  Reviewed, please refer to the MRAD.



REVIEW OF SYSTEMS:  

GENERAL:  No history of weight change, weakness or fevers.

SKIN:  No bruising, hair changes or rashes.

EYES:  No blurred, double or loss of vision.

NOSE AND THROAT:  No history of nosebleeds, hoarseness or sore throat.

HEART:  No history of palpitations, chest pain or shortness of breath on

exertion.

LUNGS:  Denies cough, hemoptysis, wheezing or shortness of breath.

GASTROINTESTINAL:  Denies changes in appetite, nausea, vomiting, diarrhea or

constipation.

GENITOURINARY:  No history of frequency, urgency, hesitancy or nocturia.

NEUROLOGIC:  Denies history of numbness, tingling, tremor or weakness.

PSYCHIATRIC:  No history of panic, anxiety or depression.

ENDOCRINE:  No history of heat or cold intolerance, polyuria or polydipsia.

EXTREMITIES:  Denies muscle weakness, joint pain, pain on walking or stiffness.



PHYSICAL EXAMINATION:

VITALS:  Within normal limits and are stable.

GENERAL:  No apparent distress.  Alert and oriented.

HEENT:  Normal cephalic atraumatic, external auditory canals are patent

EYES:  Extraocular muscles are intact, pupils are equally round and reactive to

light and accommodation

MUSCULOSKELETAL:  Well developed, well nourished, good range of motion

ENDOCRINE:  No thyromegaly was palpated

LYMPHATICS:  No cervical chain or axillary nodes were noted

HEMATOPOIETIC:  No bruising

NECK:  Supple, no JVD, no thyromegaly was noted.

LUNGS:  Clear to auscultation in all lung fields without rhonchi or wheezing.

HEART:  RRR, S1, S2 present.  Peripheral pulses intact, no obvious murmurs were

noted.

ABDOMEN:  Soft, nontender.  Positive bowel sounds no organomegaly, normal bowel

sounds.

EXTREMITIES:  She has a left arm fistula that seems to have a good thrill.

NEUROLOGIC:  Normal speech, normal tone.  A & O x3, moves all extremities, no

obvious focal deficits.

PSYCHIATRIC:  Normal affect, normal mood.  Stable.

SKIN:  No ulcerations or rashes, good skin turgor, no jaundice.

VASCULAR:  Good capillary refill, neurovascular bundle appears to be intact.



LABORATORY DATA:  White count 7, hemoglobin 8.8, platelets 267.  Electrolytes: 

Sodium 138, potassium 5.9, chloride 102, bicarbonate 21, BUN 90, creatinine

12.4, glucose 150.  Troponin 0.09.  BNP greater than 35,000.



ASSESSMENT AND PLAN:  Chest pain and volume overload in a middle-aged female who

has end-stage renal disease, on dialysis.  Suspect acute on chronic systolic and

diastolic heart failure.  She also has azotemia, anemia.  The patient will be

admitted to the cardiac floor.  Consult Cardiology, serial enzymes, serial EKGs,

consider echocardiogram.  Consult Nephrology for dialysis.  Home meds, DVT

prophylaxis.  Full code.  Trend labs.



PROGNOSIS:  Long-term guarded.

 



______________________________

BRAXTON HADLEY DO DR:  CJ/marcela  JOB#:  [id number] / [id number]

DD:  01/30/2021 11:56  DT:  01/30/2021 12:04

## 2021-01-30 NOTE — PDOC2
CONSULT


Date of Consult


Date of Consult


DATE: 1/30/21 


TIME: 15:37





Reason for Consult


Reason for Consult:


ESRD AND HIGH K





Referring Physician


Referring Physician:


VERITO





Identification/Chief Complaint


Chief Complaint


CHEST PAIN





Source


Source:  Chart review, Patient





History of Present Illness


Reason for Visit:


THIS IS A 65 YR OLD WITH CHEST PAIN. HAS KNOWN HX OF CAD AND PTCA AND STENT 4 

YEARS AGO. ADMITTED FOR EVALUATION BY CARDIOLOGY. SHE HAS ESRD AND IS ON HD MWF.

DID NOT GO TO HER LAST 2 TREATMENTS. ESRD DUE TO DM II AND HTN. HAS HAD A LAURA AV

ACCESS PLACED EARLIER THIS WEEK. HAS HAD SOME SURGICAL SITE DISCOMFORT. SHE IS 

UNDERGOING CARDIOLOGY EVALUATION. HER LABS ARE C/W ESRD.





Past Medical History


Cardiovascular:  CAD, CHF, HTN, Hyperlipidemia


Pulmonary:  No pertinent hx, Other


CENTRAL NERVOUS SYSTEM:  Other


GI:  GERD


Heme/Onc:  No pertinent hx


Hepatobiliary:  No pertinent hx


Psych:  No pertinent hx


Rheumatologic:  No pertinent hx


Infectious disease:  No pertinent hx


Renal/:  Chronic renal failure


Endocrine:  Diabetes, Hyperparathyroidism





Past Surgical History


Past Surgical History


LAURA AV ACCESS, HX OF RIGHT IJ TDC


Past Surgical History:  Other





Family History


Family History:  Heart Disease





Social History


No


ALCOHOL:  none


Drugs:  None


Lives:  with Family





Current Problem List


Problem List


Problems


Medical Problems:


(1) Chest pain


Status: Acute  





(2) Hypertension with goal of symptom management only


Status: Acute  








Current Medications


Current Medications





Current Medications


Nitroglycerin (Nitrostat) 0.4 mg PRN Q5MIN  PRN SL CHEST PAIN;  Start 1/29/21 at

17:15


Nitroglycerin (Nitro-Bid Oint) 1 inch 1X  ONCE TP  Last administered on 

1/29/21at 18:22;  Start 1/29/21 at 17:45;  Stop 1/29/21 at 17:46;  Status DC


Sodium Chloride 1,000 ml @  1,000 mls/hr Q1H PRN IV hypotension;  Start 1/29/21 

at 22:15;  Stop 1/30/21 at 04:14;  Status DC


Albumin Human 200 ml @  200 mls/hr 1X PRN  PRN IV Hypotension;  Start 1/29/21 at

22:15;  Stop 1/30/21 at 04:14;  Status DC


Sodium Chloride (Normal Saline Flush) 10 ml 1X PRN  PRN IV AP catheter pack;  

Start 1/29/21 at 22:15;  Stop 1/30/21 at 22:14


Sodium Chloride (Normal Saline Flush) 10 ml 1X PRN  PRN IV  catheter pack;  

Start 1/29/21 at 22:15;  Stop 1/30/21 at 22:14


Sodium Chloride 1,000 ml @  400 mls/hr Q2H30M PRN IV PATENCY;  Start 1/29/21 at 

22:15;  Stop 1/30/21 at 10:14;  Status DC


Info (PHARMACY MONITORING -- do not chart) 1 each PRN DAILY  PRN MC SEE 

COMMENTS;  Start 1/29/21 at 22:15;  Status UNV


Info (PHARMACY MONITORING -- do not chart) 1 each PRN DAILY  PRN MC SEE 

COMMENTS;  Start 1/29/21 at 22:15


Amlodipine Besylate (Norvasc) 5 mg DAILY PO  Last administered on 1/30/21at 

07:43;  Start 1/30/21 at 09:00;  Stop 1/30/21 at 10:57;  Status DC


Amlodipine Besylate (Norvasc) 5 mg 1X  ONCE PO  Last administered on 1/30/21at 

06:18;  Start 1/30/21 at 06:30;  Stop 1/30/21 at 06:31;  Status DC


Amlodipine Besylate (Norvasc) 10 mg DAILY PO ;  Start 1/30/21 at 11:30


Aspirin (Ecotrin) 81 mg DAILYWBKFT PO  Last administered on 1/30/21at 11:31;  

Start 1/30/21 at 11:30


Atorvastatin Calcium (Lipitor) 80 mg QHS PO ;  Start 1/30/21 at 21:00


Clonazepam (KlonoPIN) 0.5 mg TID  PRN PO ANXIETY / AGITATION;  Start 1/30/21 at 

10:45


Clopidogrel Bisulfate (Plavix) 75 mg DAILY PO  Last administered on 1/30/21at 

11:30;  Start 1/30/21 at 11:30


Furosemide (Lasix) 40 mg BID94 PO  Last administered on 1/30/21at 11:30;  Start 

1/30/21 at 11:30


Isosorbide Mononitrate (Imdur) 30 mg DAILY PO  Last administered on 1/30/21at 

11:30;  Start 1/30/21 at 11:30


Lisinopril (Prinivil) 40 mg BID PO ;  Start 1/30/21 at 21:00;  Status UNV


Calcium Acetate (Phoslo) 2,001 mg TIDWMEALS PO  Last administered on 1/30/21at 

11:31;  Start 1/30/21 at 12:00


Carvedilol (Coreg) 25 mg BIDWMEALS PO  Last administered on 1/30/21at 11:31;  

Start 1/30/21 at 11:30;  Stop 1/30/21 at 13:28;  Status DC


Labetalol HCl (Trandate) 200 mg BID PO ;  Start 1/30/21 at 21:00


Lisinopril (Prinivil) 40 mg DAILY PO  Last administered on 1/30/21at 14:38;  

Start 1/30/21 at 13:30





Active Scripts


Active


Proair Hfa Inhaler (Albuterol Sulfate) 8.5 Gm Hfa.aer.ad 1 Puff INH PRN Q6HRS 

PRN 30 Days


Furosemide 40 Mg Tablet 40 Mg PO BID 30 Days


Carvedilol 25 Mg Tablet 25 Mg PO BIDWMEALS 30 Days


Isosorbide Mononitrate Er (Isosorbide Mononitrate) 30 Mg Tab.er.24h 30 Mg PO 

DAILY 30 Days


Atorvastatin Calcium 40 Mg Tablet 80 Mg PO QHS 30 Days


     Can replace with 80mg Tabs #30 per month


Clopidogrel (Clopidogrel Bisulfate) 75 Mg Tablet 75 Mg PO DAILY 30 Days


Aspirin Ec (Aspirin) 81 Mg Tablet.dr 81 Mg PO DAILYWBKFT 30 Days


Clonazepam ** (Clonazepam) 0.5 Mg Tablet 0.5 Mg PO TID PRN 30 Days


Reported


Calcium Acetate 667 Mg Tablet 3 Tab PO TID 30 Days


Lisinopril 40 Mg Tablet 1 Tab PO BID


Amlodipine Besylate 10 Mg Tablet 10 Mg PO DAILY





Allergies


Allergies:  


Coded Allergies:  


     No Known Drug Allergies (Unverified , 8/6/18)





ROS


General:  YES: Fatigue


PSYCHOLOGICAL ROS:  YES: Anxiety


Eyes:  Yes Decreased vision


HEENT:  YES: Heacaches


Respiratory:  YES: Cough, Shortness of breath


Cardiovascular:  yes Chest Pain


Gastrointestinal:  Yes Constipation


Genitourinary:  YES Other (ANURIA)


Musculoskeletal:  Yes Muscular Weakness


Neurological:  Yes Weakness


Skin:  Yes Dry Skin





Physical Exam


General:  Alert, Oriented X3, Cooperative, No acute distress


HEENT:  Atraumatic, PERRLA


Lungs:  Clear to auscultation


Heart:  Regular rate


Abdomen:  Normal bowel sounds, Soft, No tenderness


Extremities:  No cyanosis


Neuro:  Normal gait, Normal speech, Normal tone


Psych/Mental Status:  Mental status NL, Mood NL


MUSCULOSKELETAL:  No joint tenderness, No deformity, No swelling





Vitals


VITALS





Vital Signs








  Date Time  Temp Pulse Resp B/P (MAP) Pulse Ox O2 Delivery O2 Flow Rate FiO2


 


1/30/21 14:38  88  185/78    


 


1/30/21 14:13 98.0  19  95 Room Air  





 98.0       











Labs


Labs





Laboratory Tests








Test


 1/29/21


16:40 1/29/21


17:30 1/29/21


21:45 1/30/21


11:30


 


White Blood Count


 7.1 x10^3/uL


(4.0-11.0) 


 


 





 


Red Blood Count


 2.52 x10^6/uL


(3.50-5.40) 


 


 





 


Hemoglobin


 8.8 g/dL


(12.0-15.5) 


 


 





 


Hematocrit


 25.4 %


(36.0-47.0) 


 


 





 


Mean Corpuscular Volume


 101 fL


() 


 


 





 


Mean Corpuscular Hemoglobin 35 pg (25-35)    


 


Mean Corpuscular Hemoglobin


Concent 35 g/dL


(31-37) 


 


 





 


Red Cell Distribution Width


 12.6 %


(11.5-14.5) 


 


 





 


Platelet Count


 267 x10^3/uL


(140-400) 


 


 





 


Neutrophils (%) (Auto) 66 % (31-73)    


 


Lymphocytes (%) (Auto) 19 % (24-48)    


 


Monocytes (%) (Auto) 10 % (0-9)    


 


Eosinophils (%) (Auto) 4 % (0-3)    


 


Basophils (%) (Auto) 1 % (0-3)    


 


Neutrophils # (Auto)


 4.7 x10^3/uL


(1.8-7.7) 


 


 





 


Lymphocytes # (Auto)


 1.3 x10^3/uL


(1.0-4.8) 


 


 





 


Monocytes # (Auto)


 0.7 x10^3/uL


(0.0-1.1) 


 


 





 


Eosinophils # (Auto)


 0.3 x10^3/uL


(0.0-0.7) 


 


 





 


Basophils # (Auto)


 0.1 x10^3/uL


(0.0-0.2) 


 


 





 


Sodium Level


 


 138 mmol/L


(136-145) 


 





 


Potassium Level


 


 5.9 mmol/L


(3.5-5.1) 


 





 


Chloride Level


 


 102 mmol/L


() 


 





 


Carbon Dioxide Level


 


 21 mmol/L


(21-32) 


 





 


Anion Gap  15 (6-14)   


 


Blood Urea Nitrogen


 


 90 mg/dL


(7-20) 


 





 


Creatinine


 


 12.4 mg/dL


(0.6-1.0) 


 





 


Estimated GFR


(Cockcroft-Gault) 


 3.1 


 


 





 


BUN/Creatinine Ratio  7 (6-20)   


 


Glucose Level


 


 150 mg/dL


(70-99) 


 





 


Calcium Level


 


 9.3 mg/dL


(8.5-10.1) 


 





 


Magnesium Level


 


 3.1 mg/dL


(1.8-2.4) 


 





 


Total Bilirubin


 


 0.3 mg/dL


(0.2-1.0) 


 





 


Aspartate Amino Transf


(AST/SGOT) 


 11 U/L (15-37) 


 


 





 


Alanine Aminotransferase


(ALT/SGPT) 


 8 U/L (14-59) 


 


 





 


Alkaline Phosphatase


 


 67 U/L


() 


 





 


Troponin I Quantitative


 


 0.092 ng/mL


(0.000-0.055) 0.089 ng/mL


(0.000-0.055) 





 


NT-Pro-B-Type Natriuretic


Peptide 


 > 70948 pg/mL


(0-124) 


 





 


Total Protein


 


 7.1 g/dL


(6.4-8.2) 


 





 


Albumin


 


 3.0 g/dL


(3.4-5.0) 


 





 


Albumin/Globulin Ratio  0.7 (1.0-1.7)   


 


Lipase


 


 124 U/L


() 


 





 


Glucose (Fingerstick)


 


 


 


 115 mg/dL


(70-99)


 


Test


 1/30/21


12:45 


 


 





 


White Blood Count


 5.8 x10^3/uL


(4.0-11.0) 


 


 





 


Red Blood Count


 2.53 x10^6/uL


(3.50-5.40) 


 


 





 


Hemoglobin


 8.6 g/dL


(12.0-15.5) 


 


 





 


Hematocrit


 25.5 %


(36.0-47.0) 


 


 





 


Mean Corpuscular Volume


 101 fL


() 


 


 





 


Mean Corpuscular Hemoglobin 34 pg (25-35)    


 


Mean Corpuscular Hemoglobin


Concent 34 g/dL


(31-37) 


 


 





 


Red Cell Distribution Width


 12.3 %


(11.5-14.5) 


 


 





 


Platelet Count


 174 x10^3/uL


(140-400) 


 


 





 


Neutrophils (%) (Auto) 66 % (31-73)    


 


Lymphocytes (%) (Auto) 21 % (24-48)    


 


Monocytes (%) (Auto) 8 % (0-9)    


 


Eosinophils (%) (Auto) 4 % (0-3)    


 


Basophils (%) (Auto) 1 % (0-3)    


 


Neutrophils # (Auto)


 3.8 x10^3/uL


(1.8-7.7) 


 


 





 


Lymphocytes # (Auto)


 1.2 x10^3/uL


(1.0-4.8) 


 


 





 


Monocytes # (Auto)


 0.5 x10^3/uL


(0.0-1.1) 


 


 





 


Eosinophils # (Auto)


 0.2 x10^3/uL


(0.0-0.7) 


 


 





 


Basophils # (Auto)


 0.1 x10^3/uL


(0.0-0.2) 


 


 





 


Sodium Level


 137 mmol/L


(136-145) 


 


 





 


Potassium Level


 4.7 mmol/L


(3.5-5.1) 


 


 





 


Chloride Level


 99 mmol/L


() 


 


 





 


Carbon Dioxide Level


 27 mmol/L


(21-32) 


 


 





 


Anion Gap 11 (6-14)    


 


Blood Urea Nitrogen


 45 mg/dL


(7-20) 


 


 





 


Creatinine


 8.1 mg/dL


(0.6-1.0) 


 


 





 


Estimated GFR


(Cockcroft-Gault) 5.0 


 


 


 





 


Glucose Level


 178 mg/dL


(70-99) 


 


 





 


Calcium Level


 8.5 mg/dL


(8.5-10.1) 


 


 











Laboratory Tests








Test


 1/29/21


16:40 1/29/21


17:30 1/29/21


21:45 1/30/21


11:30


 


White Blood Count


 7.1 x10^3/uL


(4.0-11.0) 


 


 





 


Red Blood Count


 2.52 x10^6/uL


(3.50-5.40) 


 


 





 


Hemoglobin


 8.8 g/dL


(12.0-15.5) 


 


 





 


Hematocrit


 25.4 %


(36.0-47.0) 


 


 





 


Mean Corpuscular Volume


 101 fL


() 


 


 





 


Mean Corpuscular Hemoglobin 35 pg (25-35)    


 


Mean Corpuscular Hemoglobin


Concent 35 g/dL


(31-37) 


 


 





 


Red Cell Distribution Width


 12.6 %


(11.5-14.5) 


 


 





 


Platelet Count


 267 x10^3/uL


(140-400) 


 


 





 


Neutrophils (%) (Auto) 66 % (31-73)    


 


Lymphocytes (%) (Auto) 19 % (24-48)    


 


Monocytes (%) (Auto) 10 % (0-9)    


 


Eosinophils (%) (Auto) 4 % (0-3)    


 


Basophils (%) (Auto) 1 % (0-3)    


 


Neutrophils # (Auto)


 4.7 x10^3/uL


(1.8-7.7) 


 


 





 


Lymphocytes # (Auto)


 1.3 x10^3/uL


(1.0-4.8) 


 


 





 


Monocytes # (Auto)


 0.7 x10^3/uL


(0.0-1.1) 


 


 





 


Eosinophils # (Auto)


 0.3 x10^3/uL


(0.0-0.7) 


 


 





 


Basophils # (Auto)


 0.1 x10^3/uL


(0.0-0.2) 


 


 





 


Sodium Level


 


 138 mmol/L


(136-145) 


 





 


Potassium Level


 


 5.9 mmol/L


(3.5-5.1) 


 





 


Chloride Level


 


 102 mmol/L


() 


 





 


Carbon Dioxide Level


 


 21 mmol/L


(21-32) 


 





 


Anion Gap  15 (6-14)   


 


Blood Urea Nitrogen


 


 90 mg/dL


(7-20) 


 





 


Creatinine


 


 12.4 mg/dL


(0.6-1.0) 


 





 


Estimated GFR


(Cockcroft-Gault) 


 3.1 


 


 





 


BUN/Creatinine Ratio  7 (6-20)   


 


Glucose Level


 


 150 mg/dL


(70-99) 


 





 


Calcium Level


 


 9.3 mg/dL


(8.5-10.1) 


 





 


Magnesium Level


 


 3.1 mg/dL


(1.8-2.4) 


 





 


Total Bilirubin


 


 0.3 mg/dL


(0.2-1.0) 


 





 


Aspartate Amino Transf


(AST/SGOT) 


 11 U/L (15-37) 


 


 





 


Alanine Aminotransferase


(ALT/SGPT) 


 8 U/L (14-59) 


 


 





 


Alkaline Phosphatase


 


 67 U/L


() 


 





 


Troponin I Quantitative


 


 0.092 ng/mL


(0.000-0.055) 0.089 ng/mL


(0.000-0.055) 





 


NT-Pro-B-Type Natriuretic


Peptide 


 > 57346 pg/mL


(0-124) 


 





 


Total Protein


 


 7.1 g/dL


(6.4-8.2) 


 





 


Albumin


 


 3.0 g/dL


(3.4-5.0) 


 





 


Albumin/Globulin Ratio  0.7 (1.0-1.7)   


 


Lipase


 


 124 U/L


() 


 





 


Glucose (Fingerstick)


 


 


 


 115 mg/dL


(70-99)


 


Test


 1/30/21


12:45 


 


 





 


White Blood Count


 5.8 x10^3/uL


(4.0-11.0) 


 


 





 


Red Blood Count


 2.53 x10^6/uL


(3.50-5.40) 


 


 





 


Hemoglobin


 8.6 g/dL


(12.0-15.5) 


 


 





 


Hematocrit


 25.5 %


(36.0-47.0) 


 


 





 


Mean Corpuscular Volume


 101 fL


() 


 


 





 


Mean Corpuscular Hemoglobin 34 pg (25-35)    


 


Mean Corpuscular Hemoglobin


Concent 34 g/dL


(31-37) 


 


 





 


Red Cell Distribution Width


 12.3 %


(11.5-14.5) 


 


 





 


Platelet Count


 174 x10^3/uL


(140-400) 


 


 





 


Neutrophils (%) (Auto) 66 % (31-73)    


 


Lymphocytes (%) (Auto) 21 % (24-48)    


 


Monocytes (%) (Auto) 8 % (0-9)    


 


Eosinophils (%) (Auto) 4 % (0-3)    


 


Basophils (%) (Auto) 1 % (0-3)    


 


Neutrophils # (Auto)


 3.8 x10^3/uL


(1.8-7.7) 


 


 





 


Lymphocytes # (Auto)


 1.2 x10^3/uL


(1.0-4.8) 


 


 





 


Monocytes # (Auto)


 0.5 x10^3/uL


(0.0-1.1) 


 


 





 


Eosinophils # (Auto)


 0.2 x10^3/uL


(0.0-0.7) 


 


 





 


Basophils # (Auto)


 0.1 x10^3/uL


(0.0-0.2) 


 


 





 


Sodium Level


 137 mmol/L


(136-145) 


 


 





 


Potassium Level


 4.7 mmol/L


(3.5-5.1) 


 


 





 


Chloride Level


 99 mmol/L


() 


 


 





 


Carbon Dioxide Level


 27 mmol/L


(21-32) 


 


 





 


Anion Gap 11 (6-14)    


 


Blood Urea Nitrogen


 45 mg/dL


(7-20) 


 


 





 


Creatinine


 8.1 mg/dL


(0.6-1.0) 


 


 





 


Estimated GFR


(Cockcroft-Gault) 5.0 


 


 


 





 


Glucose Level


 178 mg/dL


(70-99) 


 


 





 


Calcium Level


 8.5 mg/dL


(8.5-10.1) 


 


 














Assessment/Plan


Assessment/Plan


IMP





CHEST PAIN


HYPERKALEMIA


ANEMIA


ESRD


DM II


HTN





PLAN





HD MWF


DANIEL WHEN NEEDED


ENC COMPLIANCE


CARDIOLOGY EVAL AND TX


WILL FOLLOW











JANE CAMARILLO MD                 Jan 30, 2021 15:41

## 2021-01-31 VITALS — DIASTOLIC BLOOD PRESSURE: 56 MMHG | SYSTOLIC BLOOD PRESSURE: 182 MMHG

## 2021-01-31 VITALS — SYSTOLIC BLOOD PRESSURE: 199 MMHG | DIASTOLIC BLOOD PRESSURE: 68 MMHG

## 2021-01-31 VITALS — SYSTOLIC BLOOD PRESSURE: 225 MMHG | DIASTOLIC BLOOD PRESSURE: 57 MMHG

## 2021-01-31 VITALS — DIASTOLIC BLOOD PRESSURE: 64 MMHG | SYSTOLIC BLOOD PRESSURE: 205 MMHG

## 2021-01-31 VITALS — DIASTOLIC BLOOD PRESSURE: 63 MMHG | SYSTOLIC BLOOD PRESSURE: 206 MMHG

## 2021-01-31 VITALS — DIASTOLIC BLOOD PRESSURE: 57 MMHG | SYSTOLIC BLOOD PRESSURE: 205 MMHG

## 2021-01-31 LAB
ANION GAP SERPL CALC-SCNC: 12 MMOL/L (ref 6–14)
BASOPHILS # BLD AUTO: 0 X10^3/UL (ref 0–0.2)
BASOPHILS NFR BLD: 1 % (ref 0–3)
BUN SERPL-MCNC: 63 MG/DL (ref 7–20)
CALCIUM SERPL-MCNC: 8.7 MG/DL (ref 8.5–10.1)
CHLORIDE SERPL-SCNC: 97 MMOL/L (ref 98–107)
CO2 SERPL-SCNC: 26 MMOL/L (ref 21–32)
CREAT SERPL-MCNC: 9.4 MG/DL (ref 0.6–1)
EOSINOPHIL NFR BLD: 0.2 X10^3/UL (ref 0–0.7)
EOSINOPHIL NFR BLD: 3 % (ref 0–3)
ERYTHROCYTE [DISTWIDTH] IN BLOOD BY AUTOMATED COUNT: 12.4 % (ref 11.5–14.5)
GFR SERPLBLD BASED ON 1.73 SQ M-ARVRAT: 4.2 ML/MIN
GLUCOSE SERPL-MCNC: 159 MG/DL (ref 70–99)
HCT VFR BLD CALC: 24.2 % (ref 36–47)
HGB BLD-MCNC: 8.2 G/DL (ref 12–15.5)
LYMPHOCYTES # BLD: 1.9 X10^3/UL (ref 1–4.8)
LYMPHOCYTES NFR BLD AUTO: 27 % (ref 24–48)
MCH RBC QN AUTO: 34 PG (ref 25–35)
MCHC RBC AUTO-ENTMCNC: 34 G/DL (ref 31–37)
MCV RBC AUTO: 101 FL (ref 79–100)
MONO #: 0.8 X10^3/UL (ref 0–1.1)
MONOCYTES NFR BLD: 12 % (ref 0–9)
NEUT #: 4 X10^3/UL (ref 1.8–7.7)
NEUTROPHILS NFR BLD AUTO: 57 % (ref 31–73)
PLATELET # BLD AUTO: 163 X10^3/UL (ref 140–400)
POTASSIUM SERPL-SCNC: 4.8 MMOL/L (ref 3.5–5.1)
RBC # BLD AUTO: 2.4 X10^6/UL (ref 3.5–5.4)
SODIUM SERPL-SCNC: 135 MMOL/L (ref 136–145)
WBC # BLD AUTO: 7 X10^3/UL (ref 4–11)

## 2021-01-31 RX ADMIN — FUROSEMIDE SCH MG: 40 TABLET ORAL at 08:46

## 2021-01-31 RX ADMIN — CALCIUM ACETATE SCH MG: 667 CAPSULE ORAL at 08:44

## 2021-01-31 RX ADMIN — LABETALOL HCL SCH MG: 200 TABLET, FILM COATED ORAL at 08:45

## 2021-01-31 RX ADMIN — ATORVASTATIN CALCIUM SCH MG: 40 TABLET, FILM COATED ORAL at 21:00

## 2021-01-31 RX ADMIN — ASPIRIN SCH MG: 81 TABLET, COATED ORAL at 08:45

## 2021-01-31 RX ADMIN — CLOPIDOGREL BISULFATE SCH MG: 75 TABLET ORAL at 08:44

## 2021-01-31 RX ADMIN — CALCIUM ACETATE SCH MG: 667 CAPSULE ORAL at 12:38

## 2021-01-31 RX ADMIN — LISINOPRIL SCH MG: 20 TABLET ORAL at 08:46

## 2021-01-31 RX ADMIN — LABETALOL HCL SCH MG: 200 TABLET, FILM COATED ORAL at 21:01

## 2021-01-31 RX ADMIN — ISOSORBIDE MONONITRATE SCH MG: 30 TABLET, EXTENDED RELEASE ORAL at 08:45

## 2021-01-31 RX ADMIN — CALCIUM ACETATE SCH MG: 667 CAPSULE ORAL at 16:59

## 2021-01-31 RX ADMIN — FUROSEMIDE SCH MG: 40 TABLET ORAL at 16:59

## 2021-01-31 NOTE — PDOC
[facility] PROGRESS NOTE


Date of Service


DOS:


DATE: 1/31/21 


TIME: 11:42





Chief Complaint


Chief Complaint


Chest pain, HTN


End-stage renal disease on dialysis, noncompliance, CHF,


diabetes, hypertension, hyperlipidemia, pneumonia, psoriasis, angioplasty,


thoracentesis, cardiac stents, dialysis catheter placement.





History of Present Illness


History of Present Illness


The patient is a pleasant 65-year-old female with


multiple comorbidities.  She is well known to my service.  She basically


presented last night with chest pain, rates it at 6/10, has been occurring for


several days, worse with moving, better with sitting still.  She increased her


home meds, but that did not work, described as very irritating.  I discussed the


case with ER physician.  We are admitting the patient with consultation to


Cardiology.





1/31/2021


-Patient seen and examined


-WILL RN, WILL 


-Chart reviewed





Vitals/I&O


Vitals/I&O:





                                   Vital Signs








  Date Time  Temp Pulse Resp B/P (MAP) Pulse Ox O2 Delivery O2 Flow Rate FiO2


 


1/31/21 10:38 98.4 68 18 182/56 (98) 97 Room Air  





 98.4       














                                    I & O   


 


 1/30/21 1/30/21 1/31/21





 15:00 23:00 07:00


 


Intake Total 300 ml 240 ml 0 ml


 


Balance 300 ml 240 ml 0 ml











Physical Exam


General:  Alert, Oriented X3, Cooperative, No acute distress


Heart:  Regular rate


Lungs:  Crackles, Other


Abdomen:  Normal bowel sounds, Soft, No tenderness


Extremities:  No cyanosis


Skin:  No breakdown, No significant lesion





Labs


Labs:





Laboratory Tests








Test


 1/30/21


12:45 1/30/21


16:29 1/30/21


19:48 1/31/21


04:30


 


White Blood Count


 5.8 x10^3/uL


(4.0-11.0) 


 


 7.0 x10^3/uL


(4.0-11.0)


 


Red Blood Count


 2.53 x10^6/uL


(3.50-5.40) 


 


 2.40 x10^6/uL


(3.50-5.40)


 


Hemoglobin


 8.6 g/dL


(12.0-15.5) 


 


 8.2 g/dL


(12.0-15.5)


 


Hematocrit


 25.5 %


(36.0-47.0) 


 


 24.2 %


(36.0-47.0)


 


Mean Corpuscular Volume


 101 fL


() 


 


 101 fL


()


 


Mean Corpuscular Hemoglobin 34 pg (25-35)    34 pg (25-35) 


 


Mean Corpuscular Hemoglobin


Concent 34 g/dL


(31-37) 


 


 34 g/dL


(31-37)


 


Red Cell Distribution Width


 12.3 %


(11.5-14.5) 


 


 12.4 %


(11.5-14.5)


 


Platelet Count


 174 x10^3/uL


(140-400) 


 


 163 x10^3/uL


(140-400)


 


Neutrophils (%) (Auto) 66 % (31-73)    57 % (31-73) 


 


Lymphocytes (%) (Auto) 21 % (24-48)    27 % (24-48) 


 


Monocytes (%) (Auto) 8 % (0-9)    12 % (0-9) 


 


Eosinophils (%) (Auto) 4 % (0-3)    3 % (0-3) 


 


Basophils (%) (Auto) 1 % (0-3)    1 % (0-3) 


 


Neutrophils # (Auto)


 3.8 x10^3/uL


(1.8-7.7) 


 


 4.0 x10^3/uL


(1.8-7.7)


 


Lymphocytes # (Auto)


 1.2 x10^3/uL


(1.0-4.8) 


 


 1.9 x10^3/uL


(1.0-4.8)


 


Monocytes # (Auto)


 0.5 x10^3/uL


(0.0-1.1) 


 


 0.8 x10^3/uL


(0.0-1.1)


 


Eosinophils # (Auto)


 0.2 x10^3/uL


(0.0-0.7) 


 


 0.2 x10^3/uL


(0.0-0.7)


 


Basophils # (Auto)


 0.1 x10^3/uL


(0.0-0.2) 


 


 0.0 x10^3/uL


(0.0-0.2)


 


Sodium Level


 137 mmol/L


(136-145) 


 


 135 mmol/L


(136-145)


 


Potassium Level


 4.7 mmol/L


(3.5-5.1) 


 


 4.8 mmol/L


(3.5-5.1)


 


Chloride Level


 99 mmol/L


() 


 


 97 mmol/L


()


 


Carbon Dioxide Level


 27 mmol/L


(21-32) 


 


 26 mmol/L


(21-32)


 


Anion Gap 11 (6-14)    12 (6-14) 


 


Blood Urea Nitrogen


 45 mg/dL


(7-20) 


 


 63 mg/dL


(7-20)


 


Creatinine


 8.1 mg/dL


(0.6-1.0) 


 


 9.4 mg/dL


(0.6-1.0)


 


Estimated GFR


(Cockcroft-Gault) 5.0 


 


 


 4.2 





 


Glucose Level


 178 mg/dL


(70-99) 


 


 159 mg/dL


(70-99)


 


Calcium Level


 8.5 mg/dL


(8.5-10.1) 


 


 8.7 mg/dL


(8.5-10.1)


 


Glucose (Fingerstick)


 


 254 mg/dL


(70-99) 227 mg/dL


(70-99) 





 


Test


 1/31/21


08:39 


 


 





 


Glucose (Fingerstick)


 174 mg/dL


(70-99) 


 


 














Review of Systems


Review of Systems:


No clubbing, no ecchymosis





Assessment and Plan


Assessmemt and Plan


Problems


Medical Problems:


(1) Chest pain


Status: Acute  





(2) Hypertension with goal of symptom management only


Status: Acute





1/31/2021:


A:


Chest pain and volume overload in the setting of end-stage renal disease


Suspected acute on chronic systolic and diastolic heart failure


azotemia


anemia


End-stage renal disease on dialysis, noncompliance, CHF,


diabetes, hypertension, hyperlipidemia, pneumonia, psoriasis, angioplasty,


thoracentesis, cardiac stents, dialysis catheter placement.





P:


Consult Cardiology


Serial enzymes


Serial EKGs,


Cardiac monitoring


HD per nephrology recommendation


Home meds


DVT prophylaxis


Full code


Trend labs.


  








Comment


Review of Relevant


I have reviewed the following items lore (where applicable) has been applied.


Medications:





Current Medications








 Medications


  (Trade)  Dose


 Ordered  Sig/Gume


 Route


 PRN Reason  Start Time


 Stop Time Status Last Admin


Dose Admin


 


 Atorvastatin


 Calcium


  (Lipitor)  80 mg  QHS


 PO


   1/30/21 21:00


    1/30/21 19:54





 


 Calcium Acetate


  (Phoslo)  2,001 mg  TIDWMEALS


 PO


   1/30/21 12:00


    1/31/21 08:44





 


 Labetalol HCl


  (Trandate)  200 mg  BID


 PO


   1/30/21 21:00


    1/31/21 08:45





 


 Lisinopril


  (Prinivil)  40 mg  DAILY


 PO


   1/30/21 13:30


    1/31/21 08:46














Justifications for Admission


Other Justification














BRAXTON HADLEY III DO           Jan 31, 2021 11:48

## 2021-01-31 NOTE — PDOC
Renal-Progress Notes


Subjective Notes


Notes


NO NEW COMPLAINTS





History of Present Illness


Hx of present illness


STABLE





Vitals


Vitals





Vital Signs








  Date Time  Temp Pulse Resp B/P (MAP) Pulse Ox O2 Delivery O2 Flow Rate FiO2


 


1/31/21 14:35 97.9 68 16 199/68 (111) 96 Room Air  





 97.9       








Weight


Weight [ ]





I.O.


Intake and Output











Intake and Output 


 


 1/31/21





 07:00


 


Intake Total 540 ml


 


Balance 540 ml


 


 


 


Intake Oral 540 ml


 


# Bowel Movements 2











Labs


Labs





Laboratory Tests








Test


 1/30/21


16:29 1/30/21


19:48 1/31/21


04:30 1/31/21


08:39


 


Glucose (Fingerstick)


 254 mg/dL


(70-99) 227 mg/dL


(70-99) 


 174 mg/dL


(70-99)


 


White Blood Count


 


 


 7.0 x10^3/uL


(4.0-11.0) 





 


Red Blood Count


 


 


 2.40 x10^6/uL


(3.50-5.40) 





 


Hemoglobin


 


 


 8.2 g/dL


(12.0-15.5) 





 


Hematocrit


 


 


 24.2 %


(36.0-47.0) 





 


Mean Corpuscular Volume


 


 


 101 fL


() 





 


Mean Corpuscular Hemoglobin   34 pg (25-35)  


 


Mean Corpuscular Hemoglobin


Concent 


 


 34 g/dL


(31-37) 





 


Red Cell Distribution Width


 


 


 12.4 %


(11.5-14.5) 





 


Platelet Count


 


 


 163 x10^3/uL


(140-400) 





 


Neutrophils (%) (Auto)   57 % (31-73)  


 


Lymphocytes (%) (Auto)   27 % (24-48)  


 


Monocytes (%) (Auto)   12 % (0-9)  


 


Eosinophils (%) (Auto)   3 % (0-3)  


 


Basophils (%) (Auto)   1 % (0-3)  


 


Neutrophils # (Auto)


 


 


 4.0 x10^3/uL


(1.8-7.7) 





 


Lymphocytes # (Auto)


 


 


 1.9 x10^3/uL


(1.0-4.8) 





 


Monocytes # (Auto)


 


 


 0.8 x10^3/uL


(0.0-1.1) 





 


Eosinophils # (Auto)


 


 


 0.2 x10^3/uL


(0.0-0.7) 





 


Basophils # (Auto)


 


 


 0.0 x10^3/uL


(0.0-0.2) 





 


Sodium Level


 


 


 135 mmol/L


(136-145) 





 


Potassium Level


 


 


 4.8 mmol/L


(3.5-5.1) 





 


Chloride Level


 


 


 97 mmol/L


() 





 


Carbon Dioxide Level


 


 


 26 mmol/L


(21-32) 





 


Anion Gap   12 (6-14)  


 


Blood Urea Nitrogen


 


 


 63 mg/dL


(7-20) 





 


Creatinine


 


 


 9.4 mg/dL


(0.6-1.0) 





 


Estimated GFR


(Cockcroft-Gault) 


 


 4.2 


 





 


Glucose Level


 


 


 159 mg/dL


(70-99) 





 


Calcium Level


 


 


 8.7 mg/dL


(8.5-10.1) 





 


Test


 1/31/21


12:25 


 


 





 


Glucose (Fingerstick)


 261 mg/dL


(70-99) 


 


 














Physical Exam


General Appearance:  no apparent distress


Skin:  warm


Respiratory:  bilateral CTA


Heart:  S1S2


Abdomen:  soft


Genitourinary:  bladder flat


Extremities:  pulses present, no edema, other (LEFT ARM AVF HAS THRILL AND 

BRUIT)


Neurology:  alert





Assessment


Assessment


IMP





CHEST PAIN


HYPERKALEMIA-RESOLVED


S/P LEFT AVF CREATION LAST TUESDAY


ANEMIA


ESRD


DM II


HTN





PLAN





HD TOMORROW


DANIEL WHEN NEEDED


ENC COMPLIANCE


CARDIOLOGY EVAL AND TX


WILL FOLLOW











JANE CAMARILLO MD                 Jan 31, 2021 14:43

## 2021-02-01 VITALS — DIASTOLIC BLOOD PRESSURE: 56 MMHG | SYSTOLIC BLOOD PRESSURE: 208 MMHG

## 2021-02-01 VITALS — SYSTOLIC BLOOD PRESSURE: 206 MMHG | DIASTOLIC BLOOD PRESSURE: 85 MMHG

## 2021-02-01 VITALS — SYSTOLIC BLOOD PRESSURE: 193 MMHG | DIASTOLIC BLOOD PRESSURE: 62 MMHG

## 2021-02-01 VITALS — DIASTOLIC BLOOD PRESSURE: 64 MMHG | SYSTOLIC BLOOD PRESSURE: 151 MMHG

## 2021-02-01 VITALS — SYSTOLIC BLOOD PRESSURE: 214 MMHG | DIASTOLIC BLOOD PRESSURE: 51 MMHG

## 2021-02-01 VITALS — SYSTOLIC BLOOD PRESSURE: 153 MMHG | DIASTOLIC BLOOD PRESSURE: 72 MMHG

## 2021-02-01 LAB
ANION GAP SERPL CALC-SCNC: 12 MMOL/L (ref 6–14)
BASOPHILS # BLD AUTO: 0 X10^3/UL (ref 0–0.2)
BASOPHILS NFR BLD: 1 % (ref 0–3)
BUN SERPL-MCNC: 73 MG/DL (ref 7–20)
CALCIUM SERPL-MCNC: 8.7 MG/DL (ref 8.5–10.1)
CHLORIDE SERPL-SCNC: 97 MMOL/L (ref 98–107)
CO2 SERPL-SCNC: 25 MMOL/L (ref 21–32)
CREAT SERPL-MCNC: 11 MG/DL (ref 0.6–1)
EOSINOPHIL NFR BLD: 0.2 X10^3/UL (ref 0–0.7)
EOSINOPHIL NFR BLD: 4 % (ref 0–3)
ERYTHROCYTE [DISTWIDTH] IN BLOOD BY AUTOMATED COUNT: 12.4 % (ref 11.5–14.5)
GFR SERPLBLD BASED ON 1.73 SQ M-ARVRAT: 3.5 ML/MIN
GLUCOSE SERPL-MCNC: 143 MG/DL (ref 70–99)
HCT VFR BLD CALC: 23.3 % (ref 36–47)
HGB BLD-MCNC: 7.8 G/DL (ref 12–15.5)
LYMPHOCYTES # BLD: 1.7 X10^3/UL (ref 1–4.8)
LYMPHOCYTES NFR BLD AUTO: 26 % (ref 24–48)
MCH RBC QN AUTO: 34 PG (ref 25–35)
MCHC RBC AUTO-ENTMCNC: 33 G/DL (ref 31–37)
MCV RBC AUTO: 101 FL (ref 79–100)
MONO #: 0.7 X10^3/UL (ref 0–1.1)
MONOCYTES NFR BLD: 11 % (ref 0–9)
NEUT #: 3.8 X10^3/UL (ref 1.8–7.7)
NEUTROPHILS NFR BLD AUTO: 58 % (ref 31–73)
PLATELET # BLD AUTO: 154 X10^3/UL (ref 140–400)
POTASSIUM SERPL-SCNC: 5.2 MMOL/L (ref 3.5–5.1)
RBC # BLD AUTO: 2.3 X10^6/UL (ref 3.5–5.4)
SODIUM SERPL-SCNC: 134 MMOL/L (ref 136–145)
WBC # BLD AUTO: 6.6 X10^3/UL (ref 4–11)

## 2021-02-01 PROCEDURE — 5A1D70Z PERFORMANCE OF URINARY FILTRATION, INTERMITTENT, LESS THAN 6 HOURS PER DAY: ICD-10-PCS | Performed by: INTERNAL MEDICINE

## 2021-02-01 RX ADMIN — CALCIUM ACETATE SCH MG: 667 CAPSULE ORAL at 16:32

## 2021-02-01 RX ADMIN — CLOPIDOGREL BISULFATE SCH MG: 75 TABLET ORAL at 12:34

## 2021-02-01 RX ADMIN — ASPIRIN SCH MG: 81 TABLET, COATED ORAL at 12:35

## 2021-02-01 RX ADMIN — CALCIUM ACETATE SCH MG: 667 CAPSULE ORAL at 08:00

## 2021-02-01 RX ADMIN — CALCIUM ACETATE SCH MG: 667 CAPSULE ORAL at 12:33

## 2021-02-01 RX ADMIN — ISOSORBIDE MONONITRATE SCH MG: 30 TABLET, EXTENDED RELEASE ORAL at 12:35

## 2021-02-01 RX ADMIN — FUROSEMIDE SCH MG: 40 TABLET ORAL at 16:31

## 2021-02-01 RX ADMIN — LABETALOL HCL SCH MG: 200 TABLET, FILM COATED ORAL at 12:35

## 2021-02-01 RX ADMIN — ATORVASTATIN CALCIUM SCH MG: 40 TABLET, FILM COATED ORAL at 21:08

## 2021-02-01 RX ADMIN — LABETALOL HCL SCH MG: 200 TABLET, FILM COATED ORAL at 21:09

## 2021-02-01 RX ADMIN — LISINOPRIL SCH MG: 20 TABLET ORAL at 12:34

## 2021-02-01 RX ADMIN — FUROSEMIDE SCH MG: 40 TABLET ORAL at 12:34

## 2021-02-01 NOTE — NUR
pt came back from dialysis with blood pressure of 208/56. pt was given morning medications 
and blood pressure was rechecked 45 minutes later. pt blood pressure was then 210/60. pt was 
given IVP hydralazine. pt blood pressure was rechecked 45 minutes later and blood pressure 
was 200/72.

## 2021-02-01 NOTE — PDOC
[employer] PROGRESS NOTE


Date of Service


DOS:


DATE: 2/1/21 


TIME: 07:04





Chief Complaint


Chief Complaint


Chest pain, HTN


End-stage renal disease on dialysis, noncompliance, diastolic CHF,


diabetes, hypertension, hyperlipidemia, possible community-acquired pneumonia, 

ruled out, psoriasis, angioplasty,


thoracentesis, cardiac stents, dialysis catheter placement.





History of Present Illness


History of Present Illness


The patient is a pleasant 65-year-old female with


multiple comorbidities.  She is well known to my service.  She basically


presented last night with chest pain, rates it at 6/10, has been occurring for


several days, worse with moving, better with sitting still.  She increased her


home meds, but that did not work, described as very irritating.  I discussed the


case with ER physician.  We are admitting the patient with consultation to


Cardiology.





1/31/2021


-Patient seen and examined


-WILL RN, WILL 


-Chart reviewed





2/1: Patient seen and evaluated.  Still with some hypertension today, started on

hydralazine yesterday.  HD today.  Per cardiology, outpatient 2D echo and stress

test.  Charts and labs reviewed.





Vitals/I&O


Vitals/I&O:





                                   Vital Signs








  Date Time  Temp Pulse Resp B/P (MAP) Pulse Ox O2 Delivery O2 Flow Rate FiO2


 


2/1/21 03:08 98.3 70 18 214/51 (105) 96 Room Air  





 98.3       














                                    I & O   


 


 1/31/21 1/31/21 2/1/21





 15:00 23:00 07:00


 


Intake Total 540 ml 550 ml 0 ml


 


Output Total  100 ml 400 ml


 


Balance 540 ml 450 ml -400 ml











Physical Exam


General:  Alert, Oriented X3, Cooperative, No acute distress


Heart:  Regular rate


Lungs:  Crackles, Other


Abdomen:  Normal bowel sounds, Soft, No tenderness


Extremities:  No cyanosis


Skin:  No breakdown, No significant lesion





Labs


Labs:





Laboratory Tests








Test


 1/31/21


08:39 1/31/21


12:25 1/31/21


16:13 1/31/21


21:07


 


Glucose (Fingerstick)


 174 mg/dL


(70-99) 261 mg/dL


(70-99) 264 mg/dL


(70-99) 252 mg/dL


(70-99)


 


Test


 2/1/21


05:16 2/1/21


07:02 


 





 


Sodium Level


 134 mmol/L


(136-145) 


 


 





 


Potassium Level


 5.2 mmol/L


(3.5-5.1) 


 


 





 


Chloride Level


 97 mmol/L


() 


 


 





 


Carbon Dioxide Level


 25 mmol/L


(21-32) 


 


 





 


Anion Gap 12 (6-14)    


 


Blood Urea Nitrogen


 73 mg/dL


(7-20) 


 


 





 


Creatinine


 11.0 mg/dL


(0.6-1.0) 


 


 





 


Estimated GFR


(Cockcroft-Gault) 3.5 


 


 


 





 


Glucose Level


 143 mg/dL


(70-99) 


 


 





 


Calcium Level


 8.7 mg/dL


(8.5-10.1) 


 


 





 


Glucose (Fingerstick)


 


 156 mg/dL


(70-99) 


 














Assessment and Plan


Assessmemt and Plan


Problems


Medical Problems:


(1) Chest pain


Status: Acute  





(2) Hypertension with goal of symptom management only


Status: Acute  











Comment


Review of Relevant


I have reviewed the following items lore (where applicable) has been applied.


Medications:





Current Medications








 Medications


  (Trade)  Dose


 Ordered  Sig/Gume


 Route


 PRN Reason  Start Time


 Stop Time Status Last Admin


Dose Admin


 


 Hydralazine HCl


  (Apresoline)  25 mg  TID


 PO


   1/31/21 14:00


    1/31/21 21:01














Justifications for Admission


Other Justification














LLOYD ANDRE MD             Feb 1, 2021 07:05

## 2021-02-01 NOTE — PDOC
PROGRESS NOTES


Date of Service:


DATE: 2/1/21 


TIME: 14:41





Subjective


Subjective


Patient denied any further episodes of chest pain.





Objective


Objective





Vital Signs








  Date Time  Temp Pulse Resp B/P (MAP) Pulse Ox O2 Delivery O2 Flow Rate FiO2


 


2/1/21 14:38 98.1 81 18 206/85 (125) 100 Room Air  





 98.1       














Intake and Output 


 


 2/1/21





 07:00


 


Intake Total 1090 ml


 


Output Total 500 ml


 


Balance 590 ml


 


 


 


Intake Oral 1090 ml


 


Output Urine Total 500 ml











Physical Exam


Abdomen:  Normal bowel sounds, Soft, No tenderness


Heart:  Regular rate


Extremities:  No cyanosis


General:  Alert, Oriented X3, Cooperative, No acute distress


HEENT:  Atraumatic, PERRLA


Lungs:  Clear to auscultation


MUSCULOSKELETAL:  No joint tenderness, No deformity, No swelling


Neuro:  Normal gait, Normal speech, Normal tone


Psych/Mental Status:  Mental status NL, Mood NL


Skin:  No breakdown, No significant lesion





Assessment


Assessment


1.  Chest pain with atypical features, most probably GI etiology since this is 

associated with nausea and worse in the night.  Slight troponin elevation 

probably secondary to demand ischemia/ESRD.  She is currently chest pain-free.  

Patient has known history of coronary artery disease and has undergone PCI/stent

placement to RCA in 2016.  Her 2D echo in February 2019 showed LVEF 50 to 55%.  

Plan for outpatient 2D echo and Lexiscan nuclear stress test.  Continue current 

secondary prevention measures.


2.  Accelerated hypertension: Blood pressure continues to be elevated.  Increase

labetalol and hydralazine doses for better control.


3.  Hyperlipidemia: Continue statin therapy


4.  End-stage renal disease: Continue hemodialysis per nephrology team





Plan


Plan of Care


Problems


Medical Problems:


(1) Chest pain


Status: Acute  





(2) Hypertension with goal of symptom management only


Status: Acute  











Comment


Review of Relevant


I have reviewed the following items lore (where applicable) has been applied.


Labs





Laboratory Tests








Test


 1/31/21


16:13 1/31/21


21:07 2/1/21


05:16 2/1/21


07:02


 


Glucose (Fingerstick)


 264 mg/dL


(70-99) 252 mg/dL


(70-99) 


 156 mg/dL


(70-99)


 


White Blood Count


 


 


 6.6 x10^3/uL


(4.0-11.0) 





 


Red Blood Count


 


 


 2.30 x10^6/uL


(3.50-5.40) 





 


Hemoglobin


 


 


 7.8 g/dL


(12.0-15.5) 





 


Hematocrit


 


 


 23.3 %


(36.0-47.0) 





 


Mean Corpuscular Volume


 


 


 101 fL


() 





 


Mean Corpuscular Hemoglobin   34 pg (25-35)  


 


Mean Corpuscular Hemoglobin


Concent 


 


 33 g/dL


(31-37) 





 


Red Cell Distribution Width


 


 


 12.4 %


(11.5-14.5) 





 


Platelet Count


 


 


 154 x10^3/uL


(140-400) 





 


Neutrophils (%) (Auto)   58 % (31-73)  


 


Lymphocytes (%) (Auto)   26 % (24-48)  


 


Monocytes (%) (Auto)   11 % (0-9)  


 


Eosinophils (%) (Auto)   4 % (0-3)  


 


Basophils (%) (Auto)   1 % (0-3)  


 


Neutrophils # (Auto)


 


 


 3.8 x10^3/uL


(1.8-7.7) 





 


Lymphocytes # (Auto)


 


 


 1.7 x10^3/uL


(1.0-4.8) 





 


Monocytes # (Auto)


 


 


 0.7 x10^3/uL


(0.0-1.1) 





 


Eosinophils # (Auto)


 


 


 0.2 x10^3/uL


(0.0-0.7) 





 


Basophils # (Auto)


 


 


 0.0 x10^3/uL


(0.0-0.2) 





 


Sodium Level


 


 


 134 mmol/L


(136-145) 





 


Potassium Level


 


 


 5.2 mmol/L


(3.5-5.1) 





 


Chloride Level


 


 


 97 mmol/L


() 





 


Carbon Dioxide Level


 


 


 25 mmol/L


(21-32) 





 


Anion Gap   12 (6-14)  


 


Blood Urea Nitrogen


 


 


 73 mg/dL


(7-20) 





 


Creatinine


 


 


 11.0 mg/dL


(0.6-1.0) 





 


Estimated GFR


(Cockcroft-Gault) 


 


 3.5 


 





 


Glucose Level


 


 


 143 mg/dL


(70-99) 





 


Calcium Level


 


 


 8.7 mg/dL


(8.5-10.1) 











Medications





Current Medications


Darbepoetin Valeriano (ARANESP for DIALYSIS PTS) 60 mcg WEEKLYHS SQ ;  Start 2/1/21 

at 21:00


Hydralazine HCl (Apresoline Inj) 10 mg PRN Q4HRS  PRN IVP ELEVATED BP, SEE 

COMMENTS Last administered on 2/1/21at 14:29;  Start 2/1/21 at 14:15


Hydralazine HCl (Apresoline) 50 mg TID PO  Last administered on 2/1/21at 13:45; 

Start 2/1/21 at 14:00


Info (PHARMACY MONITORING -- do not chart) 1 each PRN DAILY  PRN MC SEE 

COMMENTS;  Start 2/1/21 at 08:00


Morphine Sulfate (Morphine Sulfate) 2 mg PRN Q2HR  PRN IV PAIN;  Start 2/1/21 at

13:30


Sodium Chloride 1,000 ml @  400 mls/hr Q2H30M PRN IV PATENCY;  Start 2/1/21 at 

08:00;  Stop 2/1/21 at 19:59


Sodium Chloride 1,000 ml @  1,000 mls/hr Q1H PRN IV hypotension;  Start 2/1/21 

at 08:00;  Stop 2/1/21 at 13:59;  Status DC


Tramadol HCl (Ultram) 50 mg PRN Q4HRS  PRN PO PAIN Last administered on 2/1/21at

13:44;  Start 2/1/21 at 13:30


Vitals/I & O





Vital Sign - Last 24 Hours








 1/31/21 1/31/21 1/31/21 1/31/21





 19:50 20:00 21:01 21:01


 


Temp 98.1   





 98.1   


 


Pulse 74  74 74


 


Resp 18   


 


B/P (MAP) 206/63 (110)  206/63 206/63


 


Pulse Ox 92   


 


O2 Delivery Room Air Room Air  


 


    





    





 1/31/21 2/1/21 2/1/21 2/1/21





 22:56 03:08 07:00 07:35


 


Temp 98.4 98.3 97.5 





 98.4 98.3 97.5 


 


Pulse 74 70 88 


 


Resp 18 18 16 


 


B/P (MAP) 205/64 (111) 214/51 (105) 193/62 (105) 


 


Pulse Ox 94 96 96 


 


O2 Delivery Room Air Room Air Room Air Room Air


 


    





    





 2/1/21 2/1/21 2/1/21 2/1/21





 12:34 12:35 12:35 12:35


 


Pulse 86 86 86 86


 


B/P (MAP) 208/56 208/56 208/56 208/56





 2/1/21 2/1/21 2/1/21 2/1/21





 12:41 13:44 13:45 14:29


 


Pulse 92  92 78


 


Resp 20   


 


B/P (MAP) 208/56 (106)  208/56 206/85


 


Pulse Ox 94 94  


 


O2 Delivery Room Air Room Air  





 2/1/21   





 14:38   


 


Temp 98.1   





 98.1   


 


Pulse 81   


 


Resp 18   


 


B/P (MAP) 206/85 (125)   


 


Pulse Ox 100   


 


O2 Delivery Room Air   














Intake and Output   


 


 1/31/21 1/31/21 2/1/21





 15:00 23:00 07:00


 


Intake Total 540 ml 550 ml 0 ml


 


Output Total  100 ml 400 ml


 


Balance 540 ml 450 ml -400 ml

















NONA INFANTE MD            Feb 1, 2021 14:42

## 2021-02-01 NOTE — PDOC
PROGRESS NOTES


Date of Service:


DATE: 1/31/21 


TIME: 10:43





Subjective


Subjective


Denied any chest pain today





Objective


Objective





Vital Signs








  Date Time  Temp Pulse Resp B/P (MAP) Pulse Ox O2 Delivery O2 Flow Rate FiO2


 


1/31/21 10:38 98.4 68 18 182/56 (98) 97 Room Air  





 98.4       














Intake and Output 


 


 1/31/21





 07:00


 


Intake Total 540 ml


 


Balance 540 ml


 


 


 


Intake Oral 540 ml


 


# Bowel Movements 2











Physical Exam


Abdomen:  Normal bowel sounds, Soft, No tenderness


Heart:  Regular rate


Extremities:  No cyanosis


General:  Alert, Oriented X3, Cooperative, No acute distress


HEENT:  Atraumatic, PERRLA


Lungs:  Clear to auscultation


MUSCULOSKELETAL:  No joint tenderness, No deformity, No swelling


Neuro:  Normal gait, Normal speech, Normal tone


Psych/Mental Status:  Mental status NL, Mood NL





Assessment


Assessment


1.  Chest pain with atypical features, most probably GI etiology since this is 

associated with nausea and worse in the night.  Slight troponin elevation 

probably secondary to demand ischemia/ESRD.  She is currently chest pain-free.  

Patient has known history of coronary artery disease and has undergone PCI/stent

placement to RCA in 2016.  Her 2D echo in February 2019 showed LVEF 50 to 55%.  

Plan for outpatient 2D echo and Lexiscan nuclear stress test.  Continue current 

secondary prevention measures.


2.  Accelerated hypertension: Blood pressure continues to be elevated despite 

starting labetalol yesterday.  Start hydralazine for better control.


3.  Hyperlipidemia: Continue statin therapy


4.  End-stage renal disease: Continue hemodialysis per nephrology team





Plan


Plan of Care


Problems


Medical Problems:


(1) Chest pain


Status: Acute  





(2) Hypertension with goal of symptom management only


Status: Acute  











Comment


Review of Relevant


I have reviewed the following items lore (where applicable) has been applied.


Labs





Laboratory Tests








Test


 1/30/21


11:30 1/30/21


12:45 1/30/21


16:29 1/30/21


19:48


 


Glucose (Fingerstick)


 115 mg/dL


(70-99) 


 254 mg/dL


(70-99) 227 mg/dL


(70-99)


 


White Blood Count


 


 5.8 x10^3/uL


(4.0-11.0) 


 





 


Red Blood Count


 


 2.53 x10^6/uL


(3.50-5.40) 


 





 


Hemoglobin


 


 8.6 g/dL


(12.0-15.5) 


 





 


Hematocrit


 


 25.5 %


(36.0-47.0) 


 





 


Mean Corpuscular Volume


 


 101 fL


() 


 





 


Mean Corpuscular Hemoglobin  34 pg (25-35)   


 


Mean Corpuscular Hemoglobin


Concent 


 34 g/dL


(31-37) 


 





 


Red Cell Distribution Width


 


 12.3 %


(11.5-14.5) 


 





 


Platelet Count


 


 174 x10^3/uL


(140-400) 


 





 


Neutrophils (%) (Auto)  66 % (31-73)   


 


Lymphocytes (%) (Auto)  21 % (24-48)   


 


Monocytes (%) (Auto)  8 % (0-9)   


 


Eosinophils (%) (Auto)  4 % (0-3)   


 


Basophils (%) (Auto)  1 % (0-3)   


 


Neutrophils # (Auto)


 


 3.8 x10^3/uL


(1.8-7.7) 


 





 


Lymphocytes # (Auto)


 


 1.2 x10^3/uL


(1.0-4.8) 


 





 


Monocytes # (Auto)


 


 0.5 x10^3/uL


(0.0-1.1) 


 





 


Eosinophils # (Auto)


 


 0.2 x10^3/uL


(0.0-0.7) 


 





 


Basophils # (Auto)


 


 0.1 x10^3/uL


(0.0-0.2) 


 





 


Sodium Level


 


 137 mmol/L


(136-145) 


 





 


Potassium Level


 


 4.7 mmol/L


(3.5-5.1) 


 





 


Chloride Level


 


 99 mmol/L


() 


 





 


Carbon Dioxide Level


 


 27 mmol/L


(21-32) 


 





 


Anion Gap  11 (6-14)   


 


Blood Urea Nitrogen


 


 45 mg/dL


(7-20) 


 





 


Creatinine


 


 8.1 mg/dL


(0.6-1.0) 


 





 


Estimated GFR


(Cockcroft-Gault) 


 5.0 


 


 





 


Glucose Level


 


 178 mg/dL


(70-99) 


 





 


Calcium Level


 


 8.5 mg/dL


(8.5-10.1) 


 





 


Test


 1/31/21


04:30 1/31/21


08:39 


 





 


White Blood Count


 7.0 x10^3/uL


(4.0-11.0) 


 


 





 


Red Blood Count


 2.40 x10^6/uL


(3.50-5.40) 


 


 





 


Hemoglobin


 8.2 g/dL


(12.0-15.5) 


 


 





 


Hematocrit


 24.2 %


(36.0-47.0) 


 


 





 


Mean Corpuscular Volume


 101 fL


() 


 


 





 


Mean Corpuscular Hemoglobin 34 pg (25-35)    


 


Mean Corpuscular Hemoglobin


Concent 34 g/dL


(31-37) 


 


 





 


Red Cell Distribution Width


 12.4 %


(11.5-14.5) 


 


 





 


Platelet Count


 163 x10^3/uL


(140-400) 


 


 





 


Neutrophils (%) (Auto) 57 % (31-73)    


 


Lymphocytes (%) (Auto) 27 % (24-48)    


 


Monocytes (%) (Auto) 12 % (0-9)    


 


Eosinophils (%) (Auto) 3 % (0-3)    


 


Basophils (%) (Auto) 1 % (0-3)    


 


Neutrophils # (Auto)


 4.0 x10^3/uL


(1.8-7.7) 


 


 





 


Lymphocytes # (Auto)


 1.9 x10^3/uL


(1.0-4.8) 


 


 





 


Monocytes # (Auto)


 0.8 x10^3/uL


(0.0-1.1) 


 


 





 


Eosinophils # (Auto)


 0.2 x10^3/uL


(0.0-0.7) 


 


 





 


Basophils # (Auto)


 0.0 x10^3/uL


(0.0-0.2) 


 


 





 


Sodium Level


 135 mmol/L


(136-145) 


 


 





 


Potassium Level


 4.8 mmol/L


(3.5-5.1) 


 


 





 


Chloride Level


 97 mmol/L


() 


 


 





 


Carbon Dioxide Level


 26 mmol/L


(21-32) 


 


 





 


Anion Gap 12 (6-14)    


 


Blood Urea Nitrogen


 63 mg/dL


(7-20) 


 


 





 


Creatinine


 9.4 mg/dL


(0.6-1.0) 


 


 





 


Estimated GFR


(Cockcroft-Gault) 4.2 


 


 


 





 


Glucose Level


 159 mg/dL


(70-99) 


 


 





 


Calcium Level


 8.7 mg/dL


(8.5-10.1) 


 


 





 


Glucose (Fingerstick)


 


 174 mg/dL


(70-99) 


 











Medications





Current Medications


Amlodipine Besylate (Norvasc) 10 mg DAILY PO  Last administered on 1/31/21at 

08:45;  Start 1/30/21 at 11:30


Aspirin (Ecotrin) 81 mg DAILYWBKFT PO  Last administered on 1/31/21at 08:45;  

Start 1/30/21 at 11:30


Atorvastatin Calcium (Lipitor) 80 mg QHS PO  Last administered on 1/30/21at 

19:54;  Start 1/30/21 at 21:00


Calcium Acetate (Phoslo) 2,001 mg TIDWMEALS PO  Last administered on 1/31/21at 

08:44;  Start 1/30/21 at 12:00


Carvedilol (Coreg) 25 mg BIDWMEALS PO  Last administered on 1/30/21at 11:31;  

Start 1/30/21 at 11:30;  Stop 1/30/21 at 13:28;  Status DC


Clonazepam (KlonoPIN) 0.5 mg TID  PRN PO ANXIETY / AGITATION Last administered 

on 1/31/21at 08:45;  Start 1/30/21 at 10:45


Clopidogrel Bisulfate (Plavix) 75 mg DAILY PO  Last administered on 1/31/21at 

08:44;  Start 1/30/21 at 11:30


Furosemide (Lasix) 40 mg BID94 PO  Last administered on 1/30/21at 11:30;  Start 

1/30/21 at 11:30


Isosorbide Mononitrate (Imdur) 30 mg DAILY PO  Last administered on 1/31/21at 

08:45;  Start 1/30/21 at 11:30


Labetalol HCl (Trandate) 200 mg BID PO  Last administered on 1/31/21at 08:45;  

Start 1/30/21 at 21:00


Lisinopril (Prinivil) 40 mg BID PO ;  Start 1/30/21 at 21:00;  Status UNV


Lisinopril (Prinivil) 40 mg DAILY PO  Last administered on 1/31/21at 08:46;  

Start 1/30/21 at 13:30


Vitals/I & O





Vital Sign - Last 24 Hours








 1/30/21 1/30/21 1/30/21 1/30/21





 11:30 11:30 11:31 14:13


 


Temp    98.0





    98.0


 


Pulse 75 75 75 88


 


Resp    19


 


B/P (MAP) 206/79 206/79 206/79 185/78 (113)


 


Pulse Ox    95


 


O2 Delivery    Room Air


 


    





    





 1/30/21 1/30/21 1/30/21 1/30/21





 14:38 19:32 19:54 20:00


 


Temp  98.4  





  98.4  


 


Pulse 88 72 72 


 


Resp  18  


 


B/P (MAP) 185/78 205/60 (108) 205/60 


 


Pulse Ox  95  


 


O2 Delivery  Room Air  Room Air


 


    





    





 1/30/21 1/31/21 1/31/21 1/31/21





 22:37 02:50 07:31 07:35


 


Temp 98.3 98.7 97.8 





 98.3 98.7 97.8 


 


Pulse 72 71 78 


 


Resp 18 18 20 


 


B/P (MAP) 208/67 (114) 225/57 (112) 205/57 (106) 


 


Pulse Ox 96 99 98 


 


O2 Delivery Room Air Room Air Room Air Room Air


 


    





    





 1/31/21 1/31/21 1/31/21 1/31/21





 08:45 08:45 08:45 08:46


 


Pulse 78 78 78 78


 


B/P (MAP) 205/57 205/57 205/57 205/57





 1/31/21   





 10:38   


 


Temp 98.4   





 98.4   


 


Pulse 68   


 


Resp 18   


 


B/P (MAP) 182/56 (98)   


 


Pulse Ox 97   


 


O2 Delivery Room Air   














Intake and Output   


 


 1/30/21 1/30/21 1/31/21





 15:00 23:00 07:00


 


Intake Total 300 ml 240 ml 0 ml


 


Balance 300 ml 240 ml 0 ml

















NONA INFANTE MD           Jan 31, 2021 10:43 [employer]

## 2021-02-01 NOTE — NUR
SS following for discharge planning. SS reviewed pt chart and discussed with pt RN. Pt is 
from home with spouse and is currently on room air. Pt has outpatient hemodialysis at [facility], [phone]; fax [phone], Monday, Wednesday, and Friday at 1600. PT/OT 
recommended home with assistance. SS will continue to follow for discharge planning.

## 2021-02-01 NOTE — PDOC
DATE OF SERVICE


DATE: 2/1/21 


TIME: 09:28





SUBJECTIVE


ROS


seen on dialysis, no complaints





OBJECTIVE


Vital Signs





Vital Signs








  Date Time  Temp Pulse Resp B/P (MAP) Pulse Ox O2 Delivery O2 Flow Rate FiO2


 


2/1/21 07:00 97.5 88 16 193/62 (105) 96 Room Air  





 97.5       








I & 0











Intake and Output 


 


 2/1/21





 07:00


 


Intake Total 1090 ml


 


Output Total 500 ml


 


Balance 590 ml


 


 


 


Intake Oral 1090 ml


 


Output Urine Total 500 ml











PHYSICAL EXAM


Physical Exam


General:  Alert, Oriented X3


HEENT:  Atraumatic


Lungs:  Clear to auscultation


Heart:  Regular rate


Abdomen:  Soft, No hepatosplenomegaly


Neuro:  Normal speech


Psych/Mental Status:  Mood NL





DIAGNOSIS/ASSESSMENT


Assessment & Plan


ESRD-- MWF 


seen on dialysis, tolerating well, continue as ordered, Benny Collins 


 S/P Lt AVF creation last Tuesday   





Chest Pain Per cardiology  most probably GI etiology since this is associated 

with nausea and worse in the night 





 history of coronary artery disease  s/p PCI/stent placement to RCA in 2016.  

Her 2D echo in February 2019 showed LVEF 50 to 55%.  Plan for outpatient 2D echo

and Lexiscan nuclear stress test. 





 Accelerated hypertension:cardiology managing  





HyperKalemia resolved  





Anemia-  slow decline since admission . Start DANIEL  





diabetes mellitus type II





HTN-   Antihypertensives





Chronic Non compliance with HD ,





COMMENT/RELEVANT DATA


Meds





Current Medications








 Medications


  (Trade)  Dose


 Ordered  Sig/Gume  Start Time


 Stop Time Status Last Admin


Dose Admin


 


 Albumin Human  200 ml @ 


 200 mls/hr  1X PRN  PRN  1/29/21 22:15


 1/30/21 04:14 DC  





 


 Amlodipine


 Besylate


  (Norvasc)  10 mg  DAILY  1/30/21 11:30


    1/31/21 08:45


10 MG


 


 Aspirin


  (Ecotrin)  81 mg  DAILYWBKFT  1/30/21 11:30


    1/31/21 08:45


81 MG


 


 Atorvastatin


 Calcium


  (Lipitor)  80 mg  QHS  1/30/21 21:00


    1/31/21 21:00


80 MG


 


 Calcium Acetate


  (Phoslo)  2,001 mg  TIDWMEALS  1/30/21 12:00


    1/31/21 16:59


2,001 MG


 


 Carvedilol


  (Coreg)  25 mg  BIDWMEALS  1/30/21 11:30


 1/30/21 13:28 DC 1/30/21 11:31


25 MG


 


 Clonazepam


  (KlonoPIN)  0.5 mg  TID  PRN  1/30/21 10:45


    1/31/21 21:16


0.5 MG


 


 Clopidogrel


 Bisulfate


  (Plavix)  75 mg  DAILY  1/30/21 11:30


    1/31/21 08:44


75 MG


 


 Furosemide


  (Lasix)  40 mg  BID94  1/30/21 11:30


    1/31/21 16:59


40 MG


 


 Hydralazine HCl


  (Apresoline)  25 mg  TID  1/31/21 14:00


    1/31/21 21:01


25 MG


 


 Info


  (PHARMACY


 MONITORING -- do


 not chart)  1 each  PRN DAILY  PRN  2/1/21 08:00


     





 


 Isosorbide


 Mononitrate


  (Imdur)  30 mg  DAILY  1/30/21 11:30


    1/31/21 08:45


30 MG


 


 Labetalol HCl


  (Trandate)  200 mg  BID  1/30/21 21:00


    1/31/21 21:01


200 MG


 


 Lisinopril


  (Prinivil)  40 mg  DAILY  1/30/21 13:30


    1/31/21 08:46


40 MG


 


 Nitroglycerin


  (Nitro-Bid Oint)  1 inch  1X  ONCE  1/29/21 17:45


 1/29/21 17:46 DC 1/29/21 18:22


1 INCH


 


 Nitroglycerin


  (Nitrostat)  0.4 mg  PRN Q5MIN  PRN  1/29/21 17:15


     





 


 Sodium Chloride  1,000 ml @ 


 400 mls/hr  Q2H30M PRN  2/1/21 08:00


 2/1/21 19:59   





 


 Sodium Chloride


  (Normal Saline


 Flush)  10 ml  1X PRN  PRN  1/29/21 22:15


 1/30/21 22:14 DC  











Lab





Laboratory Tests








Test


 1/31/21


12:25 1/31/21


16:13 1/31/21


21:07 2/1/21


05:16


 


Glucose (Fingerstick)


 261 mg/dL


(70-99) 264 mg/dL


(70-99) 252 mg/dL


(70-99) 





 


White Blood Count


 


 


 


 6.6 x10^3/uL


(4.0-11.0)


 


Red Blood Count


 


 


 


 2.30 x10^6/uL


(3.50-5.40)


 


Hemoglobin


 


 


 


 7.8 g/dL


(12.0-15.5)


 


Hematocrit


 


 


 


 23.3 %


(36.0-47.0)


 


Mean Corpuscular Volume


 


 


 


 101 fL


()


 


Mean Corpuscular Hemoglobin    34 pg (25-35) 


 


Mean Corpuscular Hemoglobin


Concent 


 


 


 33 g/dL


(31-37)


 


Red Cell Distribution Width


 


 


 


 12.4 %


(11.5-14.5)


 


Platelet Count


 


 


 


 154 x10^3/uL


(140-400)


 


Neutrophils (%) (Auto)    58 % (31-73) 


 


Lymphocytes (%) (Auto)    26 % (24-48) 


 


Monocytes (%) (Auto)    11 % (0-9) 


 


Eosinophils (%) (Auto)    4 % (0-3) 


 


Basophils (%) (Auto)    1 % (0-3) 


 


Neutrophils # (Auto)


 


 


 


 3.8 x10^3/uL


(1.8-7.7)


 


Lymphocytes # (Auto)


 


 


 


 1.7 x10^3/uL


(1.0-4.8)


 


Monocytes # (Auto)


 


 


 


 0.7 x10^3/uL


(0.0-1.1)


 


Eosinophils # (Auto)


 


 


 


 0.2 x10^3/uL


(0.0-0.7)


 


Basophils # (Auto)


 


 


 


 0.0 x10^3/uL


(0.0-0.2)


 


Sodium Level


 


 


 


 134 mmol/L


(136-145)


 


Potassium Level


 


 


 


 5.2 mmol/L


(3.5-5.1)


 


Chloride Level


 


 


 


 97 mmol/L


()


 


Carbon Dioxide Level


 


 


 


 25 mmol/L


(21-32)


 


Anion Gap    12 (6-14) 


 


Blood Urea Nitrogen


 


 


 


 73 mg/dL


(7-20)


 


Creatinine


 


 


 


 11.0 mg/dL


(0.6-1.0)


 


Estimated GFR


(Cockcroft-Gault) 


 


 


 3.5 





 


Glucose Level


 


 


 


 143 mg/dL


(70-99)


 


Calcium Level


 


 


 


 8.7 mg/dL


(8.5-10.1)


 


Test


 2/1/21


07:02 


 


 





 


Glucose (Fingerstick)


 156 mg/dL


(70-99) 


 


 











Results


All relevant outside records, renal labs, imaging studies, telemetry/EKG's were 

reviewed.





Justicifation of Admission Dx:


Justifications for Admission:


Justification of Admission Dx:  N/A











MABLE DIAS MD                 Feb 1, 2021 09:28

## 2021-02-02 VITALS — DIASTOLIC BLOOD PRESSURE: 71 MMHG | SYSTOLIC BLOOD PRESSURE: 142 MMHG

## 2021-02-02 VITALS — SYSTOLIC BLOOD PRESSURE: 171 MMHG | DIASTOLIC BLOOD PRESSURE: 57 MMHG

## 2021-02-02 VITALS — DIASTOLIC BLOOD PRESSURE: 53 MMHG | SYSTOLIC BLOOD PRESSURE: 151 MMHG

## 2021-02-02 VITALS — DIASTOLIC BLOOD PRESSURE: 57 MMHG | SYSTOLIC BLOOD PRESSURE: 142 MMHG

## 2021-02-02 VITALS — DIASTOLIC BLOOD PRESSURE: 53 MMHG | SYSTOLIC BLOOD PRESSURE: 165 MMHG

## 2021-02-02 VITALS — SYSTOLIC BLOOD PRESSURE: 138 MMHG | DIASTOLIC BLOOD PRESSURE: 57 MMHG

## 2021-02-02 LAB
ANION GAP SERPL CALC-SCNC: 10 MMOL/L (ref 6–14)
BASOPHILS # BLD AUTO: 0 X10^3/UL (ref 0–0.2)
BASOPHILS NFR BLD: 1 % (ref 0–3)
BUN SERPL-MCNC: 38 MG/DL (ref 7–20)
CALCIUM SERPL-MCNC: 8.4 MG/DL (ref 8.5–10.1)
CHLORIDE SERPL-SCNC: 96 MMOL/L (ref 98–107)
CO2 SERPL-SCNC: 29 MMOL/L (ref 21–32)
CREAT SERPL-MCNC: 6.5 MG/DL (ref 0.6–1)
EOSINOPHIL NFR BLD: 0.2 X10^3/UL (ref 0–0.7)
EOSINOPHIL NFR BLD: 3 % (ref 0–3)
ERYTHROCYTE [DISTWIDTH] IN BLOOD BY AUTOMATED COUNT: 12.7 % (ref 11.5–14.5)
GFR SERPLBLD BASED ON 1.73 SQ M-ARVRAT: 6.5 ML/MIN
GLUCOSE SERPL-MCNC: 158 MG/DL (ref 70–99)
HCT VFR BLD CALC: 22.6 % (ref 36–47)
HGB BLD-MCNC: 7.4 G/DL (ref 12–15.5)
LYMPHOCYTES # BLD: 1.4 X10^3/UL (ref 1–4.8)
LYMPHOCYTES NFR BLD AUTO: 28 % (ref 24–48)
MCH RBC QN AUTO: 33 PG (ref 25–35)
MCHC RBC AUTO-ENTMCNC: 33 G/DL (ref 31–37)
MCV RBC AUTO: 102 FL (ref 79–100)
MONO #: 0.6 X10^3/UL (ref 0–1.1)
MONOCYTES NFR BLD: 12 % (ref 0–9)
NEUT #: 2.9 X10^3/UL (ref 1.8–7.7)
NEUTROPHILS NFR BLD AUTO: 56 % (ref 31–73)
PLATELET # BLD AUTO: 140 X10^3/UL (ref 140–400)
POTASSIUM SERPL-SCNC: 4.5 MMOL/L (ref 3.5–5.1)
RBC # BLD AUTO: 2.22 X10^6/UL (ref 3.5–5.4)
SODIUM SERPL-SCNC: 135 MMOL/L (ref 136–145)
WBC # BLD AUTO: 5.1 X10^3/UL (ref 4–11)

## 2021-02-02 RX ADMIN — CALCIUM ACETATE SCH MG: 667 CAPSULE ORAL at 12:12

## 2021-02-02 RX ADMIN — LABETALOL HCL SCH MG: 200 TABLET, FILM COATED ORAL at 08:12

## 2021-02-02 RX ADMIN — FUROSEMIDE SCH MG: 40 TABLET ORAL at 17:44

## 2021-02-02 RX ADMIN — CALCIUM ACETATE SCH MG: 667 CAPSULE ORAL at 17:44

## 2021-02-02 RX ADMIN — ATORVASTATIN CALCIUM SCH MG: 40 TABLET, FILM COATED ORAL at 21:16

## 2021-02-02 RX ADMIN — CALCIUM ACETATE SCH MG: 667 CAPSULE ORAL at 08:10

## 2021-02-02 RX ADMIN — CLOPIDOGREL BISULFATE SCH MG: 75 TABLET ORAL at 08:12

## 2021-02-02 RX ADMIN — LABETALOL HCL SCH MG: 200 TABLET, FILM COATED ORAL at 21:16

## 2021-02-02 RX ADMIN — LISINOPRIL SCH MG: 20 TABLET ORAL at 08:11

## 2021-02-02 RX ADMIN — ISOSORBIDE MONONITRATE SCH MG: 30 TABLET, EXTENDED RELEASE ORAL at 08:12

## 2021-02-02 RX ADMIN — ASPIRIN SCH MG: 81 TABLET, COATED ORAL at 08:10

## 2021-02-02 RX ADMIN — FUROSEMIDE SCH MG: 40 TABLET ORAL at 08:11

## 2021-02-02 NOTE — NUR
SS following up with discharge planning. SS reviewed pt chart and discussed with pt RN. Pt 
is currently on room air. COVID19 negative. PT/OT recommended home with assistance. 
Discharge plan is to home when medically ready. Pt has outpatient dialysis at [facility]. SS will continue to follow for discharge planning.

## 2021-02-02 NOTE — PDOC
ADA HILL APRN 2/2/21 1439:


CARDIO Progress Notes


Date and Time


Date of Service


2/2/2021


Time of Evaluation


1250





Subjective


Subjective:  No Chest Pain, No shortness of breath, No Palpitations





Vitals


Vitals





Vital Signs








  Date Time  Temp Pulse Resp B/P (MAP) Pulse Ox O2 Delivery O2 Flow Rate FiO2


 


2/2/21 14:24  60  137/55    


 


2/2/21 11:07 98.2  16  100 Room Air  





 98.2       








Weight


Weight [ ]





Input and Output


Intake and Output











Intake and Output 


 


 2/2/21





 07:00


 


Intake Total 600 ml


 


Output Total 300 ml


 


Balance 300 ml


 


 


 


Intake Oral 600 ml


 


Output Urine Total 300 ml


 


# Voids 1











Laboratory


Labs





Laboratory Tests








Test


 2/1/21


16:35 2/1/21


20:53 2/2/21


07:45 2/2/21


07:49


 


Glucose (Fingerstick)


 212 mg/dL


(70-99) 210 mg/dL


(70-99) 


 157 mg/dL


(70-99)


 


White Blood Count


 


 


 5.1 x10^3/uL


(4.0-11.0) 





 


Red Blood Count


 


 


 2.22 x10^6/uL


(3.50-5.40) 





 


Hemoglobin


 


 


 7.4 g/dL


(12.0-15.5) 





 


Hematocrit


 


 


 22.6 %


(36.0-47.0) 





 


Mean Corpuscular Volume


 


 


 102 fL


() 





 


Mean Corpuscular Hemoglobin   33 pg (25-35)  


 


Mean Corpuscular Hemoglobin


Concent 


 


 33 g/dL


(31-37) 





 


Red Cell Distribution Width


 


 


 12.7 %


(11.5-14.5) 





 


Platelet Count


 


 


 140 x10^3/uL


(140-400) 





 


Neutrophils (%) (Auto)   56 % (31-73)  


 


Lymphocytes (%) (Auto)   28 % (24-48)  


 


Monocytes (%) (Auto)   12 % (0-9)  


 


Eosinophils (%) (Auto)   3 % (0-3)  


 


Basophils (%) (Auto)   1 % (0-3)  


 


Neutrophils # (Auto)


 


 


 2.9 x10^3/uL


(1.8-7.7) 





 


Lymphocytes # (Auto)


 


 


 1.4 x10^3/uL


(1.0-4.8) 





 


Monocytes # (Auto)


 


 


 0.6 x10^3/uL


(0.0-1.1) 





 


Eosinophils # (Auto)


 


 


 0.2 x10^3/uL


(0.0-0.7) 





 


Basophils # (Auto)


 


 


 0.0 x10^3/uL


(0.0-0.2) 





 


Sodium Level


 


 


 135 mmol/L


(136-145) 





 


Potassium Level


 


 


 4.5 mmol/L


(3.5-5.1) 





 


Chloride Level


 


 


 96 mmol/L


() 





 


Carbon Dioxide Level


 


 


 29 mmol/L


(21-32) 





 


Anion Gap   10 (6-14)  


 


Blood Urea Nitrogen


 


 


 38 mg/dL


(7-20) 





 


Creatinine


 


 


 6.5 mg/dL


(0.6-1.0) 





 


Estimated GFR


(Cockcroft-Gault) 


 


 6.5 


 





 


Glucose Level


 


 


 158 mg/dL


(70-99) 





 


Calcium Level


 


 


 8.4 mg/dL


(8.5-10.1) 





 


Test


 2/2/21


11:42 


 


 





 


Glucose (Fingerstick)


 245 mg/dL


(70-99) 


 


 














Physical Exam


HEENT:  Neck Supple W Full Motion


Chest:  Symmetric


LUNGS:  Clear to Auscultation


Heart:  S1S2, RRR (SR)


Abdomen:  Soft N/T, Other (obese)


Extremities:  No Calf Tenderness, Other (Left arm edema S/P AV dialysis fistula 

with bruising, Dressing D/I)


Neurology:  alert, oriented, follow commands





Assessment


Assessment


1. Atypical chest pain: possibly GI, none further


2. ESRD


3. Accelerated HTN: better after regimen adjustment


3. Acute on chronic diastolic CHF


4. Mild troponin elevation: Peaked at 0.9, suspect demand mediated with culprits

above


5. HLP


6. DM2


7. CAD: PCI/PAULETTE to RCA 2016





Recommendations


1. ASA, continue statin


2. Fluid off loading per HD


3. Continue current secondary prevention measures. ASA


4. Continue current BP regimen


5. Out pt follow up with Dr. Olson in March 12. 9:15 AM


6. Plan for outpt MPI and TTE prior to appt.





Justicifation of Admission Dx:


Justifications for Admission:


Justification of Admission Dx:  N/A





MANDO OLSON MD 2/2/21 1553:


CARDIO Progress Notes


Plan


Plan


Pt. seen and examined. Agree with above NP note. 


Continue BP mgmt. Outpt ischemic eval. Thanks











ADA HILL APRN           Feb 2, 2021 14:39


MANDO OLSON MD        Feb 2, 2021 23:23

## 2021-02-02 NOTE — PDOC
[facility] PROGRESS NOTE


Date of Service


DOS:


DATE: 2/2/21 


TIME: 07:26





Chief Complaint


Chief Complaint


Chest pain, HTN


End-stage renal disease on dialysis, noncompliance, diastolic CHF,


diabetes, hypertension, hyperlipidemia, possible community-acquired pneumonia, 

ruled out, psoriasis, angioplasty,


thoracentesis, cardiac stents, dialysis catheter placement.





History of Present Illness


History of Present Illness


Ms Ibrahim 64 yo F with multiple comorbidities.  She is well known to

my service.  She basically presented last night with chest pain, rates it at 

6/10, has been occurring for several days, worse with moving, better with 

sitting still.  She increased her home meds, but that did not work, described as

very irritating.  I discussed the case with ER physician.  We are admitting the 

patient with consultation to Cardiology.





1/31: Patient seen and examined


2/1: Patient seen and evaluated. Still with some hypertension today, started on 

hydralazine yesterday. HD today. Per cardiology, outpatient 2D echo and stress 

test.





Afebrile. Nausea and vomiting today.  BP still elevated.  Emphasize need for HD 

compliance.





Vitals/I&O


Vitals/I&O:





                                   Vital Signs








  Date Time  Temp Pulse Resp B/P (MAP) Pulse Ox O2 Delivery O2 Flow Rate FiO2


 


2/2/21 03:01 98.1 63 18 142/71 (94) 98 Room Air  





 98.1       














                                    I & O   


 


 2/1/21 2/1/21 2/2/21





 15:00 23:00 07:00


 


Intake Total  600 ml 0 ml


 


Output Total  300 ml 


 


Balance  300 ml 0 ml











Physical Exam


General:  Alert, Oriented X3, Cooperative, No acute distress


Heart:  Regular rate


Lungs:  Crackles, Other


Abdomen:  Normal bowel sounds, Soft, No tenderness


Extremities:  No cyanosis


Skin:  No breakdown, No significant lesion





Labs


Labs:





Laboratory Tests








Test


 2/1/21


16:35 2/1/21


20:53


 


Glucose (Fingerstick)


 212 mg/dL


(70-99) 210 mg/dL


(70-99)











Assessment and Plan


Assessmemt and Plan


Problems


Medical Problems:


(1) Chest pain


Status: Acute  





(2) Hypertension with goal of symptom management only


Status: Acute  











Comment


Review of Relevant


I have reviewed the following items lore (where applicable) has been applied.


Medications:





Current Medications








 Medications


  (Trade)  Dose


 Ordered  Sig/Gume


 Route


 PRN Reason  Start Time


 Stop Time Status Last Admin


Dose Admin


 


 Darbepoetin Valeriano


  (ARANESP for


 DIALYSIS PTS)  60 mcg  WEEKLYHS


 SQ


   2/1/21 21:00


    2/1/21 21:15





 


 Hydralazine HCl


  (Apresoline)  50 mg  TID


 PO


   2/1/21 14:00


    2/1/21 21:10





 


 Tramadol HCl


  (Ultram)  50 mg  PRN Q4HRS  PRN


 PO


 PAIN  2/1/21 13:30


    2/1/21 21:14





 


 Hydralazine HCl


  (Apresoline Inj)  10 mg  PRN Q4HRS  PRN


 IVP


 ELEVATED BP, SEE COMMENTS  2/1/21 14:15


    2/1/21 14:29





 


 Labetalol HCl


  (Trandate)  400 mg  BID


 PO


   2/1/21 21:00


    2/1/21 21:09





 


 Labetalol HCl


  (Normodyne Iv


 Push)  10 mg  1X  ONCE


 IVP


   2/1/21 15:30


 2/1/21 15:31 DC 2/1/21 15:34














Justifications for Admission


Other Justification














GALI COSTA MD         Feb 2, 2021 07:38

## 2021-02-02 NOTE — PDOC
DATE OF SERVICE


DATE: 2/2/21 


TIME: 10:21





SUBJECTIVE


ROS


Stable





OBJECTIVE


Vital Signs





Vital Signs








  Date Time  Temp Pulse Resp B/P (MAP) Pulse Ox O2 Delivery O2 Flow Rate FiO2


 


2/2/21 08:12  66  171/57    


 


2/2/21 07:35      Room Air  


 


2/2/21 07:00 97.8  16  99   





 97.8       








I & 0











Intake and Output 


 


 2/2/21





 07:00


 


Intake Total 600 ml


 


Output Total 300 ml


 


Balance 300 ml


 


 


 


Intake Oral 600 ml


 


Output Urine Total 300 ml


 


# Voids 1











PHYSICAL EXAM


Physical Exam


General:  Alert, Oriented X3


HEENT:  Atraumatic


Lungs:  Clear to auscultation


Heart:  Regular rate


Abdomen:  Soft, No hepatosplenomegaly


Neuro:  Normal speech


Psych/Mental Status:  Mood NL





DIAGNOSIS/ASSESSMENT


Assessment & Plan





ESRD-- MWF 


No indication for HD today  


 S/P Lt AVF  recently  





Chest Pain Per cardiology  most probably GI etiology since this is associated 

with nausea and worse in the night 





 history of coronary artery disease  s/p PCI/stent placement to RCA in 2016.  

Her 2D echo in February 2019 showed LVEF 50 to 55%.  Plan for outpatient 2D echo

and Lexiscan nuclear stress test. 





 Accelerated hypertension:cardiology managing  





HyperKalemia resolved  





Anemia-  slow decline since admission . Start DANIEL  





diabetes mellitus type II





HTN-   Antihypertensives





Chronic Non compliance with HD ,





COMMENT/RELEVANT DATA


Meds





Current Medications








 Medications


  (Trade)  Dose


 Ordered  Sig/Gume  Start Time


 Stop Time Status Last Admin


Dose Admin


 


 Albumin Human  200 ml @ 


 200 mls/hr  1X PRN  PRN  1/29/21 22:15


 1/30/21 04:14 DC  





 


 Amlodipine


 Besylate


  (Norvasc)  10 mg  DAILY  1/30/21 11:30


    2/2/21 08:12


10 MG


 


 Aspirin


  (Ecotrin)  81 mg  DAILYWBKFT  1/30/21 11:30


    2/2/21 08:10


81 MG


 


 Atorvastatin


 Calcium


  (Lipitor)  80 mg  QHS  1/30/21 21:00


    2/1/21 21:08


80 MG


 


 Calcium Acetate


  (Phoslo)  2,001 mg  TIDWMEALS  1/30/21 12:00


    2/2/21 08:10


2,001 MG


 


 Carvedilol


  (Coreg)  25 mg  BIDWMEALS  1/30/21 11:30


 1/30/21 13:28 DC 1/30/21 11:31


25 MG


 


 Clonazepam


  (KlonoPIN)  0.5 mg  TID  PRN  1/30/21 10:45


    2/1/21 21:09


0.5 MG


 


 Clopidogrel


 Bisulfate


  (Plavix)  75 mg  DAILY  1/30/21 11:30


    2/2/21 08:12


75 MG


 


 Darbepoetin Valeriano


  (ARANESP for


 DIALYSIS PTS)  60 mcg  WEEKLYHS  2/1/21 21:00


    2/1/21 21:15


60 MCG


 


 Furosemide


  (Lasix)  40 mg  BID94  1/30/21 11:30


    2/2/21 08:11


40 MG


 


 Hydralazine HCl


  (Apresoline Inj)  10 mg  PRN Q4HRS  PRN  2/1/21 14:15


    2/1/21 14:29


10 MG


 


 Hydralazine HCl


  (Apresoline)  50 mg  TID  2/1/21 14:00


    2/2/21 08:10


50 MG


 


 Info


  (PHARMACY


 MONITORING -- do


 not chart)  1 each  PRN DAILY  PRN  2/1/21 08:00


     





 


 Isosorbide


 Mononitrate


  (Imdur)  30 mg  DAILY  1/30/21 11:30


    2/2/21 08:12


30 MG


 


 Labetalol HCl


  (Normodyne Iv


 Push)  10 mg  1X  ONCE  2/1/21 15:30


 2/1/21 15:31 DC 2/1/21 15:34


10 MG


 


 Labetalol HCl


  (Trandate)  400 mg  BID  2/1/21 21:00


    2/2/21 08:12


400 MG


 


 Lisinopril


  (Prinivil)  40 mg  DAILY  1/30/21 13:30


    2/2/21 08:11


40 MG


 


 Morphine Sulfate


  (Morphine


 Sulfate)  2 mg  PRN Q2HR  PRN  2/1/21 13:30


     





 


 Nitroglycerin


  (Nitro-Bid Oint)  1 inch  1X  ONCE  1/29/21 17:45


 1/29/21 17:46 DC 1/29/21 18:22


1 INCH


 


 Nitroglycerin


  (Nitrostat)  0.4 mg  PRN Q5MIN  PRN  1/29/21 17:15


     





 


 Sodium Chloride  1,000 ml @ 


 400 mls/hr  Q2H30M PRN  2/1/21 08:00


 2/1/21 19:59 DC  





 


 Sodium Chloride


  (Normal Saline


 Flush)  10 ml  1X PRN  PRN  1/29/21 22:15


 1/30/21 22:14 DC  





 


 Tramadol HCl


  (Ultram)  50 mg  PRN Q4HRS  PRN  2/1/21 13:30


    2/1/21 21:14


50 MG








Lab





Laboratory Tests








Test


 2/1/21


16:35 2/1/21


20:53 2/2/21


07:45 2/2/21


07:49


 


Glucose (Fingerstick)


 212 mg/dL


(70-99) 210 mg/dL


(70-99) 


 157 mg/dL


(70-99)


 


White Blood Count


 


 


 5.1 x10^3/uL


(4.0-11.0) 





 


Red Blood Count


 


 


 2.22 x10^6/uL


(3.50-5.40) 





 


Hemoglobin


 


 


 7.4 g/dL


(12.0-15.5) 





 


Hematocrit


 


 


 22.6 %


(36.0-47.0) 





 


Mean Corpuscular Volume


 


 


 102 fL


() 





 


Mean Corpuscular Hemoglobin   33 pg (25-35)  


 


Mean Corpuscular Hemoglobin


Concent 


 


 33 g/dL


(31-37) 





 


Red Cell Distribution Width


 


 


 12.7 %


(11.5-14.5) 





 


Platelet Count


 


 


 140 x10^3/uL


(140-400) 





 


Neutrophils (%) (Auto)   56 % (31-73)  


 


Lymphocytes (%) (Auto)   28 % (24-48)  


 


Monocytes (%) (Auto)   12 % (0-9)  


 


Eosinophils (%) (Auto)   3 % (0-3)  


 


Basophils (%) (Auto)   1 % (0-3)  


 


Neutrophils # (Auto)


 


 


 2.9 x10^3/uL


(1.8-7.7) 





 


Lymphocytes # (Auto)


 


 


 1.4 x10^3/uL


(1.0-4.8) 





 


Monocytes # (Auto)


 


 


 0.6 x10^3/uL


(0.0-1.1) 





 


Eosinophils # (Auto)


 


 


 0.2 x10^3/uL


(0.0-0.7) 





 


Basophils # (Auto)


 


 


 0.0 x10^3/uL


(0.0-0.2) 





 


Sodium Level


 


 


 135 mmol/L


(136-145) 





 


Potassium Level


 


 


 4.5 mmol/L


(3.5-5.1) 





 


Chloride Level


 


 


 96 mmol/L


() 





 


Carbon Dioxide Level


 


 


 29 mmol/L


(21-32) 





 


Anion Gap   10 (6-14)  


 


Blood Urea Nitrogen


 


 


 38 mg/dL


(7-20) 





 


Creatinine


 


 


 6.5 mg/dL


(0.6-1.0) 





 


Estimated GFR


(Cockcroft-Gault) 


 


 6.5 


 





 


Glucose Level


 


 


 158 mg/dL


(70-99) 





 


Calcium Level


 


 


 8.4 mg/dL


(8.5-10.1) 











Results


All relevant outside records, renal labs, imaging studies, telemetry/EKG's were 

reviewed.





Justicifation of Admission Dx:


Justifications for Admission:


Justification of Admission Dx:  N/A











MABLE DIAS MD                 Feb 2, 2021 10:21

## 2021-02-03 VITALS — DIASTOLIC BLOOD PRESSURE: 58 MMHG | SYSTOLIC BLOOD PRESSURE: 142 MMHG

## 2021-02-03 VITALS — SYSTOLIC BLOOD PRESSURE: 190 MMHG | DIASTOLIC BLOOD PRESSURE: 57 MMHG

## 2021-02-03 VITALS — SYSTOLIC BLOOD PRESSURE: 140 MMHG | DIASTOLIC BLOOD PRESSURE: 50 MMHG

## 2021-02-03 LAB
ANION GAP SERPL CALC-SCNC: 5 MMOL/L (ref 6–14)
BASOPHILS # BLD AUTO: 0 X10^3/UL (ref 0–0.2)
BASOPHILS NFR BLD: 1 % (ref 0–3)
BUN SERPL-MCNC: 52 MG/DL (ref 7–20)
CALCIUM SERPL-MCNC: 8.6 MG/DL (ref 8.5–10.1)
CHLORIDE SERPL-SCNC: 95 MMOL/L (ref 98–107)
CO2 SERPL-SCNC: 30 MMOL/L (ref 21–32)
CREAT SERPL-MCNC: 8.4 MG/DL (ref 0.6–1)
EOSINOPHIL NFR BLD: 0.2 X10^3/UL (ref 0–0.7)
EOSINOPHIL NFR BLD: 3 % (ref 0–3)
ERYTHROCYTE [DISTWIDTH] IN BLOOD BY AUTOMATED COUNT: 12.5 % (ref 11.5–14.5)
GFR SERPLBLD BASED ON 1.73 SQ M-ARVRAT: 4.8 ML/MIN
GLUCOSE SERPL-MCNC: 138 MG/DL (ref 70–99)
HCT VFR BLD CALC: 23.2 % (ref 36–47)
HGB BLD-MCNC: 7.7 G/DL (ref 12–15.5)
LYMPHOCYTES # BLD: 1.1 X10^3/UL (ref 1–4.8)
LYMPHOCYTES NFR BLD AUTO: 16 % (ref 24–48)
MCH RBC QN AUTO: 34 PG (ref 25–35)
MCHC RBC AUTO-ENTMCNC: 33 G/DL (ref 31–37)
MCV RBC AUTO: 101 FL (ref 79–100)
MONO #: 0.6 X10^3/UL (ref 0–1.1)
MONOCYTES NFR BLD: 10 % (ref 0–9)
NEUT #: 4.7 X10^3/UL (ref 1.8–7.7)
NEUTROPHILS NFR BLD AUTO: 70 % (ref 31–73)
PLATELET # BLD AUTO: 138 X10^3/UL (ref 140–400)
POTASSIUM SERPL-SCNC: 4.9 MMOL/L (ref 3.5–5.1)
RBC # BLD AUTO: 2.29 X10^6/UL (ref 3.5–5.4)
SODIUM SERPL-SCNC: 130 MMOL/L (ref 136–145)
WBC # BLD AUTO: 6.6 X10^3/UL (ref 4–11)

## 2021-02-03 PROCEDURE — 5A1D70Z PERFORMANCE OF URINARY FILTRATION, INTERMITTENT, LESS THAN 6 HOURS PER DAY: ICD-10-PCS | Performed by: INTERNAL MEDICINE

## 2021-02-03 RX ADMIN — CALCIUM ACETATE SCH MG: 667 CAPSULE ORAL at 09:05

## 2021-02-03 RX ADMIN — LISINOPRIL SCH MG: 20 TABLET ORAL at 09:05

## 2021-02-03 RX ADMIN — ISOSORBIDE MONONITRATE SCH MG: 30 TABLET, EXTENDED RELEASE ORAL at 09:05

## 2021-02-03 RX ADMIN — ASPIRIN SCH MG: 81 TABLET, COATED ORAL at 09:07

## 2021-02-03 RX ADMIN — CALCIUM ACETATE SCH MG: 667 CAPSULE ORAL at 17:00

## 2021-02-03 RX ADMIN — INSULIN LISPRO SCH UNITS: 100 INJECTION, SOLUTION INTRAVENOUS; SUBCUTANEOUS at 08:00

## 2021-02-03 RX ADMIN — FUROSEMIDE SCH MG: 40 TABLET ORAL at 09:07

## 2021-02-03 RX ADMIN — INSULIN LISPRO SCH UNITS: 100 INJECTION, SOLUTION INTRAVENOUS; SUBCUTANEOUS at 17:00

## 2021-02-03 RX ADMIN — CALCIUM ACETATE SCH MG: 667 CAPSULE ORAL at 12:00

## 2021-02-03 RX ADMIN — LABETALOL HCL SCH MG: 200 TABLET, FILM COATED ORAL at 09:06

## 2021-02-03 RX ADMIN — FUROSEMIDE SCH MG: 40 TABLET ORAL at 16:00

## 2021-02-03 RX ADMIN — CLOPIDOGREL BISULFATE SCH MG: 75 TABLET ORAL at 09:11

## 2021-02-03 RX ADMIN — INSULIN LISPRO SCH UNITS: 100 INJECTION, SOLUTION INTRAVENOUS; SUBCUTANEOUS at 12:00

## 2021-02-03 RX ADMIN — HEPARIN SODIUM SCH UNIT: 5000 INJECTION, SOLUTION INTRAVENOUS; SUBCUTANEOUS at 09:19

## 2021-02-03 RX ADMIN — HEPARIN SODIUM SCH UNIT: 5000 INJECTION, SOLUTION INTRAVENOUS; SUBCUTANEOUS at 13:57

## 2021-02-03 NOTE — SNU/HH DC
DISCHARGE WITH HOME HEALTH


DISCHARGE INFORMATION:


Discharge Date:  Feb 3, 2021


Final Diagnosis:


Problems


Medical Problems:


(1) Chest pain


Status: Acute  





(2) Hypertension with goal of symptom management only


Status: Acute  








Condition on Discharge:  Stable





CODE STATUS:


Code Status:  Full





HOME HEALTH:


Face to Face:


I certify this patient is under my care and that I, or a nurse practitioner or 

physician's assistant working with me, had a face to face encounter that meets 

the physician face to face encounter requirements with this patient on 2/3/2021.


Medical Complications:  Other (ESRD on hemodialysis)


RN For Eval/Treatment:  Yes


Pt Meets Homebound Status:  Limited distance walking





POST DISCHARGE ORDERS:


Activity Instructions for Disc:  Activity as tolerated


Weight Bearing Status after Di:  Non weight bearing


DIET AFTER DISCHARGE:  Renal


Wound/Incision Care:  May get incision wet, No wound care needed





CHECKS AFTER DISCHARGE:


Checks after discharge:  Check blood press - daily, Check blood sugar, ac/hs





FOLLOW-UP:


DC TO SNF LABS:  CBC, CMP at time of dialysis





TREATMENT/EQUIPMENT ORDERS:


Adaptive Equipment Issued:  Walker





CERTIFICATION STATEMENT:


Certification Statement:


Certification Statement: Based on the above finding, I certify that this patient

is confined to the home and needs intermittent skilled nursing care, physical 

therapy and/or speech therapy, or continues to need occupational therapy.~ This 

patient is under my care, and I have initiated the establishment of the plan of 

care.~ This patient will be followed by myself or a community physician who will

periodically review the plan of care.


Home Meds


Active Scripts


Albuterol Sulfate (PROAIR HFA INHALER) 8.5 Gm Hfa.aer.ad, 1 PUFF INH PRN Q6HRS 

PRN for High Potassium for 30 Days, #1 INHALER 2 Refills


   Prov:GALI COSTA MD         7/13/19


Furosemide (FUROSEMIDE) 40 Mg Tablet, 40 MG PO BID for water pill for 30 Days, 

#60 TAB 2 Refills


   Prov:GALI COSTA MD         7/13/19


Carvedilol (CARVEDILOL) 25 Mg Tablet, 25 MG PO BIDWMEALS for CARDIAC for 30 

Days, #60 TAB 2 Refills


   Prov:GALI COSTA MD         7/13/19


Isosorbide Mononitrate (ISOSORBIDE MONONITRATE ER) 30 Mg Tab.er.24h, 30 MG PO 

DAILY for blood pressure for 30 Days, #30 TAB.SR 2 Refills


   Prov:GALI COSTA MD         7/13/19


Atorvastatin Calcium (ATORVASTATIN CALCIUM) 40 Mg Tablet, 80 MG PO QHS for hld 

for 30 Days, #60 TAB 2 Refills


   Can replace with 80mg Tabs #30 per month


   Prov:GALI COSTA MD         7/13/19


Clopidogrel Bisulfate (CLOPIDOGREL) 75 Mg Tablet, 75 MG PO DAILY for cad for 30 

Days, #30 TAB 2 Refills


   Prov:GALI COSTA MD         7/13/19


Aspirin (ASPIRIN EC) 81 Mg Tablet.dr, 81 MG PO DAILYWBKFT for antiplatelet for 

30 Days, #30 TAB 2 Refills


   Prov:GALI COSTA MD         7/13/19


Clonazepam (CLONAZEPAM **) 0.5 Mg Tablet, 0.5 MG PO TID PRN for ANXIETY / 

AGITATION for 30 Days, #30 TAB


   Prov:TAMMI MENDES MD         2/23/19


Reported Medications


Calcium Acetate (CALCIUM ACETATE) 667 Mg Tablet, 3 TAB PO TID for    for 30 

Days, #270 TAB 0 Refills


   4/9/20


Lisinopril (LISINOPRIL) 40 Mg Tablet, 1 TAB PO BID for   , #30 TAB 5 Refills


   4/9/20


Amlodipine Besylate (AMLODIPINE BESYLATE) 10 Mg Tablet, 10 MG PO DAILY for    , 

TAB


   4/9/20











LLOYD ANDRE MD             Feb 3, 2021 10:20

## 2021-02-03 NOTE — PDOC
[facility] PROGRESS NOTE


Date of Service


DOS:


DATE: 2/3/21 


TIME: 09:23





Chief Complaint


Chief Complaint


Chest pain, HTN


End-stage renal disease on dialysis, noncompliance, acute diastolic CHF,


diabetes, hypertension, hyperlipidemia, possible community-acquired pneumonia, 

ruled out, psoriasis, angioplasty,


thoracentesis, cardiac stents, dialysis catheter placement.





History of Present Illness


History of Present Illness


Ms Ibrahim 66 yo F with multiple comorbidities.  She is well known to

my service.  She basically presented last night with chest pain, rates it at 

6/10, has been occurring for several days, worse with moving, better with si

tting still.  She increased her home meds, but that did not work, described as 

very irritating.  I discussed the case with ER physician.  We are admitting the 

patient with consultation to Cardiology.





1/31: Patient seen and examined


2/1: Patient seen and evaluated. Still with some hypertension today, started on 

hydralazine yesterday. HD today. Per cardiology, outpatient 2D echo and stress 

test.


2/2: Afebrile. Nausea and vomiting today.  BP still elevated.  Emphasize need 

for HD compliance.


2/3: Patient seen and evaluated.  Afebrile, still with some nausea.  Cardiology 

recommended outpatient ischemic evaluation. She is scheduled for HD today, which

will help remove fluid.  If she tolerates HD well today she may discharge home 

afterwards.  Greater than 30 minutes was spent managing the discharge this 

patient.





Vitals/I&O


Vitals/I&O:





                                   Vital Signs








  Date Time  Temp Pulse Resp B/P (MAP) Pulse Ox O2 Delivery O2 Flow Rate FiO2


 


2/3/21 09:06  68      


 


2/3/21 03:10 97.8  18 190/57 (101) 97 Room Air  





 97.8       














                                    I & O   


 


 2/2/21 2/2/21 2/3/21





 15:00 23:00 07:00


 


Intake Total 600 ml 480 ml 120 ml


 


Output Total  100 ml 150 ml


 


Balance 600 ml 380 ml -30 ml











Physical Exam


General:  Alert, Oriented X3, Cooperative, No acute distress


Heart:  Regular rate


Lungs:  Crackles, Other


Abdomen:  Normal bowel sounds, Soft, No tenderness


Extremities:  No cyanosis


Skin:  No breakdown, No significant lesion





Labs


Labs:





Laboratory Tests








Test


 2/2/21


11:42 2/2/21


16:41 2/2/21


21:18 2/3/21


08:21


 


Glucose (Fingerstick)


 245 mg/dL


(70-99) 216 mg/dL


(70-99) 236 mg/dL


(70-99) 





 


White Blood Count


 


 


 


 6.6 x10^3/uL


(4.0-11.0)


 


Red Blood Count


 


 


 


 2.29 x10^6/uL


(3.50-5.40)


 


Hemoglobin


 


 


 


 7.7 g/dL


(12.0-15.5)


 


Hematocrit


 


 


 


 23.2 %


(36.0-47.0)


 


Mean Corpuscular Volume


 


 


 


 101 fL


()


 


Mean Corpuscular Hemoglobin    34 pg (25-35) 


 


Mean Corpuscular Hemoglobin


Concent 


 


 


 33 g/dL


(31-37)


 


Red Cell Distribution Width


 


 


 


 12.5 %


(11.5-14.5)


 


Platelet Count


 


 


 


 138 x10^3/uL


(140-400)


 


Neutrophils (%) (Auto)    70 % (31-73) 


 


Lymphocytes (%) (Auto)    16 % (24-48) 


 


Monocytes (%) (Auto)    10 % (0-9) 


 


Eosinophils (%) (Auto)    3 % (0-3) 


 


Basophils (%) (Auto)    1 % (0-3) 


 


Neutrophils # (Auto)


 


 


 


 4.7 x10^3/uL


(1.8-7.7)


 


Lymphocytes # (Auto)


 


 


 


 1.1 x10^3/uL


(1.0-4.8)


 


Monocytes # (Auto)


 


 


 


 0.6 x10^3/uL


(0.0-1.1)


 


Eosinophils # (Auto)


 


 


 


 0.2 x10^3/uL


(0.0-0.7)


 


Basophils # (Auto)


 


 


 


 0.0 x10^3/uL


(0.0-0.2)


 


Sodium Level


 


 


 


 130 mmol/L


(136-145)


 


Potassium Level


 


 


 


 4.9 mmol/L


(3.5-5.1)


 


Chloride Level


 


 


 


 95 mmol/L


()


 


Carbon Dioxide Level


 


 


 


 30 mmol/L


(21-32)


 


Anion Gap    5 (6-14) 


 


Blood Urea Nitrogen


 


 


 


 52 mg/dL


(7-20)


 


Creatinine


 


 


 


 8.4 mg/dL


(0.6-1.0)


 


Estimated GFR


(Cockcroft-Gault) 


 


 


 4.8 





 


Glucose Level


 


 


 


 138 mg/dL


(70-99)


 


Calcium Level


 


 


 


 8.6 mg/dL


(8.5-10.1)


 


Test


 2/3/21


08:54 


 


 





 


Glucose (Fingerstick)


 149 mg/dL


(70-99) 


 


 














Assessment and Plan


Assessmemt and Plan


Problems


Medical Problems:


(1) Chest pain


Status: Acute  





(2) Hypertension with goal of symptom management only


Status: Acute  











Comment


Review of Relevant


I have reviewed the following items lore (where applicable) has been applied.


Medications:





Current Medications








 Medications


  (Trade)  Dose


 Ordered  Sig/Gume


 Route


 PRN Reason  Start Time


 Stop Time Status Last Admin


Dose Admin


 


 Ondansetron HCl


  (Zofran)  4 mg  PRN Q4HRS  PRN


 IVP


 NAUSEA/VOMITING  2/2/21 13:15


    2/2/21 13:13





 


 Heparin Sodium


  (Porcine)


  (Heparin Sodium)  5,000 unit  Q8HRS


 SQ


   2/3/21 09:00


    2/3/21 09:19














Justifications for Admission


Other Justification














LLOYD ANDRE MD             Feb 3, 2021 09:29

## 2021-02-03 NOTE — NUR
Discharge: Teaching verbal and written. Reviewed medications, follow-up, dialysis, HTN, 
chest pain, ect. patients daughter speaks English and translated for patient. Patient and 
daughter verbalized understanding. Prescriptions sent to pharmacy per physician. All 
belongings with patient. patient assisted off of unit via wheelchair accompanied by daughter 
and RN

## 2021-02-03 NOTE — PDOC
DATE OF SERVICE


DATE: 2/3/21 


TIME: 12:47





SUBJECTIVE


ROS


Stable





OBJECTIVE


Vital Signs





Vital Signs








  Date Time  Temp Pulse Resp B/P (MAP) Pulse Ox O2 Delivery O2 Flow Rate FiO2


 


2/3/21 11:00 97.8 64 18 142/58 (86) 98 Room Air  





 97.8       








I & 0











Intake and Output 


 


 2/3/21





 07:00


 


Intake Total 1200 ml


 


Output Total 250 ml


 


Balance 950 ml


 


 


 


Intake Oral 1200 ml


 


Output Urine Total 250 ml











PHYSICAL EXAM


Physical Exam


General:  Alert, Oriented X3


HEENT:  Atraumatic


Lungs:  Clear to auscultation


Heart:  Regular rate


Abdomen:  Soft, No hepatosplenomegaly


Neuro:  Normal speech


Psych/Mental Status:  Mood NL





DIAGNOSIS/ASSESSMENT


Assessment & Plan





ESRD-- MWF 


Dialysis today, treatment plan dw Aida    


 S/P Lt AVF  recently  





Chest Pain Per cardiology  most probably GI etiology since this is associated 

with nausea and worse in the night 





 history of coronary artery disease  s/p PCI/stent placement to RCA in 2016.  

Her 2D echo in February 2019 showed LVEF 50 to 55%.  Plan for outpatient 2D echo

and Lexiscan nuclear stress test. 





 Accelerated hypertension:cardiology managing  





HyperKalemia resolved  





Anemia-  slow decline since admission . Start DANIEL  





diabetes mellitus type II





HTN-   Antihypertensives





Chronic Non compliance with HD ,





COMMENT/RELEVANT DATA


Meds





Current Medications








 Medications


  (Trade)  Dose


 Ordered  Sig/Gume  Start Time


 Stop Time Status Last Admin


Dose Admin


 


 Albumin Human  200 ml @ 


 200 mls/hr  1X PRN  PRN  2/3/21 12:30


 2/3/21 18:29   





 


 Amlodipine


 Besylate


  (Norvasc)  10 mg  DAILY  1/30/21 11:30


    2/3/21 09:06


10 MG


 


 Aspirin


  (Ecotrin)  81 mg  DAILYWBKFT  1/30/21 11:30


    2/3/21 09:07


81 MG


 


 Atorvastatin


 Calcium


  (Lipitor)  80 mg  QHS  1/30/21 21:00


    2/2/21 21:16


80 MG


 


 Calcium Acetate


  (Phoslo)  2,001 mg  TIDWMEALS  1/30/21 12:00


    2/3/21 09:05


2,001 MG


 


 Carvedilol


  (Coreg)  25 mg  BIDWMEALS  1/30/21 11:30


 1/30/21 13:28 DC 1/30/21 11:31


25 MG


 


 Clonazepam


  (KlonoPIN)  0.5 mg  TID  PRN  1/30/21 10:45


    2/1/21 21:09


0.5 MG


 


 Clopidogrel


 Bisulfate


  (Plavix)  75 mg  DAILY  1/30/21 11:30


    2/3/21 09:11


75 MG


 


 Darbepoetin Valeriano


  (ARANESP for


 DIALYSIS PTS)  60 mcg  WEEKLYHS  2/1/21 21:00


    2/1/21 21:15


60 MCG


 


 Furosemide


  (Lasix)  40 mg  BID94  1/30/21 11:30


    2/3/21 09:07


40 MG


 


 Heparin Sodium


  (Porcine)


  (Heparin Sodium)  5,000 unit  Q8HRS  2/3/21 09:00


    2/3/21 09:19


5,000 UNIT


 


 Hydralazine HCl


  (Apresoline Inj)  10 mg  PRN Q4HRS  PRN  2/1/21 14:15


    2/1/21 14:29


10 MG


 


 Hydralazine HCl


  (Apresoline)  50 mg  TID  2/1/21 14:00


    2/2/21 21:17


50 MG


 


 Info


  (PHARMACY


 MONITORING -- do


 not chart)  1 each  PRN DAILY  PRN  2/3/21 12:30


     





 


 Insulin Human


 Lispro


  (HumaLOG)  0-5 UNITS  TIDWMEALS  2/3/21 08:00


     





 


 Isosorbide


 Mononitrate


  (Imdur)  30 mg  DAILY  1/30/21 11:30


    2/3/21 09:05


30 MG


 


 Labetalol HCl


  (Normodyne Iv


 Push)  10 mg  1X  ONCE  2/1/21 15:30


 2/1/21 15:31 DC 2/1/21 15:34


10 MG


 


 Labetalol HCl


  (Trandate)  400 mg  BID  2/1/21 21:00


    2/3/21 09:06


400 MG


 


 Lisinopril


  (Prinivil)  40 mg  DAILY  1/30/21 13:30


    2/3/21 09:05


40 MG


 


 Morphine Sulfate


  (Morphine


 Sulfate)  2 mg  PRN Q2HR  PRN  2/1/21 13:30


 2/2/21 13:47 DC  





 


 Nitroglycerin


  (Nitro-Bid Oint)  1 inch  1X  ONCE  1/29/21 17:45


 1/29/21 17:46 DC 1/29/21 18:22


1 INCH


 


 Nitroglycerin


  (Nitrostat)  0.4 mg  PRN Q5MIN  PRN  1/29/21 17:15


     





 


 Ondansetron HCl


  (Zofran)  4 mg  PRN Q4HRS  PRN  2/2/21 13:15


    2/2/21 13:13


4 MG


 


 Sodium Chloride  1,000 ml @ 


 400 mls/hr  Q2H30M PRN  2/3/21 12:30


 2/4/21 00:29   





 


 Sodium Chloride


  (Normal Saline


 Flush)  10 ml  1X PRN  PRN  2/3/21 12:30


 2/4/21 12:29   





 


 Tramadol HCl


  (Ultram)  50 mg  PRN Q4HRS  PRN  2/1/21 13:30


    2/1/21 21:14


50 MG








Lab





Laboratory Tests








Test


 2/2/21


16:41 2/2/21


21:18 2/3/21


08:21 2/3/21


08:54


 


Glucose (Fingerstick)


 216 mg/dL


(70-99) 236 mg/dL


(70-99) 


 149 mg/dL


(70-99)


 


White Blood Count


 


 


 6.6 x10^3/uL


(4.0-11.0) 





 


Red Blood Count


 


 


 2.29 x10^6/uL


(3.50-5.40) 





 


Hemoglobin


 


 


 7.7 g/dL


(12.0-15.5) 





 


Hematocrit


 


 


 23.2 %


(36.0-47.0) 





 


Mean Corpuscular Volume


 


 


 101 fL


() 





 


Mean Corpuscular Hemoglobin   34 pg (25-35)  


 


Mean Corpuscular Hemoglobin


Concent 


 


 33 g/dL


(31-37) 





 


Red Cell Distribution Width


 


 


 12.5 %


(11.5-14.5) 





 


Platelet Count


 


 


 138 x10^3/uL


(140-400) 





 


Neutrophils (%) (Auto)   70 % (31-73)  


 


Lymphocytes (%) (Auto)   16 % (24-48)  


 


Monocytes (%) (Auto)   10 % (0-9)  


 


Eosinophils (%) (Auto)   3 % (0-3)  


 


Basophils (%) (Auto)   1 % (0-3)  


 


Neutrophils # (Auto)


 


 


 4.7 x10^3/uL


(1.8-7.7) 





 


Lymphocytes # (Auto)


 


 


 1.1 x10^3/uL


(1.0-4.8) 





 


Monocytes # (Auto)


 


 


 0.6 x10^3/uL


(0.0-1.1) 





 


Eosinophils # (Auto)


 


 


 0.2 x10^3/uL


(0.0-0.7) 





 


Basophils # (Auto)


 


 


 0.0 x10^3/uL


(0.0-0.2) 





 


Sodium Level


 


 


 130 mmol/L


(136-145) 





 


Potassium Level


 


 


 4.9 mmol/L


(3.5-5.1) 





 


Chloride Level


 


 


 95 mmol/L


() 





 


Carbon Dioxide Level


 


 


 30 mmol/L


(21-32) 





 


Anion Gap   5 (6-14)  


 


Blood Urea Nitrogen


 


 


 52 mg/dL


(7-20) 





 


Creatinine


 


 


 8.4 mg/dL


(0.6-1.0) 





 


Estimated GFR


(Cockcroft-Gault) 


 


 4.8 


 





 


Glucose Level


 


 


 138 mg/dL


(70-99) 





 


Calcium Level


 


 


 8.6 mg/dL


(8.5-10.1) 





 


Test


 2/3/21


12:07 


 


 





 


Glucose (Fingerstick)


 173 mg/dL


(70-99) 


 


 











Results


All relevant outside records, renal labs, imaging studies, telemetry/EKG's were 

reviewed.





Justicifation of Admission Dx:


Justifications for Admission:


Justification of Admission Dx:  N/A











MABLE DIAS MD                 Feb 3, 2021 12:51

## 2021-02-03 NOTE — PDOC3
Discharge Summary


Visit Information


Date of Admission:  Jan 29, 2021


Date of Discharge:  Feb 3, 2021


Final Diagnosis


Problems


Medical Problems:


(1) Chest pain


Status: Acute  





(2) Hypertension with goal of symptom management only


Status: Acute  











Brief Hospital Course


Allergies





                                    Allergies








Coded Allergies Type Severity Reaction Last Updated Verified


 


  No Known Drug Allergies    8/6/18 No








Vital Signs





Vital Signs








  Date Time  Temp Pulse Resp B/P (MAP) Pulse Ox O2 Delivery O2 Flow Rate FiO2


 


2/3/21 09:06  68      


 


2/3/21 07:00 97.8  18 140/50 (80) 99 Room Air  





 97.8       








Lab Results





Laboratory Tests








Test


 2/1/21


16:35 2/1/21


20:53 2/2/21


07:45 2/2/21


07:49


 


Glucose (Fingerstick)


 212 mg/dL


(70-99) 210 mg/dL


(70-99) 


 157 mg/dL


(70-99)


 


White Blood Count


 


 


 5.1 x10^3/uL


(4.0-11.0) 





 


Red Blood Count


 


 


 2.22 x10^6/uL


(3.50-5.40) 





 


Hemoglobin


 


 


 7.4 g/dL


(12.0-15.5) 





 


Hematocrit


 


 


 22.6 %


(36.0-47.0) 





 


Mean Corpuscular Volume


 


 


 102 fL


() 





 


Mean Corpuscular Hemoglobin   33 pg (25-35)  


 


Mean Corpuscular Hemoglobin


Concent 


 


 33 g/dL


(31-37) 





 


Red Cell Distribution Width


 


 


 12.7 %


(11.5-14.5) 





 


Platelet Count


 


 


 140 x10^3/uL


(140-400) 





 


Neutrophils (%) (Auto)   56 % (31-73)  


 


Lymphocytes (%) (Auto)   28 % (24-48)  


 


Monocytes (%) (Auto)   12 % (0-9)  


 


Eosinophils (%) (Auto)   3 % (0-3)  


 


Basophils (%) (Auto)   1 % (0-3)  


 


Neutrophils # (Auto)


 


 


 2.9 x10^3/uL


(1.8-7.7) 





 


Lymphocytes # (Auto)


 


 


 1.4 x10^3/uL


(1.0-4.8) 





 


Monocytes # (Auto)


 


 


 0.6 x10^3/uL


(0.0-1.1) 





 


Eosinophils # (Auto)


 


 


 0.2 x10^3/uL


(0.0-0.7) 





 


Basophils # (Auto)


 


 


 0.0 x10^3/uL


(0.0-0.2) 





 


Sodium Level


 


 


 135 mmol/L


(136-145) 





 


Potassium Level


 


 


 4.5 mmol/L


(3.5-5.1) 





 


Chloride Level


 


 


 96 mmol/L


() 





 


Carbon Dioxide Level


 


 


 29 mmol/L


(21-32) 





 


Anion Gap   10 (6-14)  


 


Blood Urea Nitrogen


 


 


 38 mg/dL


(7-20) 





 


Creatinine


 


 


 6.5 mg/dL


(0.6-1.0) 





 


Estimated GFR


(Cockcroft-Gault) 


 


 6.5 


 





 


Glucose Level


 


 


 158 mg/dL


(70-99) 





 


Calcium Level


 


 


 8.4 mg/dL


(8.5-10.1) 





 


Test


 2/2/21


11:42 2/2/21


16:41 2/2/21


21:18 2/3/21


08:21


 


Glucose (Fingerstick)


 245 mg/dL


(70-99) 216 mg/dL


(70-99) 236 mg/dL


(70-99) 





 


White Blood Count


 


 


 


 6.6 x10^3/uL


(4.0-11.0)


 


Red Blood Count


 


 


 


 2.29 x10^6/uL


(3.50-5.40)


 


Hemoglobin


 


 


 


 7.7 g/dL


(12.0-15.5)


 


Hematocrit


 


 


 


 23.2 %


(36.0-47.0)


 


Mean Corpuscular Volume


 


 


 


 101 fL


()


 


Mean Corpuscular Hemoglobin    34 pg (25-35) 


 


Mean Corpuscular Hemoglobin


Concent 


 


 


 33 g/dL


(31-37)


 


Red Cell Distribution Width


 


 


 


 12.5 %


(11.5-14.5)


 


Platelet Count


 


 


 


 138 x10^3/uL


(140-400)


 


Neutrophils (%) (Auto)    70 % (31-73) 


 


Lymphocytes (%) (Auto)    16 % (24-48) 


 


Monocytes (%) (Auto)    10 % (0-9) 


 


Eosinophils (%) (Auto)    3 % (0-3) 


 


Basophils (%) (Auto)    1 % (0-3) 


 


Neutrophils # (Auto)


 


 


 


 4.7 x10^3/uL


(1.8-7.7)


 


Lymphocytes # (Auto)


 


 


 


 1.1 x10^3/uL


(1.0-4.8)


 


Monocytes # (Auto)


 


 


 


 0.6 x10^3/uL


(0.0-1.1)


 


Eosinophils # (Auto)


 


 


 


 0.2 x10^3/uL


(0.0-0.7)


 


Basophils # (Auto)


 


 


 


 0.0 x10^3/uL


(0.0-0.2)


 


Sodium Level


 


 


 


 130 mmol/L


(136-145)


 


Potassium Level


 


 


 


 4.9 mmol/L


(3.5-5.1)


 


Chloride Level


 


 


 


 95 mmol/L


()


 


Carbon Dioxide Level


 


 


 


 30 mmol/L


(21-32)


 


Anion Gap    5 (6-14) 


 


Blood Urea Nitrogen


 


 


 


 52 mg/dL


(7-20)


 


Creatinine


 


 


 


 8.4 mg/dL


(0.6-1.0)


 


Estimated GFR


(Cockcroft-Gault) 


 


 


 4.8 





 


Glucose Level


 


 


 


 138 mg/dL


(70-99)


 


Calcium Level


 


 


 


 8.6 mg/dL


(8.5-10.1)


 


Test


 2/3/21


08:54 


 


 





 


Glucose (Fingerstick)


 149 mg/dL


(70-99) 


 


 











Laboratory Tests








Test


 2/2/21


11:42 2/2/21


16:41 2/2/21


21:18 2/3/21


08:21


 


Glucose (Fingerstick)


 245 mg/dL


(70-99) 216 mg/dL


(70-99) 236 mg/dL


(70-99) 





 


White Blood Count


 


 


 


 6.6 x10^3/uL


(4.0-11.0)


 


Red Blood Count


 


 


 


 2.29 x10^6/uL


(3.50-5.40)


 


Hemoglobin


 


 


 


 7.7 g/dL


(12.0-15.5)


 


Hematocrit


 


 


 


 23.2 %


(36.0-47.0)


 


Mean Corpuscular Volume


 


 


 


 101 fL


()


 


Mean Corpuscular Hemoglobin    34 pg (25-35) 


 


Mean Corpuscular Hemoglobin


Concent 


 


 


 33 g/dL


(31-37)


 


Red Cell Distribution Width


 


 


 


 12.5 %


(11.5-14.5)


 


Platelet Count


 


 


 


 138 x10^3/uL


(140-400)


 


Neutrophils (%) (Auto)    70 % (31-73) 


 


Lymphocytes (%) (Auto)    16 % (24-48) 


 


Monocytes (%) (Auto)    10 % (0-9) 


 


Eosinophils (%) (Auto)    3 % (0-3) 


 


Basophils (%) (Auto)    1 % (0-3) 


 


Neutrophils # (Auto)


 


 


 


 4.7 x10^3/uL


(1.8-7.7)


 


Lymphocytes # (Auto)


 


 


 


 1.1 x10^3/uL


(1.0-4.8)


 


Monocytes # (Auto)


 


 


 


 0.6 x10^3/uL


(0.0-1.1)


 


Eosinophils # (Auto)


 


 


 


 0.2 x10^3/uL


(0.0-0.7)


 


Basophils # (Auto)


 


 


 


 0.0 x10^3/uL


(0.0-0.2)


 


Sodium Level


 


 


 


 130 mmol/L


(136-145)


 


Potassium Level


 


 


 


 4.9 mmol/L


(3.5-5.1)


 


Chloride Level


 


 


 


 95 mmol/L


()


 


Carbon Dioxide Level


 


 


 


 30 mmol/L


(21-32)


 


Anion Gap    5 (6-14) 


 


Blood Urea Nitrogen


 


 


 


 52 mg/dL


(7-20)


 


Creatinine


 


 


 


 8.4 mg/dL


(0.6-1.0)


 


Estimated GFR


(Cockcroft-Gault) 


 


 


 4.8 





 


Glucose Level


 


 


 


 138 mg/dL


(70-99)


 


Calcium Level


 


 


 


 8.6 mg/dL


(8.5-10.1)


 


Test


 2/3/21


08:54 


 


 





 


Glucose (Fingerstick)


 149 mg/dL


(70-99) 


 


 











Brief Hospital Course


Ms. Ibrahim  is a 65 old female with history of seizure history who 

presented with missed episodes of hemodialysis, accelerated hypertension, 

elevated troponins, acute on chronic diastolic congestive heart failure.  

Consultations placed to nephrology and cardiology.  She had several rounds of 

hemodialysis on her regular schedule Monday/Wednesday/Friday.  Cardiology 

recommended fluid offloading per HD.  He was recommended to follow-up with 

cardiology outpatient for MPI and TTE.  After several rounds of hemodialysis 

patient felt comfortable discharging home with cardiology follow-up.





Discharge Information


Condition at Discharge:  Improved


Follow Up:  Weeks


Disposition/Orders:  D/C to Home w/ HH


Scheduled


Amlodipine Besylate (Amlodipine Besylate) 10 Mg Tablet, 10 MG PO DAILY for    , 

(Reported)


   Entered as Reported by: Juan David Apodaca on 4/9/20 2226


   Last Action: Continued on 1/30/21 1054 by JB NEELY


Aspirin (Aspirin Ec) 81 Mg Tablet., 81 MG PO DAILYWBKFT for antiplatelet for 

30 Days, #30 Ref 2


   Prescribed by: GALI COSTA MD on 7/13/19 1329


   Last Action: Continued on 1/30/21 1054 by JB NEELY


Atorvastatin Calcium (Atorvastatin Calcium) 40 Mg Tablet, 80 MG PO QHS for hld 

for 30 Days, #60 Ref 2


   Can replace with 80mg Tabs #30 per month 


   Prescribed by: GALI COSTA MD on 7/13/19 1329


   Last Action: Continued on 1/30/21 1054 by JB NEELY


Calcium Acetate (Calcium Acetate) 667 Mg Tablet, 3 TAB PO TID for    for 30 

Days, #270 Ref 0 (Reported)


   Entered as Reported by: Juan David Apodaca on 4/9/20 2225


   Last Action: Converted on 1/30/21 1054 by JB NEELY


Carvedilol (Carvedilol) 25 Mg Tablet, 25 MG PO BIDWMEALS for CARDIAC for 30 

Days, #60 Ref 2


   Prescribed by: GALI COSTA MD on 7/13/19 1329


   Last Action: Converted on 1/30/21 1054 by JB NEELY


Clopidogrel Bisulfate (Clopidogrel) 75 Mg Tablet, 75 MG PO DAILY for cad for 30 

Days, #30 Ref 2


   Prescribed by: GALI COSTA MD on 7/13/19 1329


   Last Action: Continued on 1/30/21 1054 by JB NEELY


Furosemide (Furosemide) 40 Mg Tablet, 40 MG PO BID for water pill for 30 Days, 

#60 Ref 2


   Prescribed by: GALI COSTA MD on 7/13/19 1329


   Last Action: Continued on 1/30/21 1054 by JB NEELY


Isosorbide Mononitrate (Isosorbide Mononitrate Er) 30 Mg Tab.er.24h, 30 MG PO 

DAILY for blood pressure for 30 Days, #30 Ref 2


   Prescribed by: GALI COSTA MD on 7/13/19 1329


   Last Action: Continued on 1/30/21 1054 by JB NEELY


Lisinopril (Lisinopril) 40 Mg Tablet, 1 TAB PO BID for   , #30 Ref 5 (Reported)


   Entered as Reported by: Juan David Apodaca on 4/9/20 2225


   Last Action: Continued on 1/30/21 1054 by JB NEELY


Ondansetron Hcl (Zofran) 4 Mg Tablet, 1 TAB PO Q8HRS for Nausea, #10 Ref 0


   Prescribed by: LLOYD ANDRE MD on 2/3/21 1022





Scheduled PRN


Albuterol Sulfate (Proair Hfa Inhaler) 8.5 Gm Hfa.aer.ad, 1 PUFF INH PRN Q6HRS 

PRN for High Potassium for 30 Days, #1 Ref 2


   Prescribed by: GALI COSTA MD on 7/13/19 1331


Clonazepam (Clonazepam **) 0.5 Mg Tablet, 0.5 MG PO TID PRN for ANXIETY / 

AGITATION for 30 Days, #30


   Prescribed by: TAMMI MENDES MD on 2/23/19 0952


   Last Action: Continued on 1/30/21 1054 by JB NEELY





Justicifation of Admission Dx:


Justifications for Admission:


Justification of Admission Dx:  N/A











LLOYD ANDRE MD             Feb 3, 2021 10:31

## 2021-02-03 NOTE — EKG
[facility]

              [address] [place] [address]

Test Date:    2021               Test Time:    15:42:52

Pat Name:     GANGA Solerpartment:   

Patient ID:   [id number]           Room:         261 1

Gender:       F                        Technician:   

:          [id number]               Requested By: HANG CARDONA

Order Number: [id number]           Reading MD:   Marvin Sanders

                                 Measurements

Intervals                              Axis          

Rate:         86                       P:            38

AR:           174                      QRS:          -3

QRSD:         94                       T:            118

QT:           388                                    

QTc:          467                                    

                           Interpretive Statements

SINUS RHYTHM

LEFTWARD AXIS

LVH WITH REPOLARIZATION ABNORMALITY

ABNORMAL ECG

Electronically Signed On 2021 14:47:30 CST by Marvin Sanders

## 2021-02-03 NOTE — NUR
SS following up with discharge planning. SS reviewed pt chart and discussed with pt RN. Pt 
is currently on room air. COVID19 negative. PT/OT recommended home with assistance. 
Discharge plan is to home when medically ready. Pt has outpatient dialysis at [facility]. SS contacted pt's family and discussed home healthcare. Pt is Mongolian 
speaking only. Pt's family declined home healthcare. Probable discharge to home today if 
blood pressure is good. SS will continue to follow for discharge planning.